# Patient Record
Sex: MALE | Race: WHITE | NOT HISPANIC OR LATINO | Employment: OTHER | URBAN - METROPOLITAN AREA
[De-identification: names, ages, dates, MRNs, and addresses within clinical notes are randomized per-mention and may not be internally consistent; named-entity substitution may affect disease eponyms.]

---

## 2017-08-15 ENCOUNTER — ALLSCRIPTS OFFICE VISIT (OUTPATIENT)
Dept: OTHER | Facility: OTHER | Age: 72
End: 2017-08-15

## 2017-08-15 ENCOUNTER — TRANSCRIBE ORDERS (OUTPATIENT)
Dept: ADMINISTRATIVE | Facility: HOSPITAL | Age: 72
End: 2017-08-15

## 2017-08-15 ENCOUNTER — APPOINTMENT (OUTPATIENT)
Dept: LAB | Facility: HOSPITAL | Age: 72
End: 2017-08-15
Attending: UROLOGY
Payer: COMMERCIAL

## 2017-08-15 DIAGNOSIS — R35.0 FREQUENCY OF MICTURITION: ICD-10-CM

## 2017-08-15 DIAGNOSIS — Z85.46 PERSONAL HISTORY OF MALIGNANT NEOPLASM OF PROSTATE: ICD-10-CM

## 2017-08-15 DIAGNOSIS — N28.1 RENAL CYST: Primary | ICD-10-CM

## 2017-08-15 DIAGNOSIS — N28.1 ACQUIRED CYST OF KIDNEY: ICD-10-CM

## 2017-08-15 LAB
BILIRUB UR QL STRIP: ABNORMAL
CLARITY UR: ABNORMAL
COLOR UR: ABNORMAL
GLUCOSE (HISTORICAL): ABNORMAL
HGB UR QL STRIP.AUTO: ABNORMAL
KETONES UR STRIP-MCNC: ABNORMAL MG/DL
LEUKOCYTE ESTERASE UR QL STRIP: ABNORMAL
NITRITE UR QL STRIP: POSITIVE
PH UR STRIP.AUTO: 5 [PH]
PROT UR STRIP-MCNC: ABNORMAL MG/DL
SP GR UR STRIP.AUTO: 1.02
UROBILINOGEN UR QL STRIP.AUTO: 0.2

## 2017-08-15 PROCEDURE — 87086 URINE CULTURE/COLONY COUNT: CPT

## 2017-08-16 LAB — BACTERIA UR CULT: NORMAL

## 2017-08-17 ENCOUNTER — GENERIC CONVERSION - ENCOUNTER (OUTPATIENT)
Dept: OTHER | Facility: OTHER | Age: 72
End: 2017-08-17

## 2017-09-13 ENCOUNTER — GENERIC CONVERSION - ENCOUNTER (OUTPATIENT)
Dept: OTHER | Facility: OTHER | Age: 72
End: 2017-09-13

## 2017-11-01 ENCOUNTER — TRANSCRIBE ORDERS (OUTPATIENT)
Dept: LAB | Facility: CLINIC | Age: 72
End: 2017-11-01

## 2017-11-01 ENCOUNTER — APPOINTMENT (OUTPATIENT)
Dept: LAB | Facility: CLINIC | Age: 72
End: 2017-11-01
Payer: COMMERCIAL

## 2017-11-01 DIAGNOSIS — Z85.46 PERSONAL HISTORY OF MALIGNANT NEOPLASM OF PROSTATE: ICD-10-CM

## 2017-11-01 LAB
ALBUMIN SERPL BCP-MCNC: 3.7 G/DL (ref 3.5–5)
ALP SERPL-CCNC: 72 U/L (ref 46–116)
ALT SERPL W P-5'-P-CCNC: 31 U/L (ref 12–78)
ANION GAP SERPL CALCULATED.3IONS-SCNC: 6 MMOL/L (ref 4–13)
AST SERPL W P-5'-P-CCNC: 20 U/L (ref 5–45)
BILIRUB SERPL-MCNC: 0.41 MG/DL (ref 0.2–1)
BUN SERPL-MCNC: 13 MG/DL (ref 5–25)
CALCIUM SERPL-MCNC: 9.7 MG/DL (ref 8.3–10.1)
CHLORIDE SERPL-SCNC: 106 MMOL/L (ref 100–108)
CO2 SERPL-SCNC: 29 MMOL/L (ref 21–32)
CREAT SERPL-MCNC: 0.96 MG/DL (ref 0.6–1.3)
GFR SERPL CREATININE-BSD FRML MDRD: 79 ML/MIN/1.73SQ M
GLUCOSE SERPL-MCNC: 82 MG/DL (ref 65–140)
POTASSIUM SERPL-SCNC: 4 MMOL/L (ref 3.5–5.3)
PROT SERPL-MCNC: 7 G/DL (ref 6.4–8.2)
PSA SERPL-MCNC: <0.1 NG/ML (ref 0–4)
SODIUM SERPL-SCNC: 141 MMOL/L (ref 136–145)

## 2017-11-01 PROCEDURE — 36415 COLL VENOUS BLD VENIPUNCTURE: CPT

## 2017-11-01 PROCEDURE — 80053 COMPREHEN METABOLIC PANEL: CPT

## 2017-11-01 PROCEDURE — 84153 ASSAY OF PSA TOTAL: CPT

## 2017-11-16 DIAGNOSIS — Z85.46 PERSONAL HISTORY OF MALIGNANT NEOPLASM OF PROSTATE: ICD-10-CM

## 2017-11-16 DIAGNOSIS — N28.1 ACQUIRED CYST OF KIDNEY: ICD-10-CM

## 2017-12-13 ENCOUNTER — ALLSCRIPTS OFFICE VISIT (OUTPATIENT)
Dept: OTHER | Facility: OTHER | Age: 72
End: 2017-12-13

## 2017-12-13 LAB
BILIRUB UR QL STRIP: ABNORMAL
CLARITY UR: ABNORMAL
COLOR UR: YELLOW
GLUCOSE (HISTORICAL): ABNORMAL
HGB UR QL STRIP.AUTO: ABNORMAL
KETONES UR STRIP-MCNC: ABNORMAL MG/DL
LEUKOCYTE ESTERASE UR QL STRIP: ABNORMAL
NITRITE UR QL STRIP: ABNORMAL
PH UR STRIP.AUTO: 7 [PH]
PROT UR STRIP-MCNC: ABNORMAL MG/DL
SP GR UR STRIP.AUTO: 1.01
UROBILINOGEN UR QL STRIP.AUTO: 0.2

## 2018-01-04 ENCOUNTER — GENERIC CONVERSION - ENCOUNTER (OUTPATIENT)
Dept: OTHER | Facility: OTHER | Age: 73
End: 2018-01-04

## 2018-01-13 NOTE — MISCELLANEOUS
Message   Recorded as Task   Date: 08/15/2017 03:43 PM, Created By: Jonah Rivero   Task Name: Miscellaneous   Assigned To: Breann Garcia   Regarding Patient: Miladis Lubin, Status: In Progress   Comment:    ErasmolazaraRupali pham - 15 Aug 2017 3:43 PM     TASK CREATED  ANGIE IS COMING IN FOR A LUPRON INJECTION AND OV WITH DR Sima Ramírez ON 9/13/17 AT 11:00  FIRST TIME LUPRON   Breann Garcia - 16 Aug 2017 11:47 AM     TASK EDITED  Will submit Benefit Verification  Breann Garcia - 16 Aug 2017 11:47 AM     TASK IN PROGRESS        Active Problems    1  Complex renal cyst (753 10) (N28 1)   2  Malignant neoplasm of prostate (185) (C61)   3  Urinary frequency (788 41) (R35 0)    Allergies    1   No Known Drug Allergies    Signatures   Electronically signed by : Billie Vieira RN; Aug 17 2017  3:58PM EST                       (Author)

## 2018-01-22 VITALS
WEIGHT: 254 LBS | BODY MASS INDEX: 38.49 KG/M2 | HEIGHT: 68 IN | SYSTOLIC BLOOD PRESSURE: 118 MMHG | DIASTOLIC BLOOD PRESSURE: 78 MMHG

## 2018-01-22 VITALS
WEIGHT: 248 LBS | DIASTOLIC BLOOD PRESSURE: 82 MMHG | HEIGHT: 68 IN | BODY MASS INDEX: 37.59 KG/M2 | SYSTOLIC BLOOD PRESSURE: 132 MMHG

## 2018-01-23 VITALS
DIASTOLIC BLOOD PRESSURE: 84 MMHG | SYSTOLIC BLOOD PRESSURE: 130 MMHG | HEIGHT: 68 IN | BODY MASS INDEX: 37.28 KG/M2 | WEIGHT: 246 LBS

## 2018-01-24 VITALS — BODY MASS INDEX: 39.68 KG/M2 | WEIGHT: 261 LBS

## 2018-03-01 ENCOUNTER — TELEPHONE (OUTPATIENT)
Dept: UROLOGY | Facility: MEDICAL CENTER | Age: 73
End: 2018-03-01

## 2019-06-17 ENCOUNTER — TRANSCRIBE ORDERS (OUTPATIENT)
Dept: LAB | Facility: MEDICAL CENTER | Age: 74
End: 2019-06-17

## 2019-06-17 ENCOUNTER — APPOINTMENT (OUTPATIENT)
Dept: LAB | Facility: MEDICAL CENTER | Age: 74
End: 2019-06-17
Payer: COMMERCIAL

## 2019-06-17 DIAGNOSIS — N18.9 CHRONIC RENAL IMPAIRMENT, UNSPECIFIED CKD STAGE: ICD-10-CM

## 2019-06-17 DIAGNOSIS — C61 PROSTATE CA (HCC): ICD-10-CM

## 2019-06-17 DIAGNOSIS — M19.90 ARTHRITIS: ICD-10-CM

## 2019-06-17 DIAGNOSIS — R73.9 ELEVATED BLOOD SUGAR: Primary | ICD-10-CM

## 2019-06-17 DIAGNOSIS — R53.83 OTHER FATIGUE: ICD-10-CM

## 2019-06-17 DIAGNOSIS — R73.9 ELEVATED BLOOD SUGAR: ICD-10-CM

## 2019-06-17 DIAGNOSIS — G20 PARKINSON'S DISEASE (HCC): ICD-10-CM

## 2019-06-17 DIAGNOSIS — E78.5 HYPERLIPIDEMIA, UNSPECIFIED HYPERLIPIDEMIA TYPE: ICD-10-CM

## 2019-06-17 LAB
ALBUMIN SERPL BCP-MCNC: 3.8 G/DL (ref 3.5–5)
ALP SERPL-CCNC: 75 U/L (ref 46–116)
ALT SERPL W P-5'-P-CCNC: 34 U/L (ref 12–78)
ANION GAP SERPL CALCULATED.3IONS-SCNC: 3 MMOL/L (ref 4–13)
AST SERPL W P-5'-P-CCNC: 25 U/L (ref 5–45)
BILIRUB SERPL-MCNC: 0.35 MG/DL (ref 0.2–1)
BUN SERPL-MCNC: 25 MG/DL (ref 5–25)
CALCIUM SERPL-MCNC: 9.3 MG/DL (ref 8.3–10.1)
CHLORIDE SERPL-SCNC: 109 MMOL/L (ref 100–108)
CHOLEST SERPL-MCNC: 177 MG/DL (ref 50–200)
CO2 SERPL-SCNC: 27 MMOL/L (ref 21–32)
CREAT SERPL-MCNC: 1.56 MG/DL (ref 0.6–1.3)
ERYTHROCYTE [DISTWIDTH] IN BLOOD BY AUTOMATED COUNT: 13.5 % (ref 11.6–15.1)
EST. AVERAGE GLUCOSE BLD GHB EST-MCNC: 114 MG/DL
GFR SERPL CREATININE-BSD FRML MDRD: 43 ML/MIN/1.73SQ M
GLUCOSE P FAST SERPL-MCNC: 89 MG/DL (ref 65–99)
HBA1C MFR BLD: 5.6 % (ref 4.2–6.3)
HCT VFR BLD AUTO: 46.4 % (ref 36.5–49.3)
HDLC SERPL-MCNC: 27 MG/DL (ref 40–60)
HGB BLD-MCNC: 14.4 G/DL (ref 12–17)
LDLC SERPL CALC-MCNC: 109 MG/DL (ref 0–100)
MCH RBC QN AUTO: 27.5 PG (ref 26.8–34.3)
MCHC RBC AUTO-ENTMCNC: 31 G/DL (ref 31.4–37.4)
MCV RBC AUTO: 89 FL (ref 82–98)
NONHDLC SERPL-MCNC: 150 MG/DL
PLATELET # BLD AUTO: 209 THOUSANDS/UL (ref 149–390)
PMV BLD AUTO: 10.6 FL (ref 8.9–12.7)
POTASSIUM SERPL-SCNC: 4.7 MMOL/L (ref 3.5–5.3)
PROT SERPL-MCNC: 7.3 G/DL (ref 6.4–8.2)
PSA SERPL-MCNC: <0.1 NG/ML (ref 0–4)
RBC # BLD AUTO: 5.23 MILLION/UL (ref 3.88–5.62)
SODIUM SERPL-SCNC: 139 MMOL/L (ref 136–145)
TRIGL SERPL-MCNC: 205 MG/DL
WBC # BLD AUTO: 6.11 THOUSAND/UL (ref 4.31–10.16)

## 2019-06-17 PROCEDURE — 85027 COMPLETE CBC AUTOMATED: CPT

## 2019-06-17 PROCEDURE — 83036 HEMOGLOBIN GLYCOSYLATED A1C: CPT

## 2019-06-17 PROCEDURE — 80053 COMPREHEN METABOLIC PANEL: CPT

## 2019-06-17 PROCEDURE — G0103 PSA SCREENING: HCPCS

## 2019-06-17 PROCEDURE — 36415 COLL VENOUS BLD VENIPUNCTURE: CPT

## 2019-06-17 PROCEDURE — 80061 LIPID PANEL: CPT

## 2019-12-18 ENCOUNTER — TRANSCRIBE ORDERS (OUTPATIENT)
Dept: LAB | Facility: MEDICAL CENTER | Age: 74
End: 2019-12-18

## 2019-12-18 ENCOUNTER — APPOINTMENT (OUTPATIENT)
Dept: LAB | Facility: MEDICAL CENTER | Age: 74
End: 2019-12-18
Payer: COMMERCIAL

## 2019-12-18 DIAGNOSIS — G20 PARKINSON'S DISEASE (HCC): ICD-10-CM

## 2019-12-18 DIAGNOSIS — M10.9 GOUT, UNSPECIFIED CAUSE, UNSPECIFIED CHRONICITY, UNSPECIFIED SITE: ICD-10-CM

## 2019-12-18 DIAGNOSIS — M25.519 SHOULDER PAIN, UNSPECIFIED CHRONICITY, UNSPECIFIED LATERALITY: ICD-10-CM

## 2019-12-18 DIAGNOSIS — M25.50 ARTHRALGIA, UNSPECIFIED JOINT: ICD-10-CM

## 2019-12-18 DIAGNOSIS — E53.8 LOW VITAMIN B12 LEVEL: ICD-10-CM

## 2019-12-18 DIAGNOSIS — M25.519 SHOULDER PAIN, UNSPECIFIED CHRONICITY, UNSPECIFIED LATERALITY: Primary | ICD-10-CM

## 2019-12-18 LAB
ALBUMIN SERPL BCP-MCNC: 3.8 G/DL (ref 3.5–5)
ALP SERPL-CCNC: 78 U/L (ref 46–116)
ALT SERPL W P-5'-P-CCNC: 35 U/L (ref 12–78)
ANION GAP SERPL CALCULATED.3IONS-SCNC: 5 MMOL/L (ref 4–13)
AST SERPL W P-5'-P-CCNC: 17 U/L (ref 5–45)
BILIRUB SERPL-MCNC: 0.54 MG/DL (ref 0.2–1)
BUN SERPL-MCNC: 19 MG/DL (ref 5–25)
CALCIUM SERPL-MCNC: 9.7 MG/DL (ref 8.3–10.1)
CHLORIDE SERPL-SCNC: 110 MMOL/L (ref 100–108)
CHOLEST SERPL-MCNC: 200 MG/DL (ref 50–200)
CO2 SERPL-SCNC: 26 MMOL/L (ref 21–32)
CREAT SERPL-MCNC: 1.58 MG/DL (ref 0.6–1.3)
ERYTHROCYTE [DISTWIDTH] IN BLOOD BY AUTOMATED COUNT: 13.2 % (ref 11.6–15.1)
GFR SERPL CREATININE-BSD FRML MDRD: 42 ML/MIN/1.73SQ M
GLUCOSE SERPL-MCNC: 89 MG/DL (ref 65–140)
HCT VFR BLD AUTO: 49.2 % (ref 36.5–49.3)
HDLC SERPL-MCNC: 33 MG/DL
HGB BLD-MCNC: 15.2 G/DL (ref 12–17)
LDLC SERPL CALC-MCNC: 122 MG/DL (ref 0–100)
MCH RBC QN AUTO: 27.6 PG (ref 26.8–34.3)
MCHC RBC AUTO-ENTMCNC: 30.9 G/DL (ref 31.4–37.4)
MCV RBC AUTO: 89 FL (ref 82–98)
NONHDLC SERPL-MCNC: 167 MG/DL
PLATELET # BLD AUTO: 230 THOUSANDS/UL (ref 149–390)
PMV BLD AUTO: 10.8 FL (ref 8.9–12.7)
POTASSIUM SERPL-SCNC: 4.3 MMOL/L (ref 3.5–5.3)
PROT SERPL-MCNC: 7.4 G/DL (ref 6.4–8.2)
RBC # BLD AUTO: 5.51 MILLION/UL (ref 3.88–5.62)
SODIUM SERPL-SCNC: 141 MMOL/L (ref 136–145)
TRIGL SERPL-MCNC: 224 MG/DL
URATE SERPL-MCNC: 8.4 MG/DL (ref 4.2–8)
VIT B12 SERPL-MCNC: 645 PG/ML (ref 100–900)
WBC # BLD AUTO: 6.81 THOUSAND/UL (ref 4.31–10.16)

## 2019-12-18 PROCEDURE — 84550 ASSAY OF BLOOD/URIC ACID: CPT

## 2019-12-18 PROCEDURE — 80061 LIPID PANEL: CPT

## 2019-12-18 PROCEDURE — 82607 VITAMIN B-12: CPT

## 2019-12-18 PROCEDURE — 36415 COLL VENOUS BLD VENIPUNCTURE: CPT

## 2019-12-18 PROCEDURE — 80053 COMPREHEN METABOLIC PANEL: CPT

## 2019-12-18 PROCEDURE — 85027 COMPLETE CBC AUTOMATED: CPT

## 2020-06-22 ENCOUNTER — TRANSCRIBE ORDERS (OUTPATIENT)
Dept: LAB | Facility: MEDICAL CENTER | Age: 75
End: 2020-06-22

## 2020-06-22 ENCOUNTER — APPOINTMENT (OUTPATIENT)
Dept: LAB | Facility: MEDICAL CENTER | Age: 75
End: 2020-06-22
Payer: COMMERCIAL

## 2020-06-22 DIAGNOSIS — M19.90 ARTHRITIS: ICD-10-CM

## 2020-06-22 DIAGNOSIS — G20 PARKINSON'S DISEASE (HCC): ICD-10-CM

## 2020-06-22 DIAGNOSIS — R53.83 FATIGUE, UNSPECIFIED TYPE: ICD-10-CM

## 2020-06-22 DIAGNOSIS — R53.83 FATIGUE, UNSPECIFIED TYPE: Primary | ICD-10-CM

## 2020-06-22 LAB
ANION GAP SERPL CALCULATED.3IONS-SCNC: 5 MMOL/L (ref 4–13)
BUN SERPL-MCNC: 18 MG/DL (ref 5–25)
CALCIUM SERPL-MCNC: 9.4 MG/DL (ref 8.3–10.1)
CHLORIDE SERPL-SCNC: 110 MMOL/L (ref 100–108)
CO2 SERPL-SCNC: 25 MMOL/L (ref 21–32)
CREAT SERPL-MCNC: 1.45 MG/DL (ref 0.6–1.3)
ERYTHROCYTE [DISTWIDTH] IN BLOOD BY AUTOMATED COUNT: 13.3 % (ref 11.6–15.1)
GFR SERPL CREATININE-BSD FRML MDRD: 47 ML/MIN/1.73SQ M
GLUCOSE P FAST SERPL-MCNC: 96 MG/DL (ref 65–99)
HCT VFR BLD AUTO: 47.4 % (ref 36.5–49.3)
HGB BLD-MCNC: 15.1 G/DL (ref 12–17)
MCH RBC QN AUTO: 27.7 PG (ref 26.8–34.3)
MCHC RBC AUTO-ENTMCNC: 31.9 G/DL (ref 31.4–37.4)
MCV RBC AUTO: 87 FL (ref 82–98)
PLATELET # BLD AUTO: 205 THOUSANDS/UL (ref 149–390)
PMV BLD AUTO: 10.5 FL (ref 8.9–12.7)
POTASSIUM SERPL-SCNC: 4.1 MMOL/L (ref 3.5–5.3)
RBC # BLD AUTO: 5.45 MILLION/UL (ref 3.88–5.62)
SODIUM SERPL-SCNC: 140 MMOL/L (ref 136–145)
T4 FREE SERPL-MCNC: 1 NG/DL (ref 0.76–1.46)
TSH SERPL DL<=0.05 MIU/L-ACNC: 0.93 UIU/ML (ref 0.36–3.74)
WBC # BLD AUTO: 5.56 THOUSAND/UL (ref 4.31–10.16)

## 2020-06-22 PROCEDURE — 84443 ASSAY THYROID STIM HORMONE: CPT

## 2020-06-22 PROCEDURE — 85027 COMPLETE CBC AUTOMATED: CPT

## 2020-06-22 PROCEDURE — 36415 COLL VENOUS BLD VENIPUNCTURE: CPT

## 2020-06-22 PROCEDURE — 84439 ASSAY OF FREE THYROXINE: CPT

## 2020-06-22 PROCEDURE — 80048 BASIC METABOLIC PNL TOTAL CA: CPT

## 2020-06-22 PROCEDURE — 86618 LYME DISEASE ANTIBODY: CPT

## 2020-06-23 LAB — B BURGDOR IGG+IGM SER-ACNC: <0.91 ISR (ref 0–0.9)

## 2020-06-24 ENCOUNTER — TRANSCRIBE ORDERS (OUTPATIENT)
Dept: NON INVASIVE DIAGNOSTICS | Facility: CLINIC | Age: 75
End: 2020-06-24

## 2020-06-24 DIAGNOSIS — R06.02 SOB (SHORTNESS OF BREATH): Primary | ICD-10-CM

## 2020-06-24 DIAGNOSIS — R94.31 ABNORMAL EKG: ICD-10-CM

## 2020-07-08 ENCOUNTER — HOSPITAL ENCOUNTER (OUTPATIENT)
Dept: NON INVASIVE DIAGNOSTICS | Facility: CLINIC | Age: 75
Discharge: HOME | End: 2020-07-08
Payer: COMMERCIAL

## 2020-07-08 DIAGNOSIS — R94.31 ABNORMAL EKG: ICD-10-CM

## 2020-07-08 DIAGNOSIS — R06.02 SOB (SHORTNESS OF BREATH): ICD-10-CM

## 2020-07-08 PROCEDURE — 93350 STRESS TTE ONLY: CPT

## 2020-07-08 PROCEDURE — 93351 STRESS TTE COMPLETE: CPT | Performed by: INTERNAL MEDICINE

## 2020-07-09 ENCOUNTER — OFFICE VISIT (OUTPATIENT)
Dept: CARDIOLOGY CLINIC | Facility: CLINIC | Age: 75
End: 2020-07-09
Payer: COMMERCIAL

## 2020-07-09 VITALS
BODY MASS INDEX: 36.41 KG/M2 | DIASTOLIC BLOOD PRESSURE: 82 MMHG | SYSTOLIC BLOOD PRESSURE: 114 MMHG | WEIGHT: 232 LBS | HEART RATE: 68 BPM | HEIGHT: 67 IN

## 2020-07-09 DIAGNOSIS — N28.9 RENAL INSUFFICIENCY: ICD-10-CM

## 2020-07-09 DIAGNOSIS — E78.5 DYSLIPIDEMIA: ICD-10-CM

## 2020-07-09 DIAGNOSIS — I25.10 CORONARY ARTERY DISEASE INVOLVING NATIVE CORONARY ARTERY OF NATIVE HEART, ANGINA PRESENCE UNSPECIFIED: Primary | ICD-10-CM

## 2020-07-09 LAB
CHEST PAIN STATEMENT: NORMAL
MAX DIASTOLIC BP: 90 MMHG
MAX HEART RATE: 113 BPM
MAX PREDICTED HEART RATE: 145 BPM
MAX. SYSTOLIC BP: 180 MMHG
PROTOCOL NAME: NORMAL
TARGET HR FORMULA: NORMAL
TEST INDICATION: NORMAL
TIME IN EXERCISE PHASE: NORMAL

## 2020-07-09 PROCEDURE — 99204 OFFICE O/P NEW MOD 45 MIN: CPT | Performed by: INTERNAL MEDICINE

## 2020-07-09 RX ORDER — PREDNISONE 10 MG/1
TABLET ORAL
COMMUNITY
Start: 2017-11-10

## 2020-07-09 RX ORDER — OXYCODONE HYDROCHLORIDE AND ACETAMINOPHEN 5; 325 MG/1; MG/1
TABLET ORAL
COMMUNITY
Start: 2020-04-10 | End: 2020-09-07 | Stop reason: HOSPADM

## 2020-07-09 RX ORDER — ASPIRIN 81 MG/1
81 TABLET, CHEWABLE ORAL DAILY
Start: 2020-07-09 | End: 2020-09-07 | Stop reason: HOSPADM

## 2020-07-09 NOTE — PROGRESS NOTES
Patient ID: Shira Peñaloza is a 76 y o  male  Plan:      Renal insufficiency  Mild but avoid ventriculography if feasible at the time of cardiac cath  Prior Left nephrectomy for CA  Coronary artery disease involving native coronary artery of native heart  6 months symptoms consistent with CAD as is the stress echo  Will add aspirin and arrange for cath  Dyslipidemia  Will certainly treat this if coronary angiography is abnormal        Follow up Plan:  Depending upon coronary angiography  Patient is anxious about this test   We discussed the purpose at some length  HPI:  Patient is seen today in consultation Dr Erin Brewster regarding exertional weakness and abnormal stress test   For the past 6 months the patient has felt weak with very minor effort  There has been intermittent left chest pain  Mainly this seems exertional but not entirely clear  A stress echo was performed yesterday  Resting echo was normal but there was significant inferior wall hypokinesia immediately post exercise described  I am asked to comment further  The patient has not had any prior heart attack  or stroke  He is plagued by recent onset of parkinsonism which prevents fast walking            Most recent or relevant cardiac/vascular testing:    Stress echo 07/08/2020:  Normal LV systolic function at rest   Inferior wall ischemia by echo criteria  Past Surgical History:   Procedure Laterality Date    NEPHRECTOMY Left 2018     CMP:   Lab Results   Component Value Date    K 4 1 06/22/2020     (H) 06/22/2020    CO2 25 06/22/2020    BUN 18 06/22/2020    CREATININE 1 45 (H) 06/22/2020    EGFR 47 06/22/2020       Lipid Profile:   Lab Results   Component Value Date    TRIG 224 (H) 12/18/2019    HDL 33 (L) 12/18/2019         Review of Systems   10  point ROS  was otherwise non pertinent or negative except as per HPI or as below  Gait:  Slow          Objective:     /82   Pulse 68   Ht 5' 7" (7 702 m)   Wt 105 kg (232 lb)   BMI 36 34 kg/m²     PHYSICAL EXAM:    General:  Normal appearance in no distress  Eyes:  Anicteric  Oral mucosa:  Moist   Neck:  No JVD  Carotid upstrokes are brisk without bruits  No masses  Chest:  Clear to auscultation and percussion  Cardiac:  Normal PMI  Normal S1 and S2  No murmur gallop or rub  Abdomen:  Soft and nontender  No palpable organomegaly or aortic enlargement  Extremities:  No peripheral edema  Musculoskeletal:  Symmetric  Vascular:  Femoral pulses are brisk without bruits  Popliteal pulses are intact bilaterally  Pedal pulses are intact  Neuro:  Grossly symmetric  Resting tremor particularly in the right hand  Psych:  Alert and oriented x3          Current Outpatient Medications:     oxyCODONE-acetaminophen (PERCOCET) 5-325 mg per tablet, TAKE 1 TO 2 TABLETS BY MOUTH EVERY 4 TO 6 HOURS AS NEEDED FOR PAIN NO MORE THAN 6 TABLETS PER DAY, Disp: , Rfl:     predniSONE 10 mg tablet, Take 10 mg by oral route as needed for gout , Disp: , Rfl:     aspirin 81 mg chewable tablet, Chew 1 tablet (81 mg total) daily, Disp: , Rfl:   Allergies   Allergen Reactions    Indomethacin Shortness Of Breath     Past Medical History:   Diagnosis Date    Anxiety     Chronic kidney disease (CKD), stage III (moderate)     GERD (gastroesophageal reflux disease)     Parkinson disease     Prostate carcinoma     Renal cell carcinoma, left            Social History     Tobacco Use   Smoking Status Former Smoker   Smokeless Tobacco Never Used

## 2020-07-09 NOTE — H&P (VIEW-ONLY)
Patient ID: Zully Perez is a 76 y o  male  Plan:      Renal insufficiency  Mild but avoid ventriculography if feasible at the time of cardiac cath  Prior Left nephrectomy for CA  Coronary artery disease involving native coronary artery of native heart  6 months symptoms consistent with CAD as is the stress echo  Will add aspirin and arrange for cath  Dyslipidemia  Will certainly treat this if coronary angiography is abnormal        Follow up Plan:  Depending upon coronary angiography  Patient is anxious about this test   We discussed the purpose at some length  HPI:  Patient is seen today in consultation Dr Dirk Chacko regarding exertional weakness and abnormal stress test   For the past 6 months the patient has felt weak with very minor effort  There has been intermittent left chest pain  Mainly this seems exertional but not entirely clear  A stress echo was performed yesterday  Resting echo was normal but there was significant inferior wall hypokinesia immediately post exercise described  I am asked to comment further  The patient has not had any prior heart attack  or stroke  He is plagued by recent onset of parkinsonism which prevents fast walking            Most recent or relevant cardiac/vascular testing:    Stress echo 07/08/2020:  Normal LV systolic function at rest   Inferior wall ischemia by echo criteria  Past Surgical History:   Procedure Laterality Date    NEPHRECTOMY Left 2018     CMP:   Lab Results   Component Value Date    K 4 1 06/22/2020     (H) 06/22/2020    CO2 25 06/22/2020    BUN 18 06/22/2020    CREATININE 1 45 (H) 06/22/2020    EGFR 47 06/22/2020       Lipid Profile:   Lab Results   Component Value Date    TRIG 224 (H) 12/18/2019    HDL 33 (L) 12/18/2019         Review of Systems   10  point ROS  was otherwise non pertinent or negative except as per HPI or as below  Gait:  Slow          Objective:     /82   Pulse 68   Ht 5' 7" (0 702 m)   Wt 105 kg (232 lb)   BMI 36 34 kg/m²     PHYSICAL EXAM:    General:  Normal appearance in no distress  Eyes:  Anicteric  Oral mucosa:  Moist   Neck:  No JVD  Carotid upstrokes are brisk without bruits  No masses  Chest:  Clear to auscultation and percussion  Cardiac:  Normal PMI  Normal S1 and S2  No murmur gallop or rub  Abdomen:  Soft and nontender  No palpable organomegaly or aortic enlargement  Extremities:  No peripheral edema  Musculoskeletal:  Symmetric  Vascular:  Femoral pulses are brisk without bruits  Popliteal pulses are intact bilaterally  Pedal pulses are intact  Neuro:  Grossly symmetric  Resting tremor particularly in the right hand  Psych:  Alert and oriented x3          Current Outpatient Medications:     oxyCODONE-acetaminophen (PERCOCET) 5-325 mg per tablet, TAKE 1 TO 2 TABLETS BY MOUTH EVERY 4 TO 6 HOURS AS NEEDED FOR PAIN NO MORE THAN 6 TABLETS PER DAY, Disp: , Rfl:     predniSONE 10 mg tablet, Take 10 mg by oral route as needed for gout , Disp: , Rfl:     aspirin 81 mg chewable tablet, Chew 1 tablet (81 mg total) daily, Disp: , Rfl:   Allergies   Allergen Reactions    Indomethacin Shortness Of Breath     Past Medical History:   Diagnosis Date    Anxiety     Chronic kidney disease (CKD), stage III (moderate)     GERD (gastroesophageal reflux disease)     Parkinson disease     Prostate carcinoma     Renal cell carcinoma, left            Social History     Tobacco Use   Smoking Status Former Smoker   Smokeless Tobacco Never Used

## 2020-07-09 NOTE — ASSESSMENT & PLAN NOTE
Mild but avoid ventriculography if feasible at the time of cardiac cath  Prior Left nephrectomy for CA

## 2020-07-10 ENCOUNTER — TELEPHONE (OUTPATIENT)
Dept: CARDIOLOGY CLINIC | Facility: CLINIC | Age: 75
End: 2020-07-10

## 2020-07-10 DIAGNOSIS — I25.10 CORONARY ARTERY DISEASE INVOLVING NATIVE CORONARY ARTERY OF NATIVE HEART, ANGINA PRESENCE UNSPECIFIED: Primary | ICD-10-CM

## 2020-07-10 NOTE — TELEPHONE ENCOUNTER
Patient scheduled for [facility] on 7/23/20 in [facility] location with Dr Mary Kay Henson  Patient aware of all general instructions  Blood work orders placed  [facility], can you check for prior auth

## 2020-07-14 NOTE — TELEPHONE ENCOUNTER
His cath [id number] on 7/23/20 at [facility] was approved    Auth# [id number]  7/14/20-1/20/21

## 2020-07-17 RX ORDER — SODIUM CHLORIDE 9 MG/ML
100 INJECTION, SOLUTION INTRAVENOUS CONTINUOUS
Status: CANCELLED | OUTPATIENT
Start: 2020-07-17

## 2020-07-17 RX ORDER — ASPIRIN 81 MG/1
243 TABLET, CHEWABLE ORAL DAILY
Status: CANCELLED | OUTPATIENT
Start: 2020-07-17

## 2020-07-20 ENCOUNTER — APPOINTMENT (OUTPATIENT)
Dept: LAB | Facility: CLINIC | Age: 75
End: 2020-07-20
Payer: COMMERCIAL

## 2020-07-20 DIAGNOSIS — I25.10 CORONARY ARTERY DISEASE INVOLVING NATIVE CORONARY ARTERY OF NATIVE HEART, ANGINA PRESENCE UNSPECIFIED: ICD-10-CM

## 2020-07-20 LAB
ALBUMIN SERPL BCP-MCNC: 3.9 G/DL (ref 3.5–5)
ALP SERPL-CCNC: 73 U/L (ref 46–116)
ALT SERPL W P-5'-P-CCNC: 41 U/L (ref 12–78)
ANION GAP SERPL CALCULATED.3IONS-SCNC: 7 MMOL/L (ref 4–13)
AST SERPL W P-5'-P-CCNC: 21 U/L (ref 5–45)
BASOPHILS # BLD AUTO: 0.07 THOUSANDS/ΜL (ref 0–0.1)
BASOPHILS NFR BLD AUTO: 1 % (ref 0–1)
BILIRUB SERPL-MCNC: 0.76 MG/DL (ref 0.2–1)
BUN SERPL-MCNC: 21 MG/DL (ref 5–25)
CALCIUM ALBUM COR SERPL-MCNC: 10.4 MG/DL (ref 8.3–10.1)
CALCIUM SERPL-MCNC: 10.3 MG/DL (ref 8.3–10.1)
CHLORIDE SERPL-SCNC: 111 MMOL/L (ref 100–108)
CO2 SERPL-SCNC: 23 MMOL/L (ref 21–32)
CREAT SERPL-MCNC: 1.35 MG/DL (ref 0.6–1.3)
EOSINOPHIL # BLD AUTO: 0.09 THOUSAND/ΜL (ref 0–0.61)
EOSINOPHIL NFR BLD AUTO: 1 % (ref 0–6)
ERYTHROCYTE [DISTWIDTH] IN BLOOD BY AUTOMATED COUNT: 13.4 % (ref 11.6–15.1)
GFR SERPL CREATININE-BSD FRML MDRD: 51 ML/MIN/1.73SQ M
GLUCOSE SERPL-MCNC: 82 MG/DL (ref 65–140)
HCT VFR BLD AUTO: 51.3 % (ref 36.5–49.3)
HGB BLD-MCNC: 16.1 G/DL (ref 12–17)
IMM GRANULOCYTES # BLD AUTO: 0.03 THOUSAND/UL (ref 0–0.2)
IMM GRANULOCYTES NFR BLD AUTO: 1 % (ref 0–2)
LYMPHOCYTES # BLD AUTO: 1.18 THOUSANDS/ΜL (ref 0.6–4.47)
LYMPHOCYTES NFR BLD AUTO: 18 % (ref 14–44)
MCH RBC QN AUTO: 27.6 PG (ref 26.8–34.3)
MCHC RBC AUTO-ENTMCNC: 31.4 G/DL (ref 31.4–37.4)
MCV RBC AUTO: 88 FL (ref 82–98)
MONOCYTES # BLD AUTO: 0.74 THOUSAND/ΜL (ref 0.17–1.22)
MONOCYTES NFR BLD AUTO: 12 % (ref 4–12)
NEUTROPHILS # BLD AUTO: 4.32 THOUSANDS/ΜL (ref 1.85–7.62)
NEUTS SEG NFR BLD AUTO: 67 % (ref 43–75)
NRBC BLD AUTO-RTO: 0 /100 WBCS
PLATELET # BLD AUTO: 253 THOUSANDS/UL (ref 149–390)
PMV BLD AUTO: 11 FL (ref 8.9–12.7)
POTASSIUM SERPL-SCNC: 4.2 MMOL/L (ref 3.5–5.3)
PROT SERPL-MCNC: 7 G/DL (ref 6.4–8.2)
RBC # BLD AUTO: 5.84 MILLION/UL (ref 3.88–5.62)
SODIUM SERPL-SCNC: 141 MMOL/L (ref 136–145)
WBC # BLD AUTO: 6.43 THOUSAND/UL (ref 4.31–10.16)

## 2020-07-20 PROCEDURE — 36415 COLL VENOUS BLD VENIPUNCTURE: CPT

## 2020-07-20 PROCEDURE — 85025 COMPLETE CBC W/AUTO DIFF WBC: CPT

## 2020-07-20 PROCEDURE — 80053 COMPREHEN METABOLIC PANEL: CPT

## 2020-07-22 ENCOUNTER — TELEPHONE (OUTPATIENT)
Dept: SURGERY | Facility: HOSPITAL | Age: 75
End: 2020-07-22

## 2020-07-23 ENCOUNTER — HOSPITAL ENCOUNTER (OUTPATIENT)
Dept: NON INVASIVE DIAGNOSTICS | Facility: HOSPITAL | Age: 75
Discharge: HOME | End: 2020-07-23
Attending: INTERNAL MEDICINE | Admitting: INTERNAL MEDICINE
Payer: COMMERCIAL

## 2020-07-23 VITALS
TEMPERATURE: 97.7 F | DIASTOLIC BLOOD PRESSURE: 78 MMHG | BODY MASS INDEX: 35.31 KG/M2 | RESPIRATION RATE: 20 BRPM | HEIGHT: 67 IN | OXYGEN SATURATION: 98 % | HEART RATE: 68 BPM | SYSTOLIC BLOOD PRESSURE: 133 MMHG | WEIGHT: 225 LBS

## 2020-07-23 DIAGNOSIS — I25.10 CORONARY ARTERY DISEASE INVOLVING NATIVE CORONARY ARTERY OF NATIVE HEART, ANGINA PRESENCE UNSPECIFIED: ICD-10-CM

## 2020-07-23 LAB
ATRIAL RATE: 63 BPM
P AXIS: 40 DEGREES
PR INTERVAL: 278 MS
QRS AXIS: -2 DEGREES
QRSD INTERVAL: 88 MS
QT INTERVAL: 372 MS
QTC INTERVAL: 380 MS
T WAVE AXIS: 155 DEGREES
VENTRICULAR RATE: 63 BPM

## 2020-07-23 PROCEDURE — 99152 MOD SED SAME PHYS/QHP 5/>YRS: CPT | Performed by: INTERNAL MEDICINE

## 2020-07-23 PROCEDURE — C1760 CLOSURE DEV, VASC: HCPCS | Performed by: INTERNAL MEDICINE

## 2020-07-23 PROCEDURE — C1894 INTRO/SHEATH, NON-LASER: HCPCS | Performed by: INTERNAL MEDICINE

## 2020-07-23 PROCEDURE — 93010 ELECTROCARDIOGRAM REPORT: CPT | Performed by: INTERNAL MEDICINE

## 2020-07-23 PROCEDURE — C1769 GUIDE WIRE: HCPCS | Performed by: INTERNAL MEDICINE

## 2020-07-23 PROCEDURE — 99153 MOD SED SAME PHYS/QHP EA: CPT | Performed by: INTERNAL MEDICINE

## 2020-07-23 PROCEDURE — 93458 L HRT ARTERY/VENTRICLE ANGIO: CPT | Performed by: INTERNAL MEDICINE

## 2020-07-23 PROCEDURE — 93005 ELECTROCARDIOGRAM TRACING: CPT

## 2020-07-23 RX ORDER — MIDAZOLAM HYDROCHLORIDE 2 MG/2ML
INJECTION, SOLUTION INTRAMUSCULAR; INTRAVENOUS CODE/TRAUMA/SEDATION MEDICATION
Status: COMPLETED | OUTPATIENT
Start: 2020-07-23 | End: 2020-07-23

## 2020-07-23 RX ORDER — SODIUM CHLORIDE 9 MG/ML
200 INJECTION, SOLUTION INTRAVENOUS CONTINUOUS
Status: DISPENSED | OUTPATIENT
Start: 2020-07-23 | End: 2020-07-23

## 2020-07-23 RX ORDER — ISOSORBIDE MONONITRATE 30 MG/1
30 TABLET, EXTENDED RELEASE ORAL DAILY
Qty: 30 TABLET | Refills: 0 | Status: CANCELLED | OUTPATIENT
Start: 2020-07-23

## 2020-07-23 RX ORDER — ISOSORBIDE MONONITRATE 30 MG/1
30 TABLET, EXTENDED RELEASE ORAL DAILY
Qty: 30 TABLET | Refills: 0 | Status: SHIPPED | OUTPATIENT
Start: 2020-07-23 | End: 2020-08-26

## 2020-07-23 RX ORDER — FENTANYL CITRATE 50 UG/ML
INJECTION, SOLUTION INTRAMUSCULAR; INTRAVENOUS CODE/TRAUMA/SEDATION MEDICATION
Status: COMPLETED | OUTPATIENT
Start: 2020-07-23 | End: 2020-07-23

## 2020-07-23 RX ORDER — OXYCODONE HYDROCHLORIDE AND ACETAMINOPHEN 5; 325 MG/1; MG/1
1 TABLET ORAL EVERY 4 HOURS PRN
Status: DISCONTINUED | OUTPATIENT
Start: 2020-07-23 | End: 2020-07-24 | Stop reason: HOSPADM

## 2020-07-23 RX ORDER — SODIUM CHLORIDE 9 MG/ML
100 INJECTION, SOLUTION INTRAVENOUS CONTINUOUS
Status: DISCONTINUED | OUTPATIENT
Start: 2020-07-23 | End: 2020-07-23

## 2020-07-23 RX ORDER — ASPIRIN 81 MG/1
243 TABLET, CHEWABLE ORAL DAILY
Status: DISCONTINUED | OUTPATIENT
Start: 2020-07-23 | End: 2020-07-24 | Stop reason: HOSPADM

## 2020-07-23 RX ORDER — ACETAMINOPHEN 325 MG/1
650 TABLET ORAL EVERY 4 HOURS PRN
Status: DISCONTINUED | OUTPATIENT
Start: 2020-07-23 | End: 2020-07-24 | Stop reason: HOSPADM

## 2020-07-23 RX ORDER — LIDOCAINE HYDROCHLORIDE 10 MG/ML
INJECTION, SOLUTION EPIDURAL; INFILTRATION; INTRACAUDAL; PERINEURAL CODE/TRAUMA/SEDATION MEDICATION
Status: COMPLETED | OUTPATIENT
Start: 2020-07-23 | End: 2020-07-23

## 2020-07-23 RX ADMIN — IOHEXOL 55 ML: 350 INJECTION, SOLUTION INTRAVENOUS at 08:11

## 2020-07-23 RX ADMIN — MIDAZOLAM 1 MG: 1 INJECTION INTRAMUSCULAR; INTRAVENOUS at 07:57

## 2020-07-23 RX ADMIN — MIDAZOLAM 2 MG: 1 INJECTION INTRAMUSCULAR; INTRAVENOUS at 07:50

## 2020-07-23 RX ADMIN — FENTANYL CITRATE 50 MCG: 50 INJECTION, SOLUTION INTRAMUSCULAR; INTRAVENOUS at 07:57

## 2020-07-23 RX ADMIN — SODIUM CHLORIDE 100 ML/HR: 0.9 INJECTION, SOLUTION INTRAVENOUS at 07:34

## 2020-07-23 RX ADMIN — LIDOCAINE HYDROCHLORIDE 10 ML: 10 INJECTION, SOLUTION EPIDURAL; INFILTRATION; INTRACAUDAL; PERINEURAL at 07:54

## 2020-07-23 RX ADMIN — FENTANYL CITRATE 50 MCG: 50 INJECTION, SOLUTION INTRAMUSCULAR; INTRAVENOUS at 08:11

## 2020-07-23 RX ADMIN — FENTANYL CITRATE 50 MCG: 50 INJECTION, SOLUTION INTRAMUSCULAR; INTRAVENOUS at 07:50

## 2020-07-23 NOTE — DISCHARGE INSTRUCTIONS
We have arranged an appointment for you to see Cardiac surgery on July 27th 2020  The details are included in these discharge instructions  Until then please begin taking metoprolol 12 5 mg twice daily and Imdur 30 mg daily  These scripts have been sent to your pharmacy  Continue taking aspirin  If you have any chest pain, chest pressure, dizziness, passing out, or difficulty breathing please do not hesitate to call 911 immediately and come to the hospital   No strenuous activity  After Heart Catheterization   AMBULATORY CARE:   Call 911 for any of the following:   · You have any of the following signs of a heart attack:      ¨ Squeezing, pressure, or pain in your chest that lasts longer than 5 minutes or returns    ¨ Discomfort or pain in your back, neck, jaw, stomach, or arm     ¨ Trouble breathing    ¨ Nausea or vomiting    ¨ Lightheadedness or a sudden cold sweat, especially with chest pain or trouble breathing    · You have any of the following signs of a stroke:      ¨ Numbness or drooping on one side of your face     ¨ Weakness in an arm or leg    ¨ Confusion or difficulty speaking    ¨ Dizziness, a severe headache, or vision loss    · You feel lightheaded, short of breath, and have chest pain  · You cough up blood  · You have trouble breathing  · You cannot stop the bleeding from your wound even after you hold firm pressure for 10 minutes  Seek care immediately if:   · Blood soaks through your bandage  · Your stitches come apart  · Your arm or leg feels numb, cool, or looks pale  · Your wound gets swollen quickly  Contact your healthcare provider if:   · You have a fever or chills  · Your wound is red, swollen, or draining pus  · Your wound looks more bruised or you have new bruising on the side of your leg or arm  · You have nausea or are vomiting  · Your skin is itchy, swollen, or you have a rash      · You have questions or concerns about your condition or care   Medicines: You may need any of the following:  · Blood thinners    help prevent blood clots  Examples of blood thinners include heparin and warfarin  Clots can cause strokes, heart attacks, and death  The following are general safety guidelines to follow while you are taking a blood thinner:    ¨ Watch for bleeding and bruising while you take blood thinners  Watch for bleeding from your gums or nose  Watch for blood in your urine and bowel movements  Use a soft washcloth on your skin, and a soft toothbrush to brush your teeth  This can keep your skin and gums from bleeding  If you shave, use an electric shaver  Do not play contact sports  ¨ Tell your dentist and other healthcare providers that you take anticoagulants  Wear a bracelet or necklace that says you take this medicine  ¨ Do not start or stop any medicines unless your healthcare provider tells you to  Many medicines cannot be used with blood thinners  ¨ Tell your healthcare provider right away if you forget to take the medicine, or if you take too much  ¨ Warfarin  is a blood thinner that you may need to take  The following are things you should be aware of if you take warfarin  § Foods and medicines can affect the amount of warfarin in your blood  Do not make major changes to your diet while you take warfarin  Warfarin works best when you eat about the same amount of vitamin K every day  Vitamin K is found in green leafy vegetables and certain other foods  Ask for more information about what to eat when you are taking warfarin  § You will need to see your healthcare provider for follow-up visits when you are on warfarin  You will need regular blood tests  These tests are used to decide how much medicine you need  · Acetaminophen  helps decrease pain and fever  This medicine is available without a doctor's order  Ask how much medicine is safe to take, and how often to take it   Acetaminophen can cause liver damage if not taken correctly  · Take your medicine as directed  Contact your healthcare provider if you think your medicine is not helping or if you have side effects  Tell him or her if you are allergic to any medicine  Keep a list of the medicines, vitamins, and herbs you take  Include the amounts, and when and why you take them  Bring the list or the pill bottles to follow-up visits  Carry your medicine list with you in case of an emergency  Bathing: You may be able to shower the day after your procedure  Remove your pressure bandage before you shower  Do not take baths or go in hot tubs or pools  Carefully wash the wound with soap and water  Pat the area dry  Care for your wound as directed:  Change your bandage when it gets wet or dirty  A small bandage can be placed on your wound after you remove the pressure bandage  Do not put powders, lotions, or creams on your wound  They may cause your wound to get infected  Monitor your wound every day for signs of infection, such as redness, swelling, or pus  Mild bruising is normal and expected  If bleeding from your wound occurs:  Apply firm, steady pressure to stop the bleeding  Apply pressure with a clean gauze or towel for 5 to 10 minutes  Call 911 if bleeding becomes heavy or does not stop  Activity:  Do not lift anything heavier than 5 pounds until directed by your healthcare provider  Heavy lifting can put stress on your wound and cause bleeding  Do not push or pull with the arm that was used for the procedure  Do not do vigorous activity for at least 48 hours  Vigorous activity may cause bleeding from your wound  Rest and do quiet activities  Short walks to the bathroom and around the house are okay  Limit your stair climbing to prevent bleeding  Ask your healthcare provider when you can return to your normal activities  Do not strain when you have a bowel movement:  Your wound may bleed if you strain to have a bowel movement   Keep your legs flat on the floor and your hips at a 90° angle  Talk to your healthcare provider if you are constipated  You may need medicine to make it easier for you to have a bowel movement and to prevent straining  Drink liquids as directed:  Liquids will help flush the contrast liquid from your body  Ask how much liquid to drink each day and which liquids are best for you  Driving:  Ask your healthcare provider when it is okay for you to drive  He may tell you to wait 48 hours before you drive to decrease your risk for bleeding  Returning to work: You may not be able to return to work for at least 2 days after your procedure if your job involves heavy lifting  Ask your healthcare provider when it is okay for you to return to work  © 2017 [facility] Information is for End User's use only and may not be sold, redistributed or otherwise used for commercial purposes  All illustrations and images included in CareNotes® are the copyrighted property of A D A M , Inc  or Landon Solis  The above information is an  only  It is not intended as medical advice for individual conditions or treatments  Talk to your doctor, nurse or pharmacist before following any medical regimen to see if it is safe and effective for you

## 2020-07-23 NOTE — PROGRESS NOTES
3 ASA 81mg each given to Krysta Barker RN to give to pt in Cath Lab d/t being able to sign off in Epic because listed to be given in Cath Lab

## 2020-07-23 NOTE — INTERVAL H&P NOTE
/69   Pulse 78   Temp 97 7 °F (36 5 °C) (Temporal)   Resp 18   Ht 5' 7" (1 702 m)   Wt 102 kg (225 lb)   SpO2 94%   BMI 35 24 kg/m²     H&P reviewed  After examining the patient, I find no changed to the H&P since it had been written  Patient re-evaluated   Accept as history and physical     Jodie Campos MD/July 23, 2020/7:18 AM

## 2020-07-27 ENCOUNTER — OFFICE VISIT (OUTPATIENT)
Dept: CARDIAC SURGERY | Facility: CLINIC | Age: 75
End: 2020-07-27
Payer: COMMERCIAL

## 2020-07-27 VITALS
SYSTOLIC BLOOD PRESSURE: 122 MMHG | BODY MASS INDEX: 35.16 KG/M2 | HEART RATE: 61 BPM | WEIGHT: 224 LBS | TEMPERATURE: 97.5 F | RESPIRATION RATE: 19 BRPM | DIASTOLIC BLOOD PRESSURE: 72 MMHG | HEIGHT: 67 IN

## 2020-07-27 DIAGNOSIS — I25.10 CORONARY ARTERY DISEASE INVOLVING NATIVE CORONARY ARTERY OF NATIVE HEART WITHOUT ANGINA PECTORIS: Primary | ICD-10-CM

## 2020-07-27 DIAGNOSIS — Z01.810 PREOPERATIVE CARDIOVASCULAR EXAMINATION: ICD-10-CM

## 2020-07-27 PROCEDURE — 99205 OFFICE O/P NEW HI 60 MIN: CPT | Performed by: PHYSICIAN ASSISTANT

## 2020-07-27 NOTE — PROGRESS NOTES
Consultation - Cardiothoracic Surgery   Mira Monsivais 76 y o  male MRN: [id number]    Physician Requesting Consult: Sincere Gonzalez    Reason for Consult / Principal Problem: Coronary artery disease    History of Present Illness: Mira Monsivais is a 76y o  year old male who presents for an outpatient consultation for the evaluation of coronary artery disease  The patient follows with Dr Marina Houser as an outpatient  He has been experiencing exertional weakness and chest pain/pressure for the past 6-12 months  He underwent a stress echo and his EKG was consistent with ischemia  He then underwent cardiac catheterization, which revealed MV CAD  The patient admits to feeling as though he is going to pass out at times due to his weakness  He denies SOB, PND, orthopnea, palpitations, LE edema, diaphoresis, nausea or vomiting  His level of activity has significantly declined over the past 5 years  He lives alone and does his outside yard work and indoor chores, although he has to stop and rest frequently  He is a lifelong non-smoker  He drinks approximately 9 mixed drinks per week  He reports his sister had an MI at age 72, his brother  at age 71 from a possible MI and his mother had an MI at age 80  The patient has a history of renal cell CA, s/p left nephrectomy in 2018  He also has a history of prostate CA, treated with radiation/Lupron in 2017  He has a history of CKD 3, baseline creatinine is approximately 1 5      Past Medical History:  Past Medical History:   Diagnosis Date    Anxiety     Cancer ([address] )     Renal Cell - Left;  Prostate    Chronic kidney disease (CKD), stage III (moderate)     GERD (gastroesophageal reflux disease)     Parkinson disease     Prostate carcinoma     Renal cell carcinoma, left          Past Surgical History:   Past Surgical History:   Procedure Laterality Date    NEPHRECTOMY Left 2018         Family History:  Family History   Problem Relation Age of Onset    Heart disease Mother          Social History:      Social History     Substance and Sexual Activity   Alcohol Use Yes    Frequency: 2-3 times a week    Comment: liquor     Social History     Substance and Sexual Activity   Drug Use Never     Social History     Tobacco Use   Smoking Status Never Smoker   Smokeless Tobacco Never Used       Home Medications:   Prior to Admission medications    Medication Sig Start Date End Date Taking? Authorizing Provider   aspirin 81 mg chewable tablet Chew 1 tablet (81 mg total) daily 7/9/20  Yes Juaquin Campos MD   isosorbide mononitrate (IMDUR) 30 mg 24 hr tablet Take 1 tablet (30 mg total) by mouth daily 7/23/20  Yes Layne Hager PA-C   metoprolol tartrate (LOPRESSOR) 25 mg tablet Take 0 5 tablets (12 5 mg total) by mouth every 12 (twelve) hours 7/23/20  Yes Marko Hager PA-C   oxyCODONE-acetaminophen (PERCOCET) 5-325 mg per tablet TAKE 1 TO 2 TABLETS BY MOUTH EVERY 4 TO 6 HOURS AS NEEDED FOR PAIN NO MORE THAN 6 TABLETS PER DAY 4/10/20   Historical Provider, MD   predniSONE 10 mg tablet Take 10 mg by oral route as needed for gout  11/10/17   Historical Provider, MD       Allergies: Allergies   Allergen Reactions    Indomethacin Shortness Of Breath       Review of Systems:  Review of Systems   Constitutional: Positive for activity change and fatigue  Negative for chills, diaphoresis and fever  HENT: Negative  Eyes: Negative  Respiratory: Positive for chest tightness  Negative for shortness of breath  Cardiovascular: Positive for chest pain  Negative for palpitations and leg swelling  Gastrointestinal: Negative  Endocrine: Negative  Genitourinary: Negative  Musculoskeletal: Negative  Skin: Negative  Allergic/Immunologic: Negative  Neurological: Positive for tremors and weakness  Negative for dizziness, syncope, light-headedness, numbness and headaches  Hematological: Negative for adenopathy  Does not bruise/bleed easily  Psychiatric/Behavioral: Negative  All other systems reviewed and are negative  Vital Signs:     Vitals:    07/27/20 1331 07/27/20 1335   BP: 120/72 122/72   BP Location: Left arm Right arm   Patient Position: Sitting Sitting   Cuff Size: Standard Standard   Pulse: 61    Resp: 19    Temp: 97 5 °F (36 4 °C)    TempSrc: Tympanic    Weight: 102 kg (224 lb)    Height: 5' 7" (1 702 m)        Physical Exam:  Physical Exam   Constitutional: He is oriented to person, place, and time  Vital signs are normal  He appears well-developed and well-nourished  No distress  HENT:   Head: Normocephalic and atraumatic  Right Ear: External ear normal    Left Ear: External ear normal    Nose: Nose normal    Mouth/Throat: Oropharynx is clear and moist  No oropharyngeal exudate  Eyes: Pupils are equal, round, and reactive to light  Conjunctivae and EOM are normal  Right eye exhibits no discharge  Left eye exhibits no discharge  No scleral icterus  Neck: Normal range of motion  Neck supple  No JVD present  No tracheal deviation present  Cardiovascular: Normal rate, regular rhythm and normal heart sounds  Exam reveals no friction rub  No murmur heard  Pulmonary/Chest: Effort normal and breath sounds normal  No stridor  No respiratory distress  He has no wheezes  He has no rales  Abdominal: Soft  Bowel sounds are normal  He exhibits no distension and no mass  There is no tenderness  There is no guarding  Musculoskeletal: Normal range of motion  He exhibits no edema, tenderness or deformity  Neurological: He is alert and oriented to person, place, and time  He displays normal reflexes  No cranial nerve deficit or sensory deficit  He exhibits normal muscle tone  Coordination normal    Resting tremor of right hand/arm   Skin: Skin is warm and dry  No rash noted  He is not diaphoretic  No erythema  No pallor  Psychiatric: He has a normal mood and affect   His behavior is normal  Judgment and thought content normal  Flat affect   Nursing note and vitals reviewed  Lab Results:               Invalid input(s): LABGLOM      Lab Results   Component Value Date    HGBA1C 5 6 06/17/2019     No results found for: CKTOTAL, CKMB, CKMBINDEX, TROPONINI    Imaging Studies:     Cardiac Catheterization: CORONARY CIRCULATION:  Proximal LAD: There was a tubular 90 % stenosis  The lesion was irregularly contoured, ulcerated, eccentric, and heavily calcified  It appears amenable to percutaneous intervention  1st obtuse marginal: There was a tubular 50 % stenosis  Proximal RCA: There was a 100 % stenosis  This lesion is a chronic total occlusion  It does not appear amenable to intervention  Stress Echocardiogram 7/8/20: The stress ECG was consistent with myocardial ischemia  There were no stress arrhythmias or conduction abnormalities  PEAK STRESS: There was severe hypokinesis of the basal-mid inferior wall(s)  Remaining segments vigorously contractile  There was an appropriate reduction in left ventricular size  There was an appropriate augmentation in LV function      ECHO CONCLUSIONS: Echocardiographic findings were positive for stress-induced ischemia  I have personally reviewed pertinent reports  and I have personally reviewed pertinent films in PACS    Assessment:  Patient Active Problem List    Diagnosis Date Noted    Coronary artery disease involving native coronary artery of native heart 07/09/2020    Renal insufficiency 07/09/2020    Dyslipidemia 07/09/2020     Severe coronary artery disease; Ongoing CABG workup    Plan:  Risks and benefits of coronary artery bypass grafting were discussed in detail today with the patient and his son  They understand and wish to proceed with further workup and ultimately surgical intervention  We have ordered routine preoperative imaging, laboratory and vascular studies  He will return to our office to discuss his test results and further discuss/schedule surgery at that time  Rajat May and his son were comfortable with our recommendations, and their questions were answered to their satisfaction  Thank you for allowing us to participate in the care of this patient         SIGNATURE: Naya Momin PA-C  DATE: July 27, 2020  TIME: 2:22 PM

## 2020-07-28 ENCOUNTER — APPOINTMENT (OUTPATIENT)
Dept: LAB | Facility: CLINIC | Age: 75
End: 2020-07-28
Payer: COMMERCIAL

## 2020-07-28 DIAGNOSIS — Z01.810 PREOPERATIVE CARDIOVASCULAR EXAMINATION: ICD-10-CM

## 2020-07-28 DIAGNOSIS — I25.10 CORONARY ARTERY DISEASE INVOLVING NATIVE CORONARY ARTERY OF NATIVE HEART WITHOUT ANGINA PECTORIS: ICD-10-CM

## 2020-07-28 LAB
ABO GROUP BLD: NORMAL
ALBUMIN SERPL BCP-MCNC: 3.4 G/DL (ref 3.5–5)
ALP SERPL-CCNC: 74 U/L (ref 46–116)
ALT SERPL W P-5'-P-CCNC: 29 U/L (ref 12–78)
ANION GAP SERPL CALCULATED.3IONS-SCNC: 4 MMOL/L (ref 4–13)
AST SERPL W P-5'-P-CCNC: 17 U/L (ref 5–45)
BACTERIA UR QL AUTO: NORMAL /HPF
BILIRUB SERPL-MCNC: 0.52 MG/DL (ref 0.2–1)
BILIRUB UR QL STRIP: NEGATIVE
BLD GP AB SCN SERPL QL: NEGATIVE
BUN SERPL-MCNC: 17 MG/DL (ref 5–25)
CALCIUM SERPL-MCNC: 9.4 MG/DL (ref 8.3–10.1)
CHLORIDE SERPL-SCNC: 111 MMOL/L (ref 100–108)
CHOLEST SERPL-MCNC: 161 MG/DL (ref 50–200)
CLARITY UR: CLEAR
CO2 SERPL-SCNC: 26 MMOL/L (ref 21–32)
COLOR UR: YELLOW
CREAT SERPL-MCNC: 1.27 MG/DL (ref 0.6–1.3)
ERYTHROCYTE [DISTWIDTH] IN BLOOD BY AUTOMATED COUNT: 13.5 % (ref 11.6–15.1)
EST. AVERAGE GLUCOSE BLD GHB EST-MCNC: 117 MG/DL
GFR SERPL CREATININE-BSD FRML MDRD: 55 ML/MIN/1.73SQ M
GLUCOSE P FAST SERPL-MCNC: 92 MG/DL (ref 65–99)
GLUCOSE UR STRIP-MCNC: NEGATIVE MG/DL
HBA1C MFR BLD: 5.7 %
HCT VFR BLD AUTO: 46.3 % (ref 36.5–49.3)
HDLC SERPL-MCNC: 26 MG/DL
HGB BLD-MCNC: 14.6 G/DL (ref 12–17)
HGB UR QL STRIP.AUTO: NEGATIVE
HYALINE CASTS #/AREA URNS LPF: NORMAL /LPF
INR PPP: 1.06 (ref 0.84–1.19)
KETONES UR STRIP-MCNC: NEGATIVE MG/DL
LDLC SERPL CALC-MCNC: 108 MG/DL (ref 0–100)
LEUKOCYTE ESTERASE UR QL STRIP: ABNORMAL
MCH RBC QN AUTO: 27.8 PG (ref 26.8–34.3)
MCHC RBC AUTO-ENTMCNC: 31.5 G/DL (ref 31.4–37.4)
MCV RBC AUTO: 88 FL (ref 82–98)
NITRITE UR QL STRIP: NEGATIVE
NON-SQ EPI CELLS URNS QL MICRO: NORMAL /HPF
NONHDLC SERPL-MCNC: 135 MG/DL
PH UR STRIP.AUTO: 6 [PH]
PLATELET # BLD AUTO: 223 THOUSANDS/UL (ref 149–390)
PMV BLD AUTO: 10.8 FL (ref 8.9–12.7)
POTASSIUM SERPL-SCNC: 4.4 MMOL/L (ref 3.5–5.3)
PROT SERPL-MCNC: 6.8 G/DL (ref 6.4–8.2)
PROT UR STRIP-MCNC: NEGATIVE MG/DL
PROTHROMBIN TIME: 13.8 SECONDS (ref 11.6–14.5)
RBC # BLD AUTO: 5.26 MILLION/UL (ref 3.88–5.62)
RBC #/AREA URNS AUTO: NORMAL /HPF
RH BLD: POSITIVE
SODIUM SERPL-SCNC: 141 MMOL/L (ref 136–145)
SP GR UR STRIP.AUTO: 1.02 (ref 1–1.03)
SPECIMEN EXPIRATION DATE: NORMAL
T3 SERPL-MCNC: 0.9 NG/ML (ref 0.6–1.8)
T4 FREE SERPL-MCNC: 0.87 NG/DL (ref 0.76–1.46)
TRIGL SERPL-MCNC: 133 MG/DL
TSH SERPL DL<=0.05 MIU/L-ACNC: 1.84 UIU/ML (ref 0.36–3.74)
UROBILINOGEN UR QL STRIP.AUTO: 1 E.U./DL
WBC # BLD AUTO: 7.07 THOUSAND/UL (ref 4.31–10.16)
WBC #/AREA URNS AUTO: NORMAL /HPF

## 2020-07-28 PROCEDURE — 84443 ASSAY THYROID STIM HORMONE: CPT

## 2020-07-28 PROCEDURE — 80061 LIPID PANEL: CPT

## 2020-07-28 PROCEDURE — 80053 COMPREHEN METABOLIC PANEL: CPT

## 2020-07-28 PROCEDURE — 83036 HEMOGLOBIN GLYCOSYLATED A1C: CPT

## 2020-07-28 PROCEDURE — 84480 ASSAY TRIIODOTHYRONINE (T3): CPT

## 2020-07-28 PROCEDURE — 86850 RBC ANTIBODY SCREEN: CPT

## 2020-07-28 PROCEDURE — 86901 BLOOD TYPING SEROLOGIC RH(D): CPT

## 2020-07-28 PROCEDURE — 85027 COMPLETE CBC AUTOMATED: CPT

## 2020-07-28 PROCEDURE — 86900 BLOOD TYPING SEROLOGIC ABO: CPT

## 2020-07-28 PROCEDURE — 85610 PROTHROMBIN TIME: CPT

## 2020-07-28 PROCEDURE — 36415 COLL VENOUS BLD VENIPUNCTURE: CPT

## 2020-07-28 PROCEDURE — 84439 ASSAY OF FREE THYROXINE: CPT

## 2020-07-28 PROCEDURE — 81001 URINALYSIS AUTO W/SCOPE: CPT | Performed by: THORACIC SURGERY (CARDIOTHORACIC VASCULAR SURGERY)

## 2020-07-30 ENCOUNTER — HOSPITAL ENCOUNTER (OUTPATIENT)
Dept: NON INVASIVE DIAGNOSTICS | Facility: HOSPITAL | Age: 75
Discharge: HOME | End: 2020-07-30
Payer: COMMERCIAL

## 2020-07-30 ENCOUNTER — TRANSCRIBE ORDERS (OUTPATIENT)
Dept: LAB | Facility: HOSPITAL | Age: 75
End: 2020-07-30

## 2020-07-30 ENCOUNTER — HOSPITAL ENCOUNTER (OUTPATIENT)
Dept: CT IMAGING | Facility: HOSPITAL | Age: 75
Discharge: HOME | End: 2020-07-30
Payer: COMMERCIAL

## 2020-07-30 DIAGNOSIS — I25.10 CORONARY ARTERY DISEASE INVOLVING NATIVE CORONARY ARTERY OF NATIVE HEART WITHOUT ANGINA PECTORIS: ICD-10-CM

## 2020-07-30 DIAGNOSIS — Z01.810 PREOPERATIVE CARDIOVASCULAR EXAMINATION: ICD-10-CM

## 2020-07-30 DIAGNOSIS — I25.10 CORONARY ARTERY DISEASE INVOLVING NATIVE CORONARY ARTERY OF NATIVE HEART WITHOUT ANGINA PECTORIS: Primary | ICD-10-CM

## 2020-07-30 PROCEDURE — 93971 EXTREMITY STUDY: CPT

## 2020-07-30 PROCEDURE — 71250 CT THORAX DX C-: CPT

## 2020-07-30 PROCEDURE — 93880 EXTRACRANIAL BILAT STUDY: CPT

## 2020-07-30 PROCEDURE — 87081 CULTURE SCREEN ONLY: CPT | Performed by: PHYSICIAN ASSISTANT

## 2020-07-30 PROCEDURE — 74176 CT ABD & PELVIS W/O CONTRAST: CPT

## 2020-07-30 PROCEDURE — 93880 EXTRACRANIAL BILAT STUDY: CPT | Performed by: SURGERY

## 2020-07-30 PROCEDURE — 93971 EXTREMITY STUDY: CPT | Performed by: SURGERY

## 2020-07-31 LAB — MRSA NOSE QL CULT: NORMAL

## 2020-08-13 ENCOUNTER — HOSPITAL ENCOUNTER (OUTPATIENT)
Dept: NON INVASIVE DIAGNOSTICS | Facility: HOSPITAL | Age: 75
Discharge: HOME | End: 2020-08-13
Payer: COMMERCIAL

## 2020-08-13 DIAGNOSIS — Z01.810 PREOPERATIVE CARDIOVASCULAR EXAMINATION: ICD-10-CM

## 2020-08-13 DIAGNOSIS — I25.10 CORONARY ARTERY DISEASE INVOLVING NATIVE CORONARY ARTERY OF NATIVE HEART WITHOUT ANGINA PECTORIS: ICD-10-CM

## 2020-08-13 PROCEDURE — 93306 TTE W/DOPPLER COMPLETE: CPT

## 2020-08-13 PROCEDURE — 93306 TTE W/DOPPLER COMPLETE: CPT | Performed by: INTERNAL MEDICINE

## 2020-08-17 ENCOUNTER — OFFICE VISIT (OUTPATIENT)
Dept: CARDIAC SURGERY | Facility: CLINIC | Age: 75
End: 2020-08-17
Payer: COMMERCIAL

## 2020-08-17 VITALS
RESPIRATION RATE: 18 BRPM | HEART RATE: 64 BPM | BODY MASS INDEX: 33.9 KG/M2 | DIASTOLIC BLOOD PRESSURE: 80 MMHG | SYSTOLIC BLOOD PRESSURE: 130 MMHG | TEMPERATURE: 97.7 F | WEIGHT: 216 LBS | HEIGHT: 67 IN

## 2020-08-17 DIAGNOSIS — Z12.11 COLON CANCER SCREENING: ICD-10-CM

## 2020-08-17 DIAGNOSIS — Z11.59 SPECIAL SCREENING EXAMINATION FOR UNSPECIFIED VIRAL DISEASE: ICD-10-CM

## 2020-08-17 DIAGNOSIS — I25.10 CORONARY ARTERY DISEASE INVOLVING NATIVE CORONARY ARTERY OF NATIVE HEART, ANGINA PRESENCE UNSPECIFIED: Primary | ICD-10-CM

## 2020-08-17 PROCEDURE — 99214 OFFICE O/P EST MOD 30 MIN: CPT | Performed by: PHYSICIAN ASSISTANT

## 2020-08-17 RX ORDER — CEFAZOLIN SODIUM 2 G/50ML
2000 SOLUTION INTRAVENOUS ONCE
Status: CANCELLED | OUTPATIENT
Start: 2020-08-17 | End: 2020-08-17

## 2020-08-17 RX ORDER — CHLORHEXIDINE GLUCONATE 0.12 MG/ML
15 RINSE ORAL EVERY 12 HOURS SCHEDULED
Status: CANCELLED | OUTPATIENT
Start: 2020-08-17

## 2020-08-17 NOTE — H&P
H&P - Cardiothoracic Surgery   Adele Montesinos 76 y o  male MRN: [id number]    History of Present Illness: Adele Montesinos is a 76y o  year old male who originally was seen in our office on 7/27/2020 for CAD who presents today after completion of pre-operative testing for review  Since his last visit with our office he has completed all his testing  Denies any acute illnesses, ED visits, PCP visits or hospitalizations  States his symptoms have been stable including weakness/faituge worsening w/ movements & improved w/ rest  States he has been active at home including using his riding mower to care for his lawn as well as picking up sticks in his yard & using his weed shasta  States he has to stop multiple times during these activities but is able to complete them  Is still agreeable to surgical work-up & intervention      Past Medical History:  Past Medical History:   Diagnosis Date    Anxiety     CAD (coronary artery disease)     Cancer ([facility] [address] )     Renal Cell - Left;  Prostate    Chronic kidney disease (CKD), stage III (moderate)     baseline Cr 1 50    Diverticulosis     GERD (gastroesophageal reflux disease)     Gout     History of nephrolithiasis     History of prostate cancer     s/p radiation/Lupron    History of renal cell cancer     s/p left nephrectomy    Parkinson disease      Past Surgical History:   Past Surgical History:   Procedure Laterality Date    ARTHROSCOPY KNEE Right     NEPHRECTOMY Left 2018    REPLACEMENT TOTAL KNEE Left     ROTATOR CUFF REPAIR Right     TONSILLECTOMY       Family History:  Family History   Problem Relation Age of Onset    Heart disease Mother     Heart attack Mother     Heart attack Sister     Heart attack Brother      Social History:  Social History     Substance and Sexual Activity   Alcohol Use Yes    Alcohol/week: 9 0 standard drinks    Types: 9 Standard drinks or equivalent per week    Frequency: 2-3 times a week    Binge frequency: Daily or almost daily    Comment: 9 mixed drinks per week     Social History     Substance and Sexual Activity   Drug Use Never     Social History     Tobacco Use   Smoking Status Never Smoker   Smokeless Tobacco Never Used       Home Medications:   Prior to Admission medications    Medication Sig Start Date End Date Taking? Authorizing Provider   aspirin 81 mg chewable tablet Chew 1 tablet (81 mg total) daily 7/9/20  Yes Chau Casper MD   metoprolol tartrate (LOPRESSOR) 25 mg tablet Take 0 5 tablets (12 5 mg total) by mouth every 12 (twelve) hours 7/23/20  Yes Odalis Hager PA-C   isosorbide mononitrate (IMDUR) 30 mg 24 hr tablet Take 1 tablet (30 mg total) by mouth daily  Patient not taking: Reported on 8/17/2020 7/23/20   Odalis Hager PA-C   oxyCODONE-acetaminophen (PERCOCET) 5-325 mg per tablet TAKE 1 TO 2 TABLETS BY MOUTH EVERY 4 TO 6 HOURS AS NEEDED FOR PAIN NO MORE THAN 6 TABLETS PER DAY 4/10/20   Historical Provider, MD   predniSONE 10 mg tablet Take 10 mg by oral route as needed for gout  11/10/17   Historical Provider, MD       Allergies: Allergies   Allergen Reactions    Indomethacin Shortness Of Breath       Review of Systems:  Review of Systems - History obtained from the patient  General ROS: positive for  - fatigue and weakness  Respiratory ROS: no cough, shortness of breath, or wheezing  Cardiovascular ROS: no chest pain or dyspnea on exertion  Gastrointestinal ROS: no abdominal pain, change in bowel habits, or black or bloody stools  Musculoskeletal ROS: negative  Neurological ROS: no TIA or stroke symptoms  Dermatological ROS: negative  All other ROS (-)    Vital Signs:     Vitals:    08/17/20 1328   BP: 130/80   BP Location: Left arm   Patient Position: Sitting   Cuff Size: Large   Pulse: 64   Resp: 18   Temp: 97 7 °F (36 5 °C)   TempSrc: Tympanic   Weight: 98 kg (216 lb)   Height: 5' 7" (1 702 m)       Physical Exam:    General:  Sitting in chair in NAD  HEENT/NECK:  PERRLA    No jugular venous distention  Cardiac:RRR  No murmurs/rubs/gallops  No heaves/lifts on palpation     Carotid arteries: No bruit on auscultation  Pulmonary:  Breath sounds clear bilaterally  Abdomen:  Non-tender, Non-distended  Positive bowel sounds  Upper extremities: 2+ radial pulses; brisk capillary refill  Lower extremities: Extremities warm/dry  PT/DP pulses 2+ bilaterally  Trace edema B/L  Neuro: Alert and oriented X 3  Sensation is grossly intact  B/l UE intermittent tremors  Skin: Warm/Dry, without rashes or lesions  Lab Results:               Invalid input(s): LABGLOM      Lab Results   Component Value Date    HGBA1C 5 7 (H) 07/28/2020     No results found for: CKTOTAL, CKMB, CKMBINDEX, TROPONINI    Imaging Studies:     Cardiac Catheterization 7/23: 90% prox LAD, 50% OM1, 100% prox RCA     Transthoracic Echocardiogram 8/13: EF 60%, no RWMA, mild AI     CT Chest/Abdomen/Pelvis 7/27: ascending aortic aneurysm 4 2cm, 7mm PARKER groundglass opacity    Carotid Duplex 1/51: <29% LICA stenosis, 69-63% LYN stenosis, antegrade flow b/l, no subclavian disease    Vein Mapping 7/27: whole right leg & left thigh adequate    I have personally reviewed pertinent reports  Assessment:  Patient Active Problem List    Diagnosis Date Noted    Coronary artery disease involving native coronary artery of native heart 07/09/2020    Renal insufficiency 07/09/2020    Dyslipidemia 07/09/2020     Severe coronary artery disease; Ongoing CABG workup    Plan:  Risks and benefits of coronary artery bypass grafting were discussed in detail today with the patient  We have reviewed results all preoperative radiographic,  laboratory and vascular studies  They understand and wish to proceed with surgical intervention  Surgery will be scheduled with ELIAZAR Shrestha was comfortable with our recommendations, and their questions were answered to their satisfaction    Thank you for allowing us to participate in the care of this patient  The patient was referred to gastroenterology for consideration of routine colonoscopy screening of all patients aged 54-65  Gastroenterology evaluation will not be necessary prior to any open heart procedures, and can be completed following surgical recovery      SIGNATURE: Terence Olmos PA-C  DATE: August 17, 2020  TIME: 1:55 PM

## 2020-08-17 NOTE — H&P (VIEW-ONLY)
H&P - Cardiothoracic Surgery   Eulalio Chua 76 y o  male MRN: [id number]    History of Present Illness: Eulalio Chua is a 76y o  year old male who originally was seen in our office on 7/27/2020 for CAD who presents today after completion of pre-operative testing for review  Since his last visit with our office he has completed all his testing  Denies any acute illnesses, ED visits, PCP visits or hospitalizations  States his symptoms have been stable including weakness/faituge worsening w/ movements & improved w/ rest  States he has been active at home including using his riding mower to care for his lawn as well as picking up sticks in his yard & using his weed shasta  States he has to stop multiple times during these activities but is able to complete them  Is still agreeable to surgical work-up & intervention      Past Medical History:  Past Medical History:   Diagnosis Date    Anxiety     CAD (coronary artery disease)     Cancer ([facility] [place] [address] )     Renal Cell - Left;  Prostate    Chronic kidney disease (CKD), stage III (moderate)     baseline Cr 1 50    Diverticulosis     GERD (gastroesophageal reflux disease)     Gout     History of nephrolithiasis     History of prostate cancer     s/p radiation/Lupron    History of renal cell cancer     s/p left nephrectomy    Parkinson disease      Past Surgical History:   Past Surgical History:   Procedure Laterality Date    ARTHROSCOPY KNEE Right     NEPHRECTOMY Left 2018    REPLACEMENT TOTAL KNEE Left     ROTATOR CUFF REPAIR Right     TONSILLECTOMY       Family History:  Family History   Problem Relation Age of Onset    Heart disease Mother     Heart attack Mother     Heart attack Sister     Heart attack Brother      Social History:  Social History     Substance and Sexual Activity   Alcohol Use Yes    Alcohol/week: 9 0 standard drinks    Types: 9 Standard drinks or equivalent per week    Frequency: 2-3 times a week    Binge frequency: Daily or almost daily    Comment: 9 mixed drinks per week     Social History     Substance and Sexual Activity   Drug Use Never     Social History     Tobacco Use   Smoking Status Never Smoker   Smokeless Tobacco Never Used       Home Medications:   Prior to Admission medications    Medication Sig Start Date End Date Taking? Authorizing Provider   aspirin 81 mg chewable tablet Chew 1 tablet (81 mg total) daily 7/9/20  Yes Babs Davalos MD   metoprolol tartrate (LOPRESSOR) 25 mg tablet Take 0 5 tablets (12 5 mg total) by mouth every 12 (twelve) hours 7/23/20  Yes Grady Hager PA-C   isosorbide mononitrate (IMDUR) 30 mg 24 hr tablet Take 1 tablet (30 mg total) by mouth daily  Patient not taking: Reported on 8/17/2020 7/23/20   Grady Hager PA-C   oxyCODONE-acetaminophen (PERCOCET) 5-325 mg per tablet TAKE 1 TO 2 TABLETS BY MOUTH EVERY 4 TO 6 HOURS AS NEEDED FOR PAIN NO MORE THAN 6 TABLETS PER DAY 4/10/20   Historical Provider, MD   predniSONE 10 mg tablet Take 10 mg by oral route as needed for gout  11/10/17   Historical Provider, MD       Allergies: Allergies   Allergen Reactions    Indomethacin Shortness Of Breath       Review of Systems:  Review of Systems - History obtained from the patient  General ROS: positive for  - fatigue and weakness  Respiratory ROS: no cough, shortness of breath, or wheezing  Cardiovascular ROS: no chest pain or dyspnea on exertion  Gastrointestinal ROS: no abdominal pain, change in bowel habits, or black or bloody stools  Musculoskeletal ROS: negative  Neurological ROS: no TIA or stroke symptoms  Dermatological ROS: negative  All other ROS (-)    Vital Signs:     Vitals:    08/17/20 1328   BP: 130/80   BP Location: Left arm   Patient Position: Sitting   Cuff Size: Large   Pulse: 64   Resp: 18   Temp: 97 7 °F (36 5 °C)   TempSrc: Tympanic   Weight: 98 kg (216 lb)   Height: 5' 7" (1 702 m)       Physical Exam:    General:  Sitting in chair in NAD  HEENT/NECK:  PERRLA    No jugular venous distention  Cardiac:RRR  No murmurs/rubs/gallops  No heaves/lifts on palpation     Carotid arteries: No bruit on auscultation  Pulmonary:  Breath sounds clear bilaterally  Abdomen:  Non-tender, Non-distended  Positive bowel sounds  Upper extremities: 2+ radial pulses; brisk capillary refill  Lower extremities: Extremities warm/dry  PT/DP pulses 2+ bilaterally  Trace edema B/L  Neuro: Alert and oriented X 3  Sensation is grossly intact  B/l UE intermittent tremors  Skin: Warm/Dry, without rashes or lesions  Lab Results:               Invalid input(s): LABGLOM      Lab Results   Component Value Date    HGBA1C 5 7 (H) 07/28/2020     No results found for: CKTOTAL, CKMB, CKMBINDEX, TROPONINI    Imaging Studies:     Cardiac Catheterization 7/23: 90% prox LAD, 50% OM1, 100% prox RCA     Transthoracic Echocardiogram 8/13: EF 60%, no RWMA, mild AI     CT Chest/Abdomen/Pelvis 7/27: ascending aortic aneurysm 4 2cm, 7mm PARKER groundglass opacity    Carotid Duplex 2/77: <55% LICA stenosis, 15-00% LYN stenosis, antegrade flow b/l, no subclavian disease    Vein Mapping 7/27: whole right leg & left thigh adequate    I have personally reviewed pertinent reports  Assessment:  Patient Active Problem List    Diagnosis Date Noted    Coronary artery disease involving native coronary artery of native heart 07/09/2020    Renal insufficiency 07/09/2020    Dyslipidemia 07/09/2020     Severe coronary artery disease; Ongoing CABG workup    Plan:  Risks and benefits of coronary artery bypass grafting were discussed in detail today with the patient  We have reviewed results all preoperative radiographic,  laboratory and vascular studies  They understand and wish to proceed with surgical intervention  Surgery will be scheduled with ELIAZAR Orozco Oas was comfortable with our recommendations, and their questions were answered to their satisfaction    Thank you for allowing us to participate in the care of this patient  The patient was referred to gastroenterology for consideration of routine colonoscopy screening of all patients aged 54-65  Gastroenterology evaluation will not be necessary prior to any open heart procedures, and can be completed following surgical recovery      SIGNATURE: Julieta Balderrama PA-C  DATE: August 17, 2020  TIME: 1:55 PM

## 2020-08-17 NOTE — PROGRESS NOTES
Follow up preop visit - Cardiothoracic Surgery   Jacqueline Acuna 76 y o  male MRN: [id number]    History of Present Illness: Jacqueline Acuna is a 76y o  year old male who originally was seen in our office on 7/27/2020 for CAD who presents today after completion of pre-operative testing for review  Since his last visit with our office he has completed all his testing  Denies any acute illnesses, ED visits, PCP visits or hospitalizations  States his symptoms have been stable including weakness/faituge worsening w/ movements & improved w/ rest  States he has been active at home including using his riding mower to care for his lawn as well as picking up sticks in his yard & using his weed shasta  States he has to stop multiple times during these activities but is able to complete them  Is still agreeable to surgical work-up & intervention      Past Medical History:  Past Medical History:   Diagnosis Date    Anxiety     CAD (coronary artery disease)     Cancer ([facility] [address] )     Renal Cell - Left;  Prostate    Chronic kidney disease (CKD), stage III (moderate)     baseline Cr 1 50    Diverticulosis     GERD (gastroesophageal reflux disease)     Gout     History of nephrolithiasis     History of prostate cancer     s/p radiation/Lupron    History of renal cell cancer     s/p left nephrectomy    Parkinson disease      Past Surgical History:   Past Surgical History:   Procedure Laterality Date    ARTHROSCOPY KNEE Right     NEPHRECTOMY Left 2018    REPLACEMENT TOTAL KNEE Left     ROTATOR CUFF REPAIR Right     TONSILLECTOMY       Family History:  Family History   Problem Relation Age of Onset    Heart disease Mother     Heart attack Mother     Heart attack Sister     Heart attack Brother      Social History:  Social History     Substance and Sexual Activity   Alcohol Use Yes    Alcohol/week: 9 0 standard drinks    Types: 9 Standard drinks or equivalent per week    Frequency: 2-3 times a week    Binge frequency: Daily or almost daily    Comment: 9 mixed drinks per week     Social History     Substance and Sexual Activity   Drug Use Never     Social History     Tobacco Use   Smoking Status Never Smoker   Smokeless Tobacco Never Used       Home Medications:   Prior to Admission medications    Medication Sig Start Date End Date Taking? Authorizing Provider   aspirin 81 mg chewable tablet Chew 1 tablet (81 mg total) daily 7/9/20  Yes Jean Neville MD   metoprolol tartrate (LOPRESSOR) 25 mg tablet Take 0 5 tablets (12 5 mg total) by mouth every 12 (twelve) hours 7/23/20  Yes Gali Hager PA-C   isosorbide mononitrate (IMDUR) 30 mg 24 hr tablet Take 1 tablet (30 mg total) by mouth daily  Patient not taking: Reported on 8/17/2020 7/23/20   Gali Hager PA-C   oxyCODONE-acetaminophen (PERCOCET) 5-325 mg per tablet TAKE 1 TO 2 TABLETS BY MOUTH EVERY 4 TO 6 HOURS AS NEEDED FOR PAIN NO MORE THAN 6 TABLETS PER DAY 4/10/20   Historical Provider, MD   predniSONE 10 mg tablet Take 10 mg by oral route as needed for gout  11/10/17   Historical Provider, MD       Allergies:   Allergies   Allergen Reactions    Indomethacin Shortness Of Breath       Review of Systems:  Review of Systems - History obtained from the patient  General ROS: positive for  - fatigue and weakness  Respiratory ROS: no cough, shortness of breath, or wheezing  Cardiovascular ROS: no chest pain or dyspnea on exertion  Gastrointestinal ROS: no abdominal pain, change in bowel habits, or black or bloody stools  Musculoskeletal ROS: negative  Neurological ROS: no TIA or stroke symptoms  Dermatological ROS: negative  All other ROS (-)    Vital Signs:     Vitals:    08/17/20 1328   BP: 130/80   BP Location: Left arm   Patient Position: Sitting   Cuff Size: Large   Pulse: 64   Resp: 18   Temp: 97 7 °F (36 5 °C)   TempSrc: Tympanic   Weight: 98 kg (216 lb)   Height: 5' 7" (1 702 m)       Physical Exam:    General:  Sitting in chair in NAD  HEENT/NECK: PERRLA  No jugular venous distention  Cardiac:RRR  No murmurs/rubs/gallops  No heaves/lifts on palpation     Carotid arteries: No bruit on auscultation  Pulmonary:  Breath sounds clear bilaterally  Abdomen:  Non-tender, Non-distended  Positive bowel sounds  Upper extremities: 2+ radial pulses; brisk capillary refill  Lower extremities: Extremities warm/dry  PT/DP pulses 2+ bilaterally  Trace edema B/L  Neuro: Alert and oriented X 3  Sensation is grossly intact  B/l UE intermittent tremors  Skin: Warm/Dry, without rashes or lesions  Lab Results:               Invalid input(s): LABGLOM      Lab Results   Component Value Date    HGBA1C 5 7 (H) 07/28/2020     No results found for: CKTOTAL, CKMB, CKMBINDEX, TROPONINI    Imaging Studies:     Cardiac Catheterization 7/23: 90% prox LAD, 50% OM1, 100% prox RCA     Transthoracic Echocardiogram 8/13: EF 60%, no RWMA, mild AI     CT Chest/Abdomen/Pelvis 7/27: ascending aortic aneurysm 4 2cm, 7mm PARKER groundglass opacity    Carotid Duplex 3/84: <67% LICA stenosis, 49-07% LYN stenosis, antegrade flow b/l, no subclavian disease    Vein Mapping 7/27: whole right leg & left thigh adequate    I have personally reviewed pertinent reports  Assessment:  Patient Active Problem List    Diagnosis Date Noted    Coronary artery disease involving native coronary artery of native heart 07/09/2020    Renal insufficiency 07/09/2020    Dyslipidemia 07/09/2020     Severe coronary artery disease; Ongoing CABG workup    Plan:  Risks and benefits of coronary artery bypass grafting were discussed in detail today with the patient  We have reviewed results all preoperative radiographic,  laboratory and vascular studies  They understand and wish to proceed with surgical intervention  Surgery will be scheduled with ELIAZAR Back was comfortable with our recommendations, and their questions were answered to their satisfaction    Thank you for allowing us to participate in the care of this patient  The patient was referred to gastroenterology for consideration of routine colonoscopy screening of all patients aged 54-65  Gastroenterology evaluation will not be necessary prior to any open heart procedures, and can be completed following surgical recovery      SIGNATURE: Reena Quarles PA-C  DATE: August 17, 2020  TIME: 1:55 PM

## 2020-08-19 ENCOUNTER — APPOINTMENT (OUTPATIENT)
Dept: LAB | Facility: HOSPITAL | Age: 75
End: 2020-08-19
Payer: COMMERCIAL

## 2020-08-19 ENCOUNTER — TRANSCRIBE ORDERS (OUTPATIENT)
Dept: LAB | Facility: HOSPITAL | Age: 75
End: 2020-08-19

## 2020-08-19 ENCOUNTER — LAB REQUISITION (OUTPATIENT)
Dept: LAB | Facility: HOSPITAL | Age: 75
End: 2020-08-19
Payer: COMMERCIAL

## 2020-08-19 DIAGNOSIS — I25.10 CORONARY ARTERY DISEASE INVOLVING NATIVE CORONARY ARTERY OF NATIVE HEART, ANGINA PRESENCE UNSPECIFIED: ICD-10-CM

## 2020-08-19 DIAGNOSIS — I25.10 ATHEROSCLEROTIC HEART DISEASE OF NATIVE CORONARY ARTERY WITHOUT ANGINA PECTORIS: ICD-10-CM

## 2020-08-19 DIAGNOSIS — Z11.59 SPECIAL SCREENING EXAMINATION FOR UNSPECIFIED VIRAL DISEASE: ICD-10-CM

## 2020-08-19 LAB
ABO GROUP BLD: NORMAL
BLD GP AB SCN SERPL QL: NEGATIVE
RH BLD: POSITIVE
SPECIMEN EXPIRATION DATE: NORMAL

## 2020-08-19 PROCEDURE — U0003 INFECTIOUS AGENT DETECTION BY NUCLEIC ACID (DNA OR RNA); SEVERE ACUTE RESPIRATORY SYNDROME CORONAVIRUS 2 (SARS-COV-2) (CORONAVIRUS DISEASE [COVID-19]), AMPLIFIED PROBE TECHNIQUE, MAKING USE OF HIGH THROUGHPUT TECHNOLOGIES AS DESCRIBED BY CMS-2020-01-R: HCPCS | Performed by: PHYSICIAN ASSISTANT

## 2020-08-19 PROCEDURE — 36415 COLL VENOUS BLD VENIPUNCTURE: CPT

## 2020-08-19 PROCEDURE — 86900 BLOOD TYPING SEROLOGIC ABO: CPT | Performed by: PHYSICIAN ASSISTANT

## 2020-08-19 PROCEDURE — 86901 BLOOD TYPING SEROLOGIC RH(D): CPT | Performed by: PHYSICIAN ASSISTANT

## 2020-08-19 PROCEDURE — 86850 RBC ANTIBODY SCREEN: CPT | Performed by: PHYSICIAN ASSISTANT

## 2020-08-21 LAB — SARS-COV-2 RNA SPEC QL NAA+PROBE: NOT DETECTED

## 2020-08-26 NOTE — PRE-PROCEDURE INSTRUCTIONS
Pre-Surgery Instructions:   Medication Instructions    aspirin 81 mg chewable tablet Patient was instructed by Physician and understands   metoprolol tartrate (LOPRESSOR) 25 mg tablet Patient was instructed by Physician and understands   oxyCODONE-acetaminophen (PERCOCET) 5-325 mg per tablet Patient was instructed by Physician and understands   predniSONE 10 mg tablet Patient was instructed by Physician and understands  Spoke to pt   Medication list reviewed   Confirmed with pt, he received all medication and showering instructions from the surgeon office   Negative COVID screening  Reviewed 1 visitor / patient policy  Advised to bring living will for DOS

## 2020-08-27 RX ORDER — HEPARIN SODIUM 1000 [USP'U]/ML
10000 INJECTION, SOLUTION INTRAVENOUS; SUBCUTANEOUS ONCE
Status: CANCELLED | OUTPATIENT
Start: 2020-08-28

## 2020-08-27 RX ORDER — HEPARIN SODIUM 1000 [USP'U]/ML
400 INJECTION, SOLUTION INTRAVENOUS; SUBCUTANEOUS ONCE
Status: CANCELLED | OUTPATIENT
Start: 2020-08-28

## 2020-08-28 ENCOUNTER — APPOINTMENT (OUTPATIENT)
Dept: NON INVASIVE DIAGNOSTICS | Facility: HOSPITAL | Age: 75
End: 2020-08-28
Payer: COMMERCIAL

## 2020-08-28 ENCOUNTER — HOSPITAL ENCOUNTER (OUTPATIENT)
Facility: HOSPITAL | Age: 75
Setting detail: OUTPATIENT SURGERY
Discharge: HOME | End: 2020-08-28
Attending: THORACIC SURGERY (CARDIOTHORACIC VASCULAR SURGERY) | Admitting: THORACIC SURGERY (CARDIOTHORACIC VASCULAR SURGERY)
Payer: COMMERCIAL

## 2020-08-28 VITALS
OXYGEN SATURATION: 95 % | HEIGHT: 67 IN | HEART RATE: 92 BPM | SYSTOLIC BLOOD PRESSURE: 119 MMHG | DIASTOLIC BLOOD PRESSURE: 85 MMHG | BODY MASS INDEX: 32.65 KG/M2 | WEIGHT: 208 LBS | RESPIRATION RATE: 18 BRPM | TEMPERATURE: 98.5 F

## 2020-08-28 DIAGNOSIS — I25.10 CAD IN NATIVE ARTERY: ICD-10-CM

## 2020-08-28 PROCEDURE — 86923 COMPATIBILITY TEST ELECTRIC: CPT

## 2020-08-28 RX ORDER — CHLORHEXIDINE GLUCONATE 0.12 MG/ML
15 RINSE ORAL EVERY 12 HOURS SCHEDULED
Status: DISCONTINUED | OUTPATIENT
Start: 2020-08-28 | End: 2020-08-28 | Stop reason: HOSPADM

## 2020-08-28 RX ORDER — SODIUM CHLORIDE, SODIUM LACTATE, POTASSIUM CHLORIDE, CALCIUM CHLORIDE 600; 310; 30; 20 MG/100ML; MG/100ML; MG/100ML; MG/100ML
125 INJECTION, SOLUTION INTRAVENOUS CONTINUOUS
Status: DISCONTINUED | OUTPATIENT
Start: 2020-08-28 | End: 2020-08-28 | Stop reason: HOSPADM

## 2020-08-28 RX ORDER — CEFAZOLIN SODIUM 2 G/50ML
2000 SOLUTION INTRAVENOUS ONCE
Status: DISCONTINUED | OUTPATIENT
Start: 2020-08-28 | End: 2020-08-28 | Stop reason: HOSPADM

## 2020-08-28 NOTE — RESPIRATORY THERAPY NOTE
RT Protocol Note  Thom Landau 76 y o  male MRN: [id number]  Unit/Bed#: OR POOL Encounter: [id number]    Assessment    Active Problems:    * No active hospital problems   *      Home Pulmonary Medications:  None       Past Medical History:   Diagnosis Date    Anxiety     CAD (coronary artery disease)     Cancer ([facility] [address] )     Renal Cell - Left;  Prostate    Carotid stenosis, right     50-69% internal    Chronic kidney disease (CKD), stage III (moderate)     baseline Cr 1 50    Diverticulosis     Diverticulosis     GERD (gastroesophageal reflux disease)     Gout     History of nephrolithiasis     History of prostate cancer     s/p radiation/Lupron    History of renal cell cancer     s/p left nephrectomy    Osteoarthritis     knees & shoulders    Parkinson disease     Pulmonary nodule     7mm PARKER     Social History     Socioeconomic History    Marital status:      Spouse name: None    Number of children: None    Years of education: None    Highest education level: None   Occupational History    None   Social Needs    Financial resource strain: None    Food insecurity     Worry: None     Inability: None    Transportation needs     Medical: None     Non-medical: None   Tobacco Use    Smoking status: Never Smoker    Smokeless tobacco: Never Used   Substance and Sexual Activity    Alcohol use: Not Currently     Alcohol/week: 9 0 standard drinks     Types: 9 Standard drinks or equivalent per week     Frequency: 2-3 times a week     Binge frequency: Daily or almost daily     Comment: 9 mixed drinks per week    Drug use: Never    Sexual activity: None   Lifestyle    Physical activity     Days per week: None     Minutes per session: None    Stress: None   Relationships    Social connections     Talks on phone: None     Gets together: None     Attends Denominational service: None     Active member of club or organization: None     Attends meetings of clubs or organizations: None Relationship status: None    Intimate partner violence     Fear of current or ex partner: None     Emotionally abused: None     Physically abused: None     Forced sexual activity: None   Other Topics Concern    None   Social History Narrative    None       Subjective         Objective    Physical Exam:   Assessment Type: Assess only  General Appearance: Awake  Respiratory Pattern: Spontaneous, Normal  Chest Assessment: Chest expansion symmetrical, Trachea midline  Bilateral Breath Sounds: Clear  Cough: None  O2 Device: RA    Vitals:  Blood pressure 119/85, pulse 92, temperature 98 5 °F (36 9 °C), temperature source Tympanic, resp  rate 18, height 5' 7" (1 702 m), weight 94 3 kg (208 lb), SpO2 95 %  Imaging and other studies: I have personally reviewed pertinent reports  O2 Device: RA     Plan             Resp Comments: Pt assessed; observed resting comfortably with NAD at this time  He does not have a pulmonary hx; current admission for CABG  BBS clear at this time  Respiratory intervention not required at this time, will review if clinical symptoms present

## 2020-08-28 NOTE — INTERVAL H&P NOTE
H&P reviewed  After examining the patient I find no changes in the patients condition since the H&P had been written  The patient was originally scheduled for CABG today, but this was rescheduled for Tuesday 9/2 due to an emergency       Vitals:    08/28/20 0605   BP:    Pulse: 92   Resp: 18   Temp:    SpO2: 95%

## 2020-08-30 LAB
ABO GROUP BLD BPU: NORMAL
BPU ID: NORMAL
CROSSMATCH: NORMAL
UNIT DISPENSE STATUS: NORMAL
UNIT PRODUCT CODE: NORMAL
UNIT RH: NORMAL

## 2020-09-01 ENCOUNTER — ANESTHESIA EVENT (INPATIENT)
Dept: PERIOP | Facility: HOSPITAL | Age: 75
DRG: 236 | End: 2020-09-01
Payer: COMMERCIAL

## 2020-09-01 PROBLEM — N18.30 STAGE 3 CHRONIC KIDNEY DISEASE (HCC): Chronic | Status: ACTIVE | Noted: 2020-09-01

## 2020-09-01 RX ORDER — HEPARIN SODIUM 1000 [USP'U]/ML
400 INJECTION, SOLUTION INTRAVENOUS; SUBCUTANEOUS ONCE
Status: CANCELLED | OUTPATIENT
Start: 2020-09-02

## 2020-09-01 RX ORDER — HEPARIN SODIUM 1000 [USP'U]/ML
10000 INJECTION, SOLUTION INTRAVENOUS; SUBCUTANEOUS ONCE
Status: CANCELLED | OUTPATIENT
Start: 2020-09-02

## 2020-09-01 NOTE — ANESTHESIA PREPROCEDURE EVALUATION
Procedure:  CORONARY ARTERY BYPASS GRAFT (CABG) 2 VESSELS (N/A Chest)  HARVEST VEIN ENDOSCOPIC (EVH) (N/A Abdomen)  TRANSESOPHAGEAL ECHOCARDIOGRAM (MEGAN) (N/A Esophagus)    Relevant Problems   ANESTHESIA (within normal limits)      CARDIO   (+) CAD (coronary artery disease)   (+) Carotid stenosis, right   (+) Coronary artery disease involving native coronary artery of native heart   (+) BYNUM (dyspnea on exertion)      /RENAL   (+) Chronic kidney disease (CKD), stage III (moderate)   (+) Stage 3 chronic kidney disease (HCC)      PULMONARY   (+) BYNUM (dyspnea on exertion)      Other   (+) Dyslipidemia   (+) Obesity   (+) Tremor of right hand        Physical Exam    Airway    Mallampati score: III  TM Distance: >3 FB  Neck ROM: full     Dental       Cardiovascular  Rhythm: regular, Rate: normal, Cardiovascular exam normal    Pulmonary  Pulmonary exam normal Breath sounds clear to auscultation,     Other Findings        Anesthesia Plan  ASA Score- 4     Anesthesia Type- general with ASA Monitors  Additional Monitors: arterial line, central venous line and pulmonary artery catheter  Airway Plan: ETT  Plan Factors-Exercise tolerance (METS): <4 METS  Chart reviewed  EKG reviewed  Imaging results reviewed  Existing labs reviewed  Patient summary reviewed  Patient is not a current smoker  Induction- intravenous  Postoperative Plan- Plan for postoperative opioid use  Informed Consent- Anesthetic plan and risks discussed with patient  I personally reviewed this patient with the CRNA  Discussed and agreed on the Anesthesia Plan with the CRNA  Lorin Schroeder

## 2020-09-02 ENCOUNTER — APPOINTMENT (OUTPATIENT)
Dept: NON INVASIVE DIAGNOSTICS | Facility: HOSPITAL | Age: 75
DRG: 236 | End: 2020-09-02
Payer: COMMERCIAL

## 2020-09-02 ENCOUNTER — APPOINTMENT (INPATIENT)
Dept: RADIOLOGY | Facility: HOSPITAL | Age: 75
DRG: 236 | End: 2020-09-02
Payer: COMMERCIAL

## 2020-09-02 ENCOUNTER — ANESTHESIA (INPATIENT)
Dept: PERIOP | Facility: HOSPITAL | Age: 75
DRG: 236 | End: 2020-09-02
Payer: COMMERCIAL

## 2020-09-02 ENCOUNTER — HOSPITAL ENCOUNTER (INPATIENT)
Facility: HOSPITAL | Age: 75
LOS: 5 days | Discharge: HOME HEALTH | DRG: 236 | End: 2020-09-07
Attending: THORACIC SURGERY (CARDIOTHORACIC VASCULAR SURGERY) | Admitting: THORACIC SURGERY (CARDIOTHORACIC VASCULAR SURGERY)
Payer: COMMERCIAL

## 2020-09-02 DIAGNOSIS — Z95.1 S/P CABG X 2: Primary | ICD-10-CM

## 2020-09-02 DIAGNOSIS — I25.10 CAD IN NATIVE ARTERY: ICD-10-CM

## 2020-09-02 PROBLEM — R06.09 DOE (DYSPNEA ON EXERTION): Status: ACTIVE | Noted: 2020-09-02

## 2020-09-02 PROBLEM — R06.00 DOE (DYSPNEA ON EXERTION): Status: ACTIVE | Noted: 2020-09-02

## 2020-09-02 PROBLEM — R25.1 TREMOR OF RIGHT HAND: Status: ACTIVE | Noted: 2020-09-02

## 2020-09-02 PROBLEM — E66.9 OBESITY: Status: ACTIVE | Noted: 2020-09-02

## 2020-09-02 LAB
ANION GAP SERPL CALCULATED.3IONS-SCNC: 8 MMOL/L (ref 4–13)
APTT PPP: 36 SECONDS (ref 23–37)
ATRIAL RATE: 47 BPM
BASE EXCESS BLDA CALC-SCNC: -1 MMOL/L (ref -2–3)
BASE EXCESS BLDA CALC-SCNC: -2 MMOL/L (ref -2–3)
BASE EXCESS BLDA CALC-SCNC: -3 MMOL/L (ref -2–3)
BASE EXCESS BLDA CALC-SCNC: -3 MMOL/L (ref -2–3)
BASE EXCESS BLDA CALC-SCNC: 6 MMOL/L (ref -2–3)
BUN SERPL-MCNC: 19 MG/DL (ref 5–25)
CA-I BLD-SCNC: 1.04 MMOL/L (ref 1.12–1.32)
CA-I BLD-SCNC: 1.05 MMOL/L (ref 1.12–1.32)
CA-I BLD-SCNC: 1.15 MMOL/L (ref 1.12–1.32)
CA-I BLD-SCNC: 1.15 MMOL/L (ref 1.12–1.32)
CA-I BLD-SCNC: 1.23 MMOL/L (ref 1.12–1.32)
CA-I BLD-SCNC: 1.29 MMOL/L (ref 1.12–1.32)
CA-I BLD-SCNC: 1.32 MMOL/L (ref 1.12–1.32)
CALCIUM SERPL-MCNC: 7.6 MG/DL (ref 8.3–10.1)
CHLORIDE SERPL-SCNC: 115 MMOL/L (ref 100–108)
CO2 SERPL-SCNC: 22 MMOL/L (ref 21–32)
CREAT SERPL-MCNC: 1.09 MG/DL (ref 0.6–1.3)
FIBRINOGEN PPP-MCNC: 254 MG/DL (ref 227–495)
GFR SERPL CREATININE-BSD FRML MDRD: 66 ML/MIN/1.73SQ M
GLUCOSE SERPL-MCNC: 104 MG/DL (ref 65–140)
GLUCOSE SERPL-MCNC: 112 MG/DL (ref 65–140)
GLUCOSE SERPL-MCNC: 133 MG/DL (ref 65–140)
GLUCOSE SERPL-MCNC: 133 MG/DL (ref 65–140)
GLUCOSE SERPL-MCNC: 139 MG/DL (ref 65–140)
GLUCOSE SERPL-MCNC: 140 MG/DL (ref 65–140)
GLUCOSE SERPL-MCNC: 152 MG/DL (ref 65–140)
GLUCOSE SERPL-MCNC: 152 MG/DL (ref 65–140)
GLUCOSE SERPL-MCNC: 155 MG/DL (ref 65–140)
GLUCOSE SERPL-MCNC: 161 MG/DL (ref 65–140)
GLUCOSE SERPL-MCNC: 167 MG/DL (ref 65–140)
GLUCOSE SERPL-MCNC: 171 MG/DL (ref 65–140)
GLUCOSE SERPL-MCNC: 86 MG/DL (ref 65–140)
HCO3 BLDA-SCNC: 19.7 MMOL/L (ref 24–30)
HCO3 BLDA-SCNC: 21.9 MMOL/L (ref 22–28)
HCO3 BLDA-SCNC: 23.5 MMOL/L (ref 22–28)
HCO3 BLDA-SCNC: 23.6 MMOL/L (ref 24–30)
HCO3 BLDA-SCNC: 23.7 MMOL/L (ref 22–28)
HCO3 BLDA-SCNC: 24.6 MMOL/L (ref 24–30)
HCO3 BLDA-SCNC: 31.2 MMOL/L (ref 22–28)
HCT VFR BLD AUTO: 40.5 % (ref 36.5–49.3)
HCT VFR BLD AUTO: 41.8 % (ref 36.5–49.3)
HCT VFR BLD CALC: 32 % (ref 36.5–49.3)
HCT VFR BLD CALC: 32 % (ref 36.5–49.3)
HCT VFR BLD CALC: 35 % (ref 36.5–49.3)
HCT VFR BLD CALC: 37 % (ref 36.5–49.3)
HCT VFR BLD CALC: 38 % (ref 36.5–49.3)
HCT VFR BLD CALC: 40 % (ref 36.5–49.3)
HCT VFR BLD CALC: 43 % (ref 36.5–49.3)
HGB BLD-MCNC: 12.7 G/DL (ref 12–17)
HGB BLD-MCNC: 13.6 G/DL (ref 12–17)
HGB BLDA-MCNC: 10.9 G/DL (ref 12–17)
HGB BLDA-MCNC: 10.9 G/DL (ref 12–17)
HGB BLDA-MCNC: 11.9 G/DL (ref 12–17)
HGB BLDA-MCNC: 12.6 G/DL (ref 12–17)
HGB BLDA-MCNC: 12.9 G/DL (ref 12–17)
HGB BLDA-MCNC: 13.6 G/DL (ref 12–17)
HGB BLDA-MCNC: 14.6 G/DL (ref 12–17)
INR PPP: 1.35 (ref 0.84–1.19)
KCT BLD-ACNC: 121 SEC (ref 89–137)
KCT BLD-ACNC: 132 SEC (ref 89–137)
KCT BLD-ACNC: 484 SEC (ref 89–137)
KCT BLD-ACNC: 508 SEC (ref 89–137)
KCT BLD-ACNC: 594 SEC (ref 89–137)
KCT BLD-ACNC: 616 SEC (ref 89–137)
P AXIS: 54 DEGREES
PCO2 BLD: 21 MMOL/L (ref 21–32)
PCO2 BLD: 23 MMOL/L (ref 21–32)
PCO2 BLD: 25 MMOL/L (ref 21–32)
PCO2 BLD: 26 MMOL/L (ref 21–32)
PCO2 BLD: 29.3 MM HG (ref 42–50)
PCO2 BLD: 33 MMOL/L (ref 21–32)
PCO2 BLD: 36.5 MM HG (ref 36–44)
PCO2 BLD: 39.6 MM HG (ref 36–44)
PCO2 BLD: 42.5 MM HG (ref 42–50)
PCO2 BLD: 43.3 MM HG (ref 36–44)
PCO2 BLD: 47.6 MM HG (ref 36–44)
PCO2 BLD: 49 MM HG (ref 42–50)
PH BLD: 7.31 [PH] (ref 7.3–7.4)
PH BLD: 7.35 [PH] (ref 7.35–7.45)
PH BLD: 7.35 [PH] (ref 7.35–7.45)
PH BLD: 7.35 [PH] (ref 7.3–7.4)
PH BLD: 7.42 [PH] (ref 7.35–7.45)
PH BLD: 7.42 [PH] (ref 7.35–7.45)
PH BLD: 7.43 [PH] (ref 7.3–7.4)
PLATELET # BLD AUTO: 135 THOUSANDS/UL (ref 149–390)
PLATELET # BLD AUTO: 147 THOUSANDS/UL (ref 149–390)
PMV BLD AUTO: 10.7 FL (ref 8.9–12.7)
PMV BLD AUTO: 10.8 FL (ref 8.9–12.7)
PO2 BLD: 105 MM HG (ref 75–129)
PO2 BLD: 109 MM HG (ref 35–45)
PO2 BLD: 144 MM HG (ref 75–129)
PO2 BLD: 35 MM HG (ref 35–45)
PO2 BLD: 361 MM HG (ref 75–129)
PO2 BLD: 49 MM HG (ref 35–45)
PO2 BLD: >400 MM HG (ref 75–129)
POTASSIUM BLD-SCNC: 3.8 MMOL/L (ref 3.5–5.3)
POTASSIUM BLD-SCNC: 4 MMOL/L (ref 3.5–5.3)
POTASSIUM BLD-SCNC: 4.1 MMOL/L (ref 3.5–5.3)
POTASSIUM BLD-SCNC: 4.3 MMOL/L (ref 3.5–5.3)
POTASSIUM BLD-SCNC: 4.3 MMOL/L (ref 3.5–5.3)
POTASSIUM BLD-SCNC: 4.6 MMOL/L (ref 3.5–5.3)
POTASSIUM BLD-SCNC: 4.7 MMOL/L (ref 3.5–5.3)
POTASSIUM SERPL-SCNC: 3.9 MMOL/L (ref 3.5–5.3)
POTASSIUM SERPL-SCNC: 4.3 MMOL/L (ref 3.5–5.3)
POTASSIUM SERPL-SCNC: 4.3 MMOL/L (ref 3.5–5.3)
PR INTERVAL: 271 MS
PROTHROMBIN TIME: 16.7 SECONDS (ref 11.6–14.5)
QRS AXIS: -4 DEGREES
QRSD INTERVAL: 96 MS
QT INTERVAL: 483 MS
QTC INTERVAL: 427 MS
SAO2 % BLD FROM PO2: 100 % (ref 60–85)
SAO2 % BLD FROM PO2: 60 % (ref 60–85)
SAO2 % BLD FROM PO2: 82 % (ref 60–85)
SAO2 % BLD FROM PO2: 98 % (ref 60–85)
SAO2 % BLD FROM PO2: 99 % (ref 60–85)
SAO2 % BLD FROM PO2: 99 % (ref 60–85)
SARS-COV-2 RNA RESP QL NAA+PROBE: NEGATIVE
SODIUM BLD-SCNC: 138 MMOL/L (ref 136–145)
SODIUM BLD-SCNC: 138 MMOL/L (ref 136–145)
SODIUM BLD-SCNC: 139 MMOL/L (ref 136–145)
SODIUM BLD-SCNC: 140 MMOL/L (ref 136–145)
SODIUM BLD-SCNC: 141 MMOL/L (ref 136–145)
SODIUM BLD-SCNC: 142 MMOL/L (ref 136–145)
SODIUM BLD-SCNC: 143 MMOL/L (ref 136–145)
SODIUM SERPL-SCNC: 145 MMOL/L (ref 136–145)
SPECIMEN SOURCE: ABNORMAL
SPECIMEN SOURCE: NORMAL
T WAVE AXIS: 73 DEGREES
VENTRICULAR RATE: 47 BPM

## 2020-09-02 PROCEDURE — 85347 COAGULATION TIME ACTIVATED: CPT

## 2020-09-02 PROCEDURE — 85049 AUTOMATED PLATELET COUNT: CPT | Performed by: PHYSICIAN ASSISTANT

## 2020-09-02 PROCEDURE — 06BP4ZZ EXCISION OF RIGHT SAPHENOUS VEIN, PERCUTANEOUS ENDOSCOPIC APPROACH: ICD-10-PCS | Performed by: THORACIC SURGERY (CARDIOTHORACIC VASCULAR SURGERY)

## 2020-09-02 PROCEDURE — 85049 AUTOMATED PLATELET COUNT: CPT | Performed by: THORACIC SURGERY (CARDIOTHORACIC VASCULAR SURGERY)

## 2020-09-02 PROCEDURE — 94760 N-INVAS EAR/PLS OXIMETRY 1: CPT

## 2020-09-02 PROCEDURE — 86923 COMPATIBILITY TEST ELECTRIC: CPT

## 2020-09-02 PROCEDURE — U0003 INFECTIOUS AGENT DETECTION BY NUCLEIC ACID (DNA OR RNA); SEVERE ACUTE RESPIRATORY SYNDROME CORONAVIRUS 2 (SARS-COV-2) (CORONAVIRUS DISEASE [COVID-19]), AMPLIFIED PROBE TECHNIQUE, MAKING USE OF HIGH THROUGHPUT TECHNOLOGIES AS DESCRIBED BY CMS-2020-01-R: HCPCS | Performed by: PHYSICIAN ASSISTANT

## 2020-09-02 PROCEDURE — 93005 ELECTROCARDIOGRAM TRACING: CPT

## 2020-09-02 PROCEDURE — 33517 CABG ARTERY-VEIN SINGLE: CPT | Performed by: THORACIC SURGERY (CARDIOTHORACIC VASCULAR SURGERY)

## 2020-09-02 PROCEDURE — 33508 ENDOSCOPIC VEIN HARVEST: CPT | Performed by: PHYSICIAN ASSISTANT

## 2020-09-02 PROCEDURE — 84295 ASSAY OF SERUM SODIUM: CPT

## 2020-09-02 PROCEDURE — 82947 ASSAY GLUCOSE BLOOD QUANT: CPT

## 2020-09-02 PROCEDURE — 84132 ASSAY OF SERUM POTASSIUM: CPT | Performed by: PHYSICIAN ASSISTANT

## 2020-09-02 PROCEDURE — 85610 PROTHROMBIN TIME: CPT | Performed by: PHYSICIAN ASSISTANT

## 2020-09-02 PROCEDURE — 5A1221Z PERFORMANCE OF CARDIAC OUTPUT, CONTINUOUS: ICD-10-PCS | Performed by: THORACIC SURGERY (CARDIOTHORACIC VASCULAR SURGERY)

## 2020-09-02 PROCEDURE — 02HV33Z INSERTION OF INFUSION DEVICE INTO SUPERIOR VENA CAVA, PERCUTANEOUS APPROACH: ICD-10-PCS | Performed by: THORACIC SURGERY (CARDIOTHORACIC VASCULAR SURGERY)

## 2020-09-02 PROCEDURE — 33508 ENDOSCOPIC VEIN HARVEST: CPT | Performed by: THORACIC SURGERY (CARDIOTHORACIC VASCULAR SURGERY)

## 2020-09-02 PROCEDURE — 33517 CABG ARTERY-VEIN SINGLE: CPT | Performed by: PHYSICIAN ASSISTANT

## 2020-09-02 PROCEDURE — 80048 BASIC METABOLIC PNL TOTAL CA: CPT | Performed by: PHYSICIAN ASSISTANT

## 2020-09-02 PROCEDURE — 93312 ECHO TRANSESOPHAGEAL: CPT

## 2020-09-02 PROCEDURE — 30233N1 TRANSFUSION OF NONAUTOLOGOUS RED BLOOD CELLS INTO PERIPHERAL VEIN, PERCUTANEOUS APPROACH: ICD-10-PCS | Performed by: THORACIC SURGERY (CARDIOTHORACIC VASCULAR SURGERY)

## 2020-09-02 PROCEDURE — 85730 THROMBOPLASTIN TIME PARTIAL: CPT | Performed by: PHYSICIAN ASSISTANT

## 2020-09-02 PROCEDURE — 84132 ASSAY OF SERUM POTASSIUM: CPT

## 2020-09-02 PROCEDURE — 33533 CABG ARTERIAL SINGLE: CPT | Performed by: PHYSICIAN ASSISTANT

## 2020-09-02 PROCEDURE — 021009W BYPASS CORONARY ARTERY, ONE ARTERY FROM AORTA WITH AUTOLOGOUS VENOUS TISSUE, OPEN APPROACH: ICD-10-PCS | Performed by: THORACIC SURGERY (CARDIOTHORACIC VASCULAR SURGERY)

## 2020-09-02 PROCEDURE — 94002 VENT MGMT INPAT INIT DAY: CPT

## 2020-09-02 PROCEDURE — C1894 INTRO/SHEATH, NON-LASER: HCPCS | Performed by: THORACIC SURGERY (CARDIOTHORACIC VASCULAR SURGERY)

## 2020-09-02 PROCEDURE — 82803 BLOOD GASES ANY COMBINATION: CPT

## 2020-09-02 PROCEDURE — 85018 HEMOGLOBIN: CPT | Performed by: PHYSICIAN ASSISTANT

## 2020-09-02 PROCEDURE — 33533 CABG ARTERIAL SINGLE: CPT | Performed by: THORACIC SURGERY (CARDIOTHORACIC VASCULAR SURGERY)

## 2020-09-02 PROCEDURE — 82948 REAGENT STRIP/BLOOD GLUCOSE: CPT

## 2020-09-02 PROCEDURE — 99024 POSTOP FOLLOW-UP VISIT: CPT | Performed by: ANESTHESIOLOGY

## 2020-09-02 PROCEDURE — 82330 ASSAY OF CALCIUM: CPT

## 2020-09-02 PROCEDURE — 5A1223Z PERFORMANCE OF CARDIAC PACING, CONTINUOUS: ICD-10-PCS | Performed by: THORACIC SURGERY (CARDIOTHORACIC VASCULAR SURGERY)

## 2020-09-02 PROCEDURE — 71045 X-RAY EXAM CHEST 1 VIEW: CPT

## 2020-09-02 PROCEDURE — 93010 ELECTROCARDIOGRAM REPORT: CPT | Performed by: INTERNAL MEDICINE

## 2020-09-02 PROCEDURE — 85014 HEMATOCRIT: CPT

## 2020-09-02 PROCEDURE — 85384 FIBRINOGEN ACTIVITY: CPT | Performed by: PHYSICIAN ASSISTANT

## 2020-09-02 PROCEDURE — 02100Z9 BYPASS CORONARY ARTERY, ONE ARTERY FROM LEFT INTERNAL MAMMARY, OPEN APPROACH: ICD-10-PCS | Performed by: THORACIC SURGERY (CARDIOTHORACIC VASCULAR SURGERY)

## 2020-09-02 PROCEDURE — 85014 HEMATOCRIT: CPT | Performed by: PHYSICIAN ASSISTANT

## 2020-09-02 PROCEDURE — 30233N0 TRANSFUSION OF AUTOLOGOUS RED BLOOD CELLS INTO PERIPHERAL VEIN, PERCUTANEOUS APPROACH: ICD-10-PCS | Performed by: THORACIC SURGERY (CARDIOTHORACIC VASCULAR SURGERY)

## 2020-09-02 DEVICE — MARKER CORONARY BYPASS VOSS GRAFT: Type: IMPLANTABLE DEVICE | Site: AORTA | Status: FUNCTIONAL

## 2020-09-02 RX ORDER — SODIUM CHLORIDE 450 MG/100ML
20 INJECTION, SOLUTION INTRAVENOUS CONTINUOUS
Status: DISCONTINUED | OUTPATIENT
Start: 2020-09-02 | End: 2020-09-03

## 2020-09-02 RX ORDER — GABAPENTIN 300 MG/1
300 CAPSULE ORAL ONCE
Status: COMPLETED | OUTPATIENT
Start: 2020-09-02 | End: 2020-09-02

## 2020-09-02 RX ORDER — PANTOPRAZOLE SODIUM 40 MG/1
40 TABLET, DELAYED RELEASE ORAL
Status: DISCONTINUED | OUTPATIENT
Start: 2020-09-03 | End: 2020-09-07 | Stop reason: HOSPADM

## 2020-09-02 RX ORDER — SODIUM CHLORIDE 9 MG/ML
INJECTION, SOLUTION INTRAVENOUS CONTINUOUS PRN
Status: DISCONTINUED | OUTPATIENT
Start: 2020-09-02 | End: 2020-09-02

## 2020-09-02 RX ORDER — ALBUMIN, HUMAN INJ 5% 5 %
12.5 SOLUTION INTRAVENOUS ONCE
Status: COMPLETED | OUTPATIENT
Start: 2020-09-02 | End: 2020-09-02

## 2020-09-02 RX ORDER — POLYETHYLENE GLYCOL 3350 17 G/17G
17 POWDER, FOR SOLUTION ORAL DAILY
Status: DISCONTINUED | OUTPATIENT
Start: 2020-09-02 | End: 2020-09-07 | Stop reason: HOSPADM

## 2020-09-02 RX ORDER — AMIODARONE HYDROCHLORIDE 200 MG/1
200 TABLET ORAL EVERY 8 HOURS SCHEDULED
Status: DISCONTINUED | OUTPATIENT
Start: 2020-09-02 | End: 2020-09-07 | Stop reason: HOSPADM

## 2020-09-02 RX ORDER — ONDANSETRON 2 MG/ML
4 INJECTION INTRAMUSCULAR; INTRAVENOUS EVERY 6 HOURS PRN
Status: DISCONTINUED | OUTPATIENT
Start: 2020-09-02 | End: 2020-09-07 | Stop reason: HOSPADM

## 2020-09-02 RX ORDER — POTASSIUM CHLORIDE 14.9 MG/ML
20 INJECTION INTRAVENOUS ONCE AS NEEDED
Status: COMPLETED | OUTPATIENT
Start: 2020-09-02 | End: 2020-09-02

## 2020-09-02 RX ORDER — PROTAMINE SULFATE 10 MG/ML
INJECTION, SOLUTION INTRAVENOUS AS NEEDED
Status: DISCONTINUED | OUTPATIENT
Start: 2020-09-02 | End: 2020-09-02

## 2020-09-02 RX ORDER — POTASSIUM CHLORIDE 14.9 MG/ML
20 INJECTION INTRAVENOUS
Status: DISCONTINUED | OUTPATIENT
Start: 2020-09-02 | End: 2020-09-03

## 2020-09-02 RX ORDER — BISACODYL 10 MG
10 SUPPOSITORY, RECTAL RECTAL DAILY PRN
Status: DISCONTINUED | OUTPATIENT
Start: 2020-09-02 | End: 2020-09-07 | Stop reason: HOSPADM

## 2020-09-02 RX ORDER — CEFAZOLIN SODIUM 2 G/50ML
2000 SOLUTION INTRAVENOUS ONCE
Status: COMPLETED | OUTPATIENT
Start: 2020-09-02 | End: 2020-09-02

## 2020-09-02 RX ORDER — KETAMINE HCL IN NACL, ISO-OSM 100MG/10ML
SYRINGE (ML) INJECTION AS NEEDED
Status: DISCONTINUED | OUTPATIENT
Start: 2020-09-02 | End: 2020-09-02

## 2020-09-02 RX ORDER — ALBUMIN, HUMAN INJ 5% 5 %
SOLUTION INTRAVENOUS
Status: COMPLETED
Start: 2020-09-02 | End: 2020-09-02

## 2020-09-02 RX ORDER — LIDOCAINE HYDROCHLORIDE 10 MG/ML
INJECTION, SOLUTION EPIDURAL; INFILTRATION; INTRACAUDAL; PERINEURAL
Status: COMPLETED
Start: 2020-09-02 | End: 2020-09-02

## 2020-09-02 RX ORDER — ACETAMINOPHEN 650 MG/1
650 SUPPOSITORY RECTAL EVERY 4 HOURS PRN
Status: DISCONTINUED | OUTPATIENT
Start: 2020-09-02 | End: 2020-09-07 | Stop reason: HOSPADM

## 2020-09-02 RX ORDER — ONDANSETRON 2 MG/ML
INJECTION INTRAMUSCULAR; INTRAVENOUS AS NEEDED
Status: DISCONTINUED | OUTPATIENT
Start: 2020-09-02 | End: 2020-09-02

## 2020-09-02 RX ORDER — DEXAMETHASONE SODIUM PHOSPHATE 10 MG/ML
INJECTION, SOLUTION INTRAMUSCULAR; INTRAVENOUS AS NEEDED
Status: DISCONTINUED | OUTPATIENT
Start: 2020-09-02 | End: 2020-09-02

## 2020-09-02 RX ORDER — EPHEDRINE SULFATE 50 MG/ML
INJECTION INTRAVENOUS AS NEEDED
Status: DISCONTINUED | OUTPATIENT
Start: 2020-09-02 | End: 2020-09-02

## 2020-09-02 RX ORDER — FENTANYL CITRATE 50 UG/ML
INJECTION, SOLUTION INTRAMUSCULAR; INTRAVENOUS AS NEEDED
Status: DISCONTINUED | OUTPATIENT
Start: 2020-09-02 | End: 2020-09-02

## 2020-09-02 RX ORDER — GLYCOPYRROLATE 0.2 MG/ML
INJECTION INTRAMUSCULAR; INTRAVENOUS AS NEEDED
Status: DISCONTINUED | OUTPATIENT
Start: 2020-09-02 | End: 2020-09-02

## 2020-09-02 RX ORDER — FENTANYL CITRATE 50 UG/ML
50 INJECTION, SOLUTION INTRAMUSCULAR; INTRAVENOUS ONCE
Status: DISCONTINUED | OUTPATIENT
Start: 2020-09-02 | End: 2020-09-03

## 2020-09-02 RX ORDER — ROCURONIUM BROMIDE 10 MG/ML
INJECTION, SOLUTION INTRAVENOUS AS NEEDED
Status: DISCONTINUED | OUTPATIENT
Start: 2020-09-02 | End: 2020-09-02

## 2020-09-02 RX ORDER — OXYCODONE HYDROCHLORIDE 5 MG/1
5 TABLET ORAL EVERY 4 HOURS PRN
Status: DISCONTINUED | OUTPATIENT
Start: 2020-09-02 | End: 2020-09-07 | Stop reason: HOSPADM

## 2020-09-02 RX ORDER — MAGNESIUM HYDROXIDE 1200 MG/15ML
LIQUID ORAL AS NEEDED
Status: DISCONTINUED | OUTPATIENT
Start: 2020-09-02 | End: 2020-09-02 | Stop reason: HOSPADM

## 2020-09-02 RX ORDER — OXYCODONE HYDROCHLORIDE 5 MG/1
2.5 TABLET ORAL EVERY 4 HOURS PRN
Status: DISCONTINUED | OUTPATIENT
Start: 2020-09-02 | End: 2020-09-07 | Stop reason: HOSPADM

## 2020-09-02 RX ORDER — AMINOCAPROIC ACID 250 MG/ML
INJECTION, SOLUTION INTRAVENOUS AS NEEDED
Status: DISCONTINUED | OUTPATIENT
Start: 2020-09-02 | End: 2020-09-02

## 2020-09-02 RX ORDER — ACETAMINOPHEN 325 MG/1
975 TABLET ORAL ONCE
Status: COMPLETED | OUTPATIENT
Start: 2020-09-02 | End: 2020-09-02

## 2020-09-02 RX ORDER — HEPARIN SODIUM 1000 [USP'U]/ML
INJECTION, SOLUTION INTRAVENOUS; SUBCUTANEOUS AS NEEDED
Status: DISCONTINUED | OUTPATIENT
Start: 2020-09-02 | End: 2020-09-02

## 2020-09-02 RX ORDER — NEOSTIGMINE METHYLSULFATE 1 MG/ML
INJECTION INTRAVENOUS AS NEEDED
Status: DISCONTINUED | OUTPATIENT
Start: 2020-09-02 | End: 2020-09-02

## 2020-09-02 RX ORDER — LIDOCAINE 50 MG/G
3 PATCH TOPICAL DAILY
Status: DISCONTINUED | OUTPATIENT
Start: 2020-09-02 | End: 2020-09-07 | Stop reason: HOSPADM

## 2020-09-02 RX ORDER — ACETAMINOPHEN 325 MG/1
975 TABLET ORAL EVERY 8 HOURS
Status: DISCONTINUED | OUTPATIENT
Start: 2020-09-02 | End: 2020-09-07 | Stop reason: HOSPADM

## 2020-09-02 RX ORDER — VANCOMYCIN HYDROCHLORIDE 1 G/20ML
INJECTION, POWDER, LYOPHILIZED, FOR SOLUTION INTRAVENOUS AS NEEDED
Status: DISCONTINUED | OUTPATIENT
Start: 2020-09-02 | End: 2020-09-02 | Stop reason: HOSPADM

## 2020-09-02 RX ORDER — MIDAZOLAM HYDROCHLORIDE 2 MG/2ML
INJECTION, SOLUTION INTRAMUSCULAR; INTRAVENOUS AS NEEDED
Status: DISCONTINUED | OUTPATIENT
Start: 2020-09-02 | End: 2020-09-02

## 2020-09-02 RX ORDER — CHLORHEXIDINE GLUCONATE 0.12 MG/ML
15 RINSE ORAL 2 TIMES DAILY
Status: DISCONTINUED | OUTPATIENT
Start: 2020-09-02 | End: 2020-09-03

## 2020-09-02 RX ORDER — CEFAZOLIN SODIUM 2 G/50ML
2000 SOLUTION INTRAVENOUS ONCE
Status: DISCONTINUED | OUTPATIENT
Start: 2020-09-02 | End: 2020-09-02

## 2020-09-02 RX ORDER — CHLORHEXIDINE GLUCONATE 0.12 MG/ML
15 RINSE ORAL ONCE
Status: COMPLETED | OUTPATIENT
Start: 2020-09-02 | End: 2020-09-02

## 2020-09-02 RX ORDER — HEPARIN SODIUM 5000 [USP'U]/ML
5000 INJECTION, SOLUTION INTRAVENOUS; SUBCUTANEOUS EVERY 8 HOURS SCHEDULED
Status: DISCONTINUED | OUTPATIENT
Start: 2020-09-03 | End: 2020-09-07 | Stop reason: HOSPADM

## 2020-09-02 RX ORDER — LIDOCAINE HYDROCHLORIDE 10 MG/ML
INJECTION, SOLUTION EPIDURAL; INFILTRATION; INTRACAUDAL; PERINEURAL AS NEEDED
Status: DISCONTINUED | OUTPATIENT
Start: 2020-09-02 | End: 2020-09-02

## 2020-09-02 RX ORDER — CALCIUM CHLORIDE 100 MG/ML
1 INJECTION INTRAVENOUS; INTRAVENTRICULAR ONCE
Status: DISCONTINUED | OUTPATIENT
Start: 2020-09-02 | End: 2020-09-03

## 2020-09-02 RX ORDER — CEFAZOLIN SODIUM 2 G/50ML
2000 SOLUTION INTRAVENOUS EVERY 8 HOURS
Status: COMPLETED | OUTPATIENT
Start: 2020-09-02 | End: 2020-09-03

## 2020-09-02 RX ORDER — CHLORHEXIDINE GLUCONATE 0.12 MG/ML
15 RINSE ORAL EVERY 12 HOURS SCHEDULED
Status: DISCONTINUED | OUTPATIENT
Start: 2020-09-02 | End: 2020-09-02

## 2020-09-02 RX ORDER — ASPIRIN 325 MG
325 TABLET ORAL DAILY
Status: DISCONTINUED | OUTPATIENT
Start: 2020-09-02 | End: 2020-09-07 | Stop reason: HOSPADM

## 2020-09-02 RX ORDER — FENTANYL CITRATE 50 UG/ML
50 INJECTION, SOLUTION INTRAMUSCULAR; INTRAVENOUS
Status: DISCONTINUED | OUTPATIENT
Start: 2020-09-02 | End: 2020-09-03

## 2020-09-02 RX ORDER — ATORVASTATIN CALCIUM 80 MG/1
80 TABLET, FILM COATED ORAL
Status: DISCONTINUED | OUTPATIENT
Start: 2020-09-02 | End: 2020-09-07 | Stop reason: HOSPADM

## 2020-09-02 RX ORDER — MAGNESIUM SULFATE HEPTAHYDRATE 40 MG/ML
2 INJECTION, SOLUTION INTRAVENOUS ONCE
Status: COMPLETED | OUTPATIENT
Start: 2020-09-02 | End: 2020-09-02

## 2020-09-02 RX ADMIN — EPHEDRINE SULFATE 10 MG: 50 INJECTION, SOLUTION INTRAVENOUS at 08:33

## 2020-09-02 RX ADMIN — DEXAMETHASONE SODIUM PHOSPHATE 5 MG: 10 INJECTION, SOLUTION INTRAMUSCULAR; INTRAVENOUS at 11:00

## 2020-09-02 RX ADMIN — MIDAZOLAM 2 MG: 1 INJECTION INTRAMUSCULAR; INTRAVENOUS at 11:00

## 2020-09-02 RX ADMIN — PHENYLEPHRINE HYDROCHLORIDE 200 MCG: 10 INJECTION INTRAVENOUS at 08:33

## 2020-09-02 RX ADMIN — CEFAZOLIN SODIUM 2000 MG: 2 SOLUTION INTRAVENOUS at 08:55

## 2020-09-02 RX ADMIN — PHENYLEPHRINE HYDROCHLORIDE 200 MCG: 10 INJECTION INTRAVENOUS at 08:30

## 2020-09-02 RX ADMIN — ALBUMIN (HUMAN) 12.5 G: 12.5 INJECTION, SOLUTION INTRAVENOUS at 15:01

## 2020-09-02 RX ADMIN — NEOSTIGMINE METHYLSULFATE 4 MG: 1 INJECTION, SOLUTION INTRAVENOUS at 13:17

## 2020-09-02 RX ADMIN — CHLORHEXIDINE GLUCONATE 0.12% ORAL RINSE 15 ML: 1.2 LIQUID ORAL at 06:33

## 2020-09-02 RX ADMIN — AMINOCAPROIC ACID 1000 MG/HR: 250 INJECTION, SOLUTION INTRAVENOUS at 08:55

## 2020-09-02 RX ADMIN — MAGNESIUM SULFATE HEPTAHYDRATE 2 G: 40 INJECTION, SOLUTION INTRAVENOUS at 14:42

## 2020-09-02 RX ADMIN — MIDAZOLAM 4 MG: 1 INJECTION INTRAMUSCULAR; INTRAVENOUS at 08:05

## 2020-09-02 RX ADMIN — ACETAMINOPHEN 975 MG: 325 TABLET, FILM COATED ORAL at 06:33

## 2020-09-02 RX ADMIN — POTASSIUM CHLORIDE 20 MEQ: 14.9 INJECTION, SOLUTION INTRAVENOUS at 14:40

## 2020-09-02 RX ADMIN — SODIUM CHLORIDE: 0.9 INJECTION, SOLUTION INTRAVENOUS at 08:01

## 2020-09-02 RX ADMIN — ROCURONIUM BROMIDE 100 MG: 10 INJECTION, SOLUTION INTRAVENOUS at 08:25

## 2020-09-02 RX ADMIN — AMIODARONE HYDROCHLORIDE 200 MG: 200 TABLET ORAL at 21:32

## 2020-09-02 RX ADMIN — CEFAZOLIN SODIUM 2000 MG: 2 SOLUTION INTRAVENOUS at 20:09

## 2020-09-02 RX ADMIN — HEPARIN SODIUM 32700 UNITS: 1000 INJECTION INTRAVENOUS; SUBCUTANEOUS at 10:26

## 2020-09-02 RX ADMIN — GABAPENTIN 300 MG: 300 CAPSULE ORAL at 06:33

## 2020-09-02 RX ADMIN — SODIUM CHLORIDE 20 ML/HR: 0.45 INJECTION, SOLUTION INTRAVENOUS at 14:24

## 2020-09-02 RX ADMIN — ACETAMINOPHEN 975 MG: 325 TABLET, FILM COATED ORAL at 21:32

## 2020-09-02 RX ADMIN — METOPROLOL TARTRATE 12.5 MG: 25 TABLET ORAL at 06:33

## 2020-09-02 RX ADMIN — HEPARIN SODIUM 5000 UNITS: 1000 INJECTION INTRAVENOUS; SUBCUTANEOUS at 10:05

## 2020-09-02 RX ADMIN — MUPIROCIN 1 APPLICATION: 20 OINTMENT TOPICAL at 06:33

## 2020-09-02 RX ADMIN — PROTAMINE SULFATE 300 MG: 10 INJECTION, SOLUTION INTRAVENOUS at 12:06

## 2020-09-02 RX ADMIN — ONDANSETRON 4 MG: 2 INJECTION INTRAMUSCULAR; INTRAVENOUS at 13:00

## 2020-09-02 RX ADMIN — PHENYLEPHRINE HYDROCHLORIDE 200 MCG: 10 INJECTION INTRAVENOUS at 08:50

## 2020-09-02 RX ADMIN — LIDOCAINE HYDROCHLORIDE 10 ML: 10 INJECTION, SOLUTION EPIDURAL; INFILTRATION; INTRACAUDAL; PERINEURAL at 08:25

## 2020-09-02 RX ADMIN — CEFAZOLIN SODIUM 2000 MG: 2 SOLUTION INTRAVENOUS at 11:59

## 2020-09-02 RX ADMIN — OXYCODONE HYDROCHLORIDE 5 MG: 5 TABLET ORAL at 19:15

## 2020-09-02 RX ADMIN — ALBUMIN (HUMAN) 12.5 G: 12.5 INJECTION, SOLUTION INTRAVENOUS at 19:15

## 2020-09-02 RX ADMIN — OXYCODONE HYDROCHLORIDE 5 MG: 5 TABLET ORAL at 23:17

## 2020-09-02 RX ADMIN — Medication 50 MG: at 08:25

## 2020-09-02 RX ADMIN — GLYCOPYRROLATE 0.4 MG: 0.2 INJECTION, SOLUTION INTRAMUSCULAR; INTRAVENOUS at 13:17

## 2020-09-02 RX ADMIN — FENTANYL CITRATE 1000 MCG: 50 INJECTION, SOLUTION INTRAMUSCULAR; INTRAVENOUS at 08:25

## 2020-09-02 RX ADMIN — EPHEDRINE SULFATE 10 MG: 50 INJECTION, SOLUTION INTRAVENOUS at 08:30

## 2020-09-02 RX ADMIN — SODIUM CHLORIDE 2 MCG/MIN: 0.9 INJECTION, SOLUTION INTRAVENOUS at 11:59

## 2020-09-02 RX ADMIN — Medication 50 MG: at 11:59

## 2020-09-02 RX ADMIN — SODIUM CHLORIDE: 0.9 INJECTION, SOLUTION INTRAVENOUS at 08:55

## 2020-09-02 RX ADMIN — DEXMEDETOMIDINE 0.3 MCG/KG/HR: 100 INJECTION, SOLUTION, CONCENTRATE INTRAVENOUS at 09:15

## 2020-09-02 RX ADMIN — MUPIROCIN 1 APPLICATION: 20 OINTMENT TOPICAL at 21:32

## 2020-09-02 RX ADMIN — AMINOCAPROIC ACID 5 G: 250 INJECTION, SOLUTION INTRAVENOUS at 08:55

## 2020-09-02 RX ADMIN — EPHEDRINE SULFATE 10 MG: 50 INJECTION, SOLUTION INTRAVENOUS at 08:50

## 2020-09-02 RX ADMIN — LIDOCAINE 5% 3 PATCH: 700 PATCH TOPICAL at 20:09

## 2020-09-02 RX ADMIN — MIDAZOLAM 4 MG: 1 INJECTION INTRAMUSCULAR; INTRAVENOUS at 11:59

## 2020-09-02 NOTE — ANESTHESIA PROCEDURE NOTES
Arterial Line Insertion    Date/Time: 9/2/2020 8:20 AM  Performed by: Tonya Lyn MD  Authorized by: Tonya Lyn MD   Consent: Verbal consent obtained  Written consent obtained  Risks and benefits: risks, benefits and alternatives were discussed  Consent given by: patient  Patient understanding: patient states understanding of the procedure being performed  Patient consent: the patient's understanding of the procedure matches consent given  Required items: required blood products, implants, devices, and special equipment available  Patient identity confirmed: verbally with patient  Time out: Immediately prior to procedure a "time out" was called to verify the correct patient, procedure, equipment, support staff and site/side marked as required  Preparation: Patient was prepped and draped in the usual sterile fashion  Indications: hemodynamic monitoring  Orientation:  Right  Location: brachial artery  Sedation:  Patient sedated: yes  Sedation type: anxiolysis  Sedatives: midazolam and see MAR for details  Vitals: Vital signs were monitored during sedation      Procedure Details:  Needle gauge: 22 (5 Fr catheter)  Seldinger technique: Seldinger technique used  Number of attempts: 1    Post-procedure:  Post-procedure: dressing applied and chlorhexidine patch applied  Waveform: good waveform  Post-procedure CNS: unchanged  Patient tolerance: Patient tolerated the procedure well with no immediate complications  Comments: Failed attempts at left radial artery cannulation x 2

## 2020-09-02 NOTE — ANESTHESIA PROCEDURE NOTES
Procedure Performed: MEGAN Anesthesia  Start Time:  9/2/2020 8:54 AM        Preanesthesia Checklist    Patient identified, IV assessed, risks and benefits discussed, monitors and equipment assessed, procedure being performed at surgeon's request and anesthesia consent obtained  Procedure    Type of MEGAN: complete MEGAN with interpretation  Images Saved: ultrasound permanent image saved  Physician Requesting Echo: Sukumar Lux DO  Location performed: OR  Intubated  Heart visualized  Insertion of MEGAN Probe:  Easy  Probe Type:  Epiaortic and multiplane  Modalities:  Color flow mapping, 3D, pulse wave Doppler and continuous wave Doppler  Echocardiographic and Doppler Measurements    PREPROCEDURE    LEFT VENTRICLE:  Systolic Function: normal  Ejection Fraction: 50-55%  Cavity size: normal  Regional Wall Motion Abnormalities: None  RIGHT VENTRICLE:  Systolic Function: normal   Cavity size normal  Hypertrophy (LVH) present  AORTIC VALVE:  Leaflets: normal and trileaflet  Leaflet motions normal and normal  Stenosis: none  Regurgitation: Trace to mild and mild  MITRAL VALVE:  Leaflets: normal  Leaflet Motions: normal  Regurgitation: mild  Stenosis: none  TRICUSPID VALVE:  Leaflets: normal  Leaflet Motions: normal  Stenosis: none  Regurgitation: mild  PULMONIC VALVE:  Leaflets: normal  Regurgitation: trace and Trace to mild  Stenosis: none  ASCENDING AORTA:  Size:  normal  Dissection not present  AORTIC ARCH:  Size:  normal  dissection not present  Grade 2: severe intimal thickening without protruding atheroma  DESCENDING AORTA:  Size: normal  Dissection not present  Grade 3: atheroma protruding < 0 5 cm into lumen          RIGHT ATRIUM:  Size:  normal  No spontaneous echo contrast     LEFT ATRIUM:  Size: normal  No spontaneous echo contrast     LEFT ATRIAL APPENDAGE:  Size: normal  No spontaneous echo contrast         ATRIAL SEPTUM:  Intra-atrial septal morphology: lipomatous hypertrophy  VENTRICULAR SEPTUM:  Intra-ventricular septum morphology: normal          EPIAORTIC:  Plaque Thickness: 0-5 mm  OTHER FINDINGS:  Pericardium:  normal  Pleural Effusion:  none  POSTPROCEDURE    LEFT VENTRICLE: Unchanged   RIGHT VENTRICLE: Unchanged   AORTIC VALVE: Unchanged   MITRAL VALVE: Unchanged   TRICUSPID VALVE: Unchanged   PULMONIC VALVE: Unchanged           ATRIA: Unchanged   AORTA: Unchanged   REMOVAL:  Probe Removal: atraumatic

## 2020-09-02 NOTE — ANESTHESIA PROCEDURE NOTES
Ad/Rosanne  Performed by: Julita Spears MD  Authorized by: Julita Spears MD     Date/Time: 9/2/2020 8:47 AM  Consent: Verbal consent obtained  Written consent obtained  Risks and benefits: risks, benefits and alternatives were discussed  Consent given by: patient  Patient understanding: patient states understanding of the procedure being performed  Patient consent: the patient's understanding of the procedure matches consent given  Required items: required blood products, implants, devices, and special equipment available  Patient identity confirmed: verbally with patient and arm band  Time out: Immediately prior to procedure a "time out" was called to verify the correct patient, procedure, equipment, support staff and site/side marked as required    Indications: vascular access and central pressure monitoring  Location details: right internal jugular  Catheter size: 9 Fr  Patient position: Trendelenburg  Assessment: blood return through all ports and free fluid flow  Preparation: skin prepped with 2% chlorhexidine and Chloraprep  Skin prep agent dried: skin prep agent completely dried prior to procedure  Sterile barriers: all five maximum sterile barriers used - cap, mask, sterile gown, sterile gloves, and large sterile sheet  Hand hygiene: hand hygiene performed prior to central venous catheter insertion  Ultrasound guidance: yes  ultrasound permanent image saved  Number of attempts: 1  Successful placement: yes  Post-procedure: line sutured and dressing applied  Patient tolerance: Patient tolerated the procedure well with no immediate complications

## 2020-09-02 NOTE — ANESTHESIA PROCEDURE NOTES
Central Line Insertion  Performed by: Sha Machado MD  Authorized by: Sha Machado MD     Date/Time: 9/2/2020 8:47 AM  Catheter Type:  triple lumen  Consent: Verbal consent obtained  Written consent obtained  Risks and benefits: risks, benefits and alternatives were discussed  Consent given by: patient  Patient understanding: patient states understanding of the procedure being performed  Patient consent: the patient's understanding of the procedure matches consent given  Required items: required blood products, implants, devices, and special equipment available  Patient identity confirmed: verbally with patient and arm band  Time out: Immediately prior to procedure a "time out" was called to verify the correct patient, procedure, equipment, support staff and site/side marked as required    Indications: vascular access and central pressure monitoring  Location details: right internal jugular  Catheter size: 7 Fr  Patient position: Trendelenburg  Assessment: blood return through all ports and free fluid flow  Preparation: skin prepped with 2% chlorhexidine and Chloraprep  Skin prep agent dried: skin prep agent completely dried prior to procedure  Sterile barriers: all five maximum sterile barriers used - cap, mask, sterile gown, sterile gloves, and large sterile sheet  Hand hygiene: hand hygiene performed prior to central venous catheter insertion  Ultrasound guidance: yes  sterile gel and probe cover used in ultrasound-guided central venous catheter insertionultrasound permanent image saved  Vessel of Catheter Tip End: svc  Number of attempts: 1  Successful placement: yes  Post-procedure: line sutured,  dressing applied and chlorhexidine patch applied  Patient tolerance: Patient tolerated the procedure well with no immediate complications

## 2020-09-02 NOTE — ANESTHESIA POSTPROCEDURE EVALUATION
Post-Op Assessment Note    CV Status:  Stable    Pain management: adequate     Mental Status:  Sleepy and somnolent   Hydration Status:  Stable   PONV Controlled:  None   Airway Patency:  Patent  Airway: intubated      Post Op Vitals Reviewed: Yes      Staff: Anesthesiologist         No complications documented      BP   108/61   Temp  96 8F   Pulse  80 AV paced   Resp   14   SpO2   98%

## 2020-09-03 ENCOUNTER — APPOINTMENT (INPATIENT)
Dept: RADIOLOGY | Facility: HOSPITAL | Age: 75
DRG: 236 | End: 2020-09-03
Payer: COMMERCIAL

## 2020-09-03 LAB
ABO GROUP BLD BPU: NORMAL
ANION GAP SERPL CALCULATED.3IONS-SCNC: 5 MMOL/L (ref 4–13)
ATRIAL RATE: 87 BPM
BPU ID: NORMAL
BUN SERPL-MCNC: 18 MG/DL (ref 5–25)
CALCIUM SERPL-MCNC: 7.8 MG/DL (ref 8.3–10.1)
CHLORIDE SERPL-SCNC: 113 MMOL/L (ref 100–108)
CO2 SERPL-SCNC: 25 MMOL/L (ref 21–32)
CREAT SERPL-MCNC: 1.04 MG/DL (ref 0.6–1.3)
CROSSMATCH: NORMAL
ERYTHROCYTE [DISTWIDTH] IN BLOOD BY AUTOMATED COUNT: 13.7 % (ref 11.6–15.1)
GFR SERPL CREATININE-BSD FRML MDRD: 70 ML/MIN/1.73SQ M
GLUCOSE SERPL-MCNC: 101 MG/DL (ref 65–140)
GLUCOSE SERPL-MCNC: 103 MG/DL (ref 65–140)
GLUCOSE SERPL-MCNC: 111 MG/DL (ref 65–140)
GLUCOSE SERPL-MCNC: 124 MG/DL (ref 65–140)
GLUCOSE SERPL-MCNC: 129 MG/DL (ref 65–140)
GLUCOSE SERPL-MCNC: 132 MG/DL (ref 65–140)
GLUCOSE SERPL-MCNC: 142 MG/DL (ref 65–140)
GLUCOSE SERPL-MCNC: 150 MG/DL (ref 65–140)
GLUCOSE SERPL-MCNC: 157 MG/DL (ref 65–140)
HCT VFR BLD AUTO: 39.6 % (ref 36.5–49.3)
HGB BLD-MCNC: 12.5 G/DL (ref 12–17)
MAGNESIUM SERPL-MCNC: 2.7 MG/DL (ref 1.6–2.6)
MCH RBC QN AUTO: 27.6 PG (ref 26.8–34.3)
MCHC RBC AUTO-ENTMCNC: 31.6 G/DL (ref 31.4–37.4)
MCV RBC AUTO: 87 FL (ref 82–98)
P AXIS: 41 DEGREES
PLATELET # BLD AUTO: 152 THOUSANDS/UL (ref 149–390)
PMV BLD AUTO: 11 FL (ref 8.9–12.7)
POTASSIUM SERPL-SCNC: 4.4 MMOL/L (ref 3.5–5.3)
PR INTERVAL: 263 MS
QRS AXIS: -10 DEGREES
QRSD INTERVAL: 92 MS
QT INTERVAL: 388 MS
QTC INTERVAL: 467 MS
RBC # BLD AUTO: 4.53 MILLION/UL (ref 3.88–5.62)
SODIUM SERPL-SCNC: 143 MMOL/L (ref 136–145)
T WAVE AXIS: 13 DEGREES
UNIT DISPENSE STATUS: NORMAL
UNIT PRODUCT CODE: NORMAL
UNIT RH: NORMAL
VENTRICULAR RATE: 87 BPM
WBC # BLD AUTO: 13.67 THOUSAND/UL (ref 4.31–10.16)

## 2020-09-03 PROCEDURE — 97167 OT EVAL HIGH COMPLEX 60 MIN: CPT

## 2020-09-03 PROCEDURE — 71045 X-RAY EXAM CHEST 1 VIEW: CPT

## 2020-09-03 PROCEDURE — 83735 ASSAY OF MAGNESIUM: CPT | Performed by: PHYSICIAN ASSISTANT

## 2020-09-03 PROCEDURE — 82948 REAGENT STRIP/BLOOD GLUCOSE: CPT

## 2020-09-03 PROCEDURE — 97163 PT EVAL HIGH COMPLEX 45 MIN: CPT

## 2020-09-03 PROCEDURE — 97530 THERAPEUTIC ACTIVITIES: CPT

## 2020-09-03 PROCEDURE — 93005 ELECTROCARDIOGRAM TRACING: CPT

## 2020-09-03 PROCEDURE — 94760 N-INVAS EAR/PLS OXIMETRY 1: CPT

## 2020-09-03 PROCEDURE — 99024 POSTOP FOLLOW-UP VISIT: CPT | Performed by: THORACIC SURGERY (CARDIOTHORACIC VASCULAR SURGERY)

## 2020-09-03 PROCEDURE — 99233 SBSQ HOSP IP/OBS HIGH 50: CPT | Performed by: EMERGENCY MEDICINE

## 2020-09-03 PROCEDURE — 99223 1ST HOSP IP/OBS HIGH 75: CPT | Performed by: INTERNAL MEDICINE

## 2020-09-03 PROCEDURE — 85027 COMPLETE CBC AUTOMATED: CPT | Performed by: PHYSICIAN ASSISTANT

## 2020-09-03 PROCEDURE — 93010 ELECTROCARDIOGRAM REPORT: CPT | Performed by: INTERNAL MEDICINE

## 2020-09-03 PROCEDURE — 80048 BASIC METABOLIC PNL TOTAL CA: CPT | Performed by: PHYSICIAN ASSISTANT

## 2020-09-03 RX ORDER — FUROSEMIDE 10 MG/ML
40 INJECTION INTRAMUSCULAR; INTRAVENOUS
Status: DISCONTINUED | OUTPATIENT
Start: 2020-09-03 | End: 2020-09-04

## 2020-09-03 RX ORDER — LANOLIN ALCOHOL/MO/W.PET/CERES
6 CREAM (GRAM) TOPICAL
Status: DISCONTINUED | OUTPATIENT
Start: 2020-09-03 | End: 2020-09-07 | Stop reason: HOSPADM

## 2020-09-03 RX ORDER — DOCUSATE SODIUM 100 MG/1
100 CAPSULE, LIQUID FILLED ORAL 2 TIMES DAILY
Status: DISCONTINUED | OUTPATIENT
Start: 2020-09-03 | End: 2020-09-07 | Stop reason: HOSPADM

## 2020-09-03 RX ORDER — TEMAZEPAM 15 MG/1
15 CAPSULE ORAL
Status: DISCONTINUED | OUTPATIENT
Start: 2020-09-03 | End: 2020-09-07 | Stop reason: HOSPADM

## 2020-09-03 RX ORDER — POTASSIUM CHLORIDE 20 MEQ/1
20 TABLET, EXTENDED RELEASE ORAL 2 TIMES DAILY
Status: DISCONTINUED | OUTPATIENT
Start: 2020-09-03 | End: 2020-09-04

## 2020-09-03 RX ADMIN — HEPARIN SODIUM 5000 UNITS: 5000 INJECTION INTRAVENOUS; SUBCUTANEOUS at 06:08

## 2020-09-03 RX ADMIN — ASPIRIN 325 MG ORAL TABLET 325 MG: 325 PILL ORAL at 09:04

## 2020-09-03 RX ADMIN — SODIUM CHLORIDE 2 MG/HR: 0.9 INJECTION, SOLUTION INTRAVENOUS at 01:57

## 2020-09-03 RX ADMIN — HEPARIN SODIUM 5000 UNITS: 5000 INJECTION INTRAVENOUS; SUBCUTANEOUS at 21:32

## 2020-09-03 RX ADMIN — MUPIROCIN 1 APPLICATION: 20 OINTMENT TOPICAL at 21:32

## 2020-09-03 RX ADMIN — POTASSIUM CHLORIDE 20 MEQ: 1500 TABLET, EXTENDED RELEASE ORAL at 09:04

## 2020-09-03 RX ADMIN — OXYCODONE HYDROCHLORIDE 5 MG: 5 TABLET ORAL at 21:32

## 2020-09-03 RX ADMIN — MUPIROCIN 1 APPLICATION: 20 OINTMENT TOPICAL at 09:04

## 2020-09-03 RX ADMIN — CEFAZOLIN SODIUM 2000 MG: 2 SOLUTION INTRAVENOUS at 12:13

## 2020-09-03 RX ADMIN — POTASSIUM CHLORIDE 20 MEQ: 1500 TABLET, EXTENDED RELEASE ORAL at 17:54

## 2020-09-03 RX ADMIN — DOCUSATE SODIUM 100 MG: 100 CAPSULE, LIQUID FILLED ORAL at 17:54

## 2020-09-03 RX ADMIN — AMIODARONE HYDROCHLORIDE 200 MG: 200 TABLET ORAL at 14:18

## 2020-09-03 RX ADMIN — METOPROLOL TARTRATE 25 MG: 25 TABLET, FILM COATED ORAL at 21:32

## 2020-09-03 RX ADMIN — DOCUSATE SODIUM 100 MG: 100 CAPSULE, LIQUID FILLED ORAL at 09:04

## 2020-09-03 RX ADMIN — FUROSEMIDE 40 MG: 10 INJECTION, SOLUTION INTRAMUSCULAR; INTRAVENOUS at 09:05

## 2020-09-03 RX ADMIN — ACETAMINOPHEN 975 MG: 325 TABLET, FILM COATED ORAL at 21:31

## 2020-09-03 RX ADMIN — TEMAZEPAM 15 MG: 15 CAPSULE ORAL at 21:32

## 2020-09-03 RX ADMIN — FUROSEMIDE 40 MG: 10 INJECTION, SOLUTION INTRAMUSCULAR; INTRAVENOUS at 17:54

## 2020-09-03 RX ADMIN — LIDOCAINE 5% 3 PATCH: 700 PATCH TOPICAL at 09:28

## 2020-09-03 RX ADMIN — POLYETHYLENE GLYCOL 3350 17 G: 17 POWDER, FOR SOLUTION ORAL at 09:04

## 2020-09-03 RX ADMIN — ATORVASTATIN CALCIUM 80 MG: 80 TABLET, FILM COATED ORAL at 17:54

## 2020-09-03 RX ADMIN — HEPARIN SODIUM 5000 UNITS: 5000 INJECTION INTRAVENOUS; SUBCUTANEOUS at 14:18

## 2020-09-03 RX ADMIN — ACETAMINOPHEN 975 MG: 325 TABLET, FILM COATED ORAL at 14:18

## 2020-09-03 RX ADMIN — MELATONIN 6 MG: at 21:31

## 2020-09-03 RX ADMIN — METOPROLOL TARTRATE 25 MG: 25 TABLET, FILM COATED ORAL at 09:04

## 2020-09-03 RX ADMIN — AMIODARONE HYDROCHLORIDE 200 MG: 200 TABLET ORAL at 21:32

## 2020-09-03 RX ADMIN — MELATONIN 6 MG: at 00:10

## 2020-09-03 RX ADMIN — AMIODARONE HYDROCHLORIDE 200 MG: 200 TABLET ORAL at 06:08

## 2020-09-03 RX ADMIN — ACETAMINOPHEN 975 MG: 325 TABLET, FILM COATED ORAL at 06:08

## 2020-09-03 RX ADMIN — CEFAZOLIN SODIUM 2000 MG: 2 SOLUTION INTRAVENOUS at 04:35

## 2020-09-03 RX ADMIN — PANTOPRAZOLE SODIUM 40 MG: 40 TABLET, DELAYED RELEASE ORAL at 06:08

## 2020-09-03 RX ADMIN — OXYCODONE HYDROCHLORIDE 5 MG: 5 TABLET ORAL at 04:32

## 2020-09-04 LAB
ANION GAP SERPL CALCULATED.3IONS-SCNC: 3 MMOL/L (ref 4–13)
BUN SERPL-MCNC: 33 MG/DL (ref 5–25)
CALCIUM SERPL-MCNC: 8 MG/DL (ref 8.3–10.1)
CHLORIDE SERPL-SCNC: 110 MMOL/L (ref 100–108)
CO2 SERPL-SCNC: 27 MMOL/L (ref 21–32)
CREAT SERPL-MCNC: 1.67 MG/DL (ref 0.6–1.3)
ERYTHROCYTE [DISTWIDTH] IN BLOOD BY AUTOMATED COUNT: 14.2 % (ref 11.6–15.1)
GFR SERPL CREATININE-BSD FRML MDRD: 39 ML/MIN/1.73SQ M
GLUCOSE SERPL-MCNC: 121 MG/DL (ref 65–140)
GLUCOSE SERPL-MCNC: 125 MG/DL (ref 65–140)
GLUCOSE SERPL-MCNC: 128 MG/DL (ref 65–140)
GLUCOSE SERPL-MCNC: 92 MG/DL (ref 65–140)
GLUCOSE SERPL-MCNC: 94 MG/DL (ref 65–140)
HCT VFR BLD AUTO: 38.3 % (ref 36.5–49.3)
HGB BLD-MCNC: 11.6 G/DL (ref 12–17)
MAGNESIUM SERPL-MCNC: 2.5 MG/DL (ref 1.6–2.6)
MCH RBC QN AUTO: 27.4 PG (ref 26.8–34.3)
MCHC RBC AUTO-ENTMCNC: 30.3 G/DL (ref 31.4–37.4)
MCV RBC AUTO: 91 FL (ref 82–98)
PLATELET # BLD AUTO: 142 THOUSANDS/UL (ref 149–390)
PMV BLD AUTO: 11.2 FL (ref 8.9–12.7)
POTASSIUM SERPL-SCNC: 4.7 MMOL/L (ref 3.5–5.3)
RBC # BLD AUTO: 4.23 MILLION/UL (ref 3.88–5.62)
SODIUM SERPL-SCNC: 140 MMOL/L (ref 136–145)
WBC # BLD AUTO: 13.72 THOUSAND/UL (ref 4.31–10.16)

## 2020-09-04 PROCEDURE — 97530 THERAPEUTIC ACTIVITIES: CPT

## 2020-09-04 PROCEDURE — 97535 SELF CARE MNGMENT TRAINING: CPT

## 2020-09-04 PROCEDURE — 82948 REAGENT STRIP/BLOOD GLUCOSE: CPT

## 2020-09-04 PROCEDURE — 85027 COMPLETE CBC AUTOMATED: CPT | Performed by: PHYSICIAN ASSISTANT

## 2020-09-04 PROCEDURE — 97116 GAIT TRAINING THERAPY: CPT

## 2020-09-04 PROCEDURE — 99232 SBSQ HOSP IP/OBS MODERATE 35: CPT | Performed by: INTERNAL MEDICINE

## 2020-09-04 PROCEDURE — 99024 POSTOP FOLLOW-UP VISIT: CPT | Performed by: THORACIC SURGERY (CARDIOTHORACIC VASCULAR SURGERY)

## 2020-09-04 PROCEDURE — 83735 ASSAY OF MAGNESIUM: CPT | Performed by: PHYSICIAN ASSISTANT

## 2020-09-04 PROCEDURE — 99223 1ST HOSP IP/OBS HIGH 75: CPT | Performed by: NURSE PRACTITIONER

## 2020-09-04 PROCEDURE — 80048 BASIC METABOLIC PNL TOTAL CA: CPT | Performed by: PHYSICIAN ASSISTANT

## 2020-09-04 RX ORDER — BISACODYL 10 MG
10 SUPPOSITORY, RECTAL RECTAL ONCE
Status: COMPLETED | OUTPATIENT
Start: 2020-09-04 | End: 2020-09-04

## 2020-09-04 RX ORDER — FUROSEMIDE 10 MG/ML
40 INJECTION INTRAMUSCULAR; INTRAVENOUS DAILY
Status: DISCONTINUED | OUTPATIENT
Start: 2020-09-04 | End: 2020-09-07 | Stop reason: HOSPADM

## 2020-09-04 RX ADMIN — OXYCODONE HYDROCHLORIDE 5 MG: 5 TABLET ORAL at 02:38

## 2020-09-04 RX ADMIN — DOCUSATE SODIUM 100 MG: 100 CAPSULE, LIQUID FILLED ORAL at 10:01

## 2020-09-04 RX ADMIN — AMIODARONE HYDROCHLORIDE 200 MG: 200 TABLET ORAL at 14:22

## 2020-09-04 RX ADMIN — POLYETHYLENE GLYCOL 3350 17 G: 17 POWDER, FOR SOLUTION ORAL at 10:01

## 2020-09-04 RX ADMIN — ATORVASTATIN CALCIUM 80 MG: 80 TABLET, FILM COATED ORAL at 17:14

## 2020-09-04 RX ADMIN — HEPARIN SODIUM 5000 UNITS: 5000 INJECTION INTRAVENOUS; SUBCUTANEOUS at 14:22

## 2020-09-04 RX ADMIN — AMIODARONE HYDROCHLORIDE 200 MG: 200 TABLET ORAL at 06:31

## 2020-09-04 RX ADMIN — ACETAMINOPHEN 975 MG: 325 TABLET, FILM COATED ORAL at 14:22

## 2020-09-04 RX ADMIN — HEPARIN SODIUM 5000 UNITS: 5000 INJECTION INTRAVENOUS; SUBCUTANEOUS at 06:30

## 2020-09-04 RX ADMIN — HEPARIN SODIUM 5000 UNITS: 5000 INJECTION INTRAVENOUS; SUBCUTANEOUS at 21:16

## 2020-09-04 RX ADMIN — METOPROLOL TARTRATE 25 MG: 25 TABLET, FILM COATED ORAL at 10:01

## 2020-09-04 RX ADMIN — MUPIROCIN 1 APPLICATION: 20 OINTMENT TOPICAL at 20:23

## 2020-09-04 RX ADMIN — DOCUSATE SODIUM 100 MG: 100 CAPSULE, LIQUID FILLED ORAL at 17:14

## 2020-09-04 RX ADMIN — MELATONIN 6 MG: at 21:15

## 2020-09-04 RX ADMIN — ACETAMINOPHEN 975 MG: 325 TABLET, FILM COATED ORAL at 06:30

## 2020-09-04 RX ADMIN — PANTOPRAZOLE SODIUM 40 MG: 40 TABLET, DELAYED RELEASE ORAL at 06:31

## 2020-09-04 RX ADMIN — ACETAMINOPHEN 975 MG: 325 TABLET, FILM COATED ORAL at 21:15

## 2020-09-04 RX ADMIN — OXYCODONE HYDROCHLORIDE 2.5 MG: 5 TABLET ORAL at 14:21

## 2020-09-04 RX ADMIN — ASPIRIN 325 MG ORAL TABLET 325 MG: 325 PILL ORAL at 10:01

## 2020-09-04 RX ADMIN — METOPROLOL TARTRATE 25 MG: 25 TABLET, FILM COATED ORAL at 20:23

## 2020-09-04 RX ADMIN — BISACODYL 10 MG: 10 SUPPOSITORY RECTAL at 10:03

## 2020-09-04 RX ADMIN — LIDOCAINE 5% 3 PATCH: 700 PATCH TOPICAL at 10:01

## 2020-09-04 RX ADMIN — FUROSEMIDE 40 MG: 10 INJECTION, SOLUTION INTRAMUSCULAR; INTRAVENOUS at 10:01

## 2020-09-04 RX ADMIN — AMIODARONE HYDROCHLORIDE 200 MG: 200 TABLET ORAL at 21:15

## 2020-09-04 RX ADMIN — MUPIROCIN 1 APPLICATION: 20 OINTMENT TOPICAL at 10:01

## 2020-09-05 LAB
ANION GAP SERPL CALCULATED.3IONS-SCNC: 4 MMOL/L (ref 4–13)
BUN SERPL-MCNC: 34 MG/DL (ref 5–25)
CALCIUM SERPL-MCNC: 8.3 MG/DL (ref 8.3–10.1)
CHLORIDE SERPL-SCNC: 109 MMOL/L (ref 100–108)
CO2 SERPL-SCNC: 27 MMOL/L (ref 21–32)
CREAT SERPL-MCNC: 1.25 MG/DL (ref 0.6–1.3)
ERYTHROCYTE [DISTWIDTH] IN BLOOD BY AUTOMATED COUNT: 13.9 % (ref 11.6–15.1)
GFR SERPL CREATININE-BSD FRML MDRD: 56 ML/MIN/1.73SQ M
GLUCOSE SERPL-MCNC: 114 MG/DL (ref 65–140)
GLUCOSE SERPL-MCNC: 124 MG/DL (ref 65–140)
GLUCOSE SERPL-MCNC: 138 MG/DL (ref 65–140)
GLUCOSE SERPL-MCNC: 85 MG/DL (ref 65–140)
GLUCOSE SERPL-MCNC: 91 MG/DL (ref 65–140)
HCT VFR BLD AUTO: 33.7 % (ref 36.5–49.3)
HGB BLD-MCNC: 10.6 G/DL (ref 12–17)
MCH RBC QN AUTO: 27.8 PG (ref 26.8–34.3)
MCHC RBC AUTO-ENTMCNC: 31.5 G/DL (ref 31.4–37.4)
MCV RBC AUTO: 89 FL (ref 82–98)
PLATELET # BLD AUTO: 124 THOUSANDS/UL (ref 149–390)
PMV BLD AUTO: 11.4 FL (ref 8.9–12.7)
POTASSIUM SERPL-SCNC: 4.3 MMOL/L (ref 3.5–5.3)
RBC # BLD AUTO: 3.81 MILLION/UL (ref 3.88–5.62)
SODIUM SERPL-SCNC: 140 MMOL/L (ref 136–145)
WBC # BLD AUTO: 9.72 THOUSAND/UL (ref 4.31–10.16)

## 2020-09-05 PROCEDURE — 85027 COMPLETE CBC AUTOMATED: CPT | Performed by: PHYSICIAN ASSISTANT

## 2020-09-05 PROCEDURE — 80048 BASIC METABOLIC PNL TOTAL CA: CPT | Performed by: PHYSICIAN ASSISTANT

## 2020-09-05 PROCEDURE — 99024 POSTOP FOLLOW-UP VISIT: CPT | Performed by: THORACIC SURGERY (CARDIOTHORACIC VASCULAR SURGERY)

## 2020-09-05 PROCEDURE — 82948 REAGENT STRIP/BLOOD GLUCOSE: CPT

## 2020-09-05 PROCEDURE — 99232 SBSQ HOSP IP/OBS MODERATE 35: CPT | Performed by: INTERNAL MEDICINE

## 2020-09-05 PROCEDURE — 97116 GAIT TRAINING THERAPY: CPT

## 2020-09-05 RX ADMIN — METOPROLOL TARTRATE 25 MG: 25 TABLET, FILM COATED ORAL at 09:31

## 2020-09-05 RX ADMIN — FUROSEMIDE 40 MG: 10 INJECTION, SOLUTION INTRAMUSCULAR; INTRAVENOUS at 09:31

## 2020-09-05 RX ADMIN — AMIODARONE HYDROCHLORIDE 200 MG: 200 TABLET ORAL at 06:18

## 2020-09-05 RX ADMIN — AMIODARONE HYDROCHLORIDE 200 MG: 200 TABLET ORAL at 21:23

## 2020-09-05 RX ADMIN — MELATONIN 6 MG: at 21:23

## 2020-09-05 RX ADMIN — METOPROLOL TARTRATE 25 MG: 25 TABLET, FILM COATED ORAL at 21:24

## 2020-09-05 RX ADMIN — ASPIRIN 325 MG ORAL TABLET 325 MG: 325 PILL ORAL at 09:31

## 2020-09-05 RX ADMIN — OXYCODONE HYDROCHLORIDE 5 MG: 5 TABLET ORAL at 02:57

## 2020-09-05 RX ADMIN — DOCUSATE SODIUM 100 MG: 100 CAPSULE, LIQUID FILLED ORAL at 09:31

## 2020-09-05 RX ADMIN — LIDOCAINE 5% 3 PATCH: 700 PATCH TOPICAL at 09:31

## 2020-09-05 RX ADMIN — ACETAMINOPHEN 975 MG: 325 TABLET, FILM COATED ORAL at 06:17

## 2020-09-05 RX ADMIN — ACETAMINOPHEN 975 MG: 325 TABLET, FILM COATED ORAL at 21:23

## 2020-09-05 RX ADMIN — MUPIROCIN 1 APPLICATION: 20 OINTMENT TOPICAL at 09:31

## 2020-09-05 RX ADMIN — ATORVASTATIN CALCIUM 80 MG: 80 TABLET, FILM COATED ORAL at 18:12

## 2020-09-05 RX ADMIN — DOCUSATE SODIUM 100 MG: 100 CAPSULE, LIQUID FILLED ORAL at 18:12

## 2020-09-05 RX ADMIN — HEPARIN SODIUM 5000 UNITS: 5000 INJECTION INTRAVENOUS; SUBCUTANEOUS at 21:39

## 2020-09-05 RX ADMIN — POLYETHYLENE GLYCOL 3350 17 G: 17 POWDER, FOR SOLUTION ORAL at 09:31

## 2020-09-05 RX ADMIN — PANTOPRAZOLE SODIUM 40 MG: 40 TABLET, DELAYED RELEASE ORAL at 06:18

## 2020-09-05 RX ADMIN — TEMAZEPAM 15 MG: 15 CAPSULE ORAL at 23:07

## 2020-09-05 RX ADMIN — HEPARIN SODIUM 5000 UNITS: 5000 INJECTION INTRAVENOUS; SUBCUTANEOUS at 06:18

## 2020-09-05 RX ADMIN — OXYCODONE HYDROCHLORIDE 5 MG: 5 TABLET ORAL at 21:23

## 2020-09-05 RX ADMIN — AMIODARONE HYDROCHLORIDE 200 MG: 200 TABLET ORAL at 16:14

## 2020-09-05 RX ADMIN — MUPIROCIN 1 APPLICATION: 20 OINTMENT TOPICAL at 21:23

## 2020-09-05 RX ADMIN — OXYCODONE HYDROCHLORIDE 5 MG: 5 TABLET ORAL at 09:36

## 2020-09-06 LAB
ANION GAP SERPL CALCULATED.3IONS-SCNC: 3 MMOL/L (ref 4–13)
BUN SERPL-MCNC: 28 MG/DL (ref 5–25)
CALCIUM SERPL-MCNC: 8.9 MG/DL (ref 8.3–10.1)
CHLORIDE SERPL-SCNC: 106 MMOL/L (ref 100–108)
CO2 SERPL-SCNC: 30 MMOL/L (ref 21–32)
CREAT SERPL-MCNC: 1.17 MG/DL (ref 0.6–1.3)
GFR SERPL CREATININE-BSD FRML MDRD: 61 ML/MIN/1.73SQ M
GLUCOSE SERPL-MCNC: 102 MG/DL (ref 65–140)
GLUCOSE SERPL-MCNC: 104 MG/DL (ref 65–140)
GLUCOSE SERPL-MCNC: 104 MG/DL (ref 65–140)
GLUCOSE SERPL-MCNC: 135 MG/DL (ref 65–140)
GLUCOSE SERPL-MCNC: 88 MG/DL (ref 65–140)
POTASSIUM SERPL-SCNC: 4.2 MMOL/L (ref 3.5–5.3)
SODIUM SERPL-SCNC: 139 MMOL/L (ref 136–145)

## 2020-09-06 PROCEDURE — 80048 BASIC METABOLIC PNL TOTAL CA: CPT | Performed by: PHYSICIAN ASSISTANT

## 2020-09-06 PROCEDURE — 99024 POSTOP FOLLOW-UP VISIT: CPT | Performed by: THORACIC SURGERY (CARDIOTHORACIC VASCULAR SURGERY)

## 2020-09-06 PROCEDURE — 99232 SBSQ HOSP IP/OBS MODERATE 35: CPT | Performed by: INTERNAL MEDICINE

## 2020-09-06 PROCEDURE — 82948 REAGENT STRIP/BLOOD GLUCOSE: CPT

## 2020-09-06 RX ADMIN — ASPIRIN 325 MG ORAL TABLET 325 MG: 325 PILL ORAL at 09:36

## 2020-09-06 RX ADMIN — DOCUSATE SODIUM 100 MG: 100 CAPSULE, LIQUID FILLED ORAL at 17:38

## 2020-09-06 RX ADMIN — MUPIROCIN 1 APPLICATION: 20 OINTMENT TOPICAL at 09:36

## 2020-09-06 RX ADMIN — PANTOPRAZOLE SODIUM 40 MG: 40 TABLET, DELAYED RELEASE ORAL at 05:13

## 2020-09-06 RX ADMIN — OXYCODONE HYDROCHLORIDE 5 MG: 5 TABLET ORAL at 04:10

## 2020-09-06 RX ADMIN — AMIODARONE HYDROCHLORIDE 200 MG: 200 TABLET ORAL at 21:28

## 2020-09-06 RX ADMIN — MUPIROCIN 1 APPLICATION: 20 OINTMENT TOPICAL at 21:27

## 2020-09-06 RX ADMIN — MELATONIN 6 MG: at 21:28

## 2020-09-06 RX ADMIN — ACETAMINOPHEN 975 MG: 325 TABLET, FILM COATED ORAL at 21:28

## 2020-09-06 RX ADMIN — FUROSEMIDE 40 MG: 10 INJECTION, SOLUTION INTRAMUSCULAR; INTRAVENOUS at 09:36

## 2020-09-06 RX ADMIN — DOCUSATE SODIUM 100 MG: 100 CAPSULE, LIQUID FILLED ORAL at 09:36

## 2020-09-06 RX ADMIN — METOPROLOL TARTRATE 25 MG: 25 TABLET, FILM COATED ORAL at 21:27

## 2020-09-06 RX ADMIN — HEPARIN SODIUM 5000 UNITS: 5000 INJECTION INTRAVENOUS; SUBCUTANEOUS at 06:14

## 2020-09-06 RX ADMIN — ACETAMINOPHEN 975 MG: 325 TABLET, FILM COATED ORAL at 13:51

## 2020-09-06 RX ADMIN — ACETAMINOPHEN 975 MG: 325 TABLET, FILM COATED ORAL at 05:13

## 2020-09-06 RX ADMIN — METOPROLOL TARTRATE 25 MG: 25 TABLET, FILM COATED ORAL at 09:35

## 2020-09-06 RX ADMIN — HEPARIN SODIUM 5000 UNITS: 5000 INJECTION INTRAVENOUS; SUBCUTANEOUS at 21:29

## 2020-09-06 RX ADMIN — BISACODYL 10 MG: 10 SUPPOSITORY RECTAL at 09:39

## 2020-09-06 RX ADMIN — POLYETHYLENE GLYCOL 3350 17 G: 17 POWDER, FOR SOLUTION ORAL at 09:36

## 2020-09-06 RX ADMIN — AMIODARONE HYDROCHLORIDE 200 MG: 200 TABLET ORAL at 06:14

## 2020-09-06 RX ADMIN — HEPARIN SODIUM 5000 UNITS: 5000 INJECTION INTRAVENOUS; SUBCUTANEOUS at 13:52

## 2020-09-06 RX ADMIN — LIDOCAINE 5% 3 PATCH: 700 PATCH TOPICAL at 09:36

## 2020-09-06 RX ADMIN — AMIODARONE HYDROCHLORIDE 200 MG: 200 TABLET ORAL at 13:52

## 2020-09-06 RX ADMIN — ATORVASTATIN CALCIUM 80 MG: 80 TABLET, FILM COATED ORAL at 17:38

## 2020-09-07 VITALS
DIASTOLIC BLOOD PRESSURE: 73 MMHG | RESPIRATION RATE: 18 BRPM | TEMPERATURE: 98.1 F | OXYGEN SATURATION: 96 % | BODY MASS INDEX: 33.01 KG/M2 | SYSTOLIC BLOOD PRESSURE: 126 MMHG | WEIGHT: 210.32 LBS | HEART RATE: 59 BPM | HEIGHT: 67 IN

## 2020-09-07 LAB
ANION GAP SERPL CALCULATED.3IONS-SCNC: 5 MMOL/L (ref 4–13)
BUN SERPL-MCNC: 24 MG/DL (ref 5–25)
CALCIUM SERPL-MCNC: 8.9 MG/DL (ref 8.3–10.1)
CHLORIDE SERPL-SCNC: 106 MMOL/L (ref 100–108)
CO2 SERPL-SCNC: 29 MMOL/L (ref 21–32)
CREAT SERPL-MCNC: 1.24 MG/DL (ref 0.6–1.3)
GFR SERPL CREATININE-BSD FRML MDRD: 57 ML/MIN/1.73SQ M
GLUCOSE SERPL-MCNC: 108 MG/DL (ref 65–140)
GLUCOSE SERPL-MCNC: 90 MG/DL (ref 65–140)
POTASSIUM SERPL-SCNC: 3.9 MMOL/L (ref 3.5–5.3)
SODIUM SERPL-SCNC: 140 MMOL/L (ref 136–145)

## 2020-09-07 PROCEDURE — NC001 PR NO CHARGE: Performed by: THORACIC SURGERY (CARDIOTHORACIC VASCULAR SURGERY)

## 2020-09-07 PROCEDURE — 99232 SBSQ HOSP IP/OBS MODERATE 35: CPT | Performed by: INTERNAL MEDICINE

## 2020-09-07 PROCEDURE — 99024 POSTOP FOLLOW-UP VISIT: CPT | Performed by: THORACIC SURGERY (CARDIOTHORACIC VASCULAR SURGERY)

## 2020-09-07 PROCEDURE — 80048 BASIC METABOLIC PNL TOTAL CA: CPT | Performed by: PHYSICIAN ASSISTANT

## 2020-09-07 PROCEDURE — 82948 REAGENT STRIP/BLOOD GLUCOSE: CPT

## 2020-09-07 RX ORDER — ATORVASTATIN CALCIUM 80 MG/1
80 TABLET, FILM COATED ORAL
Qty: 30 TABLET | Refills: 2 | Status: SHIPPED | OUTPATIENT
Start: 2020-09-07

## 2020-09-07 RX ORDER — TORSEMIDE 20 MG/1
20 TABLET ORAL DAILY
Qty: 5 TABLET | Refills: 2 | Status: SHIPPED | OUTPATIENT
Start: 2020-09-07 | End: 2020-09-22 | Stop reason: ALTCHOICE

## 2020-09-07 RX ORDER — SENNOSIDES 8.6 MG
650 CAPSULE ORAL EVERY 8 HOURS PRN
Qty: 30 TABLET | Refills: 0 | Status: SHIPPED | OUTPATIENT
Start: 2020-09-07 | End: 2020-10-07

## 2020-09-07 RX ORDER — OMEPRAZOLE 20 MG/1
20 CAPSULE, DELAYED RELEASE ORAL DAILY
Qty: 30 CAPSULE | Refills: 0 | Status: SHIPPED | OUTPATIENT
Start: 2020-09-07 | End: 2020-10-07

## 2020-09-07 RX ORDER — DOCUSATE SODIUM 100 MG/1
100 CAPSULE, LIQUID FILLED ORAL 2 TIMES DAILY
Qty: 60 CAPSULE | Refills: 0 | Status: SHIPPED | OUTPATIENT
Start: 2020-09-07 | End: 2020-10-07

## 2020-09-07 RX ORDER — OXYCODONE HYDROCHLORIDE 5 MG/1
5 TABLET ORAL EVERY 6 HOURS PRN
Qty: 28 TABLET | Refills: 0 | Status: SHIPPED | OUTPATIENT
Start: 2020-09-07 | End: 2020-09-14

## 2020-09-07 RX ORDER — POTASSIUM CHLORIDE 20 MEQ/1
20 TABLET, EXTENDED RELEASE ORAL DAILY
Qty: 5 TABLET | Refills: 2 | Status: SHIPPED | OUTPATIENT
Start: 2020-09-07 | End: 2020-09-22 | Stop reason: ALTCHOICE

## 2020-09-07 RX ORDER — ASPIRIN 325 MG
325 TABLET ORAL DAILY
Qty: 30 TABLET | Refills: 2 | Status: SHIPPED | OUTPATIENT
Start: 2020-09-08

## 2020-09-07 RX ADMIN — ACETAMINOPHEN 975 MG: 325 TABLET, FILM COATED ORAL at 05:22

## 2020-09-07 RX ADMIN — POLYETHYLENE GLYCOL 3350 17 G: 17 POWDER, FOR SOLUTION ORAL at 08:15

## 2020-09-07 RX ADMIN — DOCUSATE SODIUM 100 MG: 100 CAPSULE, LIQUID FILLED ORAL at 08:15

## 2020-09-07 RX ADMIN — METOPROLOL TARTRATE 25 MG: 25 TABLET, FILM COATED ORAL at 08:15

## 2020-09-07 RX ADMIN — OXYCODONE HYDROCHLORIDE 5 MG: 5 TABLET ORAL at 00:03

## 2020-09-07 RX ADMIN — HEPARIN SODIUM 5000 UNITS: 5000 INJECTION INTRAVENOUS; SUBCUTANEOUS at 05:23

## 2020-09-07 RX ADMIN — AMIODARONE HYDROCHLORIDE 200 MG: 200 TABLET ORAL at 05:22

## 2020-09-07 RX ADMIN — PANTOPRAZOLE SODIUM 40 MG: 40 TABLET, DELAYED RELEASE ORAL at 05:22

## 2020-09-07 RX ADMIN — ASPIRIN 325 MG ORAL TABLET 325 MG: 325 PILL ORAL at 08:15

## 2020-09-07 RX ADMIN — FUROSEMIDE 40 MG: 10 INJECTION, SOLUTION INTRAMUSCULAR; INTRAVENOUS at 08:15

## 2020-09-07 RX ADMIN — LIDOCAINE 5% 3 PATCH: 700 PATCH TOPICAL at 08:15

## 2020-09-08 ENCOUNTER — TELEPHONE (OUTPATIENT)
Dept: CARDIOLOGY CLINIC | Facility: CLINIC | Age: 75
End: 2020-09-08

## 2020-09-08 NOTE — TELEPHONE ENCOUNTER
Has had anxiety since surgery  Wants to know if he can take something for anxiety    Feels he is so week he is unable to start rehab   [phone]

## 2020-09-14 ENCOUNTER — TELEPHONE (OUTPATIENT)
Dept: CARDIAC SURGERY | Facility: CLINIC | Age: 75
End: 2020-09-14

## 2020-09-14 NOTE — TELEPHONE ENCOUNTER
Spoke with patient for routine post op call  He said he is feeling good, no shortness of breath, fevers or chills  No swelling or dizziness and his incision looks good  He has been keeping a daily weight log and this morning his weight was 207lbs  He is unsure if he wants to complete cardiac rehab he does not want to wear the mask, said he will decide by tomorrow whether to cancel appt or not  Pt  Is aware of all upcoming appts  Advised to call the office with any questions or concerns

## 2020-09-22 ENCOUNTER — OFFICE VISIT (OUTPATIENT)
Dept: CARDIOLOGY CLINIC | Facility: CLINIC | Age: 75
End: 2020-09-22
Payer: COMMERCIAL

## 2020-09-22 VITALS
WEIGHT: 208 LBS | HEIGHT: 67 IN | SYSTOLIC BLOOD PRESSURE: 120 MMHG | BODY MASS INDEX: 32.65 KG/M2 | DIASTOLIC BLOOD PRESSURE: 80 MMHG | HEART RATE: 58 BPM

## 2020-09-22 DIAGNOSIS — E78.5 DYSLIPIDEMIA: ICD-10-CM

## 2020-09-22 DIAGNOSIS — R53.1 GENERALIZED WEAKNESS: ICD-10-CM

## 2020-09-22 DIAGNOSIS — I25.10 CORONARY ARTERY DISEASE INVOLVING NATIVE CORONARY ARTERY OF NATIVE HEART WITHOUT ANGINA PECTORIS: Primary | ICD-10-CM

## 2020-09-22 PROCEDURE — 99214 OFFICE O/P EST MOD 30 MIN: CPT | Performed by: PHYSICIAN ASSISTANT

## 2020-09-22 NOTE — PROGRESS NOTES
[facility]     Stephani Castellano / 76 y o  male / : 1945 / MRN: [id number]  Date of Visit: 20    ASSESSMENT/PLAN  Coronary artery disease involving native coronary artery of native heart  · S/p 2V CABG 20 (LIMA to LAD, SVG to PDA)  · No chest pain since surgery  · On ASA and high intensity statin therapy    Generalized weakness  · Very weak/fatigued since CABG, has positional lightheadedness  · BP on the low side and he is mildly bradycardic today - will decrease lopressor to 12 5mg BID  · Strongly encouraged cardiac rehab - he cancelled appt last week due to weakness and unwillingness to wear mask while exercising  He is now agreeable to trying it    Dyslipidemia  · Tolerating high intensity statin      We will reach out to cardiac rehab to have his appointments arranged  Decrease lopressor, f/u in 1 month     SUBJECTIVE:  HPI: Stephani Castellano is a 76 y o  male who presents for follow-up regarding CAD, carotid stenosis, HLD  He also has Parkinson's and RCC s/p nephrectomy  He recently underwent 2V CABG  He is feeling very weak and lightheaded  No recurrence of chest pain  He cancelled cardiac rehab appts last week as he did not feel it was necessary and does not feel that he can wear a mask while exercising  Cardiac testing:    Echo 20 - EF 60%, mild LVH     Cardiac cath 20 - 90% pLAD, 100% pRCA, 50% OM1       Allergies   Allergen Reactions    Indomethacin Shortness Of Breath         Current Outpatient Medications:     acetaminophen (TYLENOL) 650 mg CR tablet, Take 1 tablet (650 mg total) by mouth every 8 (eight) hours as needed for mild pain, Disp: 30 tablet, Rfl: 0    aspirin 325 mg tablet, Take 1 tablet (325 mg total) by mouth daily, Disp: 30 tablet, Rfl: 2    atorvastatin (LIPITOR) 80 mg tablet, Take 1 tablet (80 mg total) by mouth daily with dinner, Disp: 30 tablet, Rfl: 2    docusate sodium (COLACE) 100 mg capsule, Take 1 capsule (100 mg total) by mouth 2 (two) times a day, Disp: 60 capsule, Rfl: 0    metoprolol tartrate (LOPRESSOR) 25 mg tablet, Take 1 tablet (25 mg total) by mouth every 12 (twelve) hours, Disp: 60 tablet, Rfl: 2    omeprazole (PriLOSEC) 20 mg delayed release capsule, Take 1 capsule (20 mg total) by mouth daily, Disp: 30 capsule, Rfl: 0    predniSONE 10 mg tablet, Take 10 mg by oral route as needed for gout , Disp: , Rfl:     Past Medical History:   Diagnosis Date    Anxiety     CAD (coronary artery disease)     Carotid stenosis, right     50-69% internal    Chronic kidney disease (CKD), stage III (moderate)     baseline Cr 1 50    Diverticulosis     GERD (gastroesophageal reflux disease)     Gout     History of nephrolithiasis     History of prostate cancer     s/p radiation/Lupron    History of renal cell cancer     s/p left nephrectomy    Osteoarthritis     knees & shoulders    Parkinson disease     Pulmonary nodule     7mm PARKER       Family History   Problem Relation Age of Onset    Heart disease Mother     Heart attack Mother     Heart attack Sister     Heart attack Brother        Past Surgical History:   Procedure Laterality Date    ARTHROSCOPY KNEE Right     CARDIAC CATHETERIZATION      JOINT REPLACEMENT Left     knee    NEPHRECTOMY Left 2018    MA CABG, ARTERY-VEIN, TWO N/A 9/2/2020    Procedure: CORONARY ARTERY BYPASS GRAFT (CABG) 2 VESSELS: LIMA to LAD, RLE EVH/SVG to PDA; Surgeon: Reena Isabel DO;  [facility];  Service: Cardiac Surgery    MA ECHO TRANSESOPHAG R-T 2D W/PRB IMG ACQUISJ I&R N/A 9/2/2020    Procedure: TRANSESOPHAGEAL ECHOCARDIOGRAM (MEGAN); Surgeon: Reena Isabel DO;  [facility];  Service: Cardiac Surgery    MA ENDOSCOPY W/VIDEO-ASST VEIN HARVEST,CABG Right 9/2/2020    Procedure: HARVEST VEIN ENDOSCOPIC ([address]);   Surgeon: Reena Isabel DO;  [facility];  Service: Cardiac Surgery    REPLACEMENT TOTAL KNEE Left     ROTATOR CUFF REPAIR Right     TONSILLECTOMY Social History     Socioeconomic History    Marital status:      Spouse name: Not on file    Number of children: Not on file    Years of education: Not on file    Highest education level: Not on file   Occupational History    Not on file   Social Needs    Financial resource strain: Not on file    Food insecurity     Worry: Not on file     Inability: Not on file    Transportation needs     Medical: Not on file     Non-medical: Not on file   Tobacco Use    Smoking status: Never Smoker    Smokeless tobacco: Never Used   Substance and Sexual Activity    Alcohol use: Not Currently     Alcohol/week: 9 0 standard drinks     Types: 9 Standard drinks or equivalent per week     Frequency: 2-3 times a week     Binge frequency: Daily or almost daily     Comment: 9 mixed drinks per week    Drug use: Never    Sexual activity: Not on file   Lifestyle    Physical activity     Days per week: Not on file     Minutes per session: Not on file    Stress: Not on file   Relationships    Social connections     Talks on phone: Not on file     Gets together: Not on file     Attends [employer] service: Not on file     Active member of club or organization: Not on file     Attends meetings of clubs or organizations: Not on file     Relationship status: Not on file    Intimate partner violence     Fear of current or ex partner: Not on file     Emotionally abused: Not on file     Physically abused: Not on file     Forced sexual activity: Not on file   Other Topics Concern    Not on file   Social History Narrative    Not on file       Review of Systems   Constitution: Positive for malaise/fatigue  HENT: Negative  Eyes: Negative  Cardiovascular: Negative for chest pain, claudication, dyspnea on exertion, irregular heartbeat, leg swelling, near-syncope, orthopnea, palpitations, paroxysmal nocturnal dyspnea and syncope  Respiratory: Negative for cough, shortness of breath and wheezing      Endocrine: Negative  Skin: Negative  Musculoskeletal: Negative  Gastrointestinal: Negative  Genitourinary: Negative  Neurological: Positive for light-headedness and weakness  Psychiatric/Behavioral: Negative  OBJECTIVE:  Vitals: /80   Pulse 58   Ht 5' 7" (1 702 m)   Wt 94 3 kg (208 lb)   BMI 32 58 kg/m²     Physical exam:   Physical Exam   Constitutional: He is oriented to person, place, and time  He appears well-developed and well-nourished  No distress  HENT:   Head: Normocephalic and atraumatic  Eyes: EOM are normal  No scleral icterus  Neck: Normal range of motion  Neck supple  No JVD present  Cardiovascular: Regular rhythm, S1 normal and S2 normal  Bradycardia present  No murmur heard  Midline sternotomy scar   Pulmonary/Chest: Effort normal and breath sounds normal  He has no wheezes  He has no rales  Abdominal: Soft  Bowel sounds are normal    Musculoskeletal:         General: No edema  Neurological: He is alert and oriented to person, place, and time  He displays tremor (right hand)  Skin: Skin is warm and dry  Psychiatric: He has a normal mood and affect   His behavior is normal        Lab Results:   Lab Results   Component Value Date    HGBA1C 5 7 (H) 07/28/2020    HGBA1C 5 6 06/17/2019    HGBA1C 5 8 (H) 03/12/2019     No results found for: CHOL  Lab Results   Component Value Date    HDL 26 (L) 07/28/2020    HDL 33 (L) 12/18/2019    HDL 27 (L) 06/17/2019     Lab Results   Component Value Date    LDLCALC 108 (H) 07/28/2020    LDLCALC 122 (H) 12/18/2019    LDLCALC 109 (H) 06/17/2019     Lab Results   Component Value Date    TRIG 133 07/28/2020    TRIG 224 (H) 12/18/2019    TRIG 205 (H) 06/17/2019     No results found for: CHOLHDL

## 2020-09-22 NOTE — ASSESSMENT & PLAN NOTE
· S/p 2V CABG 9/2/20 (LIMA to LAD, SVG to PDA)  · No chest pain since surgery  · On ASA and high intensity statin therapy

## 2020-09-22 NOTE — ASSESSMENT & PLAN NOTE
· Very weak/fatigued since CABG, has positional lightheadedness  · BP on the low side and he is mildly bradycardic today - will decrease lopressor to 12 5mg BID  · Strongly encouraged cardiac rehab - he cancelled appt last week due to weakness and unwillingness to wear mask while exercising   He is now agreeable to trying it

## 2020-09-23 ENCOUNTER — TELEPHONE (OUTPATIENT)
Dept: CARDIAC REHAB | Facility: HOSPITAL | Age: 75
End: 2020-09-23

## 2020-10-05 ENCOUNTER — OFFICE VISIT (OUTPATIENT)
Dept: CARDIAC SURGERY | Facility: CLINIC | Age: 75
End: 2020-10-05

## 2020-10-05 VITALS
BODY MASS INDEX: 32.74 KG/M2 | TEMPERATURE: 97 F | HEART RATE: 62 BPM | DIASTOLIC BLOOD PRESSURE: 70 MMHG | HEIGHT: 67 IN | RESPIRATION RATE: 18 BRPM | SYSTOLIC BLOOD PRESSURE: 116 MMHG | WEIGHT: 208.6 LBS

## 2020-10-05 DIAGNOSIS — Z48.89 ENCOUNTER FOR POST SURGICAL WOUND CHECK: Primary | ICD-10-CM

## 2020-10-05 PROCEDURE — 99024 POSTOP FOLLOW-UP VISIT: CPT | Performed by: PHYSICIAN ASSISTANT

## 2021-03-16 ENCOUNTER — IMMUNIZATIONS (OUTPATIENT)
Dept: FAMILY MEDICINE CLINIC | Facility: HOSPITAL | Age: 76
End: 2021-03-16

## 2021-03-16 DIAGNOSIS — Z23 ENCOUNTER FOR IMMUNIZATION: Primary | ICD-10-CM

## 2021-03-16 PROCEDURE — 0011A SARS-COV-2 / COVID-19 MRNA VACCINE (MODERNA) 100 MCG: CPT

## 2021-03-16 PROCEDURE — 91301 SARS-COV-2 / COVID-19 MRNA VACCINE (MODERNA) 100 MCG: CPT

## 2021-04-15 ENCOUNTER — IMMUNIZATIONS (OUTPATIENT)
Dept: FAMILY MEDICINE CLINIC | Facility: HOSPITAL | Age: 76
End: 2021-04-15

## 2021-04-15 DIAGNOSIS — Z23 ENCOUNTER FOR IMMUNIZATION: Primary | ICD-10-CM

## 2021-04-15 PROCEDURE — 91301 SARS-COV-2 / COVID-19 MRNA VACCINE (MODERNA) 100 MCG: CPT

## 2021-04-15 PROCEDURE — 0012A SARS-COV-2 / COVID-19 MRNA VACCINE (MODERNA) 100 MCG: CPT

## 2021-12-07 ENCOUNTER — APPOINTMENT (OUTPATIENT)
Dept: LAB | Facility: CLINIC | Age: 76
End: 2021-12-07
Payer: COMMERCIAL

## 2021-12-07 DIAGNOSIS — M25.50 ARTHRALGIA, UNSPECIFIED JOINT: ICD-10-CM

## 2021-12-07 DIAGNOSIS — R97.20 ELEVATED PSA: ICD-10-CM

## 2021-12-07 DIAGNOSIS — R63.5 WEIGHT GAIN: ICD-10-CM

## 2021-12-07 DIAGNOSIS — I25.10 ASCVD (ARTERIOSCLEROTIC CARDIOVASCULAR DISEASE): ICD-10-CM

## 2021-12-07 DIAGNOSIS — M19.90 ARTHRITIS: ICD-10-CM

## 2021-12-07 DIAGNOSIS — I10 HYPERTENSION, UNSPECIFIED TYPE: ICD-10-CM

## 2021-12-07 DIAGNOSIS — R73.01 ELEVATED FASTING BLOOD SUGAR: ICD-10-CM

## 2021-12-07 DIAGNOSIS — E78.5 HYPERLIPIDEMIA, UNSPECIFIED HYPERLIPIDEMIA TYPE: ICD-10-CM

## 2021-12-07 DIAGNOSIS — D51.9 ANEMIA DUE TO VITAMIN B12 DEFICIENCY, UNSPECIFIED B12 DEFICIENCY TYPE: ICD-10-CM

## 2021-12-07 LAB
ALBUMIN SERPL BCP-MCNC: 3.6 G/DL (ref 3.5–5)
ALP SERPL-CCNC: 88 U/L (ref 46–116)
ALT SERPL W P-5'-P-CCNC: 24 U/L (ref 12–78)
ANION GAP SERPL CALCULATED.3IONS-SCNC: 5 MMOL/L (ref 4–13)
AST SERPL W P-5'-P-CCNC: 19 U/L (ref 5–45)
BASOPHILS # BLD AUTO: 0.07 THOUSANDS/ΜL (ref 0–0.1)
BASOPHILS NFR BLD AUTO: 1 % (ref 0–1)
BILIRUB SERPL-MCNC: 0.85 MG/DL (ref 0.2–1)
BUN SERPL-MCNC: 19 MG/DL (ref 5–25)
CALCIUM SERPL-MCNC: 8.7 MG/DL (ref 8.3–10.1)
CHLORIDE SERPL-SCNC: 110 MMOL/L (ref 100–108)
CHOLEST SERPL-MCNC: 86 MG/DL
CO2 SERPL-SCNC: 27 MMOL/L (ref 21–32)
CREAT SERPL-MCNC: 1.35 MG/DL (ref 0.6–1.3)
CRP SERPL QL: <3 MG/L
EOSINOPHIL # BLD AUTO: 0.12 THOUSAND/ΜL (ref 0–0.61)
EOSINOPHIL NFR BLD AUTO: 2 % (ref 0–6)
ERYTHROCYTE [DISTWIDTH] IN BLOOD BY AUTOMATED COUNT: 13.9 % (ref 11.6–15.1)
GFR SERPL CREATININE-BSD FRML MDRD: 51 ML/MIN/1.73SQ M
GLUCOSE P FAST SERPL-MCNC: 87 MG/DL (ref 65–99)
HCT VFR BLD AUTO: 48.5 % (ref 36.5–49.3)
HDLC SERPL-MCNC: 21 MG/DL
HGB BLD-MCNC: 15.1 G/DL (ref 12–17)
IMM GRANULOCYTES # BLD AUTO: 0.03 THOUSAND/UL (ref 0–0.2)
IMM GRANULOCYTES NFR BLD AUTO: 0 % (ref 0–2)
LDLC SERPL CALC-MCNC: 41 MG/DL (ref 0–100)
LYMPHOCYTES # BLD AUTO: 1.3 THOUSANDS/ΜL (ref 0.6–4.47)
LYMPHOCYTES NFR BLD AUTO: 18 % (ref 14–44)
MCH RBC QN AUTO: 27.8 PG (ref 26.8–34.3)
MCHC RBC AUTO-ENTMCNC: 31.1 G/DL (ref 31.4–37.4)
MCV RBC AUTO: 89 FL (ref 82–98)
MONOCYTES # BLD AUTO: 0.78 THOUSAND/ΜL (ref 0.17–1.22)
MONOCYTES NFR BLD AUTO: 11 % (ref 4–12)
NEUTROPHILS # BLD AUTO: 4.91 THOUSANDS/ΜL (ref 1.85–7.62)
NEUTS SEG NFR BLD AUTO: 68 % (ref 43–75)
NONHDLC SERPL-MCNC: 65 MG/DL
NRBC BLD AUTO-RTO: 0 /100 WBCS
PLATELET # BLD AUTO: 253 THOUSANDS/UL (ref 149–390)
PMV BLD AUTO: 11.3 FL (ref 8.9–12.7)
POTASSIUM SERPL-SCNC: 4.2 MMOL/L (ref 3.5–5.3)
PROT SERPL-MCNC: 7.1 G/DL (ref 6.4–8.2)
PSA SERPL-MCNC: <0.1 NG/ML (ref 0–4)
RBC # BLD AUTO: 5.43 MILLION/UL (ref 3.88–5.62)
SODIUM SERPL-SCNC: 142 MMOL/L (ref 136–145)
T4 FREE SERPL-MCNC: 0.91 NG/DL (ref 0.76–1.46)
TRIGL SERPL-MCNC: 119 MG/DL
TSH SERPL DL<=0.05 MIU/L-ACNC: 1.02 UIU/ML (ref 0.36–3.74)
VIT B12 SERPL-MCNC: 454 PG/ML (ref 100–900)
WBC # BLD AUTO: 7.21 THOUSAND/UL (ref 4.31–10.16)

## 2021-12-07 PROCEDURE — 36415 COLL VENOUS BLD VENIPUNCTURE: CPT

## 2021-12-07 PROCEDURE — 84439 ASSAY OF FREE THYROXINE: CPT

## 2021-12-07 PROCEDURE — 82607 VITAMIN B-12: CPT

## 2021-12-07 PROCEDURE — 80061 LIPID PANEL: CPT

## 2021-12-07 PROCEDURE — 85025 COMPLETE CBC W/AUTO DIFF WBC: CPT

## 2021-12-07 PROCEDURE — 80053 COMPREHEN METABOLIC PANEL: CPT

## 2021-12-07 PROCEDURE — 84443 ASSAY THYROID STIM HORMONE: CPT

## 2021-12-07 PROCEDURE — 83036 HEMOGLOBIN GLYCOSYLATED A1C: CPT

## 2021-12-07 PROCEDURE — 84153 ASSAY OF PSA TOTAL: CPT

## 2021-12-07 PROCEDURE — 86140 C-REACTIVE PROTEIN: CPT

## 2021-12-08 LAB
EST. AVERAGE GLUCOSE BLD GHB EST-MCNC: 114 MG/DL
HBA1C MFR BLD: 5.6 %

## 2022-12-01 ENCOUNTER — APPOINTMENT (OUTPATIENT)
Dept: LAB | Facility: MEDICAL CENTER | Age: 77
End: 2022-12-01

## 2022-12-01 DIAGNOSIS — E78.5 HYPERLIPIDEMIA, UNSPECIFIED HYPERLIPIDEMIA TYPE: ICD-10-CM

## 2022-12-01 DIAGNOSIS — C61 PROSTATE CANCER (HCC): ICD-10-CM

## 2022-12-01 DIAGNOSIS — G20 PARKINSON'S DISEASE (HCC): ICD-10-CM

## 2022-12-01 DIAGNOSIS — I25.10 ASCVD (ARTERIOSCLEROTIC CARDIOVASCULAR DISEASE): ICD-10-CM

## 2022-12-02 LAB
ALBUMIN SERPL BCP-MCNC: 3.7 G/DL (ref 3.5–5)
ALP SERPL-CCNC: 106 U/L (ref 46–116)
ALT SERPL W P-5'-P-CCNC: 28 U/L (ref 12–78)
ANION GAP SERPL CALCULATED.3IONS-SCNC: 4 MMOL/L (ref 4–13)
AST SERPL W P-5'-P-CCNC: 26 U/L (ref 5–45)
BASOPHILS # BLD AUTO: 0.05 THOUSANDS/ÂΜL (ref 0–0.1)
BASOPHILS NFR BLD AUTO: 1 % (ref 0–1)
BILIRUB SERPL-MCNC: 0.84 MG/DL (ref 0.2–1)
BUN SERPL-MCNC: 21 MG/DL (ref 5–25)
CALCIUM SERPL-MCNC: 9.5 MG/DL (ref 8.3–10.1)
CHLORIDE SERPL-SCNC: 111 MMOL/L (ref 96–108)
CHOLEST SERPL-MCNC: 70 MG/DL
CO2 SERPL-SCNC: 26 MMOL/L (ref 21–32)
CREAT SERPL-MCNC: 1.34 MG/DL (ref 0.6–1.3)
EOSINOPHIL # BLD AUTO: 0.15 THOUSAND/ÂΜL (ref 0–0.61)
EOSINOPHIL NFR BLD AUTO: 2 % (ref 0–6)
ERYTHROCYTE [DISTWIDTH] IN BLOOD BY AUTOMATED COUNT: 14.8 % (ref 11.6–15.1)
GFR SERPL CREATININE-BSD FRML MDRD: 50 ML/MIN/1.73SQ M
GLUCOSE SERPL-MCNC: 110 MG/DL (ref 65–140)
HCT VFR BLD AUTO: 46.5 % (ref 36.5–49.3)
HDLC SERPL-MCNC: 14 MG/DL
HGB BLD-MCNC: 14.5 G/DL (ref 12–17)
IMM GRANULOCYTES # BLD AUTO: 0.04 THOUSAND/UL (ref 0–0.2)
IMM GRANULOCYTES NFR BLD AUTO: 1 % (ref 0–2)
LDLC SERPL CALC-MCNC: 39 MG/DL (ref 0–100)
LYMPHOCYTES # BLD AUTO: 1.03 THOUSANDS/ÂΜL (ref 0.6–4.47)
LYMPHOCYTES NFR BLD AUTO: 17 % (ref 14–44)
MCH RBC QN AUTO: 27.9 PG (ref 26.8–34.3)
MCHC RBC AUTO-ENTMCNC: 31.2 G/DL (ref 31.4–37.4)
MCV RBC AUTO: 90 FL (ref 82–98)
MONOCYTES # BLD AUTO: 0.72 THOUSAND/ÂΜL (ref 0.17–1.22)
MONOCYTES NFR BLD AUTO: 12 % (ref 4–12)
NEUTROPHILS # BLD AUTO: 4.15 THOUSANDS/ÂΜL (ref 1.85–7.62)
NEUTS SEG NFR BLD AUTO: 67 % (ref 43–75)
NONHDLC SERPL-MCNC: 56 MG/DL
NRBC BLD AUTO-RTO: 0 /100 WBCS
PLATELET # BLD AUTO: 221 THOUSANDS/UL (ref 149–390)
PMV BLD AUTO: 11.8 FL (ref 8.9–12.7)
POTASSIUM SERPL-SCNC: 4.4 MMOL/L (ref 3.5–5.3)
PROT SERPL-MCNC: 6.7 G/DL (ref 6.4–8.4)
PSA SERPL-MCNC: <0.1 NG/ML (ref 0–4)
RBC # BLD AUTO: 5.19 MILLION/UL (ref 3.88–5.62)
SODIUM SERPL-SCNC: 141 MMOL/L (ref 135–147)
TRIGL SERPL-MCNC: 85 MG/DL
WBC # BLD AUTO: 6.14 THOUSAND/UL (ref 4.31–10.16)

## 2023-01-01 ENCOUNTER — HOME CARE VISIT (OUTPATIENT)
Dept: HOME HEALTH SERVICES | Facility: HOME HEALTHCARE | Age: 78
End: 2023-01-01
Payer: MEDICARE

## 2023-01-01 ENCOUNTER — HOME CARE VISIT (OUTPATIENT)
Dept: HOME HOSPICE | Facility: HOSPICE | Age: 78
End: 2023-01-01
Payer: MEDICARE

## 2023-01-01 VITALS — HEART RATE: 76 BPM | TEMPERATURE: 98.2 F | RESPIRATION RATE: 14 BRPM

## 2023-01-01 VITALS
SYSTOLIC BLOOD PRESSURE: 150 MMHG | RESPIRATION RATE: 16 BRPM | DIASTOLIC BLOOD PRESSURE: 100 MMHG | HEART RATE: 80 BPM | TEMPERATURE: 98.6 F

## 2023-01-01 VITALS — TEMPERATURE: 97.8 F | RESPIRATION RATE: 16 BRPM

## 2023-01-01 VITALS — HEART RATE: 60 BPM | TEMPERATURE: 97.5 F | RESPIRATION RATE: 19 BRPM

## 2023-01-01 DIAGNOSIS — Z95.1 S/P CABG X 2: ICD-10-CM

## 2023-01-01 PROCEDURE — G0156 HHCP-SVS OF AIDE,EA 15 MIN: HCPCS

## 2023-01-01 PROCEDURE — G0299 HHS/HOSPICE OF RN EA 15 MIN: HCPCS

## 2023-01-01 PROCEDURE — G0155 HHCP-SVS OF CSW,EA 15 MIN: HCPCS

## 2023-01-01 RX ORDER — OMEPRAZOLE 20 MG/1
20 CAPSULE, DELAYED RELEASE ORAL DAILY
Qty: 30 CAPSULE | Refills: 5 | Status: SHIPPED | OUTPATIENT
Start: 2023-01-01 | End: 2023-01-01

## 2023-06-07 ENCOUNTER — TELEPHONE (OUTPATIENT)
Dept: FAMILY MEDICINE CLINIC | Facility: CLINIC | Age: 78
End: 2023-06-07

## 2023-06-07 DIAGNOSIS — Z95.1 S/P CABG X 2: Primary | ICD-10-CM

## 2023-06-07 NOTE — TELEPHONE ENCOUNTER
Spoke with Dean Carrizales today  He is having increasing dyspnea on exertion  Feels like he is essentially going to pass out at times  This is similar to the situation he had right before he had open heart bypass surgery 3 years ago  I need him to get to a cardiology appointment with Dr Alyssa chapa as soon as possible  I can [place] text Dr andria Weaver or his colleagues if you having trouble getting him set up  He is aware he is to go to the emergency room if his symptoms seem to be worsening  Right now at rest he has no symptoms, only when he walks 30 feet or more  There is no chest pain associated with the symptoms    Please let me know when his appointment will be with the cardiologist

## 2023-06-08 ENCOUNTER — APPOINTMENT (EMERGENCY)
Dept: RADIOLOGY | Facility: HOSPITAL | Age: 78
DRG: 392 | End: 2023-06-08
Payer: COMMERCIAL

## 2023-06-08 ENCOUNTER — APPOINTMENT (EMERGENCY)
Dept: CT IMAGING | Facility: HOSPITAL | Age: 78
DRG: 392 | End: 2023-06-08
Payer: COMMERCIAL

## 2023-06-08 ENCOUNTER — HOSPITAL ENCOUNTER (INPATIENT)
Facility: HOSPITAL | Age: 78
LOS: 4 days | Discharge: NURSING, REHAB OR LONG-TERM CARE | DRG: 392 | End: 2023-06-13
Attending: EMERGENCY MEDICINE | Admitting: INTERNAL MEDICINE
Payer: COMMERCIAL

## 2023-06-08 DIAGNOSIS — K57.92 ACUTE DIVERTICULITIS: ICD-10-CM

## 2023-06-08 DIAGNOSIS — I95.9 HYPOTENSION: ICD-10-CM

## 2023-06-08 DIAGNOSIS — N48.89 PENILE EDEMA: ICD-10-CM

## 2023-06-08 DIAGNOSIS — I48.92 ATRIAL FLUTTER (HCC): ICD-10-CM

## 2023-06-08 DIAGNOSIS — D64.9 ANEMIA: Primary | ICD-10-CM

## 2023-06-08 DIAGNOSIS — G20 PARKINSON DISEASE (HCC): ICD-10-CM

## 2023-06-08 DIAGNOSIS — E83.42 HYPOMAGNESEMIA: ICD-10-CM

## 2023-06-08 DIAGNOSIS — R53.1 WEAKNESS: ICD-10-CM

## 2023-06-08 PROBLEM — R42 DIZZINESS: Status: ACTIVE | Noted: 2023-06-08

## 2023-06-08 PROBLEM — R55 NEAR SYNCOPE: Status: ACTIVE | Noted: 2023-06-08

## 2023-06-08 PROBLEM — R10.9 FLANK PAIN: Status: ACTIVE | Noted: 2023-06-08

## 2023-06-08 LAB
2HR DELTA HS TROPONIN: 0 NG/L
4HR DELTA HS TROPONIN: 2 NG/L
ALBUMIN SERPL BCP-MCNC: 2.9 G/DL (ref 3.5–5)
ALP SERPL-CCNC: 61 U/L (ref 34–104)
ALT SERPL W P-5'-P-CCNC: 3 U/L (ref 7–52)
ANION GAP SERPL CALCULATED.3IONS-SCNC: 6 MMOL/L (ref 4–13)
APTT PPP: 32 SECONDS (ref 23–37)
AST SERPL W P-5'-P-CCNC: 30 U/L (ref 13–39)
BASOPHILS # BLD AUTO: 0.03 THOUSANDS/ÂΜL (ref 0–0.1)
BASOPHILS NFR BLD AUTO: 0 % (ref 0–1)
BILIRUB SERPL-MCNC: 1.07 MG/DL (ref 0.2–1)
BILIRUB UR QL STRIP: NEGATIVE
BNP SERPL-MCNC: 133 PG/ML (ref 0–100)
BUN SERPL-MCNC: 23 MG/DL (ref 5–25)
CALCIUM ALBUM COR SERPL-MCNC: 9 MG/DL (ref 8.3–10.1)
CALCIUM SERPL-MCNC: 8.1 MG/DL (ref 8.4–10.2)
CARDIAC TROPONIN I PNL SERPL HS: 24 NG/L
CARDIAC TROPONIN I PNL SERPL HS: 24 NG/L
CARDIAC TROPONIN I PNL SERPL HS: 26 NG/L
CHLORIDE SERPL-SCNC: 104 MMOL/L (ref 96–108)
CLARITY UR: CLEAR
CO2 SERPL-SCNC: 27 MMOL/L (ref 21–32)
COLOR UR: YELLOW
CREAT SERPL-MCNC: 1.43 MG/DL (ref 0.6–1.3)
EOSINOPHIL # BLD AUTO: 0.03 THOUSAND/ÂΜL (ref 0–0.61)
EOSINOPHIL NFR BLD AUTO: 0 % (ref 0–6)
ERYTHROCYTE [DISTWIDTH] IN BLOOD BY AUTOMATED COUNT: 17 % (ref 11.6–15.1)
GFR SERPL CREATININE-BSD FRML MDRD: 46 ML/MIN/1.73SQ M
GLUCOSE SERPL-MCNC: 87 MG/DL (ref 65–140)
GLUCOSE UR STRIP-MCNC: NEGATIVE MG/DL
HCT VFR BLD AUTO: 33.4 % (ref 36.5–49.3)
HGB BLD-MCNC: 10.6 G/DL (ref 12–17)
HGB UR QL STRIP.AUTO: NEGATIVE
IMM GRANULOCYTES # BLD AUTO: 0.04 THOUSAND/UL (ref 0–0.2)
IMM GRANULOCYTES NFR BLD AUTO: 1 % (ref 0–2)
INR PPP: 1.51 (ref 0.84–1.19)
KETONES UR STRIP-MCNC: NEGATIVE MG/DL
LEUKOCYTE ESTERASE UR QL STRIP: NEGATIVE
LYMPHOCYTES # BLD AUTO: 0.63 THOUSANDS/ÂΜL (ref 0.6–4.47)
LYMPHOCYTES NFR BLD AUTO: 9 % (ref 14–44)
MAGNESIUM SERPL-MCNC: 1.8 MG/DL (ref 1.9–2.7)
MCH RBC QN AUTO: 27.5 PG (ref 26.8–34.3)
MCHC RBC AUTO-ENTMCNC: 31.7 G/DL (ref 31.4–37.4)
MCV RBC AUTO: 87 FL (ref 82–98)
MONOCYTES # BLD AUTO: 0.85 THOUSAND/ÂΜL (ref 0.17–1.22)
MONOCYTES NFR BLD AUTO: 12 % (ref 4–12)
NEUTROPHILS # BLD AUTO: 5.61 THOUSANDS/ÂΜL (ref 1.85–7.62)
NEUTS SEG NFR BLD AUTO: 78 % (ref 43–75)
NITRITE UR QL STRIP: NEGATIVE
NRBC BLD AUTO-RTO: 0 /100 WBCS
PH UR STRIP.AUTO: 6 [PH]
PLATELET # BLD AUTO: 171 THOUSANDS/UL (ref 149–390)
PMV BLD AUTO: 12.7 FL (ref 8.9–12.7)
POTASSIUM SERPL-SCNC: 4.6 MMOL/L (ref 3.5–5.3)
PROT SERPL-MCNC: 4.9 G/DL (ref 6.4–8.4)
PROT UR STRIP-MCNC: NEGATIVE MG/DL
PROTHROMBIN TIME: 18.2 SECONDS (ref 11.6–14.5)
RBC # BLD AUTO: 3.85 MILLION/UL (ref 3.88–5.62)
SODIUM SERPL-SCNC: 137 MMOL/L (ref 135–147)
SP GR UR STRIP.AUTO: 1.01
TSH SERPL DL<=0.05 MIU/L-ACNC: 1.44 UIU/ML (ref 0.45–4.5)
UROBILINOGEN UR QL STRIP.AUTO: 4 E.U./DL
WBC # BLD AUTO: 7.19 THOUSAND/UL (ref 4.31–10.16)

## 2023-06-08 PROCEDURE — 96361 HYDRATE IV INFUSION ADD-ON: CPT

## 2023-06-08 PROCEDURE — 96360 HYDRATION IV INFUSION INIT: CPT

## 2023-06-08 PROCEDURE — 74177 CT ABD & PELVIS W/CONTRAST: CPT

## 2023-06-08 PROCEDURE — 99223 1ST HOSP IP/OBS HIGH 75: CPT | Performed by: NURSE PRACTITIONER

## 2023-06-08 PROCEDURE — 93005 ELECTROCARDIOGRAM TRACING: CPT

## 2023-06-08 PROCEDURE — 84484 ASSAY OF TROPONIN QUANT: CPT | Performed by: EMERGENCY MEDICINE

## 2023-06-08 PROCEDURE — 84443 ASSAY THYROID STIM HORMONE: CPT | Performed by: EMERGENCY MEDICINE

## 2023-06-08 PROCEDURE — 36415 COLL VENOUS BLD VENIPUNCTURE: CPT | Performed by: EMERGENCY MEDICINE

## 2023-06-08 PROCEDURE — 85610 PROTHROMBIN TIME: CPT | Performed by: EMERGENCY MEDICINE

## 2023-06-08 PROCEDURE — 83880 ASSAY OF NATRIURETIC PEPTIDE: CPT | Performed by: EMERGENCY MEDICINE

## 2023-06-08 PROCEDURE — 99285 EMERGENCY DEPT VISIT HI MDM: CPT

## 2023-06-08 PROCEDURE — 83735 ASSAY OF MAGNESIUM: CPT | Performed by: EMERGENCY MEDICINE

## 2023-06-08 PROCEDURE — 71045 X-RAY EXAM CHEST 1 VIEW: CPT

## 2023-06-08 PROCEDURE — 81003 URINALYSIS AUTO W/O SCOPE: CPT | Performed by: EMERGENCY MEDICINE

## 2023-06-08 PROCEDURE — 85025 COMPLETE CBC W/AUTO DIFF WBC: CPT | Performed by: EMERGENCY MEDICINE

## 2023-06-08 PROCEDURE — 85730 THROMBOPLASTIN TIME PARTIAL: CPT | Performed by: EMERGENCY MEDICINE

## 2023-06-08 PROCEDURE — 80053 COMPREHEN METABOLIC PANEL: CPT | Performed by: EMERGENCY MEDICINE

## 2023-06-08 RX ORDER — ASPIRIN 81 MG/1
81 TABLET, CHEWABLE ORAL DAILY
COMMUNITY

## 2023-06-08 RX ORDER — GABAPENTIN 100 MG/1
200 CAPSULE ORAL
Status: ON HOLD | COMMUNITY
End: 2023-06-23 | Stop reason: SDUPTHER

## 2023-06-08 RX ORDER — CEFTRIAXONE 1 G/50ML
1000 INJECTION, SOLUTION INTRAVENOUS EVERY 24 HOURS
Status: DISCONTINUED | OUTPATIENT
Start: 2023-06-08 | End: 2023-06-12

## 2023-06-08 RX ORDER — ENOXAPARIN SODIUM 100 MG/ML
40 INJECTION SUBCUTANEOUS DAILY
Status: DISCONTINUED | OUTPATIENT
Start: 2023-06-09 | End: 2023-06-13 | Stop reason: HOSPADM

## 2023-06-08 RX ORDER — METRONIDAZOLE 500 MG/100ML
500 INJECTION, SOLUTION INTRAVENOUS EVERY 8 HOURS
Status: DISCONTINUED | OUTPATIENT
Start: 2023-06-08 | End: 2023-06-12

## 2023-06-08 RX ORDER — ATORVASTATIN CALCIUM 40 MG/1
80 TABLET, FILM COATED ORAL
Status: DISCONTINUED | OUTPATIENT
Start: 2023-06-08 | End: 2023-06-09

## 2023-06-08 RX ORDER — ONDANSETRON 2 MG/ML
4 INJECTION INTRAMUSCULAR; INTRAVENOUS EVERY 6 HOURS PRN
Status: DISCONTINUED | OUTPATIENT
Start: 2023-06-08 | End: 2023-06-13 | Stop reason: HOSPADM

## 2023-06-08 RX ORDER — LANOLIN ALCOHOL/MO/W.PET/CERES
400 CREAM (GRAM) TOPICAL ONCE
Status: COMPLETED | OUTPATIENT
Start: 2023-06-08 | End: 2023-06-08

## 2023-06-08 RX ORDER — ACETAMINOPHEN 325 MG/1
650 TABLET ORAL EVERY 6 HOURS PRN
Status: DISCONTINUED | OUTPATIENT
Start: 2023-06-08 | End: 2023-06-13 | Stop reason: HOSPADM

## 2023-06-08 RX ORDER — ASPIRIN 81 MG/1
81 TABLET, CHEWABLE ORAL DAILY
Status: DISCONTINUED | OUTPATIENT
Start: 2023-06-09 | End: 2023-06-13 | Stop reason: HOSPADM

## 2023-06-08 RX ORDER — GABAPENTIN 100 MG/1
200 CAPSULE ORAL
Status: DISCONTINUED | OUTPATIENT
Start: 2023-06-08 | End: 2023-06-13 | Stop reason: HOSPADM

## 2023-06-08 RX ADMIN — METOPROLOL TARTRATE 25 MG: 25 TABLET, FILM COATED ORAL at 21:09

## 2023-06-08 RX ADMIN — CARBIDOPA AND LEVODOPA 1 TABLET: 25; 100 TABLET ORAL at 21:08

## 2023-06-08 RX ADMIN — CEFTRIAXONE 1000 MG: 1 INJECTION, SOLUTION INTRAVENOUS at 18:28

## 2023-06-08 RX ADMIN — MAGNESIUM OXIDE TAB 400 MG (241.3 MG ELEMENTAL MG) 400 MG: 400 (241.3 MG) TAB at 16:06

## 2023-06-08 RX ADMIN — SODIUM CHLORIDE 1000 ML: 0.9 INJECTION, SOLUTION INTRAVENOUS at 16:08

## 2023-06-08 RX ADMIN — ATORVASTATIN CALCIUM 80 MG: 40 TABLET, FILM COATED ORAL at 18:28

## 2023-06-08 RX ADMIN — IOHEXOL 100 ML: 350 INJECTION, SOLUTION INTRAVENOUS at 15:54

## 2023-06-08 RX ADMIN — GABAPENTIN 200 MG: 100 CAPSULE ORAL at 21:09

## 2023-06-08 RX ADMIN — METRONIDAZOLE 500 MG: 500 INJECTION, SOLUTION INTRAVENOUS at 19:08

## 2023-06-08 NOTE — ASSESSMENT & PLAN NOTE
· Reports left flank pain  · History of renal cell cancer, left nephrectomy, and nephrolithiasis  · CT abdomen pelvis: Colonic diverticulosis with mild wall thickening pericolonic inflammation involving the distal descending colon consistent with acute diverticulitis  No perforation or abscess   Trace perihepatic ascites  · Start ceftriaxone 1 g IV Q24H and Flagyl 500 mg IV Q8H

## 2023-06-08 NOTE — ASSESSMENT & PLAN NOTE
· Reports dizziness along with generalized weakness in that after ambulating greater than 10 feet he becomes weak, dizzy, feels like he is going to collapse    Sent by his primary physician, Dr Mary Hamilton  · Check echocardiogram  · Cardiology consultation

## 2023-06-08 NOTE — ASSESSMENT & PLAN NOTE
· Reports several years of generalized weakness, decreasing activity tolerance  · History of Parkinson's disease  · PT OT consult

## 2023-06-08 NOTE — H&P
[address]  H&P  Name: Sandy Heath 66 y o  male I MRN: [id number]  Unit/Bed#: ED 25 I Date of Admission: 6/8/2023   Date of Service: 6/8/2023 I Hospital Day: 0      Assessment/Plan      * Acute diverticulitis  Assessment & Plan  · Reports left flank pain  · History of renal cell cancer, left nephrectomy, and nephrolithiasis  · CT abdomen pelvis: Colonic diverticulosis with mild wall thickening pericolonic inflammation involving the distal descending colon consistent with acute diverticulitis  No perforation or abscess  Trace perihepatic ascites  · Start ceftriaxone 1 g IV Q24H and Flagyl 500 mg IV Q8H    Dizziness  Assessment & Plan  · Reports dizziness along with generalized weakness in that after ambulating greater than 10 feet he becomes weak, dizzy, feels like he is going to collapse  Sent by his primary physician, Dr Christiano Doan  · Check echocardiogram  · Cardiology consultation    Parkinson's disease [facility])  Assessment & Plan  · Continue Sinemet  · Fall precautions and supportive care    Generalized weakness  Assessment & Plan  · Reports several years of generalized weakness, decreasing activity tolerance  · History of Parkinson's disease  · PT OT consult    Coronary artery disease involving native coronary artery of native heart without angina pectoris  Assessment & Plan  · Denies chest pain  · History of CABG  · Continue aspirin, metoprolol, and atorvastatin       VTE Pharmacologic Prophylaxis: VTE Score: 5 High Risk (Score >/= 5) - Pharmacological DVT Prophylaxis Ordered: enoxaparin (Lovenox)  Sequential Compression Devices Ordered  Code Status: Level 1 - Full Code   Discussion with family: Patient declined call to   Anticipated Length of Stay: Patient will be admitted on an observation basis with an anticipated length of stay of less than 2 midnights secondary to acute diverticultis and weakness        Total Time Spent on Date of Encounter in care of patient: "55 minutes This time was spent on one or more of the following: performing physical exam; counseling and coordination of care; obtaining or reviewing history; documenting in the medical record; reviewing/ordering tests, medications or procedures; communicating with other healthcare professionals and discussing with patient's family/caregivers  Chief Complaint: Generalized weakness    History of Present Illness:  Thompson Evans is a 66 y o  male with a PMH of CAD, CABG, CKD, GERD, gout, nephrolithiasis, prostate cancer, renal cell cancer, Parkinson's disease, alcohol use who presents with generalized weakness  He also reports that he has had decreasing activity tolerance  He says this has been going on for several years  He says that he used to be able to walk about 100 feet  Now he says he can walk only about 10 feet and then he becomes dizzy, short of breath, gets weak and feels like he is going to [facility]"  He also reports left flank pain but it is more so to the front  He says that this is also been going on a while, and he says that the pain increases he feels like he has to push out gas really hard, and when he does his pain improves  He denies fever, congestion, visual disturbance, chest pain, myalgia, rash, focal weakness  He is admitted to observation medicine  Review of Systems:  Review of Systems   Constitutional: Positive for activity change  Negative for chills and fever  HENT: Negative for congestion and sinus pressure  Eyes: Negative for visual disturbance  Respiratory: Positive for shortness of breath  Negative for chest tightness  Cardiovascular: Negative for chest pain, palpitations and leg swelling  Gastrointestinal: Positive for abdominal pain and constipation  Genitourinary: Negative for difficulty urinating  Musculoskeletal: Positive for gait problem  Negative for arthralgias  Skin: Negative for color change  Neurological: Positive for dizziness   " Psychiatric/Behavioral: Negative for confusion and dysphoric mood  Past Medical and Surgical History:   Past Medical History:   Diagnosis Date   • Anxiety    • CAD (coronary artery disease)    • Carotid stenosis, right     50-69% internal   • Chronic kidney disease (CKD), stage III (moderate)     baseline Cr 1 50   • Diverticulosis    • GERD (gastroesophageal reflux disease)    • Gout    • History of nephrolithiasis    • History of prostate cancer     s/p radiation/Lupron   • History of renal cell cancer     s/p left nephrectomy   • Osteoarthritis     knees & shoulders   • Parkinson disease    • Pulmonary nodule     7mm PARKER       Past Surgical History:   Procedure Laterality Date   • ARTHROSCOPY KNEE Right    • CARDIAC CATHETERIZATION     • JOINT REPLACEMENT Left     knee   • NEPHRECTOMY Left 2018   • MS CORONARY ARTERY BYP W/VEIN & ARTERY GRAFT 2 VEIN N/A 9/2/2020    Procedure: CORONARY ARTERY BYPASS GRAFT (CABG) 2 VESSELS: LIMA to LAD, RLE EVH/SVG to PDA; Surgeon: Arsalan Smith DO;  Location: [facility];  Service: Cardiac Surgery   • MS ECHO TRANSESOPHAG R-T 2D W/PRB IMG ACQUISJ I&R N/A 9/2/2020    Procedure: TRANSESOPHAGEAL ECHOCARDIOGRAM (MEGAN); Surgeon: Arsalan Smith DO;  Location: [facility];  Service: Cardiac Surgery   • MS NDSC SURG W/VIDEO-ASSISTED HARVEST VEIN CABG Right 9/2/2020    Procedure: HARVEST VEIN ENDOSCOPIC ([address]); Surgeon: Arsalan Smith DO;  Location: [facility];  Service: Cardiac Surgery   • REPLACEMENT TOTAL KNEE Left    • ROTATOR CUFF REPAIR Right    • TONSILLECTOMY         Meds/Allergies:  Prior to Admission medications    Medication Sig Start Date End Date Taking?  Authorizing Provider   aspirin 325 mg tablet Take 1 tablet (325 mg total) by mouth daily 9/8/20   Corona Griffin PA-C   atorvastatin (LIPITOR) 80 mg tablet Take 1 tablet (80 mg total) by mouth daily with dinner 9/7/20   Corona Griffin PA-C   docusate sodium (COLACE) 100 mg capsule Take 1 capsule (100 mg total) by "mouth 2 (two) times a day 9/7/20 10/7/20  Rios Ee, PA-C   metoprolol tartrate (LOPRESSOR) 25 mg tablet Take 1 tablet (25 mg total) by mouth every 12 (twelve) hours 9/7/20   Rios Ee, PA-C   omeprazole (PriLOSEC) 20 mg delayed release capsule Take 1 capsule (20 mg total) by mouth daily 9/7/20 10/7/20  Rios Ee, PA-C   predniSONE 10 mg tablet Take 10 mg by oral route as needed for gout  11/10/17   Historical Provider, MD LEIJA have reviewed home medications with patient personally  Allergies: Allergies   Allergen Reactions   • Indomethacin Shortness Of Breath       Social History:  Marital Status:    Occupation: Not employed  Patient Pre-hospital Living Situation: Home  Patient Pre-hospital Level of Mobility: Sometimes uses cane  Still able to drive  Patient Pre-hospital Diet Restrictions: None    Substance Use History:   Social History     Substance and Sexual Activity   Alcohol Use Yes   • Alcohol/week: 9 0 standard drinks of alcohol   • Types: 9 Standard drinks or equivalent per week    Comment: 3x a week ( 6-7 mixed drinks each time- henok)     Social History     Tobacco Use   Smoking Status Never   Smokeless Tobacco Never     Social History     Substance and Sexual Activity   Drug Use Never       Family History:  Family History   Problem Relation Age of Onset   • Heart disease Mother    • Heart attack Mother    • Heart attack Sister    • Heart attack Brother        Physical Exam:     Vitals:   Blood Pressure: 96/51 (06/08/23 1430)  Pulse: 70 (06/08/23 1430)  Temperature: (!) 97 2 °F (36 2 °C) (06/08/23 1430)  Temp Source: Temporal (06/08/23 1430)  Respirations: 18 (06/08/23 1430)  Height: 5' 7\" (170 2 cm) (06/08/23 1430)  Weight - Scale: 90 7 kg (200 lb) (06/08/23 1430)  SpO2: 98 % (06/08/23 1430)    Physical Exam  Constitutional:       General: He is not in acute distress  Appearance: He is obese     HENT:      Nose: Nose normal       Mouth/Throat:      Mouth: Mucous " membranes are moist       Pharynx: Oropharynx is clear  Eyes:      Pupils: Pupils are equal, round, and reactive to light  Cardiovascular:      Rate and Rhythm: Normal rate and regular rhythm  Pulses: Normal pulses  Heart sounds: Normal heart sounds  Pulmonary:      Effort: Pulmonary effort is normal       Breath sounds: No rhonchi  Abdominal:      General: There is no distension  Palpations: Abdomen is soft  Tenderness: There is no abdominal tenderness  There is no guarding  Musculoskeletal:      Right lower leg: No edema  Left lower leg: No edema  Skin:     General: Skin is warm and dry  Capillary Refill: Capillary refill takes less than 2 seconds  Neurological:      General: No focal deficit present  Mental Status: He is alert and oriented to person, place, and time        Comments: Right hand tremor        Additional Data:     Lab Results:  Results from last 7 days   Lab Units 06/08/23  1516   EOS PCT % 0   HEMATOCRIT % 33 4*   HEMOGLOBIN g/dL 10 6*   LYMPHS PCT % 9*   MONOS PCT % 12   NEUTROS PCT % 78*   PLATELETS Thousands/uL 171   WBC Thousand/uL 7 19     Results from last 7 days   Lab Units 06/08/23  1516   ANION GAP mmol/L 6   ALBUMIN g/dL 2 9*   ALK PHOS U/L 61   ALT U/L 3*   AST U/L 30   BUN mg/dL 23   CALCIUM mg/dL 8 1*   CHLORIDE mmol/L 104   CO2 mmol/L 27   CREATININE mg/dL 1 43*   GLUCOSE RANDOM mg/dL 87   POTASSIUM mmol/L 4 6   SODIUM mmol/L 137   TOTAL BILIRUBIN mg/dL 1 07*     Results from last 7 days   Lab Units 06/08/23  1516   INR  1 51*                   Lines/Drains:  Invasive Devices     Peripheral Intravenous Line  Duration           Peripheral IV 06/08/23 Right Antecubital <1 day                    Imaging: Reviewed radiology reports from this admission including: abdominal/pelvic CT     CT abdomen pelvis with contrast   Final Result by Srikanth Sommers MD (06/08 0029)      Colonic diverticulosis with mild wall thickening pericolonic inflammation involving the distal descending colon consistent with acute diverticulitis  No perforation or abscess  Trace perihepatic ascites  Small right pleural effusion of uncertain etiology  Nonobstructing 3 mm right lower pole intrarenal calculus without hydronephrosis  Workstation performed: [id number]         XR chest 1 view portable   ED Interpretation by Sarah Wilson III, DO (06/08 1538)   Portable chest x-ray shows no acute osseous abnormalities, no acute fractures, no infiltrates noted, no pneumothoraces, steronotomy wires present  Final Result by Ángel Tovar MD (06/08 4149)      No acute cardiopulmonary disease  Workstation performed: [id number]             EKG and Other Studies Reviewed on Admission:   · EKG: NSR  HR 70-80       ** Please Note: This note has been constructed using a voice recognition system   **

## 2023-06-08 NOTE — ED PROVIDER NOTES
History  Chief Complaint   Patient presents with   • Weakness - Generalized     Patient reports when ambulatory he is weak and feels like his legs are going to give out  Patient reports he then is dizzy from the weakness  Patient reports he has been having this issue for approx 4 years  • Flank Pain     Started approx 1 month ago  Patient had left kidney removed approx 3-4 years ago and is experiencing pain there  HPI    This is a 51-year-old gentleman with a past medical history of LYN stensosis contact chronic kidney disease stage III, history of nephrectomy, history of prostate cancer status post Lupron and radiation, diverticulosis, GERD, gout, history of nephro lithiasis, osteoarthritis and Parkinson's disease with a history of CAD with subsequent CABG x2 2 vessels: LIMA to LAD, RLE EVH / SVG to PDA presents the emergency department via private vehicle with a myriad of multiple chronic complaints  Review of the electronic medical record noted that he had a conversation with his PCP about inability to walk distances specifically increased dyspnea upon exertion: reported by Dr Shamir Pimentel communication with patient (pt denies: describes it as exertional dizziness w/ associated near syncopal symptoms at times  Last 2D echo in the computer August 2020 shows an EF of 60% mild aortic regurg  Also reports some increased frequency of urination along with some left-sided abdominal pain that is intermittent in nature  Prior to Admission Medications   Prescriptions Last Dose Informant Patient Reported?  Taking?   aspirin 81 mg chewable tablet   Yes Yes   Sig: Chew 81 mg daily   atorvastatin (LIPITOR) 80 mg tablet   No No   Sig: Take 1 tablet (80 mg total) by mouth daily with dinner   carbidopa-levodopa (SINEMET)  mg per tablet   Yes Yes   Sig: Take 1 tablet by mouth 3 (three) times a day   docusate sodium (COLACE) 100 mg capsule   No No   Sig: Take 1 capsule (100 mg total) by mouth 2 (two) times a day gabapentin (NEURONTIN) 100 mg capsule   Yes Yes   Sig: Take 200 mg by mouth daily at bedtime   metoprolol tartrate (LOPRESSOR) 25 mg tablet   No No   Sig: Take 1 tablet (25 mg total) by mouth every 12 (twelve) hours   omeprazole (PriLOSEC) 20 mg delayed release capsule   No No   Sig: Take 1 capsule (20 mg total) by mouth daily      Facility-Administered Medications: None       Past Medical History:   Diagnosis Date   • Anxiety    • CAD (coronary artery disease)    • Carotid stenosis, right     50-69% internal   • Chronic kidney disease (CKD), stage III (moderate)     baseline Cr 1 50   • Diverticulosis    • GERD (gastroesophageal reflux disease)    • Gout    • History of nephrolithiasis    • History of prostate cancer     s/p radiation/Lupron   • History of renal cell cancer     s/p left nephrectomy   • Osteoarthritis     knees & shoulders   • Parkinson disease    • Pulmonary nodule     7mm PARKER       Past Surgical History:   Procedure Laterality Date   • ARTHROSCOPY KNEE Right    • CARDIAC CATHETERIZATION     • JOINT REPLACEMENT Left     knee   • NEPHRECTOMY Left 2018   • CA CORONARY ARTERY BYP W/VEIN & ARTERY GRAFT 2 VEIN N/A 9/2/2020    Procedure: CORONARY ARTERY BYPASS GRAFT (CABG) 2 VESSELS: LIMA to LAD, RLE EVH/SVG to PDA; Surgeon: Sabina Mcginnis DO;  [facility]: [facility];  Service: Cardiac Surgery   • CA ECHO TRANSESOPHAG R-T 2D W/PRB IMG ACQUISJ I&R N/A 9/2/2020    Procedure: TRANSESOPHAGEAL ECHOCARDIOGRAM (MEGAN); Surgeon: Sabina Mcginnis DO;  Location[facility];  Service: Cardiac Surgery   • CA NDSC SURG W/VIDEO-ASSISTED HARVEST VEIN CABG Right 9/2/2020    Procedure: HARVEST VEIN ENDOSCOPIC ([address]);   Surgeon: Sabina Mcginnis DO;  Location: [facility];  Service: Cardiac Surgery   • REPLACEMENT TOTAL KNEE Left    • ROTATOR CUFF REPAIR Right    • TONSILLECTOMY         Family History   Problem Relation Age of Onset   • Heart disease Mother    • Heart attack Mother    • Heart attack Sister    • Heart attack Brother      I have reviewed and agree with the history as documented  E-Cigarette/Vaping   • E-Cigarette Use Never User      E-Cigarette/Vaping Substances     Social History     Tobacco Use   • Smoking status: Never   • Smokeless tobacco: Never   Vaping Use   • Vaping Use: Never used   Substance Use Topics   • Alcohol use: Yes     Alcohol/week: 9 0 standard drinks of alcohol     Types: 9 Standard drinks or equivalent per week     Comment: 3x a week ( 6-7 mixed drinks each time- henok)   • Drug use: Never       Review of Systems   Constitutional: Negative  HENT: Negative  Eyes: Negative  Respiratory: Negative  Negative for shortness of breath  Cardiovascular: Negative  Negative for chest pain and leg swelling  Gastrointestinal: Positive for abdominal pain  Endocrine: Negative  Genitourinary: Negative  Musculoskeletal: Negative  Skin: Negative  Allergic/Immunologic: Negative  Neurological: Positive for dizziness  Hematological: Negative  Psychiatric/Behavioral: Negative  Physical Exam  Physical Exam  Vitals and nursing note reviewed  Constitutional:       Appearance: Normal appearance  He is normal weight  HENT:      Head: Normocephalic and atraumatic  Right Ear: External ear normal       Left Ear: External ear normal       Nose: Nose normal       Mouth/Throat:      Mouth: Mucous membranes are moist       Pharynx: Oropharynx is clear  Eyes:      Extraocular Movements: Extraocular movements intact  Conjunctiva/sclera: Conjunctivae normal       Pupils: Pupils are equal, round, and reactive to light  Cardiovascular:      Rate and Rhythm: Normal rate and regular rhythm  Pulses: Normal pulses  Heart sounds: Normal heart sounds  Pulmonary:      Effort: Pulmonary effort is normal       Breath sounds: Normal breath sounds  Abdominal:      General: Abdomen is flat  Bowel sounds are normal    Genitourinary:     Rectum: Guaiac result negative  Musculoskeletal:         General: Normal range of motion  Cervical back: Normal range of motion  Skin:     General: Skin is warm  Capillary Refill: Capillary refill takes less than 2 seconds  Neurological:      General: No focal deficit present  Mental Status: He is alert and oriented to person, place, and time  Mental status is at baseline  Psychiatric:         Mood and Affect: Mood normal          Behavior: Behavior normal          Thought Content:  Thought content normal          Judgment: Judgment normal          Vital Signs  ED Triage Vitals [06/08/23 1430]   Temperature Pulse Respirations Blood Pressure SpO2   (!) 97 2 °F (36 2 °C) 70 18 96/51 98 %      Temp Source Heart Rate Source Patient Position - Orthostatic VS BP Location FiO2 (%)   Temporal Monitor Lying Right arm --      Pain Score       2           Vitals:    06/08/23 1430   BP: 96/51   Pulse: 70   Patient Position - Orthostatic VS: Lying         Visual Acuity      ED Medications  Medications   aspirin chewable tablet 81 mg (has no administration in time range)   atorvastatin (LIPITOR) tablet 80 mg (has no administration in time range)   carbidopa-levodopa (SINEMET)  mg per tablet 1 tablet (has no administration in time range)   gabapentin (NEURONTIN) capsule 200 mg (has no administration in time range)   metoprolol tartrate (LOPRESSOR) tablet 25 mg (has no administration in time range)   acetaminophen (TYLENOL) tablet 650 mg (has no administration in time range)   ondansetron (ZOFRAN) injection 4 mg (has no administration in time range)   enoxaparin (LOVENOX) subcutaneous injection 40 mg (has no administration in time range)   cefTRIAXone (ROCEPHIN) IVPB (premix in dextrose) 1,000 mg 50 mL (has no administration in time range)   metroNIDAZOLE (FLAGYL) IVPB (premix) 500 mg 100 mL (has no administration in time range)   sodium chloride 0 9 % bolus 1,000 mL (1,000 mL Intravenous New Bag 6/8/23 0906)   magnesium Oxide (MAG-OX) tablet 400 mg (400 mg Oral Given 6/8/23 1606)   iohexol (OMNIPAQUE) 350 MG/ML injection (SINGLE-DOSE) 100 mL (100 mL Intravenous Given 6/8/23 1554)       Diagnostic Studies  Results Reviewed     Procedure Component Value Units Date/Time    B-Type Natriuretic Peptide(BNP) [[id number]]  (Abnormal) Collected: 06/08/23 1516    Lab Status: Final result Specimen: Blood from Arm, Right Updated: 06/08/23 1808      pg/mL     HS Troponin I 2hr [[id number]] Collected: 06/08/23 1806    Lab Status:  In process Specimen: Blood Updated: 06/08/23 1806    UA w Reflex to Microscopic w Reflex to Culture [[id number]]  (Abnormal) Collected: 06/08/23 1640    Lab Status: Final result Specimen: Urine, Other Updated: 06/08/23 1648     Color, UA Yellow     Clarity, UA Clear     Specific Gravity, UA 1 010     pH, UA 6 0     Leukocytes, UA Negative     Nitrite, UA Negative     Protein, UA Negative mg/dl      Glucose, UA Negative mg/dl      Ketones, UA Negative mg/dl      Urobilinogen, UA 4 0 E U /dl      Bilirubin, UA Negative     Occult Blood, UA Negative    HS Troponin I 4hr [[id number]]     Lab Status: No result Specimen: Blood     HS Troponin 0hr (reflex protocol) [[id number]]  (Normal) Collected: 06/08/23 1516    Lab Status: Final result Specimen: Blood Updated: 06/08/23 1628     hs TnI 0hr 24 ng/L     TSH [[id number]]  (Normal) Collected: 06/08/23 1516    Lab Status: Final result Specimen: Blood from Arm, Right Updated: 06/08/23 1554     TSH 3RD GENERATON 1 438 uIU/mL     Comprehensive metabolic panel [[id number]]  (Abnormal) Collected: 06/08/23 1516    Lab Status: Final result Specimen: Blood from Arm, Right Updated: 06/08/23 1543     Sodium 137 mmol/L      Potassium 4 6 mmol/L      Chloride 104 mmol/L      CO2 27 mmol/L      ANION GAP 6 mmol/L      BUN 23 mg/dL      Creatinine 1 43 mg/dL      Glucose 87 mg/dL      Calcium 8 1 mg/dL      Corrected Calcium 9 0 mg/dL      AST 30 U/L      ALT 3 U/L      Alkaline Phosphatase 61 U/L Total Protein 4 9 g/dL      Albumin 2 9 g/dL      Total Bilirubin 1 07 mg/dL      eGFR 46 ml/min/1 73sq m     Narrative:      National Kid[employer] Disease Foundation guidelines for Chronic Kidney Disease (CKD):   •  Stage 1 with normal or high GFR (GFR > 90 mL/min/1 73 square meters)  •  Stage 2 Mild CKD (GFR = 60-89 mL/min/1 73 square meters)  •  Stage 3A Moderate CKD (GFR = 45-59 mL/min/1 73 square meters)  •  Stage 3B Moderate CKD (GFR = 30-44 mL/min/1 73 square meters)  •  Stage 4 Severe CKD (GFR = 15-29 mL/min/1 73 square meters)  •  Stage 5 End Stage CKD (GFR <15 mL/min/1 73 square meters)  Note: GFR calculation is accurate only with a steady state creatinine    Magnesium [[id number]]  (Abnormal) Collected: 06/08/23 1516    Lab Status: Final result Specimen: Blood from Arm, Right Updated: 06/08/23 1543     Magnesium 1 8 mg/dL     Protime-INR [[id number]]  (Abnormal) Collected: 06/08/23 1516    Lab Status: Final result Specimen: Blood from Arm, Right Updated: 06/08/23 1538     Protime 18 2 seconds      INR 1 51    APTT [[id number]]  (Normal) Collected: 06/08/23 1516    Lab Status: Final result Specimen: Blood from Arm, Right Updated: 06/08/23 1538     PTT 32 seconds     CBC and differential [[id number]]  (Abnormal) Collected: 06/08/23 1516    Lab Status: Final result Specimen: Blood from Arm, Right Updated: 06/08/23 1521     WBC 7 19 Thousand/uL      RBC 3 85 Million/uL      Hemoglobin 10 6 g/dL      Hematocrit 33 4 %      MCV 87 fL      MCH 27 5 pg      MCHC 31 7 g/dL      RDW 17 0 %      MPV 12 7 fL      Platelets 551 Thousands/uL      nRBC 0 /100 WBCs      Neutrophils Relative 78 %      Immat GRANS % 1 %      Lymphocytes Relative 9 %      Monocytes Relative 12 %      Eosinophils Relative 0 %      Basophils Relative 0 %      Neutrophils Absolute 5 61 Thousands/µL      Immature Grans Absolute 0 04 Thousand/uL      Lymphocytes Absolute 0 63 Thousands/µL      Monocytes Absolute 0 85 Thousand/µL      Eosinophils Absolute 0 03 Thousand/µL      Basophils Absolute 0 03 Thousands/µL                  CT abdomen pelvis with contrast   Final Result by Lilian Frank MD (06/08 1639)      Colonic diverticulosis with mild wall thickening pericolonic inflammation involving the distal descending colon consistent with acute diverticulitis  No perforation or abscess  Trace perihepatic ascites  Small right pleural effusion of uncertain etiology  Nonobstructing 3 mm right lower pole intrarenal calculus without hydronephrosis  Workstation performed: [id number]         XR chest 1 view portable   ED Interpretation by Pankaj Cardozo III, DO (06/08 7728)   Portable chest x-ray shows no acute osseous abnormalities, no acute fractures, no infiltrates noted, no pneumothoraces, steronotomy wires present  Final Result by Jaimee Zeng MD (06/08 0719)      No acute cardiopulmonary disease  Workstation performed: [id number]                    Procedures  ECG 12 Lead Documentation Only    Date/Time: 6/8/2023 2:40 PM    Performed by: Eun Dextre DO  Authorized by: Pankaj Cardozo III, DO    Indications / Diagnosis:  Weakness  ECG reviewed by me, the ED Provider: yes    Patient location:  ED  Comments:      Personally reviewed this EKG that was performed the patient June 8, 2023, EKG was completed at 2:40 PM interpreted by me at 2:40 PM, artifact versus a flutter with a rate of 149 bpm, appears that there is some artifact in leads I, lead II, lead III  This change from prior tracings    No diffuse elevations to indicate pericarditis  No coved ST elevations greater than 2mm with negative T waves in V1-3 to indicate concern for brugada  No biphasic T waves in V2, V3 to indicate Wellens (critical stenosis of LAD)  No elevation in aVR or deviation when compared to V1 (can be associated with ST depression in I,II, V4-6 when left main occlusion is present)    ECG 12 Lead Documentation Only    Date/Time: 6/8/2023 5:20 PM    Performed by: Almedia Mortimer, DO  Authorized by: Cam Wilcox III, DO    Indications / Diagnosis:  Weakness  ECG reviewed by me, the ED Provider: yes    Patient location:  ED  Comments:      I personally reviewed this EKG that was performed the patient is June 8, 2023, EKG was completed at 5:18 PM interpreted by me at 5:20 PM, rate of 137 bpm, peers that this could be artifact secondary to Parkinson's history versus of normal sinus, there appears to be minimal artifact noted in V4, V5, V6 and leads III  No diffuse elevations to indicate pericarditis  No coved ST elevations greater than 2mm with negative T waves in V1-3 to indicate concern for brugada  No biphasic T waves in V2, V3 to indicate Wellens (critical stenosis of LAD)  No elevation in aVR or deviation when compared to V1 (can be associated with ST depression in I,II, V4-6 when left main occlusion is present)  ED Course  ED Course as of 06/08/23 1823   Thu Jun 08, 2023   1510 Patient seen and examined  Chronic exertional dyspnea is syncopal symptoms worse over the last 6 months, baseline he can walk normally 100 feet now is only less than 20 feet, appointment with cardiology next week, EKG noted appears to be a flutter difficult to tell due to his history of having significant Parkinson's with right-sided tremor, EKG sent to cardiology  Initial Diff Dx: Anemia vs Pneumonia (parkinson's pt high risk) versus cardiac versus infectious specifically UTI versus dehydration versus electrolyte abnormalities   1529 Patient's hemoglobin is 10 6, Villegas crit is 33 4 with normal platelets, baseline 4 months ago was 14 5 terms of his hemoglobin and hematocrit of 46 5    1535 Images of the EKG was sent to on-call cardiology Dr Seema Quarles wondering if the EKG is consistent with a flutter versus Parkinson's induced, different from prior tracings     1548 Noted magnesium level of 1 8   1645 CT of the A/P showed: Colonic diverticulosis with mild wall thickening pericolonic inflammation involving the distal descending colon consistent with acute diverticulitis  No perforation or abscess      Trace perihepatic ascites      Small right pleural effusion of uncertain etiology      Nonobstructing 3 mm right lower pole intrarenal calculus without hydronephrosis      1722 Tempted to ambulate the patient noted that he was to attempt to ambulate, only ambulate approximately 50 feet, patient's EKG was repeated  Appears the patient significant artifact related to his Parkinson's disease,  because the QRS complexes are regular, but not in even intervals of the flutter waves, appears to be significantly from the prior EKG tracings  [facility] text communication sent to Dr Magaly Way from [facility]  1740 Case reviewed with Dr Magaly Way, will admit for observation  SBIRT 22yo+    Flowsheet Row Most Recent Value   Initial Alcohol Screen: US AUDIT-C     1  How often do you have a drink containing alcohol? 4 Filed at: 06/08/2023 1429   2  How many drinks containing alcohol do you have on a typical day you are drinking? 4 Filed at: 06/08/2023 1429   3a  Male UNDER 65: How often do you have five or more drinks on one occasion? 3 Filed at: 06/08/2023 1429   3b  FEMALE Any Age, or MALE 65+: How often do you have 4 or more drinks on one occassion?  0 Filed at: 06/08/2023 1429   Audit-C Score 11 Filed at: 06/08/2023 1429                    Medical Decision Making  This is a 22-year-old gentleman presents with multiple chronic complaints worsening over the last 6 months, reports having exertional dizziness with questionable dyspnea going on for approximately 3 to 4 years, found to have a abnormal EKG it appears that it could be a rapid a flutter versus Parkinson's artifact, case was discussed with Dr Bethel Hunter felt this could be pointing to a Parkinson's artifact, it appears that this EKG finding "is new for the patient (EKG was in 2020, son reports that he did have some mild parkinsonian's-like symptoms then with a tremor of the right upper extremity) , noted that the patient did have episode of hypotension down to 92 systolic, give patient IV fluids, found to have a marginally decreased magnesium level which was replaced via p o  no UTI, patient's hemoglobin is dropped 4 g in approximately 4 months, heme-negative rectal exam, she will be admitted to the hospital for evaluation  Portions of the record may have been created with voice recognition software  Occasional wrong word or \"sound a like\" substitutions may have occurred due to the inherent limitations of voice recognition software  Read the chart carefully and recognize, using context, where substitutions have occurred  Amount and/or Complexity of Data Reviewed  Labs: ordered  Radiology: ordered and independent interpretation performed  Risk  OTC drugs  Prescription drug management  Decision regarding hospitalization            Disposition  Final diagnoses:   Anemia   Hypomagnesemia   Weakness   Hypotension   [facility]     Time reflects when diagnosis was documented in both MDM as applicable and the Disposition within this note     Time User Action Codes Description Comment    6/8/2023  3:55 PM Mehran Kil [D64 9] Anemia     6/8/2023  3:55 PM Mehran Kil [E83 42] Hypomagnesemia     6/8/2023  5:41 PM Phyllis Eaves Add [R53 1] Weakness     6/8/2023  6:19 PM Henao, [address] [I48 92] Atrial flutter ([facility] )     6/8/2023  6:22 PM Phyllis Eaves Add [I95 9] Hypotension     6/8/2023  6:22 PM Phyllis Eaves Add [G20] Parkinson disease [facility])       ED Disposition     ED Disposition   Admit    Condition   Stable    Date/Time   u Jun 8, 2023  5:42 PM    Comment   Case was discussed with Dr Veronica Tanner and the patient's admission status was agreed to be Admission Status: observation status to the service of Dr Veronica Tanner "             Follow-up Information    None         Patient's Medications   Discharge Prescriptions    No medications on file       No discharge procedures on file      PDMP Review       Value Time User    PDMP Reviewed  Yes 9/7/2020  9:30 AM Travis Ferguson PA-C          ED Provider  Electronically Signed by           Christoph Pickard III, DO  06/08/23 6499

## 2023-06-09 ENCOUNTER — HOME HEALTH ADMISSION (OUTPATIENT)
Dept: HOME HEALTH SERVICES | Facility: HOME HEALTHCARE | Age: 78
End: 2023-06-09

## 2023-06-09 ENCOUNTER — APPOINTMENT (OUTPATIENT)
Dept: NON INVASIVE DIAGNOSTICS | Facility: HOSPITAL | Age: 78
DRG: 392 | End: 2023-06-09
Payer: COMMERCIAL

## 2023-06-09 PROBLEM — D64.9 ANEMIA: Status: ACTIVE | Noted: 2023-06-09

## 2023-06-09 PROBLEM — R94.31 ABNORMAL ECG: Status: ACTIVE | Noted: 2023-06-09

## 2023-06-09 LAB
ANION GAP SERPL CALCULATED.3IONS-SCNC: 5 MMOL/L (ref 4–13)
AORTIC ROOT: 4 CM
AORTIC VALVE MEAN VELOCITY: 9.7 M/S
ASCENDING AORTA: 3.1 CM
AV AREA BY CONTINUOUS VTI: 2.5 CM2
AV AREA PEAK VELOCITY: 2.4 CM2
AV LVOT MEAN GRADIENT: 3 MMHG
AV LVOT PEAK GRADIENT: 4 MMHG
AV MEAN GRADIENT: 4 MMHG
AV PEAK GRADIENT: 7 MMHG
AV VALVE AREA: 2.46 CM2
AV VELOCITY RATIO: 0.77
BUN SERPL-MCNC: 21 MG/DL (ref 5–25)
CALCIUM SERPL-MCNC: 7.8 MG/DL (ref 8.4–10.2)
CHLORIDE SERPL-SCNC: 106 MMOL/L (ref 96–108)
CO2 SERPL-SCNC: 25 MMOL/L (ref 21–32)
CREAT SERPL-MCNC: 1.33 MG/DL (ref 0.6–1.3)
DOP CALC AO PEAK VEL: 1.37 M/S
DOP CALC AO VTI: 30.05 CM
DOP CALC LVOT AREA: 3.14 CM2
DOP CALC LVOT DIAMETER: 2 CM
DOP CALC LVOT PEAK VEL VTI: 23.59 CM
DOP CALC LVOT PEAK VEL: 1.06 M/S
DOP CALC LVOT STROKE INDEX: 36.6 ML/M2
DOP CALC LVOT STROKE VOLUME: 74.07
E WAVE DECELERATION TIME: 251 MS
ERYTHROCYTE [DISTWIDTH] IN BLOOD BY AUTOMATED COUNT: 17.1 % (ref 11.6–15.1)
FRACTIONAL SHORTENING: 37 (ref 28–44)
GFR SERPL CREATININE-BSD FRML MDRD: 50 ML/MIN/1.73SQ M
GLUCOSE SERPL-MCNC: 98 MG/DL (ref 65–140)
HCT VFR BLD AUTO: 30.1 % (ref 36.5–49.3)
HGB BLD-MCNC: 9.3 G/DL (ref 12–17)
INTERVENTRICULAR SEPTUM IN DIASTOLE (PARASTERNAL SHORT AXIS VIEW): 1.3 CM
INTERVENTRICULAR SEPTUM: 1.3 CM (ref 0.6–1.1)
IVC: 13 MM
LAAS-AP2: 20.3 CM2
LAAS-AP4: 7.9 CM2
LEFT ATRIUM SIZE: 4.5 CM
LEFT INTERNAL DIMENSION IN SYSTOLE: 3.3 CM (ref 2.1–4)
LEFT VENTRICULAR INTERNAL DIMENSION IN DIASTOLE: 5.2 CM (ref 3.5–6)
LEFT VENTRICULAR POSTERIOR WALL IN END DIASTOLE: 1.3 CM
LEFT VENTRICULAR STROKE VOLUME: 84 ML
LVSV (TEICH): 84 ML
MAGNESIUM SERPL-MCNC: 1.9 MG/DL (ref 1.9–2.7)
MCH RBC QN AUTO: 26.7 PG (ref 26.8–34.3)
MCHC RBC AUTO-ENTMCNC: 30.9 G/DL (ref 31.4–37.4)
MCV RBC AUTO: 87 FL (ref 82–98)
MV E'TISSUE VEL-SEP: 7 CM/S
MV PEAK A VEL: 0.81 M/S
MV PEAK E VEL: 57 CM/S
MV STENOSIS PRESSURE HALF TIME: 73 MS
MV VALVE AREA P 1/2 METHOD: 3.01
PLATELET # BLD AUTO: 146 THOUSANDS/UL (ref 149–390)
PMV BLD AUTO: 11.9 FL (ref 8.9–12.7)
POTASSIUM SERPL-SCNC: 4.4 MMOL/L (ref 3.5–5.3)
RBC # BLD AUTO: 3.48 MILLION/UL (ref 3.88–5.62)
RIGHT ATRIUM AREA SYSTOLE A4C: 13.9 CM2
RIGHT VENTRICLE ID DIMENSION: 3.6 CM
SL CV LEFT ATRIUM LENGTH A2C: 5.9 CM
SL CV LV EF: 65
SL CV PED ECHO LEFT VENTRICLE DIASTOLIC VOLUME (MOD BIPLANE) 2D: 128 ML
SL CV PED ECHO LEFT VENTRICLE SYSTOLIC VOLUME (MOD BIPLANE) 2D: 44 ML
SODIUM SERPL-SCNC: 136 MMOL/L (ref 135–147)
TR MAX PG: 14 MMHG
TR PEAK VELOCITY: 1.9 M/S
TRICUSPID ANNULAR PLANE SYSTOLIC EXCURSION: 1.7 CM
TRICUSPID VALVE PEAK REGURGITATION VELOCITY: 1.87 M/S
WBC # BLD AUTO: 6.1 THOUSAND/UL (ref 4.31–10.16)

## 2023-06-09 PROCEDURE — 99222 1ST HOSP IP/OBS MODERATE 55: CPT | Performed by: INTERNAL MEDICINE

## 2023-06-09 PROCEDURE — 97167 OT EVAL HIGH COMPLEX 60 MIN: CPT

## 2023-06-09 PROCEDURE — C8929 TTE W OR WO FOL WCON,DOPPLER: HCPCS

## 2023-06-09 PROCEDURE — 80048 BASIC METABOLIC PNL TOTAL CA: CPT | Performed by: INTERNAL MEDICINE

## 2023-06-09 PROCEDURE — 36415 COLL VENOUS BLD VENIPUNCTURE: CPT | Performed by: INTERNAL MEDICINE

## 2023-06-09 PROCEDURE — 99232 SBSQ HOSP IP/OBS MODERATE 35: CPT | Performed by: INTERNAL MEDICINE

## 2023-06-09 PROCEDURE — 83735 ASSAY OF MAGNESIUM: CPT | Performed by: INTERNAL MEDICINE

## 2023-06-09 PROCEDURE — 93306 TTE W/DOPPLER COMPLETE: CPT | Performed by: INTERNAL MEDICINE

## 2023-06-09 PROCEDURE — 85027 COMPLETE CBC AUTOMATED: CPT | Performed by: INTERNAL MEDICINE

## 2023-06-09 PROCEDURE — 97163 PT EVAL HIGH COMPLEX 45 MIN: CPT

## 2023-06-09 RX ORDER — SODIUM CHLORIDE, SODIUM GLUCONATE, SODIUM ACETATE, POTASSIUM CHLORIDE, MAGNESIUM CHLORIDE, SODIUM PHOSPHATE, DIBASIC, AND POTASSIUM PHOSPHATE .53; .5; .37; .037; .03; .012; .00082 G/100ML; G/100ML; G/100ML; G/100ML; G/100ML; G/100ML; G/100ML
100 INJECTION, SOLUTION INTRAVENOUS CONTINUOUS
Status: DISPENSED | OUTPATIENT
Start: 2023-06-09 | End: 2023-06-09

## 2023-06-09 RX ORDER — LORAZEPAM 1 MG/1
1 TABLET ORAL
Status: DISCONTINUED | OUTPATIENT
Start: 2023-06-09 | End: 2023-06-13 | Stop reason: HOSPADM

## 2023-06-09 RX ORDER — ATORVASTATIN CALCIUM 40 MG/1
40 TABLET, FILM COATED ORAL
Status: DISCONTINUED | OUTPATIENT
Start: 2023-06-09 | End: 2023-06-13 | Stop reason: HOSPADM

## 2023-06-09 RX ADMIN — ASPIRIN 81 MG 81 MG: 81 TABLET ORAL at 08:46

## 2023-06-09 RX ADMIN — METRONIDAZOLE 500 MG: 500 INJECTION, SOLUTION INTRAVENOUS at 02:33

## 2023-06-09 RX ADMIN — CEFTRIAXONE 1000 MG: 1 INJECTION, SOLUTION INTRAVENOUS at 17:37

## 2023-06-09 RX ADMIN — CARBIDOPA AND LEVODOPA 1 TABLET: 25; 100 TABLET ORAL at 21:30

## 2023-06-09 RX ADMIN — ENOXAPARIN SODIUM 40 MG: 100 INJECTION SUBCUTANEOUS at 08:47

## 2023-06-09 RX ADMIN — GABAPENTIN 200 MG: 100 CAPSULE ORAL at 21:30

## 2023-06-09 RX ADMIN — METRONIDAZOLE 500 MG: 500 INJECTION, SOLUTION INTRAVENOUS at 10:50

## 2023-06-09 RX ADMIN — METRONIDAZOLE 500 MG: 500 INJECTION, SOLUTION INTRAVENOUS at 18:08

## 2023-06-09 RX ADMIN — CARBIDOPA AND LEVODOPA 1 TABLET: 25; 100 TABLET ORAL at 16:52

## 2023-06-09 RX ADMIN — ATORVASTATIN CALCIUM 40 MG: 40 TABLET, FILM COATED ORAL at 16:52

## 2023-06-09 RX ADMIN — PERFLUTREN 1 ML/MIN: 6.52 INJECTION, SUSPENSION INTRAVENOUS at 08:05

## 2023-06-09 RX ADMIN — ACETAMINOPHEN 650 MG: 325 TABLET ORAL at 23:10

## 2023-06-09 RX ADMIN — CARBIDOPA AND LEVODOPA 1 TABLET: 25; 100 TABLET ORAL at 08:46

## 2023-06-09 RX ADMIN — LORAZEPAM 1 MG: 1 TABLET ORAL at 22:43

## 2023-06-09 RX ADMIN — SODIUM CHLORIDE, SODIUM GLUCONATE, SODIUM ACETATE, POTASSIUM CHLORIDE, MAGNESIUM CHLORIDE, SODIUM PHOSPHATE, DIBASIC, AND POTASSIUM PHOSPHATE 100 ML/HR: .53; .5; .37; .037; .03; .012; .00082 INJECTION, SOLUTION INTRAVENOUS at 11:43

## 2023-06-09 NOTE — ASSESSMENT & PLAN NOTE
· Reports left flank pain  · History of renal cell cancer, left nephrectomy, and nephrolithiasis  · CT abdomen pelvis: Colonic diverticulosis with mild wall thickening pericolonic inflammation involving the distal descending colon consistent with acute diverticulitis  No perforation or abscess  Trace perihepatic ascites  · Will continue ceftriaxone and Flagyl  · IV fluids as needed  · Pain control as needed  · Diet as tolerated  · No signs of abscess or perforation for now    If change in clinical exam can consider surgical consultation

## 2023-06-09 NOTE — PLAN OF CARE
Problem: MOBILITY - ADULT  Goal: Maintain or return to baseline ADL function  Description: INTERVENTIONS:  -  Assess patient's ability to carry out ADLs; assess patient's baseline for ADL function and identify physical deficits which impact ability to perform ADLs (bathing, care of mouth/teeth, toileting, grooming, dressing, etc )  - Assess/evaluate cause of self-care deficits   - Assess range of motion  - Assess patient's mobility; develop plan if impaired  - Assess patient's need for assistive devices and provide as appropriate  - Encourage maximum independence but intervene and supervise when necessary  - Involve family in performance of ADLs  - Assess for home care needs following discharge   - Consider OT consult to assist with ADL evaluation and planning for discharge  - Provide patient education as appropriate  Outcome: Not Progressing     Problem: MOBILITY - ADULT  Goal: Maintains/Returns to pre admission functional level  Description: INTERVENTIONS:  - Perform BMAT or MOVE assessment daily    - Set and communicate daily mobility goal to care team and patient/family/caregiver  - Collaborate with rehabilitation services on mobility goals if consulted  - Perform Range of Motion   times a day  - Reposition patient every 2 hours    - Dangle patient 3 times a day  - Stand patient 3 times a day  - Ambulate patient 3 times a day  - Out of bed to chair 3 times a day   - Out of bed for meals 3 times a day  - Out of bed for toileting  - Record patient progress and toleration of activity level   Outcome: Not Progressing

## 2023-06-09 NOTE — CASE MANAGEMENT
Case Management Assessment & Discharge Planning Note    Patient name Akilah Kilgore  Location ED 05/ED 05 MRN [id number]  : 1945 Date 2023       Current Admission Date: 2023  Current Admission Diagnosis:Acute diverticulitis   Patient Active Problem List    Diagnosis Date Noted   • Abnormal ECG 2023   • Dizziness 2023   • Acute diverticulitis 2023   • Parkinson's disease ([facility] ) 2023   • Generalized weakness 2020   • S/P CABG x 2 2020   • Tremor of right hand 2020   • Obesity 2020   • BYNUM (dyspnea on exertion) 2020   • Chronic kidney disease (CKD), stage III (moderate)    • Coronary artery disease involving native coronary artery of native heart without angina pectoris    • Carotid stenosis, right    • Stage 3 chronic kidney disease ([facility] ) 2020   • Coronary artery disease involving native coronary artery of native heart 2020   • Renal insufficiency 2020   • Dyslipidemia 2020      LOS (days): 0  Geometric Mean LOS (GMLOS) (days): 2 60  Days to GMLOS:2 5     OBJECTIVE:    Risk of Unplanned Readmission Score: 14 24       Current admission status: Inpatient  Referral Reason: Rehab    Preferred Pharmacy:   [address] [place] [address], [address] - [place] [address] [place] [facility] [address] [place] [address]  Phone: [phone] Fax: [phone] [address] [facility] [address] [facility] 94 [facility] 38 210 [facility]  Phone: [phone] Fax: [phone]    Primary Care Provider: Kylah Ferguson DO    Primary Insurance: Sharon Claros [facility]  Secondary Insurance:     ASSESSMENT:  Ashok Sandoval Proxies    There are no active Health Care Proxies on file         Advance Directives  Does patient have a Health Care POA?: Yes  Does patient have Advance Directives?: Yes  Advance Directives: Living will, Power of  for health care  Primary Contact: Cheryl Coleman (Son)   [phone] (Mobile)    Readmission Root Cause  30 Day Readmission: No    Patient Information  Admitted from[de-identified] Home  Mental Status: Alert  During Assessment patient was accompanied by: Son  Assessment information provided by[de-identified] Patient, Son  Primary Caregiver: Self  Support Systems: Children, Friend, Family members  [place] of Residence: [address] do you live in?: Via [employer] entry access options   Select all that apply : Stairs  Number of steps to enter home : 5  Do the steps have railings?: Yes  Type of Current Residence: Other (Comment) (Split level)  In the last 12 months, was there a time when you were not able to pay the mortgage or rent on time?: No  In the last 12 months, how many places have you lived?: 1  In the last 12 months, was there a time when you did not have a steady place to sleep or slept in a shelter (including now)?: No  Homeless/housing insecurity resource given?: N/A  Living Arrangements: Lives Alone  Is patient a ?: No    Activities of Daily Living Prior to Admission  Functional Status: Independent  Completes ADLs independently?: Yes  Ambulates independently?: Yes  Does patient use assisted devices?: No  Does patient currently own DME?: No  Does patient have a history of Outpatient Therapy (PT/OT)?: No  Does the patient have a history of Short-Term Rehab?: No  Does patient have a history of HHC?: No  Does patient currently have [facility]?: No    Patient Information Continued  Income Source: Pension/detention  Does patient have prescription coverage?: Yes  Within the past 12 months, you worried that your food would run out before you got the money to buy more : Never true  Within the past 12 months, the food you bought just didn't last and you didn't have money to get more : Never true  Food insecurity resource given?: N/A  Does patient receive dialysis treatments?: No  Does patient have a history of substance abuse?: No  Does patient have a history of Mental Health Diagnosis?: No    Means of Transportation  Means of Transport to Appts[de-identified] Drives Self  In the past 12 months, has lack of transportation kept you from medical appointments or from getting medications?: No  In the past 12 months, has lack of transportation kept you from meetings, work, or from getting things needed for daily living?: No  Was application for public transport provided?: N/A        DISCHARGE DETAILS:    Discharge planning discussed with[de-identified] Patient and son Sonali Dates of Choice: Yes  Comments - Freedom of Choice: Discussed therapy evals and recs for rehab  CM contacted family/caregiver?: Yes  Were Treatment Team discharge recommendations reviewed with patient/caregiver?: Yes  Did patient/caregiver verbalize understanding of patient care needs?: Yes  Were patient/caregiver advised of the risks associated with not following Treatment Team discharge recommendations?: Yes    Contacts  Patient Contacts: Nadeem Greer (Son)   [phone] [facility])  Relationship to Patient[de-identified] Family  Contact Method: Phone  Phone Number: Nadeem Greer (Son)   [phone] (Mobile)  Reason/Outcome: Emergency Contact, Discharge Planning    Treatment Team Recommendation: Short Term Rehab      Additional Comments: Met with patient and son at bedside in ED  Discussed PT/OT evals and recs for rehab  Explained STR and answered all questions  Patient unsure if he wants to go to rehab vs Home with [facility]  Patient and son will discuss and [facility]  CM department following thru discharge

## 2023-06-09 NOTE — CONSULTS
[place] [address]  Consult  Name: Kevin Quezada 66 y o  male I MRN: [id number]  Unit/Bed#: ED 05 I Date of Admission: 6/8/2023   Date of Service: 6/9/2023 I Hospital Day: 0    Inpatient consult to Cardiology  Consult performed by: Naveed Morfin MD  Consult ordered by: Vinicius Alcantar, DO          Assessment/Plan   Abnormal ECG  Assessment & Plan  There was concern about atrial flutter  I have inspected the EKGs however and they are most consistent with sinus rhythm with artifact related to his Parkinson's disease mimicking atrial flutter  Coronary artery disease involving native coronary artery of native heart without angina pectoris  Assessment & Plan  Prior CABG  We have not seen him much since then  As he has generalized weakness I am going to stop the very small dose of metoprolol  Generalized weakness  Assessment & Plan  See above comments  Other summary comments:   I made minor changes to the regimen including stopping metoprolol and lowering atorvastatin  I am going to make sure we put him back in our yearly office schedule  I will remain available for any further problems in the hospital   Main issue seems to be deteriorating functionality  Outpatient Cardiologist: Kimberly Crowell    HPI: Kevin Quezada is a 66y o  year old male who presented with weakness  He was thought to have atrial flutter based on the EKG and I am asked to comment further  As well there may be diverticulitis which is being addressed  No recent chest pain  Lots of weakness and balance issues but no falls  He tries to be careful in this regard  Parkinsonism has been progressive over the last few years  Patient lives alone  Patient had two-vessel CABG including left internal mammary to the LAD and saphenous vein graft to the right posterior descending artery on 9/2/2020  No chest pain since then  EKG:   Baseline artifact consistent with parkinsonism    Regular rhythm most likely sinus  MOST  RECENT CARDIAC IMAGING:   TTE 6/9/2023: Normal LV and RV systolic function  Review of Systems: a 10 point review of systems was conducted and is negative except for as mentioned in the HPI or as below  Historical Information   Past Medical History:   Diagnosis Date   • Anxiety    • CAD (coronary artery disease)    • Carotid stenosis, right     50-69% internal   • Chronic kidney disease (CKD), stage III (moderate)     baseline Cr 1 50   • Diverticulosis    • GERD (gastroesophageal reflux disease)    • Gout    • History of nephrolithiasis    • History of prostate cancer     s/p radiation/Lupron   • History of renal cell cancer     s/p left nephrectomy   • Osteoarthritis     knees & shoulders   • Parkinson disease    • Pulmonary nodule     7mm PARKER     Past Surgical History:   Procedure Laterality Date   • ARTHROSCOPY KNEE Right    • CARDIAC CATHETERIZATION     • JOINT REPLACEMENT Left     knee   • NEPHRECTOMY Left 2018   • [place] CORONARY ARTERY BYP W/VEIN & ARTERY GRAFT 2 VEIN N/A 9/2/2020    Procedure: CORONARY ARTERY BYPASS GRAFT (CABG) 2 VESSELS: LIMA to LAD, RLE EVH/SVG to PDA; Surgeon: Jp Blanco DO;  [facility];  Service: Cardiac Surgery   • [place] ECHO TRANSESOPHAG R-T 2D W/PRB IMG ACQUISJ I&R N/A 9/2/2020    Procedure: TRANSESOPHAGEAL ECHOCARDIOGRAM (MEGAN); Surgeon: Jp Blanco DO;  [facility];  Service: Cardiac Surgery   • [place] NDSC SURG W/VIDEO-ASSISTED HARVEST VEIN CABG Right 9/2/2020    Procedure: HARVEST VEIN ENDOSCOPIC ([address]);   Surgeon: Jp Blanco DO;  [facility];  Service: Cardiac Surgery   • REPLACEMENT TOTAL KNEE Left    • ROTATOR CUFF REPAIR Right    • TONSILLECTOMY       Social History     Substance and Sexual Activity   Alcohol Use Yes   • Alcohol/week: 9 0 standard drinks of alcohol   • Types: 9 Standard drinks or equivalent per week    Comment: 3x a week ( 6-7 mixed drinks each time- henok)     Social History     Substance and "Sexual Activity   Drug Use Never     Social History     Tobacco Use   Smoking Status Never   Smokeless Tobacco Never       Family History:   No longer relevant  Meds/Allergies   all current active meds have been reviewed  (Not in a hospital admission)      Allergies   Allergen Reactions   • Indomethacin Shortness Of Breath       Objective   Vitals: Blood pressure 116/64, pulse 65, temperature (!) 97 2 °F (36 2 °C), temperature source Temporal, resp  rate 17, height 5' 7\" (1 702 m), weight 90 7 kg (200 lb), SpO2 95 %  , Body mass index is 31 32 kg/m² ,   Orthostatic Blood Pressures    Flowsheet Row Most Recent Value   Blood Pressure 116/64 filed at 06/09/2023 0805   Patient Position - Orthostatic VS Lying filed at 06/08/2023 2859          Systolic (77XUQ), FWQ:964 , Min:96 , MKC:646     Diastolic (91OJK), NLK:77, Min:51, Max:64              Physical Exam:    General:  Normal appearance in no distress  Eyes:  Anicteric  Oral mucosa:  Moist   Neck:  No JVD  Carotid upstrokes are brisk without bruits  No masses  Chest:  Clear to auscultation  Well-healed midline sternotomy scar  Cardiac:  No palpable PMI  Normal S1 and S2  No murmur gallop or rub  Abdomen:  Soft and nontender  No palpable organomegaly or aortic enlargement  Extremities:  No peripheral edema  Musculoskeletal:  Symmetric  Vascular:  Femoral pulses are brisk without bruits  Popliteal  pulses are intact bilaterally  Pedal pulses are intact  Neuro: Resting tremor right hand worse than left hand  Psych:  Alert and oriented x3          Lab Results:     Troponins:    Results from last 7 days   Lab Units 06/08/23  2107 06/08/23  1806 06/08/23  1516   HS TNI 0HR ng/L  --   --  24   HS TNI 2HR ng/L  --  24  --    HS TNI 4HR ng/L 26  --   --    HSTNI D2 ng/L  --  0  --    HSTNI D4 ng/L 2  --   --      BNP:   Results from last 6 Months   Lab Units 06/08/23  1516   BNP pg/mL 133*       CBC :   Results from last 7 days   Lab Units 06/09/23  0637 " 06/08/23  1516   HEMATOCRIT % 30 1* 33 4*   HEMOGLOBIN g/dL 9 3* 10 6*   MCV fL 87 87   PLATELETS Thousands/uL 146* 171   WBC Thousand/uL 6 10 7 19     TSH:     CMP:   Results from last 7 days   Lab Units 06/09/23  0637 06/08/23  1516   ALT U/L  --  3*   AST U/L  --  30   BUN mg/dL 21 23   CHLORIDE mmol/L 106 104   CO2 mmol/L 25 27   CREATININE mg/dL 1 33* 1 43*   EGFR ml/min/1 73sq m 50 46   POTASSIUM mmol/L 4 4 4 6     Lipid Profile:     Coags:   Results from last 7 days   Lab Units 06/08/23  1516   INR  1 51*       ]

## 2023-06-09 NOTE — ASSESSMENT & PLAN NOTE
Prior CABG  We have not seen him much since then  As he has generalized weakness I am going to stop the very small dose of metoprolol

## 2023-06-09 NOTE — PHYSICAL THERAPY NOTE
Physical Therapy Evaluation     Patient's Name: Harsha Vasquez    Admitting Diagnosis  Weakness generalized [R53 1]  Flank pain [R10 9]    Problem List  Patient Active Problem List   Diagnosis    Coronary artery disease involving native coronary artery of native heart    Renal insufficiency    Dyslipidemia    Stage 3 chronic kidney disease (HCC)    Chronic kidney disease (CKD), stage III (moderate)    Coronary artery disease involving native coronary artery of native heart without angina pectoris    Carotid stenosis, right    S/P CABG x 2    Tremor of right hand    Obesity    BYNUM (dyspnea on exertion)    Generalized weakness    Dizziness    Acute diverticulitis    Parkinson's disease ([address] )    Abnormal ECG       Past Medical History  Past Medical History:   Diagnosis Date    Anxiety     CAD (coronary artery disease)     Carotid stenosis, right     50-69% internal    Chronic kidney disease (CKD), stage III (moderate)     baseline Cr 1 50    Diverticulosis     GERD (gastroesophageal reflux disease)     Gout     History of nephrolithiasis     History of prostate cancer     s/p radiation/Lupron    History of renal cell cancer     s/p left nephrectomy    Osteoarthritis     knees & shoulders    Parkinson disease     Pulmonary nodule     7mm PARKER       Past Surgical History  Past Surgical History:   Procedure Laterality Date    ARTHROSCOPY KNEE Right     CARDIAC CATHETERIZATION      JOINT REPLACEMENT Left     knee    NEPHRECTOMY Left 2018    [place] CORONARY ARTERY BYP W/VEIN & ARTERY GRAFT 2 VEIN N/A 9/2/2020    Procedure: CORONARY ARTERY BYPASS GRAFT (CABG) 2 VESSELS: LIMA to LAD, RLE EVH/SVG to PDA; Surgeon: Presley Tavarez DO;  [facility];  Service: Cardiac Surgery    MN ECHO TRANSESOPHAG R-T 2D W/PRB IMG ACQUISJ I&R N/A 9/2/2020    Procedure: TRANSESOPHAGEAL ECHOCARDIOGRAM (MEGAN);   Surgeon: Presley Tavarez DO;  [facility];  Service: Cardiac Surgery    MN NDSC SURG W/VIDEO-ASSISTED HARVEST VEIN CABG "Right 9/2/2020    Procedure: HARVEST VEIN ENDOSCOPIC (EVH); Surgeon: Kuldeep Davidson DO;  [facility];  Service: Cardiac Surgery    REPLACEMENT TOTAL KNEE Left     ROTATOR CUFF REPAIR Right     TONSILLECTOMY          06/09/23 0926   PT Last Visit   PT Visit Date 06/09/23   Note Type   Note type Evaluation   Pain Assessment   Pain Assessment Tool 0-10  (\"numbness gets maddening\")   Pain Score 7   Pain Location/Orientation Orientation: Right;Location: Shoulder   Pain Onset/Description Onset: Ongoing;Frequency: Constant/Continuous; Descriptor: Aching;Descriptor: Sore   Effect of Pain on Daily Activities yes   Patient's Stated Pain Goal No pain   Hospital Pain Intervention(s) Repositioned; Emotional support; Environmental changes   Multiple Pain Sites Yes   Pain 2   Pain Score 2 7   Pain Location/Orientation 2 Orientation: Bilateral;Location: Generalized   Pain Onset/Description 2 Onset: Ongoing;Frequency: Constant/Continuous; Descriptor: Aching;Descriptor: Sore   Patient's Stated Pain Goal 2 No pain   Hospital Pain Intervention(s) 2 Repositioned; Ambulation/increased activity; Emotional support; Environmental changes   Restrictions/Precautions   Weight Bearing Precautions Per Order No   Other Precautions Chair Alarm; Bed Alarm; Fall Risk;Pain;Multiple lines   Home Living   Type of [address] One level;Performs ADLs on one level; Able to live on main level with bedroom/bathroom  (garage 8 + 6 GIDEON through garage, front 3 + 6 GIDEON)   Bathroom Shower/Tub Tub/shower unit  (typical sponge bathing \" I have trouble reaching\")   [address]   (no prior DME usage)   [address] Cane;Long-handled shoehorn  (denies prior usage)   Prior Function   Level of [place] Independent with ADLs; Independent with functional mobility; Independent with IADLS   Lives With Alone   Receives Help From Other (Comment)  (\"not much\" when asked about support) " "  IADLs Independent with driving; Independent with meal prep; Independent with medication management   Falls in the last 6 months 0  (denies, \"but I'm very careful\")   Vocational Retired   [employer]   Additional Pertinent History Lana CERDA present for co-assessment due to medical complexity, required skilled interventions of 2 clinicians for care delivery  Family/Caregiver Present No   Cognition   Overall Cognitive Status WFL   Arousal/Participation Alert   Orientation Level Oriented X4   Memory Within functional limits   Following Commands Follows one step commands with increased time or repetition   Comments Rafy Engel was agreeable to PT assessment, pleasant  He reports fatigue has notably worsened in the last month  RLE Assessment   RLE Assessment X  (3/5 gross musculature)   LLE Assessment   LLE Assessment X  (3/5 gross musculature)   Vision-Basic Assessment   Current Vision Wears glasses only for reading   Vestibular   Spontaneous Nystagmus (-) no evidence of nystagmus at rest in room light   Gaze Holding Nystagmus (-) no evidence of nystagmus   Coordination   Movements are Fluid and Coordinated 0   Coordination and Movement Description Incremental mobility requiring increased time and tactile facilitation  Parkinsonian tremors present throughout assessment  Sharp/Dull   RLE Sharp/Dull Impaired  (B hand constant numbness, intermittent pedal numbness)   LLE Sharp/Dull Impaired   Bed Mobility   Supine to Sit 4  Minimal assistance   Additional items Assist x 1;HOB elevated; Bedrails; Increased time required;Verbal cues;LE management   Sit to Supine   (DNT as Rafy Engel was sitting out of bed on the recliner upon conclusion )   Additional Comments Verbal cues for bedrail utilization and proper body mechanics  Transfers   Sit to Stand 4  Minimal assistance   Additional items Assist x 1;Bedrails; Impulsive;Verbal cues   Stand to Sit 4  Minimal assistance   Additional items Assist x 1;Bedrails; Impulsive;Verbal cues   Stand " pivot 4  Minimal assistance   Additional items Assist x 1; Increased time required;Verbal cues   Additional Comments Verbal cues for environmental awareness and safety awareness  Verbal cues for base of support widening for stabilization upon achieving weight bearing stance  Ambulation/Elevation   Gait pattern Improper Weight shift;Narrow TEO; Forward Flexion;Decreased foot clearance; Inconsistent alejandro; Short stride; Shuffling   Gait Assistance 4  Minimal assist   Additional items Assist x 1;Verbal cues; Tactile cues   Assistive Device None; Other (Comment)  (axillary/tactile support R side; initial pivot to recliner RW was present, however was not utilized for additional gait activities)   Distance 25 feet x 3   Stair Management Assistance Not tested   Ambulation/Elevation Additional Comments Verbal cues for safety with directional changes and base of support widening for stabilization  Fatigue reported as gait activities progressed with instruction for sitting rest    Balance   Static Sitting Fair +   Dynamic Sitting Fair   Static Standing Fair -   Dynamic Standing Fair -   Ambulatory Poor +   Endurance Deficit   Endurance Deficit Yes   Activity Tolerance   Activity Tolerance Patient limited by fatigue;Patient limited by pain   Medical Staff Made Aware Yes, CM was informed of d/c disposition recommendation  Nurse Made Aware Yes, nursing staff was informed of assessment outcome  Assessment   Prognosis Fair   Problem List Decreased strength;Decreased endurance; Impaired balance;Decreased mobility; Decreased coordination;Decreased safety awareness; Impaired judgement;Obesity;Pain   Assessment Pt is 66 y o  male seen for PT evaluation s/p admit to [facility] on 6/8/2023 w/ Acute diverticulitis  PT consulted to assess pt's functional mobility and d/c needs  Order placed for PT eval and tx, w/ up and OOB as tolerated order   Comorbidities affecting pt's physical performance at time of assessment include:generalized weakness, Parkinson's,acute diverticulitis,dizziness,CAD   PTA, pt was independent w/ all functional mobility w/ AD utilization  Personal factors affecting pt at time of IE include: inaccessible home environment, stairs to enter home, inability to navigate community distances, unable to perform dynamic tasks in community, impulsivity, limited insight into impairments, inability to perform IADLs, inability to perform ADLs and inability to live alone  Please find objective findings from PT assessment regarding body systems outlined above with impairments and limitations including weakness, impaired balance, decreased endurance, impaired coordination, gait deviations, pain, decreased activity tolerance, decreased functional mobility tolerance, altered sensation, decreased safety awareness, impaired judgement and fall risk  From PT/mobility standpoint, recommendation at time of d/c would be post acute rehabilitation services pending progress in order to facilitate return to PLOF  Barriers to Discharge Inaccessible home environment;Decreased caregiver support   Goals   Patient Goals to feel stronger   LTG Expiration Date 06/19/23   Long Term Goal #1 1 )Patient will complete bed mobility supervision of 1 for decrease need for caregiver assistance, decrease burden of care  2 ) Patient will complete transfers supervision of 1 to decrease risk of falls, facilitate upright standing posture  3 ) BLE strength to greater than/equal to 4/5 gross musculature to increase ability to safely transfer, control descent to chair  4 ) Patient will exhibit increase dynamic standing to Good 3-5 minutes without LOB supervision of 1 to improve activity tolerance  5 ) Patient will exhibit increase dynamic ambulatory balance to Fair > 50 feet w/AD prn supervision of 1 to improve ability to mobilize to toilet, chair and decrease risk for additional medical complications   6 ) Patient will exhibit good self monitoring and ability to follow 2 step commands to increase complexity of tasks and resume ADL's without LOB  PT Treatment Day 0   Plan   Treatment/Interventions Functional transfer training;LE strengthening/ROM; Elevations; Therapeutic exercise; Endurance training;Patient/family training;Equipment eval/education; Bed mobility;Gait training;Spoke to nursing;Spoke to case management   PT Frequency 3-5x/wk   Recommendation   PT Discharge Recommendation (S)  Post acute rehabilitation services   Additional Comments Upon conclusion, Sukhwinder Steen was resting out of bed on the recliner  All of his needs were within reach     AM-PAC Basic Mobility Inpatient   Turning in Flat Bed Without Bedrails 3   Lying on Back to Sitting on Edge of Flat Bed Without Bedrails 3   Moving Bed to Chair 3   Standing Up From Chair Using Arms 3   Walk in Room 2   Climb 3-5 Stairs With Railing 2   Basic Mobility Inpatient Raw Score 16   Basic Mobility Standardized Score 38 32   Highest Level Of Mobility   -HLM Goal 5: Stand one or more mins   JH-HLM Achieved 7: Walk 25 feet or more     History/Personal Factors/Comorbidities: generalized weakness, Parkinson's,acute diverticulitis,dizziness,CAD     # of body structures/limitations: muscle weakness, activity intolerance,decreased endurance, impaired balance, gait deviations,pain, impaired sensation, impaired coordination    Clinical presentation: unstable as seen in pain severity in more than one body region, fall risk, impulsivity, progressive symptoms prior to hospitalization,fatigue severity    Initial Assessment Time: 7320-2110    Rocky Rossi, PT

## 2023-06-09 NOTE — ASSESSMENT & PLAN NOTE
There was concern about atrial flutter  I have inspected the EKGs however and they are most consistent with sinus rhythm with artifact related to his Parkinson's disease mimicking atrial flutter

## 2023-06-09 NOTE — ASSESSMENT & PLAN NOTE
· Concern for possible aflutter  · Cardiology input appreciated  · Abnormality likely artifact from patient's Parkinson tremor  · Troponins have been stable  · Echo with no wall motion abnormalities

## 2023-06-09 NOTE — PLAN OF CARE
Problem: PHYSICAL THERAPY ADULT  Goal: Performs mobility at highest level of function for planned discharge setting  See evaluation for individualized goals  Description: Treatment/Interventions: Functional transfer training, LE strengthening/ROM, Elevations, Therapeutic exercise, Endurance training, Patient/family training, Equipment eval/education, Bed mobility, Gait training, Spoke to nursing, Spoke to case management          See flowsheet documentation for full assessment, interventions and recommendations  Note: Prognosis: Fair  Problem List: Decreased strength, Decreased endurance, Impaired balance, Decreased mobility, Decreased coordination, Decreased safety awareness, Impaired judgement, Obesity, Pain  Assessment: Pt is 66 y o  male seen for PT evaluation s/p admit to [facility] on 6/8/2023 w/ Acute diverticulitis  PT consulted to assess pt's functional mobility and d/c needs  Order placed for PT eval and tx, w/ up and OOB as tolerated order  Comorbidities affecting pt's physical performance at time of assessment include:generalized weakness, Parkinson's,acute diverticulitis,dizziness,CAD   PTA, pt was independent w/ all functional mobility w/ AD utilization  Personal factors affecting pt at time of IE include: inaccessible home environment, stairs to enter home, inability to navigate community distances, unable to perform dynamic tasks in community, impulsivity, limited insight into impairments, inability to perform IADLs, inability to perform ADLs and inability to live alone  Please find objective findings from PT assessment regarding body systems outlined above with impairments and limitations including weakness, impaired balance, decreased endurance, impaired coordination, gait deviations, pain, decreased activity tolerance, decreased functional mobility tolerance, altered sensation, decreased safety awareness, impaired judgement and fall risk   From PT/mobility standpoint, recommendation at time of d/c would be post acute rehabilitation services pending progress in order to facilitate return to PLOF  Barriers to Discharge: Inaccessible home environment, Decreased caregiver support     PT Discharge Recommendation: (S) Post acute rehabilitation services    See flowsheet documentation for full assessment

## 2023-06-09 NOTE — ASSESSMENT & PLAN NOTE
· Likely multifactorial, no signs of acute bleeding at this time  · Monitor H&H  · Transfuse as needed  · Will check iron panel  · Repeat CBC in the morning

## 2023-06-09 NOTE — PROGRESS NOTES
Alyse Cartagena  Progress Note  Name: Jean Paul Ponce  MRN: [id number]  Unit/Bed#: -01 I Date of Admission: 6/8/2023   Date of Service: 6/9/2023 I Hospital Day: 0    Assessment/Plan   * Acute diverticulitis  Assessment & Plan  · Reports left flank pain  · History of renal cell cancer, left nephrectomy, and nephrolithiasis  · CT abdomen pelvis: Colonic diverticulosis with mild wall thickening pericolonic inflammation involving the distal descending colon consistent with acute diverticulitis  No perforation or abscess  Trace perihepatic ascites  · Will continue ceftriaxone and Flagyl  · IV fluids as needed  · Pain control as needed  · Diet as tolerated  · No signs of abscess or perforation for now  If change in clinical exam can consider surgical consultation    Anemia  Assessment & Plan  · Likely multifactorial, no signs of acute bleeding at this time  · Monitor H&H  · Transfuse as needed  · Will check iron panel  · Repeat CBC in the morning    Abnormal ECG  Assessment & Plan  · Concern for possible aflutter  · Cardiology input appreciated  · Abnormality likely artifact from patient's Parkinson tremor  · Troponins have been stable  · Echo with no wall motion abnormalities    Parkinson's disease (HCC)  Assessment & Plan  · Continue Sinemet  · Fall precautions and supportive care  · PT/OT    Generalized weakness  Assessment & Plan  · Reports several years of generalized weakness, decreasing activity tolerance  · History of Parkinson's disease  · PT OT consult, recommending rehab    Coronary artery disease involving native coronary artery of native heart without angina pectoris  Assessment & Plan  · Denies chest pain  · History of CABG  · Continue aspirin, metoprolol, and atorvastatin         VTE Prophylaxis:  Enoxaparin (Lovenox)    Patient Centered Rounds: I have performed bedside rounds with nursing staff today      Discussions with Specialists or Other Care Team Provider: yes  Education and Discussions with Family / Patient: Spoke with patient's son over the phone regarding plan of care    Current Length of Stay: 0 day(s)    Current Patient Status: Inpatient   Certification Statement: The patient will continue to require additional inpatient hospital stay due to Diverticulitis    Discharge Plan: Hopeful discharge in the next 24 to 48 hours  PT recommending  rehab  Case management working on potential rehab placement    Code Status: Level 1 - Full Code    Subjective:   No overnight events noted  Patient still with generalized weakness and intermittent nausea  Tolerated diet    Objective:     Vitals:   Temp (24hrs), Av 3 °F (36 8 °C), Min:98 3 °F (36 8 °C), Max:98 3 °F (36 8 °C)    Temp:  [98 3 °F (36 8 °C)] 98 3 °F (36 8 °C)  HR:  [64-74] 74  Resp:  [14-21] 18  BP: ()/(52-64) 128/55  SpO2:  [95 %-98 %] 98 %  Body mass index is 33 08 kg/m²  Input and Output Summary (last 24 hours): Intake/Output Summary (Last 24 hours) at 2023 1528  Last data filed at 2023 0436  Gross per 24 hour   Intake 200 ml   Output --   Net 200 ml       Physical Exam:   Physical Exam  Constitutional:       General: He is not in acute distress  Appearance: He is obese  HENT:      Head: Normocephalic and atraumatic  Nose: Nose normal       Mouth/Throat:      Mouth: Mucous membranes are moist    Eyes:      Extraocular Movements: Extraocular movements intact  Conjunctiva/sclera: Conjunctivae normal    Cardiovascular:      Rate and Rhythm: Normal rate and regular rhythm  Pulmonary:      Effort: Pulmonary effort is normal  No respiratory distress  Abdominal:      Palpations: Abdomen is soft  Tenderness: There is no abdominal tenderness  Musculoskeletal:         General: Normal range of motion  Cervical back: Normal range of motion and neck supple  Comments: Generalized weakness   Skin:     General: Skin is warm and dry     Neurological:      Mental Status: He is alert  Comments: Tremor noted  Patient awake and alert  Answering questions appropriately  Moving all 4 extremities   Psychiatric:         Mood and Affect: Mood normal          Behavior: Behavior normal          Additional Data:     Labs:    Results from last 7 days   Lab Units 06/09/23  0637 06/08/23  1516   EOS PCT %  --  0   HEMATOCRIT % 30 1* 33 4*   HEMOGLOBIN g/dL 9 3* 10 6*   LYMPHS PCT %  --  9*   MONOS PCT %  --  12   NEUTROS PCT %  --  78*   PLATELETS Thousands/uL 146* 171   WBC Thousand/uL 6 10 7 19     Results from last 7 days   Lab Units 06/09/23  0637 06/08/23  1516   ALK PHOS U/L  --  61   ALT U/L  --  3*   AST U/L  --  30   BUN mg/dL 21 23   CALCIUM mg/dL 7 8* 8 1*   CHLORIDE mmol/L 106 104   CO2 mmol/L 25 27   CREATININE mg/dL 1 33* 1 43*   POTASSIUM mmol/L 4 4 4 6   SODIUM mmol/L 136 137     Results from last 7 days   Lab Units 06/08/23  1516   INR  1 51*               * I Have Reviewed All Lab Data Listed Above  * Additional Pertinent Lab Tests Reviewed:  [facility] 66 Admission  Reviewed    Imaging:  Imaging Reports Reviewed Today Include: No new imaging    Recent Cultures (last 7 days):           Last 24 Hours Medication List:   Current Facility-Administered Medications   Medication Dose Route Frequency Provider Last Rate   • acetaminophen  650 mg Oral Q6H PRN [facility], DO     • aspirin  81 mg Oral Daily [facility], DO     • atorvastatin  40 mg Oral Daily [facility], CRNP     • carbidopa-levodopa  1 tablet Oral TID [facility], DO     • cefTRIAXone  1,000 mg Intravenous Q24H [facility], DO Stopped (06/08/23 1909)   • enoxaparin  40 mg Subcutaneous Daily [facility], DO     • gabapentin  200 mg Oral HS [facility], DO     • metroNIDAZOLE  500 mg Intravenous Q8H [address] [facility], DO Stopped (06/09/23 1146)   • multi-electrolyte  100 mL/hr Intravenous Continuous Marci Ortiz MD 100 mL/hr (06/09/23 1143)   • ondansetron  4 mg Intravenous Q6H PRN [facility], [facility]          Today, Patient Was Seen By: Shweta Spears MD    ** Please Note: Dictation voice to text software may have been used in the creation of this document   **

## 2023-06-09 NOTE — ASSESSMENT & PLAN NOTE
· Reports several years of generalized weakness, decreasing activity tolerance  · History of Parkinson's disease  · PT OT consult, recommending rehab

## 2023-06-09 NOTE — UTILIZATION REVIEW
Initial Clinical Review    Admission: Date/Time/Statement:  6/8/23 1743 Observation AND CHANGED 6/9/23 1104 INPATIENT RE: PATIENT NEEDS > 2 MIDNIGHT STAY DUE TO CONTINUED ANTIBIOTICS FOR DIVERTICULITIS AND PT FOR AMBULATION DYSFUNCTION  Admission Orders (From admission, onward)     Ordered        06/09/23 1104  Inpatient Admission  Once                      Orders Placed This Encounter   Procedures   • Inpatient Admission     Standing Status:   Standing     Number of Occurrences:   1     Order Specific Question:   Level of Care     Answer:   Med Surg [16]     Order Specific Question:   Estimated length of stay     Answer:   More than 2 Midnights     Order Specific Question:   Certification     Answer:   I certify that inpatient services are medically necessary for this patient for a duration of greater than two midnights  See H&P and MD Progress Notes for additional information about the patient's course of treatment  ED Arrival Information     Expected   -    Arrival   6/8/2023 14:12    Acuity   Urgent            Means of arrival   Wheelchair    Escorted by   Family Member    Service   Hospitalist    Admission type   Emergency            Arrival complaint   feels faint           Chief Complaint   Patient presents with   • Weakness - Generalized     Patient reports when ambulatory he is weak and feels like his legs are going to give out  Patient reports he then is dizzy from the weakness  Patient reports he has been having this issue for approx 4 years  • Flank Pain     Started approx 1 month ago  Patient had left kidney removed approx 3-4 years ago and is experiencing pain there  Initial Presentation: 66 y o  male from home to ED, per PCP,  admitted to observation [address]  due to acute diverticulitis/Dizziness  PMH of parkinson, CAD, CKD & nephrectomy    Presented due to increased dyspnea on exertion,  intermittent increased urinary frequency and left abdominal pain starting about 1 month ago  Feels weak and if legs are going to give out, starting about 4 years ago  Dizziness and weakness after ambulating 10 feet with feeling going to collapse  On exam obese  Right hand tremor    Bun 23, creatinine 1 43 and was 1 34 12/1/22  INR 1 51   H&H 10 6/33 4  Ct abdomen showed diverticulitis  Trace perihepatic ascites  Right pleural effusion  In the ED given bolus of IVF  Plan is start ceftriaxone and Flagyl  Check echo and consult cardiology  Continue home Sinemet  Consult PT/OT  Continue home asa, metoprolol and statin  Date: 6/9/23    Day 2: CHANGED TO INPATIENT:  Has continued decreased strength and endurance  On exam:Resting tremor right hand worse than left hand  H&H 9 3/301  Bun 25, creatinine 1 33  Plan is continued antibiotics and IVF   PT recommends short term rehab  CM working with family  Per Cardiology 6/9/23:  Patient with abnormal ecg and concern of atrial flutter  Suspect this is artifact with sinus related to parkinson's disease  Stop metoprolol due to weakness  Decrease atorvastatin  Date: 6/10/23    Day 3: Has surpassed a 2nd midnight with active treatments and services  Feels achy  On exam:  Alert and oriented   + RUE tremors, numbness and tingling  Decreased sensation  Strength 4/5  LUE and bilateral LE 5/5  Cardiac irregular rhythm  Scrotal and penile swelling  Bun 22, creatinine 1 34   H&h 9 3/29  8  Continue antibiotics  Pain control  Diet as tolerated    PT      ED Triage Vitals [06/08/23 1430]   Temperature Pulse Respirations Blood Pressure SpO2   (!) 97 2 °F (36 2 °C) 70 18 96/51 98 %      Temp Source Heart Rate Source Patient Position - Orthostatic VS BP Location FiO2 (%)   Temporal Monitor Lying Right arm --      Pain Score       2          Wt Readings from Last 1 Encounters:   06/09/23 95 8 kg (211 lb 3 2 oz)     Additional Vital Signs:   06/10/23 06:46:23 98 8 °F (37 1 °C) 68 20 113/59 77 95 % -- -- 06/09/23 2310 -- -- -- -- -- -- None (Room air) --   06/09/23 22:56:01 100 4 °F (38 °C) 75 20 120/61 81 99 % -- --   06/09/23 14:56:23 98 3 °F (36 8 °C) 74 18 128/55 79 98 % --      06/09/23 0805 -- 65 -- 116/64 -- -- -- --   06/09/23 0500 -- 69 17 116/64 -- 95 % -- --   06/09/23 0300 -- 68 14 112/57 75 95 % -- --   06/09/23 0100 -- 64 15 105/52 73 95 % -- --   06/08/23 2315 -- 72 21 97/55 69 96 % -- --   06/08/23 2109 -- 70 -- 135/56 -- --       Pertinent Labs/Diagnostic Test Results:   CT abdomen pelvis with contrast   Final Result by Yossi Damon MD (06/08 1639)      Colonic diverticulosis with mild wall thickening pericolonic inflammation involving the distal descending colon consistent with acute diverticulitis  No perforation or abscess  Trace perihepatic ascites  Small right pleural effusion of uncertain etiology  Nonobstructing 3 mm right lower pole intrarenal calculus without hydronephrosis  Workstation performed: [id number]         XR chest 1 view portable   ED Interpretation by Jose Solo III, DO (06/08 1538)   Portable chest x-ray shows no acute osseous abnormalities, no acute fractures, no infiltrates noted, no pneumothoraces, steronotomy wires present  Final Result by Marcello Camacho MD (06/08 0689)      No acute cardiopulmonary disease  Workstation performed: [id number]           6/9/23 echo - Left Ventricle: Left ventricular cavity size is normal  There is mild concentric hypertrophy  The left ventricular ejection fraction is 65%  Systolic function is normal  Wall motion is normal   •  Right Ventricle: Right ventricular cavity size is mildly dilated  Systolic function is normal   •  The right ventricular systolic pressure is normal   •  The aortic root is mildly dilated to 4 00 cm    •  Left Atrium: The atrium is mildly dilated    6/8/23  1440 ecg artifact versus a flutter with a rate of 149 bpm, appears that there is some   artifact in leads I, lead II, lead III  This change from prior   tracings    No diffuse elevations to indicate pericarditis  No coved ST elevations greater than 2mm with negative T waves in V1-3 to   indicate concern for brugada  No biphasic T waves in V2, V3 to indicate Wellens (critical stenosis of   LAD)  No elevation in aVR or deviation when compared to V1 (can be associated   with ST depression in I,II, V4-6 when left main occlusion is present    6/8/23 ecg 1718 rate of 137 bpm, peers that this could be artifact secondary to Parkinson's   history versus of normal sinus, there appears to be minimal artifact noted   in V4, V5, V6 and leads III  No diffuse elevations to indicate pericarditis  No coved ST elevations greater than 2mm with negative T waves in V1-3 to   indicate concern for brugada  No biphasic T waves in V2, V3 to indicate Wellens (critical stenosis of   LAD)  No elevation in aVR or deviation when compared to V1 (can be associated   with ST depression in I,II, V4-6 when left main occlusion is present)        Results from last 7 days   Lab Units 06/10/23  0547 06/09/23  0637 06/08/23  1516   HEMATOCRIT % 29 8* 30 1* 33 4*   HEMOGLOBIN g/dL 9 3* 9 3* 10 6*   NEUTROS ABS Thousands/µL 3 75  --  5 61   PLATELETS Thousands/uL 130* 146* 171   WBC Thousand/uL 5 52 6 10 7 19     Results from last 7 days   Lab Units 06/10/23  0547 06/09/23  0637 06/08/23  1516   ANION GAP mmol/L 4 5 6   BUN mg/dL 22 21 23   CALCIUM mg/dL 7 7* 7 8* 8 1*   CHLORIDE mmol/L 106 106 104   CO2 mmol/L 26 25 27   CREATININE mg/dL 1 34* 1 33* 1 43*   EGFR ml/min/1 73sq m 50 50 46   POTASSIUM mmol/L 4 3 4 4 4 6   MAGNESIUM mg/dL 1 9 1 9 1 8*   SODIUM mmol/L 136 136 137     Results from last 7 days   Lab Units 06/10/23  0547 06/08/23  1516   ALBUMIN g/dL 2 3* 2 9*   ALK PHOS U/L 51 61   ALT U/L 4* 3*   AST U/L 25 30   TOTAL BILIRUBIN mg/dL 0 72 1 07*   TOTAL PROTEIN g/dL 4 4* 4 9*     Results from last 7 days   Lab Units 06/10/23  0547 06/09/23  0637 06/08/23  1516   GLUCOSE RANDOM mg/dL 102 98 87     Results from last 7 days   Lab Units 06/08/23  2107 06/08/23  1806 06/08/23  1516   HS TNI 0HR ng/L  --   --  24   HS TNI 2HR ng/L  --  24  --    HS TNI 4HR ng/L 26  --   --    HSTNI D2 ng/L  --  0  --    HSTNI D4 ng/L 2  --   --      Results from last 7 days   Lab Units 06/08/23  1516   INR  1 51*   PROTIME seconds 18 2*   PTT seconds 32     Results from last 7 days   Lab Units 06/08/23  1516   TSH 3RD GENERATON uIU/mL 1 438     Results from last 7 days   Lab Units 06/08/23  1516   BNP pg/mL 133*     Results from last 7 days   Lab Units 06/08/23  1640   BILIRUBIN UA  Negative   BLOOD UA  Negative   CLARITY UA  Clear   COLOR UA  Yellow   GLUCOSE UA mg/dl Negative   KETONES UA mg/dl Negative   LEUKOCYTES UA  Negative   NITRITE UA  Negative   PH UA  6 0   PROTEIN UA mg/dl Negative   SPEC GRAV UA  1 010   UROBILINOGEN UA E U /dl 4 0*       ED Treatment:   Medication Administration from 06/08/2023 1411 to 06/09/2023 0948       Date/Time Order Dose Route Action Comments     06/08/2023 1608 EDT sodium chloride 0 9 % bolus 1,000 mL 1,000 mL Intravenous New Bag --     06/08/2023 1606 EDT magnesium Oxide (MAG-OX) tablet 400 mg 400 mg Oral Given --     06/09/2023 0846 EDT aspirin chewable tablet 81 mg 81 mg Oral Given --     06/08/2023 1828 EDT atorvastatin (LIPITOR) tablet 80 mg 80 mg Oral Given --     06/09/2023 0846 EDT carbidopa-levodopa (SINEMET)  mg per tablet 1 tablet 1 tablet Oral Given --     06/08/2023 2108 EDT carbidopa-levodopa (SINEMET)  mg per tablet 1 tablet 1 tablet Oral Given --     06/08/2023 2109 EDT gabapentin (NEURONTIN) capsule 200 mg 200 mg Oral Given --     06/08/2023 2109 EDT metoprolol tartrate (LOPRESSOR) tablet 25 mg 25 mg Oral Given --     06/09/2023 0847 EDT enoxaparin (LOVENOX) subcutaneous injection 40 mg 40 mg Subcutaneous Given --     06/08/2023 1828 EDT cefTRIAXone (ROCEPHIN) IVPB (premix in dextrose) 1,000 mg 50 mL 1,000 mg Intravenous New Bag --     06/09/2023 0233 EDT metroNIDAZOLE (FLAGYL) IVPB (premix) 500 mg 100 mL 500 mg Intravenous New Bag --     06/08/2023 1908 EDT metroNIDAZOLE (FLAGYL) IVPB (premix) 500 mg 100 mL 500 mg Intravenous New Bag --        Past Medical History:   Diagnosis Date   • Anxiety    • CAD (coronary artery disease)    • Carotid stenosis, right     50-69% internal   • Chronic kidney disease (CKD), stage III (moderate)     baseline Cr 1 50   • Diverticulosis    • GERD (gastroesophageal reflux disease)    • Gout    • History of nephrolithiasis    • History of prostate cancer     s/p radiation/Lupron   • History of renal cell cancer     s/p left nephrectomy   • Osteoarthritis     knees & shoulders   • Parkinson disease    • Pulmonary nodule     7mm PARKER     Present on Admission:  • Generalized weakness      Admitting Diagnosis: Atrial flutter (HCC) [I48 92]  Hypomagnesemia [E83 42]  Parkinson disease ([facility] [address] ) [G20]  Weakness generalized [R53 1]  Weakness [R53 1]  Anemia [D64 9]  Hypotension [I95 9]  Flank pain [R10 9]  Age/Sex: 66 y o  male  Admission Orders:  Scheduled Medications:  aspirin, 81 mg, Oral, Daily  atorvastatin, 40 mg, Oral, Daily With Dinner  carbidopa-levodopa, 1 tablet, Oral, TID  cefTRIAXone, 1,000 mg, Intravenous, Q24H  enoxaparin, 40 mg, Subcutaneous, Daily  gabapentin, 200 mg, Oral, HS  metroNIDAZOLE, 500 mg, Intravenous, Q8H      Continuous IV Infusions:     PRN Meds: not used  acetaminophen, 650 mg, Oral, Q6H PRN  ondansetron, 4 mg, Intravenous, Q6H PRN    Telemetry     IP CONSULT TO CARDIOLOGY    Network Utilization Review Department  ATTENTION: Please call with any questions or concerns to [phone] and carefully listen to the prompts so that you are directed to the right person   All voicemails are confidential   Grant Garcia all requests for admission clinical reviews, approved or denied determinations and any other requests to dedicated fax number below belonging to the campus where the patient is receiving treatment   List of dedicated fax numbers for the Facilities:  [address] [place] [address] DENIALS (Administrative/Medical Necessity) [phone]   [address]  (Maternity/NICU/Pediatrics) [phone]   [address] [phone]   [address] [phone]   [address] [place] [address] [facility] [address] [place] [address] [phone]   [facility] [address] [facility] [phone]

## 2023-06-09 NOTE — PLAN OF CARE
Problem: OCCUPATIONAL THERAPY ADULT  Goal: Performs self-care activities at highest level of function for planned discharge setting  See evaluation for individualized goals  Description: Treatment Interventions: ADL retraining, Functional transfer training, UE strengthening/ROM, Endurance training, Patient/family training, Compensatory technique education, Energy conservation, Activityengagement       See flowsheet documentation for full assessment, interventions and recommendations  Note: Limitation: Decreased ADL status, Decreased UE strength, Decreased Safe judgement during ADL, Decreased endurance  Prognosis: Good  Assessment: Pt is a 66 y o  male seen for OT evaluation s/p admit to [place] [address] on 6/8/2023 w/ Acute diverticulitis  Comorbidities affecting pt's functional performance at time of assessment include: CAD, generalized weakness, dizziness, Parkinson's disease, Chronic kidney disease, tremor of R hand  Personal factors affecting pt at time of IE include:steps to enter environment, limited home support, difficulty performing ADLS, difficulty performing IADLS , decreased initiation and engagement  and health management   Prior to admission, pt was (I) with ADLs and required some assistance but overall modified independent with IADLs  Upon evaluation: the following deficits impact occupational performance: weakness, decreased ROM, decreased balance, decreased tolerance, impaired problem solving and decreased safety awareness  Pt to benefit from continued skilled OT tx while in the hospital to address deficits as defined above and maximize level of functional independence w ADL's and functional mobility  Occupational Performance areas to address include: grooming, bathing/shower, toilet hygiene, dressing, functional mobility, community mobility and clothing management  From OT standpoint, recommendation at time of d/c would be STR       OT Discharge Recommendation: Post acute rehabilitation services Nhi Tijerina MS, OTR/L

## 2023-06-09 NOTE — PROGRESS NOTES
-- Patient:  -- MRN: [id number]  -- Aidin Request ID: [id number]  -- Level of care reserved: translation missing: en provider  provider_type  -- Partner Reserved:  -- Clinical needs requested:  -- Geography searched: [id number]  -- Start of Service:  -- Request sent: 1:14pm EDT on 6/9/2023 by Obdulio Rasmussen  -- Partner reserved: by Obdulio Rasmussen  -- Choice list shared: 1:55pm EDT on 6/9/2023 by Obdulio Rasmussen

## 2023-06-09 NOTE — OCCUPATIONAL THERAPY NOTE
Occupational Therapy Evaluation     Patient Name: Thompson URBINA Date: 6/9/2023  Problem List  Principal Problem:    Acute diverticulitis  Active Problems:    Coronary artery disease involving native coronary artery of native heart without angina pectoris    Generalized weakness    Dizziness    Parkinson's disease ([address] )    Abnormal ECG    Past Medical History  Past Medical History:   Diagnosis Date    Anxiety     CAD (coronary artery disease)     Carotid stenosis, right     50-69% internal    Chronic kidney disease (CKD), stage III (moderate)     baseline Cr 1 50    Diverticulosis     GERD (gastroesophageal reflux disease)     Gout     History of nephrolithiasis     History of prostate cancer     s/p radiation/Lupron    History of renal cell cancer     s/p left nephrectomy    Osteoarthritis     knees & shoulders    Parkinson disease     Pulmonary nodule     7mm PARKER     Past Surgical History  Past Surgical History:   Procedure Laterality Date    ARTHROSCOPY KNEE Right     CARDIAC CATHETERIZATION      JOINT REPLACEMENT Left     knee    NEPHRECTOMY Left 2018    AL CORONARY ARTERY BYP W/VEIN & ARTERY GRAFT 2 VEIN N/A 9/2/2020    Procedure: CORONARY ARTERY BYPASS GRAFT (CABG) 2 VESSELS: LIMA to LAD, RLE EVH/SVG to PDA; Surgeon: Tru Sanchez DO;  [facility]: [facility];  Service: Cardiac Surgery    AL ECHO TRANSESOPHAG R-T 2D W/PRB IMG ACQUISJ I&R N/A 9/2/2020    Procedure: TRANSESOPHAGEAL ECHOCARDIOGRAM (MEGAN); Surgeon: Tru Sanchez DO;  [facility];  Service: Cardiac Surgery    AL NDSC SURG W/VIDEO-ASSISTED HARVEST VEIN CABG Right 9/2/2020    Procedure: HARVEST VEIN ENDOSCOPIC ([address]);   Surgeon: Tru Sanchez DO;  [facility];  Service: Cardiac Surgery    REPLACEMENT TOTAL KNEE Left     ROTATOR CUFF REPAIR Right     TONSILLECTOMY           06/09/23 0944   OT Last Visit   OT Visit Date 06/09/23   Note Type   Note type Evaluation   Additional Comments Pt seen as a co-eval with PT due "to the patient's co-morbidities, clinically unstable presentation, and present impairments which are a regression from the patient's baseline  Pain Assessment   Pain Assessment Tool 0-10  (numbness)   Pain Score 7   Pain Location/Orientation Orientation: Right;Location: Shoulder   Pain Onset/Description Onset: Ongoing;Frequency: Constant/Continuous; Descriptor: Aching;Descriptor: Sore   Effect of Pain on Daily Activities yes   Patient's Stated Pain Goal No pain   Hospital Pain Intervention(s) Repositioned; Emotional support; Environmental changes   Multiple Pain Sites Yes   Pain 2   Pain Score 2 7   Pain Location/Orientation 2 Orientation: Bilateral;Location: Generalized   Pain Onset/Description 2 Onset: Ongoing;Frequency: Constant/Continuous; Descriptor: Aching;Descriptor: Sore   Patient's Stated Pain Goal 2 No pain   Hospital Pain Intervention(s) 2 Repositioned; Ambulation/increased activity; Emotional support; Environmental changes   Restrictions/Precautions   Weight Bearing Precautions Per Order No   Braces or Orthoses   (none reported)   Other Precautions Bed Alarm; Chair Alarm; Fall Risk;Pain;Multiple lines   Home Living   Type of [address] One level;Performs ADLs on one level; Able to live on main level with bedroom/bathroom  (garage 8+6 GIDEON through garage, front 3+6 GIDEON)   Bathroom Shower/Tub Tub/shower unit  (typical sponge bathing, \"I have trouble reaching\")   Bathroom Toilet Standard   Bathroom Equipment   (no prior DME usage)   [address] Cane;Long-handled shoehorn  (denies prior usage)   Prior Function   Level of [place] Independent with ADLs; Independent with functional mobility; Independent with IADLS   Lives With Alone   Receives Help From Other (Comment)  (\"not much\" when asked about support)   IADLs Independent with driving; Independent with meal prep; Independent with medication management   Falls in the last 6 months 0  (dnies, but I'm very careful) " "  Vocational Retired   Subjective   Subjective \"I can reach my feet but can't hold it there for long\"   ADL   Where Assessed Edge of bed   Grooming Assistance 5  Supervision/Setup   UB Bathing Assistance 5  Supervision/Setup   LB Bathing Assistance 4  Minimal Assistance   UB Dressing Assistance 5  Supervision/Setup   LB Dressing Assistance 4  Minimal Assistance   LB Dressing Deficit Don/doff R sock; Don/doff L sock  (Pt demonstrated ability to doff sock by doffing L sock but unable to complete task thoroughly d/t fatigue  Pt required min - mod (A) to complete LB dressing task )   Bed Mobility   Supine to Sit 4  Minimal assistance   Additional items Assist x 1;HOB elevated; Bedrails; Increased time required;LE management   Sit to Supine   (DNT; pt seated in recliner upon conclusion of session)   Additional Comments VC for proper body mechanics   Transfers   Sit to Stand 4  Minimal assistance   Additional items Assist x 1;Bedrails; Increased time required; Impulsive;Verbal cues   Stand to Sit 4  Minimal assistance   Additional items Assist x 1;Bedrails; Impulsive;Verbal cues   Stand pivot 4  Minimal assistance   Additional items Assist x 1; Increased time required;Verbal cues   Additional Comments VC for safe hand placement and safety awareness   Functional Mobility   Functional Mobility 4  Minimal assistance   Additional Comments Pt completed short distance ADL mobility at min (A) x1 w/ RW  No significant LOB observed but mild-mod instability     Additional items Rolling walker   Balance   Static Sitting Fair +   Dynamic Sitting Fair   Static Standing Fair -   Dynamic Standing Fair -   Ambulatory Poor +   Activity Tolerance   Activity Tolerance Patient limited by fatigue;Patient limited by pain   Nurse Made Aware Yes, RN made aware of session outcomes   RUE Assessment   RUE Assessment WFL   RUE Strength   RUE Overall Strength   (3+/5)   LUE Assessment   LUE Assessment WFL   LUE Strength   LUE Overall Strength   (3+/5)   Hand " Function   Gross Motor Coordination Functional   Fine Motor Coordination Functional   Vision-Basic Assessment   Current Vision Wears glasses only for reading   Psychosocial   Psychosocial (WDL) WDL   Cognition   Overall Cognitive Status WFL   Arousal/Participation Alert; Responsive; Cooperative   Attention Within functional limits   Orientation Level Oriented X4   Memory Within functional limits   Following Commands Follows one step commands with increased time or repetition   Comments Pt agreeable to OT evaluation, pleasant  He reports fatigue has notable worsened in the last month   Assessment   Limitation Decreased ADL status; Decreased UE strength;Decreased Safe judgement during ADL;Decreased endurance   Prognosis Good   Assessment Pt is a 66 y o  male seen for OT evaluation s/p admit to [place] [id number] on 6/8/2023 w/ Acute diverticulitis  Comorbidities affecting pt's functional performance at time of assessment include: CAD, generalized weakness, dizziness, Parkinson's disease, Chronic kidney disease, tremor of R hand  Personal factors affecting pt at time of IE include:steps to enter environment, limited home support, difficulty performing ADLS, difficulty performing IADLS , decreased initiation and engagement  and health management   Prior to admission, pt was (I) with ADLs and required some assistance but overall modified independent with IADLs  Upon evaluation: the following deficits impact occupational performance: weakness, decreased ROM, decreased balance, decreased tolerance, impaired problem solving and decreased safety awareness  Pt to benefit from continued skilled OT tx while in the hospital to address deficits as defined above and maximize level of functional independence w ADL's and functional mobility  Occupational Performance areas to address include: grooming, bathing/shower, toilet hygiene, dressing, functional mobility, community mobility and clothing management   From OT standpoint, recommendation at time of d/c would be STR  Goals   Patient Goals to get stronger   Plan   Treatment Interventions ADL retraining;Functional transfer training;UE strengthening/ROM; Endurance training;Patient/family training; Compensatory technique education; Energy conservation; Activityengagement   Goal Expiration Date 06/19/23   OT Treatment Day 0   OT Frequency 3-5x/wk   Recommendation   OT Discharge Recommendation Post acute rehabilitation services   Additional Comments  The patient's raw score on the AM-PAC Daily Activity inpatient short form is 18, standardized score is 38 66, less than 39 4  Patients at this level are likely to benefit from discharge to post-acute rehabilitation services  Please refer to the recommendation of the Occupational Therapist for safe discharge planning     AM-PAC Daily Activity Inpatient   Lower Body Dressing 2   Bathing 2   Toileting 3   Upper Body Dressing 3   Grooming 4   Eating 4   Daily Activity Raw Score 18   Daily Activity Standardized Score (Calc for Raw Score >=11) 38 66   [facility] Applied Cognition Inpatient   Following a Speech/Presentation 3   Understanding Ordinary Conversation 4   Taking Medications 3   Remembering Where Things Are Placed or Put Away 3   Remembering List of 4-5 Errands 3   Taking Care of Complicated Tasks 3   Applied Cognition Raw Score 19   Applied Cognition Standardized Score 39 77     GOALS:    Pt will achieve the following within specified time frame: LTG  Pt will achieve the following goals within 10 days    *ADL transfers with (S) for inc'd independence with ADLs/purposeful tasks    *UB ADL with (I) for inc'd independence with self cares    *LB ADL with (I) using AE prn for inc'd independence with self cares    *Toileting with (S) for clothing management and hygiene for return to PLOF with personal care    *Increase static stand balance and dyn stand balance to G for inc'd safety with standing purposeful tasks    *Increase stand tolerance x7 m for inc'd tolerance with standing purposeful tasks    *Bed mobility- (I) for inc'd independence to manage own comfort and initiate EOB & OOB purposeful tasks    *Participate in 10m UE therex to increase overall stamina/activity tolerance for purposeful tasks      Cira Peña, MS, OTR/L

## 2023-06-10 PROBLEM — N48.89 PENILE EDEMA: Status: ACTIVE | Noted: 2023-06-10

## 2023-06-10 LAB
ALBUMIN SERPL BCP-MCNC: 2.3 G/DL (ref 3.5–5)
ALP SERPL-CCNC: 51 U/L (ref 34–104)
ALT SERPL W P-5'-P-CCNC: 4 U/L (ref 7–52)
ANION GAP SERPL CALCULATED.3IONS-SCNC: 4 MMOL/L (ref 4–13)
AST SERPL W P-5'-P-CCNC: 25 U/L (ref 13–39)
BASOPHILS # BLD AUTO: 0.03 THOUSANDS/ÂΜL (ref 0–0.1)
BASOPHILS NFR BLD AUTO: 1 % (ref 0–1)
BILIRUB SERPL-MCNC: 0.72 MG/DL (ref 0.2–1)
BUN SERPL-MCNC: 22 MG/DL (ref 5–25)
CALCIUM ALBUM COR SERPL-MCNC: 9.1 MG/DL (ref 8.3–10.1)
CALCIUM SERPL-MCNC: 7.7 MG/DL (ref 8.4–10.2)
CHLORIDE SERPL-SCNC: 106 MMOL/L (ref 96–108)
CO2 SERPL-SCNC: 26 MMOL/L (ref 21–32)
CREAT SERPL-MCNC: 1.34 MG/DL (ref 0.6–1.3)
EOSINOPHIL # BLD AUTO: 0.1 THOUSAND/ÂΜL (ref 0–0.61)
EOSINOPHIL NFR BLD AUTO: 2 % (ref 0–6)
ERYTHROCYTE [DISTWIDTH] IN BLOOD BY AUTOMATED COUNT: 17.1 % (ref 11.6–15.1)
FERRITIN SERPL-MCNC: 525 NG/ML (ref 24–336)
GFR SERPL CREATININE-BSD FRML MDRD: 50 ML/MIN/1.73SQ M
GLUCOSE SERPL-MCNC: 102 MG/DL (ref 65–140)
HCT VFR BLD AUTO: 29.8 % (ref 36.5–49.3)
HGB BLD-MCNC: 9.3 G/DL (ref 12–17)
IMM GRANULOCYTES # BLD AUTO: 0.02 THOUSAND/UL (ref 0–0.2)
IMM GRANULOCYTES NFR BLD AUTO: 0 % (ref 0–2)
IRON SATN MFR SERPL: 25 % (ref 20–50)
IRON SERPL-MCNC: 43 UG/DL (ref 65–175)
LYMPHOCYTES # BLD AUTO: 0.76 THOUSANDS/ÂΜL (ref 0.6–4.47)
LYMPHOCYTES NFR BLD AUTO: 14 % (ref 14–44)
MAGNESIUM SERPL-MCNC: 1.9 MG/DL (ref 1.9–2.7)
MCH RBC QN AUTO: 26.6 PG (ref 26.8–34.3)
MCHC RBC AUTO-ENTMCNC: 31.2 G/DL (ref 31.4–37.4)
MCV RBC AUTO: 85 FL (ref 82–98)
MONOCYTES # BLD AUTO: 0.86 THOUSAND/ÂΜL (ref 0.17–1.22)
MONOCYTES NFR BLD AUTO: 16 % (ref 4–12)
NEUTROPHILS # BLD AUTO: 3.75 THOUSANDS/ÂΜL (ref 1.85–7.62)
NEUTS SEG NFR BLD AUTO: 67 % (ref 43–75)
NRBC BLD AUTO-RTO: 0 /100 WBCS
PLATELET # BLD AUTO: 130 THOUSANDS/UL (ref 149–390)
PMV BLD AUTO: 10.9 FL (ref 8.9–12.7)
POTASSIUM SERPL-SCNC: 4.3 MMOL/L (ref 3.5–5.3)
PROT SERPL-MCNC: 4.4 G/DL (ref 6.4–8.4)
RBC # BLD AUTO: 3.5 MILLION/UL (ref 3.88–5.62)
SODIUM SERPL-SCNC: 136 MMOL/L (ref 135–147)
TIBC SERPL-MCNC: 169 UG/DL (ref 250–450)
WBC # BLD AUTO: 5.52 THOUSAND/UL (ref 4.31–10.16)

## 2023-06-10 PROCEDURE — 83540 ASSAY OF IRON: CPT | Performed by: INTERNAL MEDICINE

## 2023-06-10 PROCEDURE — 80053 COMPREHEN METABOLIC PANEL: CPT | Performed by: INTERNAL MEDICINE

## 2023-06-10 PROCEDURE — 82728 ASSAY OF FERRITIN: CPT | Performed by: INTERNAL MEDICINE

## 2023-06-10 PROCEDURE — 85025 COMPLETE CBC W/AUTO DIFF WBC: CPT | Performed by: INTERNAL MEDICINE

## 2023-06-10 PROCEDURE — 99232 SBSQ HOSP IP/OBS MODERATE 35: CPT | Performed by: INTERNAL MEDICINE

## 2023-06-10 PROCEDURE — 83735 ASSAY OF MAGNESIUM: CPT | Performed by: INTERNAL MEDICINE

## 2023-06-10 PROCEDURE — 83550 IRON BINDING TEST: CPT | Performed by: INTERNAL MEDICINE

## 2023-06-10 RX ORDER — HYDROCODONE BITARTRATE AND ACETAMINOPHEN 5; 325 MG/1; MG/1
1 TABLET ORAL EVERY 6 HOURS PRN
Status: DISCONTINUED | OUTPATIENT
Start: 2023-06-10 | End: 2023-06-13 | Stop reason: HOSPADM

## 2023-06-10 RX ADMIN — GABAPENTIN 200 MG: 100 CAPSULE ORAL at 21:00

## 2023-06-10 RX ADMIN — ENOXAPARIN SODIUM 40 MG: 100 INJECTION SUBCUTANEOUS at 08:08

## 2023-06-10 RX ADMIN — CARBIDOPA AND LEVODOPA 1 TABLET: 25; 100 TABLET ORAL at 08:08

## 2023-06-10 RX ADMIN — ATORVASTATIN CALCIUM 40 MG: 40 TABLET, FILM COATED ORAL at 16:55

## 2023-06-10 RX ADMIN — CARBIDOPA AND LEVODOPA 1 TABLET: 25; 100 TABLET ORAL at 20:56

## 2023-06-10 RX ADMIN — HYDROCODONE BITARTRATE AND ACETAMINOPHEN 1 TABLET: 5; 325 TABLET ORAL at 22:55

## 2023-06-10 RX ADMIN — METRONIDAZOLE 500 MG: 500 INJECTION, SOLUTION INTRAVENOUS at 02:47

## 2023-06-10 RX ADMIN — METRONIDAZOLE 500 MG: 500 INJECTION, SOLUTION INTRAVENOUS at 11:12

## 2023-06-10 RX ADMIN — METRONIDAZOLE 500 MG: 500 INJECTION, SOLUTION INTRAVENOUS at 18:20

## 2023-06-10 RX ADMIN — ASPIRIN 81 MG 81 MG: 81 TABLET ORAL at 08:08

## 2023-06-10 RX ADMIN — CARBIDOPA AND LEVODOPA 1 TABLET: 25; 100 TABLET ORAL at 16:55

## 2023-06-10 RX ADMIN — CEFTRIAXONE 1000 MG: 1 INJECTION, SOLUTION INTRAVENOUS at 17:45

## 2023-06-10 RX ADMIN — ACETAMINOPHEN 650 MG: 325 TABLET ORAL at 22:24

## 2023-06-10 NOTE — PLAN OF CARE
Problem: INFECTION - ADULT  Goal: Absence or prevention of progression during hospitalization  Description: INTERVENTIONS:  - Assess and monitor for signs and symptoms of infection  - Monitor lab/diagnostic results  - Monitor all insertion sites, i e  indwelling lines, tubes, and drains  - Monitor endotracheal if appropriate and nasal secretions for changes in amount and color  - [place] appropriate cooling/warming therapies per order  - Administer medications as ordered  - Instruct and encourage patient and family to use good hand hygiene technique  - Identify and instruct in appropriate isolation precautions for identified infection/condition  Outcome: Progressing  Goal: Absence of fever/infection during neutropenic period  Description: INTERVENTIONS:  - Monitor WBC    Outcome: Progressing

## 2023-06-10 NOTE — ASSESSMENT & PLAN NOTE
· Likely multifactorial, no signs of acute bleeding at this time  · Monitor H&H  · Transfuse as needed  · Iron panel results pending  · Repeat CBC in the morning

## 2023-06-10 NOTE — PLAN OF CARE
Problem: INFECTION - ADULT  Goal: Absence or prevention of progression during hospitalization  Description: INTERVENTIONS:  - Assess and monitor for signs and symptoms of infection  - Monitor lab/diagnostic results  - Monitor all insertion sites, i e  indwelling lines, tubes, and drains  - Monitor endotracheal if appropriate and nasal secretions for changes in amount and color  - [place] appropriate cooling/warming therapies per order  - Administer medications as ordered  - Instruct and encourage patient and family to use good hand hygiene technique  - Identify and instruct in appropriate isolation precautions for identified infection/condition  Outcome: Progressing  Goal: Absence of fever/infection during neutropenic period  Description: INTERVENTIONS:  - Monitor WBC    Outcome: Progressing

## 2023-06-10 NOTE — PROGRESS NOTES
[facility]  Progress Note  Name: Brian Young  MRN: [id number]  Unit/Bed#: -01 I Date of Admission: 6/8/2023   Date of Service: 6/10/2023 I Hospital Day: 1    Assessment/Plan   * Acute diverticulitis  Assessment & Plan  · Reports left flank pain  · History of renal cell cancer, left nephrectomy, and nephrolithiasis  · CT abdomen pelvis: Colonic diverticulosis with mild wall thickening pericolonic inflammation involving the distal descending colon consistent with acute diverticulitis  No perforation or abscess  Trace perihepatic ascites  · Will continue ceftriaxone and Flagyl  · IV fluids as needed  · Pain control as needed  · Diet as tolerated  · No signs of abscess or perforation for now  If change in clinical exam can consider surgical consultation    Anemia  Assessment & Plan  · Likely multifactorial, no signs of acute bleeding at this time  · Monitor H&H  · Transfuse as needed  · Iron panel results pending  · Repeat CBC in the morning    Abnormal ECG  Assessment & Plan  · Concern for possible aflutter  · Cardiology input appreciated  · Abnormality likely artifact from patient's Parkinson tremor  · Troponins have been stable  · Echo with no wall motion abnormalities    Parkinson's disease (HCC)  Assessment & Plan  · Continue Sinemet  · Fall precautions and supportive care  · PT/OT    Generalized weakness  Assessment & Plan  · Reports several years of generalized weakness, decreasing activity tolerance  · History of Parkinson's disease  · PT OT consult, recommending rehab    Coronary artery disease involving native coronary artery of native heart without angina pectoris  Assessment & Plan  · Denies chest pain  · History of CABG  · Continue aspirin, metoprolol, and atorvastatin           VTE Prophylaxis:  Enoxaparin (Lovenox)    Patient Centered Rounds: I have performed bedside rounds with nursing staff today      Discussions with Specialists or Other Care Team Provider: yes  Education and Discussions with Family / Patient: Spoke with patient's son Wiliam Sandhoff over the phone regarding plan of care    Current Length of Stay: 1 day(s)    Current Patient Status: Inpatient   Certification Statement: The patient will continue to require additional inpatient hospital stay due to Diverticulitis    Discharge Plan: PT recommending rehab  Case management for discharge planning    Code Status: Level 1 - Full Code    Subjective:   No overnight events noted  Patient complaining of scrotal/penile edema  No pain  No dysuria  Objective:     Vitals:   Temp (24hrs), Av 2 °F (37 3 °C), Min:98 3 °F (36 8 °C), Max:100 4 °F (38 °C)    Temp:  [98 3 °F (36 8 °C)-100 4 °F (38 °C)] 98 8 °F (37 1 °C)  HR:  [68-75] 68  Resp:  [18-20] 20  BP: (113-128)/(55-61) 113/59  SpO2:  [95 %-99 %] 95 %  Body mass index is 33 08 kg/m²  Input and Output Summary (last 24 hours): Intake/Output Summary (Last 24 hours) at 6/10/2023 1259  Last data filed at 6/10/2023 1100  Gross per 24 hour   Intake 600 ml   Output 500 ml   Net 100 ml       Physical Exam:   Physical Exam  Constitutional:       General: He is not in acute distress  HENT:      Head: Normocephalic and atraumatic  Nose: Nose normal       Mouth/Throat:      Mouth: Mucous membranes are moist    Eyes:      Extraocular Movements: Extraocular movements intact  Conjunctiva/sclera: Conjunctivae normal    Cardiovascular:      Rate and Rhythm: Normal rate and regular rhythm  Pulmonary:      Effort: Pulmonary effort is normal  No respiratory distress  Abdominal:      Palpations: Abdomen is soft  Tenderness: There is no abdominal tenderness  Genitourinary:     Penis: Swelling present  No tenderness or discharge  Testes:         Right: Tenderness not present  Left: Tenderness not present  Musculoskeletal:         General: No swelling  Normal range of motion  Cervical back: Normal range of motion and neck supple  Skin:     General: Skin is warm and dry  Neurological:      General: No focal deficit present  Mental Status: He is alert  Mental status is at baseline  Cranial Nerves: No cranial nerve deficit  Comments: Chronic tremor noted   Psychiatric:         Mood and Affect: Mood normal          Behavior: Behavior normal          Additional Data:     Labs:    Results from last 7 days   Lab Units 06/10/23  0547   EOS PCT % 2   HEMATOCRIT % 29 8*   HEMOGLOBIN g/dL 9 3*   LYMPHS PCT % 14   MONOS PCT % 16*   NEUTROS PCT % 67   PLATELETS Thousands/uL 130*   WBC Thousand/uL 5 52     Results from last 7 days   Lab Units 06/10/23  0547   ALK PHOS U/L 51   ALT U/L 4*   AST U/L 25   BUN mg/dL 22   CALCIUM mg/dL 7 7*   CHLORIDE mmol/L 106   CO2 mmol/L 26   CREATININE mg/dL 1 34*   POTASSIUM mmol/L 4 3   SODIUM mmol/L 136     Results from last 7 days   Lab Units 06/08/23  1516   INR  1 51*               * I Have Reviewed All Lab Data Listed Above  * Additional Pertinent Lab Tests Reviewed:  [address] Admission  Reviewed    Imaging:  Imaging Reports Reviewed Today Include: No new imaging    Recent Cultures (last 7 days):           Last 24 Hours Medication List:   Current Facility-Administered Medications   Medication Dose Route Frequency Provider Last Rate   • acetaminophen  650 mg Oral Q6H PRN [facility] Henao, DO     • aspirin  81 mg Oral Daily [facility], DO     • atorvastatin  40 mg Oral Daily With [employer] XVionicsRAEANN     • carbidopa-levodopa  1 tablet Oral TID [place] [facility] Henao, DO     • cefTRIAXone  1,000 mg Intravenous Q24H [facility], DO 1,000 mg (06/09/23 9127)   • enoxaparin  40 mg Subcutaneous Daily [place] Serfior Stanton, DO     • gabapentin  200 mg Oral HS [place] Serfior Stanton, DO     • LORazepam  1 mg Oral HS PRN Raeann Izaguirre PA-C     • metroNIDAZOLE  500 mg Intravenous Q8H [place] [facility] Henao,  mg (06/10/23 1112)   • ondansetron  4 mg Intravenous Q6H PRN [address] [facility], DO          Today, Patient Was Seen By: Doris Tay MD    ** Please Note: Dictation voice to text software may have been used in the creation of this document   **

## 2023-06-10 NOTE — ASSESSMENT & PLAN NOTE
· Appears to be dependent edema    No tenderness/ pain  · Advised nurse to provide scrotal elevation with Tylenol underneath  · If worsening can consider imaging versus urology consult

## 2023-06-11 PROBLEM — M79.89 SWELLING OF RIGHT UPPER EXTREMITY: Status: ACTIVE | Noted: 2023-06-11

## 2023-06-11 LAB
ANION GAP SERPL CALCULATED.3IONS-SCNC: 5 MMOL/L (ref 4–13)
BUN SERPL-MCNC: 23 MG/DL (ref 5–25)
CALCIUM SERPL-MCNC: 7.7 MG/DL (ref 8.4–10.2)
CHLORIDE SERPL-SCNC: 106 MMOL/L (ref 96–108)
CO2 SERPL-SCNC: 24 MMOL/L (ref 21–32)
CREAT SERPL-MCNC: 1.38 MG/DL (ref 0.6–1.3)
ERYTHROCYTE [DISTWIDTH] IN BLOOD BY AUTOMATED COUNT: 17.2 % (ref 11.6–15.1)
GFR SERPL CREATININE-BSD FRML MDRD: 48 ML/MIN/1.73SQ M
GLUCOSE SERPL-MCNC: 98 MG/DL (ref 65–140)
HCT VFR BLD AUTO: 31.1 % (ref 36.5–49.3)
HGB BLD-MCNC: 9.6 G/DL (ref 12–17)
MCH RBC QN AUTO: 26.3 PG (ref 26.8–34.3)
MCHC RBC AUTO-ENTMCNC: 30.9 G/DL (ref 31.4–37.4)
MCV RBC AUTO: 85 FL (ref 82–98)
PLATELET # BLD AUTO: 151 THOUSANDS/UL (ref 149–390)
PMV BLD AUTO: 12.2 FL (ref 8.9–12.7)
POTASSIUM SERPL-SCNC: 4.3 MMOL/L (ref 3.5–5.3)
RBC # BLD AUTO: 3.65 MILLION/UL (ref 3.88–5.62)
SODIUM SERPL-SCNC: 135 MMOL/L (ref 135–147)
WBC # BLD AUTO: 5.82 THOUSAND/UL (ref 4.31–10.16)

## 2023-06-11 PROCEDURE — 99222 1ST HOSP IP/OBS MODERATE 55: CPT | Performed by: SURGERY

## 2023-06-11 PROCEDURE — 87040 BLOOD CULTURE FOR BACTERIA: CPT | Performed by: INTERNAL MEDICINE

## 2023-06-11 PROCEDURE — 85027 COMPLETE CBC AUTOMATED: CPT | Performed by: INTERNAL MEDICINE

## 2023-06-11 PROCEDURE — 80048 BASIC METABOLIC PNL TOTAL CA: CPT | Performed by: INTERNAL MEDICINE

## 2023-06-11 PROCEDURE — 99232 SBSQ HOSP IP/OBS MODERATE 35: CPT | Performed by: INTERNAL MEDICINE

## 2023-06-11 PROCEDURE — 99497 ADVNCD CARE PLAN 30 MIN: CPT | Performed by: INTERNAL MEDICINE

## 2023-06-11 RX ORDER — FERROUS SULFATE 325(65) MG
325 TABLET ORAL
Status: DISCONTINUED | OUTPATIENT
Start: 2023-06-12 | End: 2023-06-13 | Stop reason: HOSPADM

## 2023-06-11 RX ORDER — FUROSEMIDE 10 MG/ML
40 INJECTION INTRAMUSCULAR; INTRAVENOUS ONCE
Status: COMPLETED | OUTPATIENT
Start: 2023-06-11 | End: 2023-06-11

## 2023-06-11 RX ADMIN — CARBIDOPA AND LEVODOPA 1 TABLET: 25; 100 TABLET ORAL at 20:55

## 2023-06-11 RX ADMIN — METRONIDAZOLE 500 MG: 500 INJECTION, SOLUTION INTRAVENOUS at 04:02

## 2023-06-11 RX ADMIN — METRONIDAZOLE 500 MG: 500 INJECTION, SOLUTION INTRAVENOUS at 17:30

## 2023-06-11 RX ADMIN — ATORVASTATIN CALCIUM 40 MG: 40 TABLET, FILM COATED ORAL at 16:28

## 2023-06-11 RX ADMIN — METRONIDAZOLE 500 MG: 500 INJECTION, SOLUTION INTRAVENOUS at 10:34

## 2023-06-11 RX ADMIN — CEFTRIAXONE 1000 MG: 1 INJECTION, SOLUTION INTRAVENOUS at 17:30

## 2023-06-11 RX ADMIN — GABAPENTIN 200 MG: 100 CAPSULE ORAL at 21:41

## 2023-06-11 RX ADMIN — ASPIRIN 81 MG 81 MG: 81 TABLET ORAL at 09:22

## 2023-06-11 RX ADMIN — LORAZEPAM 1 MG: 1 TABLET ORAL at 00:46

## 2023-06-11 RX ADMIN — CARBIDOPA AND LEVODOPA 1 TABLET: 25; 100 TABLET ORAL at 09:22

## 2023-06-11 RX ADMIN — ONDANSETRON 4 MG: 2 INJECTION INTRAMUSCULAR; INTRAVENOUS at 21:41

## 2023-06-11 RX ADMIN — FUROSEMIDE 40 MG: 10 INJECTION, SOLUTION INTRAMUSCULAR; INTRAVENOUS at 10:35

## 2023-06-11 RX ADMIN — CARBIDOPA AND LEVODOPA 1 TABLET: 25; 100 TABLET ORAL at 16:28

## 2023-06-11 RX ADMIN — ENOXAPARIN SODIUM 40 MG: 100 INJECTION SUBCUTANEOUS at 09:22

## 2023-06-11 RX ADMIN — HYDROCODONE BITARTRATE AND ACETAMINOPHEN 1 TABLET: 5; 325 TABLET ORAL at 20:55

## 2023-06-11 NOTE — ASSESSMENT & PLAN NOTE
Lab Results   Component Value Date    CREATININE 1 38 (H) 06/11/2023    CREATININE 1 34 (H) 06/10/2023    CREATININE 1 33 (H) 06/09/2023    EGFR 48 06/11/2023    EGFR 50 06/10/2023    EGFR 50 06/09/2023   · Patient with history of left nephrectomy  · Creatinine appears to be around baseline  · Given patient's edema and penile edema will trial a dose of Lasix 40 mg IV once  · Repeat BMP in the morning

## 2023-06-11 NOTE — ASSESSMENT & PLAN NOTE
· Continue Sinemet  · Fall precautions and supportive care  · PT/OT recommending rehab    Patient still deciding whether he is interested in rehab or not

## 2023-06-11 NOTE — PROGRESS NOTES
[facility]  Progress Note  Name: Diane Reynoso  MRN: [id number]  Unit/Bed#: -01 I Date of Admission: 6/8/2023   Date of Service: 6/11/2023 I Hospital Day: 2    Assessment/Plan   * Acute diverticulitis  Assessment & Plan  · CT abdomen pelvis: Colonic diverticulosis with mild wall thickening pericolonic inflammation involving the distal descending colon consistent with acute diverticulitis  No perforation or abscess  Trace perihepatic ascites  · Will continue ceftriaxone and Flagyl  · IV fluids as needed  · Pain control as needed  · Diet as tolerated  · No signs of abscess or perforation  · Patient spiked fever overnight  Follow-up blood cultures  · Will request Surgery consultation     Swelling of right upper extremity  Assessment & Plan  · Will check right upper extremity Doppler  · Encourage patient to elevate arm    Penile edema  Assessment & Plan  · Appears to be dependent edema  No tenderness/ pain  · Advised nurse to provide scrotal elevation with towel underneath  · Consult urology due to continued edema    Anemia  Assessment & Plan  · Likely multifactorial, no signs of acute bleeding at this time  · Monitor H&H  · Transfuse as needed  · Iron deficiency noted, will initiate iron supplementation  · Repeat CBC in the morning    Abnormal ECG  Assessment & Plan  · Concern for possible aflutter  · Cardiology input appreciated  · Abnormality likely artifact from patient's Parkinson tremor  · Troponins have been stable  · Echo with no wall motion abnormalities    Parkinson's disease ([address] )  Assessment & Plan  · Continue Sinemet  · Fall precautions and supportive care  · PT/OT recommending rehab    Patient still deciding whether he is interested in rehab or not    Generalized weakness  Assessment & Plan  · Reports several years of generalized weakness, decreasing activity tolerance  · History of Parkinson's disease  · PT OT consult, recommending rehab    Coronary artery disease involving native coronary artery of native heart without angina pectoris  Assessment & Plan  · Denies chest pain  · History of CABG  · Continue aspirin, metoprolol, and atorvastatin    Chronic kidney disease (CKD), stage III (moderate)  Assessment & Plan  Lab Results   Component Value Date    CREATININE 1 38 (H) 2023    CREATININE 1 34 (H) 06/10/2023    CREATININE 1 33 (H) 2023    EGFR 48 2023    EGFR 50 06/10/2023    EGFR 50 2023   · Patient with history of left nephrectomy  · Creatinine appears to be around baseline  · Given patient's edema and penile edema will trial a dose of Lasix 40 mg IV once  · Repeat BMP in the morning         VTE Prophylaxis:  Enoxaparin (Lovenox)    Patient Centered Rounds: I have performed bedside rounds with nursing staff today  Discussions with Specialists or Other Care Team Provider: yes  Education and Discussions with Family / Patient: Spoke with patient's son Shaw Baker over the phone regarding plan of care    Current Length of Stay: 2 day(s)    Current Patient Status: Inpatient   Certification Statement: The patient will continue to require additional inpatient hospital stay due to Diverticulitis    Discharge Plan: PT recommending rehab  Case management for placement once medically stable    Code Status: Level 1 - Full Code    Subjective:   Patient with fever overnight  Blood cultures have been obtained  Also with right upper extremity swelling, Doppler pending  Continues with penile swelling, urology consult pending  Patient denies any chest pain or shortness of breath  Tolerating diet  Objective:     Vitals:   Temp (24hrs), Av 2 °F (37 3 °C), Min:97 9 °F (36 6 °C), Max:101 3 °F (38 5 °C)    Temp:  [97 9 °F (36 6 °C)-101 3 °F (38 5 °C)] 97 9 °F (36 6 °C)  HR:  [64-92] 64  Resp:  [20] 20  BP: ()/(56-73) 100/56  SpO2:  [97 %-98 %] 97 %  Body mass index is 33 08 kg/m²  Input and Output Summary (last 24 hours):        Intake/Output Summary (Last 24 hours) at 6/11/2023 0953  Last data filed at 6/11/2023 0700  Gross per 24 hour   Intake 480 ml   Output 400 ml   Net 80 ml       Physical Exam:   Physical Exam  Constitutional:       General: He is not in acute distress  HENT:      Head: Normocephalic and atraumatic  Nose: Nose normal       Mouth/Throat:      Mouth: Mucous membranes are moist    Eyes:      Extraocular Movements: Extraocular movements intact  Conjunctiva/sclera: Conjunctivae normal    Cardiovascular:      Rate and Rhythm: Normal rate and regular rhythm  Pulmonary:      Effort: Pulmonary effort is normal  No respiratory distress  Abdominal:      Palpations: Abdomen is soft  Tenderness: There is no abdominal tenderness  Genitourinary:     Comments: Penile edema noted, no tenderness  Scrotum without obvious edema  No testicular tenderness  Musculoskeletal:         General: Normal range of motion  Cervical back: Normal range of motion and neck supple  Comments: Right upper extremity swelling  Bilateral lower extremity edema   Skin:     General: Skin is warm and dry  Neurological:      General: No focal deficit present  Mental Status: He is alert  Mental status is at baseline  Cranial Nerves: No cranial nerve deficit        Comments: Tremor of right upper extremity noted   Psychiatric:         Mood and Affect: Mood normal          Behavior: Behavior normal          Additional Data:     Labs:    Results from last 7 days   Lab Units 06/11/23  0504 06/10/23  0547   EOS PCT %  --  2   HEMATOCRIT % 31 1* 29 8*   HEMOGLOBIN g/dL 9 6* 9 3*   LYMPHS PCT %  --  14   MONOS PCT %  --  16*   NEUTROS PCT %  --  67   PLATELETS Thousands/uL 151 130*   WBC Thousand/uL 5 82 5 52     Results from last 7 days   Lab Units 06/11/23  0504 06/10/23  0547   ALK PHOS U/L  --  51   ALT U/L  --  4*   AST U/L  --  25   BUN mg/dL 23 22   CALCIUM mg/dL 7 7* 7 7*   CHLORIDE mmol/L 106 106   CO2 mmol/L 24 26   CREATININE mg/dL 1 38* 1 34*   POTASSIUM mmol/L 4 3 4 3   SODIUM mmol/L 135 136     Results from last 7 days   Lab Units 06/08/23  1516   INR  1 51*               * I Have Reviewed All Lab Data Listed Above  * Additional Pertinent Lab Tests Reviewed: [facility] [address] Admission  Reviewed    Imaging:  Imaging Reports Reviewed Today Include: No new imaging    Recent Cultures (last 7 days):           Last 24 Hours Medication List:   Current Facility-Administered Medications   Medication Dose Route Frequency Provider Last Rate   • acetaminophen  650 mg Oral Q6H PRN [facility], DO     • aspirin  81 mg Oral Daily [facility], DO     • atorvastatin  40 mg Oral Daily With Gobble, CRNP     • carbidopa-levodopa  1 tablet Oral TID [facility], DO     • cefTRIAXone  1,000 mg Intravenous Q24H [facility], DO 1,000 mg (06/10/23 1745)   • enoxaparin  40 mg Subcutaneous Daily [facility] Radha Jiménez, DO     • [START ON 6/12/2023] ferrous sulfate  325 mg Oral Daily With Breakfast Sky Barrios MD     • furosemide  40 mg Intravenous Once Sky Barrios MD     • gabapentin  200 mg Oral HS [facility], DO     • HYDROcodone-acetaminophen  1 tablet Oral Q6H PRN Jody Bower PA-C     • LORazepam  1 mg Oral HS PRN Jody Bower PA-C     • metroNIDAZOLE  500 mg Intravenous Q8H [facility],  mg (06/11/23 0402)   • morphine injection  2 mg Intravenous Q4H PRN Jody Bower PA-C     • ondansetron  4 mg Intravenous Q6H PRN [facility], DO          Today, Patient Was Seen By: Sky Barrios MD    ** Please Note: Dictation voice to text software may have been used in the creation of this document   **

## 2023-06-11 NOTE — CONSULTS
Consultation - General Surgery   Raquel Ureña 66 y o  male MRN: [id number]  Unit/Bed#: -01 Encounter: [id number]    Assessment/Plan     Assessment:  Patient is a pleasant 42-year-old male with a past medical history significant for Parkinson's disease presenting with generalized constitutional complaints for which inpatient admission is indicated  During his work-up CT of the abdomen was obtained which revealed the presence of sigmoid diverticulitis  Plan:  The patient's diverticulitis appears to be mild in degree  I fully expect that this will be a self-limited process that will resolve with conservative therapy  Criteria for failure of medical management of his acute sigmoid diverticulitis outlined for the patient  All questions answered to his satisfaction  Thank you for the consultation and for the privilege of assisting in the care of this gentleman  History of Present Illness   HPI:  Raquel Ureña is a 66 y o  male who presents with constitutional complaints and ambulatory dysfunction for which admission is now indicated  Inpatient consult to Acute Care Surgery  Consult performed by: Kae Hamilton MD  Consult ordered by: Shweta Spears MD          Review of Systems   Constitutional: Negative for chills and fever  HENT: Negative for ear pain and sore throat  Eyes: Negative for pain and visual disturbance  Respiratory: Negative for cough and shortness of breath  Cardiovascular: Negative for chest pain and palpitations  Gastrointestinal: Negative for abdominal pain and vomiting  Genitourinary: Negative for dysuria and hematuria  Musculoskeletal: Positive for gait problem  Negative for arthralgias and back pain  Skin: Negative for color change and rash  Neurological: Negative for seizures and syncope  Parkinson's disease with tremor   All other systems reviewed and are negative        Historical Information   Past Medical History: Diagnosis Date   • Anxiety    • CAD (coronary artery disease)    • Carotid stenosis, right     50-69% internal   • Chronic kidney disease (CKD), stage III (moderate)     baseline Cr 1 50   • Diverticulosis    • GERD (gastroesophageal reflux disease)    • Gout    • History of nephrolithiasis    • History of prostate cancer     s/p radiation/Lupron   • History of renal cell cancer     s/p left nephrectomy   • Osteoarthritis     knees & shoulders   • Parkinson disease    • Pulmonary nodule     7mm PARKER     Past Surgical History:   Procedure Laterality Date   • ARTHROSCOPY KNEE Right    • CARDIAC CATHETERIZATION     • JOINT REPLACEMENT Left     knee   • NEPHRECTOMY Left 2018   • [place] CORONARY ARTERY BYP W/VEIN & ARTERY GRAFT 2 VEIN N/A 9/2/2020    Procedure: CORONARY ARTERY BYPASS GRAFT (CABG) 2 VESSELS: LIMA to LAD, RLE EVH/SVG to PDA; Surgeon: Iban Higgins DO;  Location: [facility];  Service: Cardiac Surgery   • [place] ECHO TRANSESOPHAG R-T 2D W/PRB IMG ACQUISJ I&R N/A 9/2/2020    Procedure: TRANSESOPHAGEAL ECHOCARDIOGRAM (MEGAN); Surgeon: Iban Higgins DO;  Location: [facility];  Service: Cardiac Surgery   • [place] NDSC SURG W/VIDEO-ASSISTED HARVEST VEIN CABG Right 9/2/2020    Procedure: HARVEST VEIN ENDOSCOPIC ([address]);   Surgeon: Iban Higgins DO;  Location: [facility];  Service: Cardiac Surgery   • REPLACEMENT TOTAL KNEE Left    • ROTATOR CUFF REPAIR Right    • TONSILLECTOMY       Social History   Social History     Substance and Sexual Activity   Alcohol Use Yes   • Alcohol/week: 9 0 standard drinks of alcohol   • Types: 9 Standard drinks or equivalent per week    Comment: 3x a week ( 6-7 mixed drinks each time- henok)     Social History     Substance and Sexual Activity   Drug Use Never     E-Cigarette/Vaping   • E-Cigarette Use Never User      E-Cigarette/Vaping Substances   • Nicotine No    • THC No    • CBD No    • Flavoring No    • Other No    • Unknown No      Social History     Tobacco Use   Smoking Status Never "  Smokeless Tobacco Never     Family History: non-contributory    Meds/Allergies   all current active meds have been reviewed  Allergies   Allergen Reactions   • Indomethacin Shortness Of Breath       Objective   First Vitals:   Blood Pressure: 96/51 (06/08/23 1430)  Pulse: 70 (06/08/23 1430)  Temperature: (!) 97 2 °F (36 2 °C) (06/08/23 1430)  Temp Source: Temporal (06/08/23 1430)  Respirations: 18 (06/08/23 1430)  Height: 5' 7\" (170 2 cm) (06/08/23 1430)  Weight - Scale: 90 7 kg (200 lb) (06/08/23 1430)  SpO2: 98 % (06/08/23 1430)    Current Vitals:   Blood Pressure: 116/64 (06/11/23 1545)  Pulse: 84 (06/11/23 1545)  Temperature: 98 4 °F (36 9 °C) (06/11/23 1545)  Temp Source: Temporal (06/08/23 1430)  Respirations: 20 (06/11/23 1545)  Height: 5' 7\" (170 2 cm) (06/09/23 0805)  Weight - Scale: 95 8 kg (211 lb 3 2 oz) (06/09/23 1456)  SpO2: 96 % (06/11/23 1545)      Intake/Output Summary (Last 24 hours) at 6/11/2023 1834  Last data filed at 6/11/2023 1701  Gross per 24 hour   Intake 445 ml   Output 800 ml   Net -355 ml       Invasive Devices     Peripheral Intravenous Line  Duration           Peripheral IV 06/10/23 Distal;Left;Upper;Ventral (anterior) Arm 1 day                Physical Exam  Vitals and nursing note reviewed  Constitutional:       General: He is not in acute distress  Appearance: He is well-developed  HENT:      Head: Normocephalic and atraumatic  Eyes:      Conjunctiva/sclera: Conjunctivae normal    Cardiovascular:      Rate and Rhythm: Normal rate and regular rhythm  Heart sounds: No murmur heard  Pulmonary:      Effort: Pulmonary effort is normal  No respiratory distress  Breath sounds: Normal breath sounds  Abdominal:      Palpations: Abdomen is soft  Tenderness: There is no abdominal tenderness  Comments: Benign abdominal examination with mild left lower quadrant tenderness to deep palpation  No guarding rebound or peritoneal signs    No masses noted no hernias " appreciated  Musculoskeletal:         General: No swelling  Cervical back: Neck supple  Skin:     General: Skin is warm and dry  Capillary Refill: Capillary refill takes less than 2 seconds  Neurological:      Mental Status: He is alert  Psychiatric:         Mood and Affect: Mood normal          Lab Results: I have personally reviewed pertinent lab results  Imaging: I have personally reviewed pertinent reports  EKG, Pathology, and Other Studies: I have personally reviewed pertinent reports  Counseling / Coordination of Care  Total floor / unit time spent today 35 minutes  Greater than 50% of total time was spent with the patient and / or family counseling and / or coordination of care  A description of the counseling / coordination of care: 15

## 2023-06-11 NOTE — ASSESSMENT & PLAN NOTE
· Likely multifactorial, no signs of acute bleeding at this time  · Monitor H&H  · Transfuse as needed  · Iron deficiency noted, will initiate iron supplementation  · Repeat CBC in the morning

## 2023-06-11 NOTE — PLAN OF CARE
Problem: PAIN - ADULT  Goal: Verbalizes/displays adequate comfort level or baseline comfort level  Description: Interventions:  - Encourage patient to monitor pain and request assistance  - Assess pain using appropriate pain scale  - Administer analgesics based on type and severity of pain and evaluate response  - Implement non-pharmacological measures as appropriate and evaluate response  - Consider cultural and social influences on pain and pain management  - Notify physician/advanced practitioner if interventions unsuccessful or patient reports new pain  Outcome: Progressing     Problem: INFECTION - ADULT  Goal: Absence or prevention of progression during hospitalization  Description: INTERVENTIONS:  - Assess and monitor for signs and symptoms of infection  - Monitor lab/diagnostic results  - Monitor all insertion sites, i e  indwelling lines, tubes, and drains  - Monitor endotracheal if appropriate and nasal secretions for changes in amount and color  - [place] appropriate cooling/warming therapies per order  - Administer medications as ordered  - Instruct and encourage patient and family to use good hand hygiene technique  - Identify and instruct in appropriate isolation precautions for identified infection/condition  Outcome: Progressing  Goal: Absence of fever/infection during neutropenic period  Description: INTERVENTIONS:  - Monitor WBC    Outcome: Progressing

## 2023-06-11 NOTE — ASSESSMENT & PLAN NOTE
· Appears to be dependent edema    No tenderness/ pain  · Advised nurse to provide scrotal elevation with towel underneath  · Consult urology due to continued edema

## 2023-06-11 NOTE — PROGRESS NOTES
Order for blood cultures attempted  Pt is edematous, attempted x2  Attempted with ultrasound, unsuccessful  MD made aware

## 2023-06-11 NOTE — ASSESSMENT & PLAN NOTE
· CT abdomen pelvis: Colonic diverticulosis with mild wall thickening pericolonic inflammation involving the distal descending colon consistent with acute diverticulitis  No perforation or abscess  Trace perihepatic ascites  · Will continue ceftriaxone and Flagyl  · IV fluids as needed  · Pain control as needed  · Diet as tolerated  · No signs of abscess or perforation  · Patient spiked fever overnight    Follow-up blood cultures  · Will request Surgery consultation

## 2023-06-12 ENCOUNTER — APPOINTMENT (INPATIENT)
Dept: NON INVASIVE DIAGNOSTICS | Facility: HOSPITAL | Age: 78
DRG: 392 | End: 2023-06-12
Payer: COMMERCIAL

## 2023-06-12 LAB
ANION GAP SERPL CALCULATED.3IONS-SCNC: 7 MMOL/L (ref 4–13)
BASOPHILS # BLD AUTO: 0.04 THOUSANDS/ÂΜL (ref 0–0.1)
BASOPHILS NFR BLD AUTO: 1 % (ref 0–1)
BUN SERPL-MCNC: 25 MG/DL (ref 5–25)
CALCIUM SERPL-MCNC: 7.7 MG/DL (ref 8.4–10.2)
CHLORIDE SERPL-SCNC: 105 MMOL/L (ref 96–108)
CO2 SERPL-SCNC: 24 MMOL/L (ref 21–32)
CREAT SERPL-MCNC: 1.32 MG/DL (ref 0.6–1.3)
EOSINOPHIL # BLD AUTO: 0.06 THOUSAND/ÂΜL (ref 0–0.61)
EOSINOPHIL NFR BLD AUTO: 1 % (ref 0–6)
ERYTHROCYTE [DISTWIDTH] IN BLOOD BY AUTOMATED COUNT: 17.3 % (ref 11.6–15.1)
GFR SERPL CREATININE-BSD FRML MDRD: 51 ML/MIN/1.73SQ M
GLUCOSE SERPL-MCNC: 91 MG/DL (ref 65–140)
HCT VFR BLD AUTO: 31.4 % (ref 36.5–49.3)
HGB BLD-MCNC: 9.6 G/DL (ref 12–17)
IMM GRANULOCYTES # BLD AUTO: 0.03 THOUSAND/UL (ref 0–0.2)
IMM GRANULOCYTES NFR BLD AUTO: 1 % (ref 0–2)
LYMPHOCYTES # BLD AUTO: 0.67 THOUSANDS/ÂΜL (ref 0.6–4.47)
LYMPHOCYTES NFR BLD AUTO: 12 % (ref 14–44)
MCH RBC QN AUTO: 26.7 PG (ref 26.8–34.3)
MCHC RBC AUTO-ENTMCNC: 30.6 G/DL (ref 31.4–37.4)
MCV RBC AUTO: 87 FL (ref 82–98)
MONOCYTES # BLD AUTO: 0.86 THOUSAND/ÂΜL (ref 0.17–1.22)
MONOCYTES NFR BLD AUTO: 15 % (ref 4–12)
NEUTROPHILS # BLD AUTO: 4.11 THOUSANDS/ÂΜL (ref 1.85–7.62)
NEUTS SEG NFR BLD AUTO: 70 % (ref 43–75)
NRBC BLD AUTO-RTO: 0 /100 WBCS
PLATELET # BLD AUTO: 155 THOUSANDS/UL (ref 149–390)
PMV BLD AUTO: 13.1 FL (ref 8.9–12.7)
POTASSIUM SERPL-SCNC: 4.2 MMOL/L (ref 3.5–5.3)
RBC # BLD AUTO: 3.6 MILLION/UL (ref 3.88–5.62)
SODIUM SERPL-SCNC: 136 MMOL/L (ref 135–147)
WBC # BLD AUTO: 5.77 THOUSAND/UL (ref 4.31–10.16)

## 2023-06-12 PROCEDURE — 80048 BASIC METABOLIC PNL TOTAL CA: CPT | Performed by: INTERNAL MEDICINE

## 2023-06-12 PROCEDURE — 99232 SBSQ HOSP IP/OBS MODERATE 35: CPT | Performed by: HOSPITALIST

## 2023-06-12 PROCEDURE — 99232 SBSQ HOSP IP/OBS MODERATE 35: CPT | Performed by: SURGERY

## 2023-06-12 PROCEDURE — 85025 COMPLETE CBC W/AUTO DIFF WBC: CPT | Performed by: INTERNAL MEDICINE

## 2023-06-12 PROCEDURE — 97535 SELF CARE MNGMENT TRAINING: CPT

## 2023-06-12 PROCEDURE — 97530 THERAPEUTIC ACTIVITIES: CPT

## 2023-06-12 PROCEDURE — 93971 EXTREMITY STUDY: CPT | Performed by: SURGERY

## 2023-06-12 PROCEDURE — 97110 THERAPEUTIC EXERCISES: CPT

## 2023-06-12 PROCEDURE — 93971 EXTREMITY STUDY: CPT

## 2023-06-12 PROCEDURE — 97116 GAIT TRAINING THERAPY: CPT

## 2023-06-12 PROCEDURE — 97112 NEUROMUSCULAR REEDUCATION: CPT

## 2023-06-12 RX ORDER — FUROSEMIDE 10 MG/ML
40 INJECTION INTRAMUSCULAR; INTRAVENOUS ONCE
Status: COMPLETED | OUTPATIENT
Start: 2023-06-12 | End: 2023-06-12

## 2023-06-12 RX ORDER — METRONIDAZOLE 500 MG/1
500 TABLET ORAL EVERY 8 HOURS SCHEDULED
Status: DISCONTINUED | OUTPATIENT
Start: 2023-06-12 | End: 2023-06-13 | Stop reason: HOSPADM

## 2023-06-12 RX ORDER — CEPHALEXIN 500 MG/1
500 CAPSULE ORAL EVERY 6 HOURS SCHEDULED
Status: DISCONTINUED | OUTPATIENT
Start: 2023-06-12 | End: 2023-06-13 | Stop reason: HOSPADM

## 2023-06-12 RX ADMIN — FUROSEMIDE 40 MG: 10 INJECTION, SOLUTION INTRAMUSCULAR; INTRAVENOUS at 09:47

## 2023-06-12 RX ADMIN — ENOXAPARIN SODIUM 40 MG: 100 INJECTION SUBCUTANEOUS at 08:55

## 2023-06-12 RX ADMIN — CARBIDOPA AND LEVODOPA 1 TABLET: 25; 100 TABLET ORAL at 08:55

## 2023-06-12 RX ADMIN — CARBIDOPA AND LEVODOPA 1 TABLET: 25; 100 TABLET ORAL at 16:18

## 2023-06-12 RX ADMIN — METRONIDAZOLE 500 MG: 500 INJECTION, SOLUTION INTRAVENOUS at 03:31

## 2023-06-12 RX ADMIN — HYDROCODONE BITARTRATE AND ACETAMINOPHEN 1 TABLET: 5; 325 TABLET ORAL at 21:21

## 2023-06-12 RX ADMIN — METRONIDAZOLE 500 MG: 500 TABLET ORAL at 21:21

## 2023-06-12 RX ADMIN — CARBIDOPA AND LEVODOPA 1 TABLET: 25; 100 TABLET ORAL at 21:21

## 2023-06-12 RX ADMIN — LORAZEPAM 1 MG: 1 TABLET ORAL at 23:17

## 2023-06-12 RX ADMIN — CEPHALEXIN 500 MG: 500 CAPSULE ORAL at 18:01

## 2023-06-12 RX ADMIN — METRONIDAZOLE 500 MG: 500 TABLET ORAL at 11:15

## 2023-06-12 RX ADMIN — CEPHALEXIN 500 MG: 500 CAPSULE ORAL at 11:16

## 2023-06-12 RX ADMIN — HYDROCODONE BITARTRATE AND ACETAMINOPHEN 1 TABLET: 5; 325 TABLET ORAL at 11:23

## 2023-06-12 RX ADMIN — ASPIRIN 81 MG 81 MG: 81 TABLET ORAL at 08:55

## 2023-06-12 RX ADMIN — FERROUS SULFATE TAB 325 MG (65 MG ELEMENTAL FE) 325 MG: 325 (65 FE) TAB at 08:55

## 2023-06-12 RX ADMIN — CEPHALEXIN 500 MG: 500 CAPSULE ORAL at 23:17

## 2023-06-12 RX ADMIN — LORAZEPAM 1 MG: 1 TABLET ORAL at 00:28

## 2023-06-12 RX ADMIN — ATORVASTATIN CALCIUM 40 MG: 40 TABLET, FILM COATED ORAL at 16:18

## 2023-06-12 RX ADMIN — GABAPENTIN 200 MG: 100 CAPSULE ORAL at 21:21

## 2023-06-12 RX ADMIN — METOPROLOL TARTRATE 25 MG: 25 TABLET, FILM COATED ORAL at 21:21

## 2023-06-12 NOTE — ASSESSMENT & PLAN NOTE
· Appears to be dependent edema    No tenderness/ pain  · Status post a urology evaluation  · Supportive therapy only, no plan for any type of acute phase urological intervention  · Continue close monitoring

## 2023-06-12 NOTE — ASSESSMENT & PLAN NOTE
· CT abdomen pelvis: Colonic diverticulosis with mild wall thickening pericolonic inflammation involving the distal descending colon consistent with acute diverticulitis  No perforation or abscess  Trace perihepatic ascites  · Status post a general surgical evaluation -no need/plan for any type of acute phase surgical intervention  · Status post 4 days worth of IV metronidazole, and IV ceftriaxone therapy  · DC IV antibiotics, transition to p o  Flagyl, and p o   Keflex x3 more days  · Status post a PT and OT evaluation-they recommend postacute rehabilitation services  · Case management has been notified -they are working on placement

## 2023-06-12 NOTE — PROGRESS NOTES
[place] 45  Progress Note  Name: Josette Urena  MRN: [id number]  Unit/Bed#: -01 I Date of Admission: 6/8/2023   Date of Service: 6/12/2023 I Hospital Day: 3    Assessment/Plan   * Acute diverticulitis  Assessment & Plan  · CT abdomen pelvis: Colonic diverticulosis with mild wall thickening pericolonic inflammation involving the distal descending colon consistent with acute diverticulitis  No perforation or abscess  Trace perihepatic ascites  · Status post a general surgical evaluation -no need/plan for any type of acute phase surgical intervention  · Status post 4 days worth of IV metronidazole, and IV ceftriaxone therapy  · DC IV antibiotics, transition to p o  Flagyl, and p o  Keflex x3 more days  · Status post a PT and OT evaluation-they recommend postacute rehabilitation services  · Case management has been notified -they are working on placement    Penile edema  Assessment & Plan  · Appears to be dependent edema    No tenderness/ pain  · Status post a urology evaluation  · Supportive therapy only, no plan for any type of acute phase urological intervention  · Continue close monitoring    Generalized weakness  Assessment & Plan  · Multifactorial  · #1 acute diverticulitis  · #2 Parkinson's disease  · #3 advancing age with additional comorbid medical conditions  · Once again, status post the PT and OT evaluation, they recommend rehab    Parkinson's disease ([facility] )  Assessment & Plan  · Continue Sinemet  · Continue fall precautions, continue supportive care    Coronary artery disease involving native coronary artery of native heart without angina pectoris  Assessment & Plan  · Stable  · Continue current cardiac based medications which include aspirin, metoprolol and Lipitor    Anemia  Assessment & Plan  · History of iron deficiency anemia  · H&H is stable  · Continue ferrous sulfate    Swelling of right upper extremity  Assessment & Plan  · Right upper extremity Dopplers pending  · Encourage patient to elevate arm  · Status post treatment with IV Lasix, will repeat a second dose today    Chronic kidney disease (CKD), stage III (moderate)  Assessment & Plan  Lab Results   Component Value Date    CREATININE 1 32 (H) 2023    CREATININE 1 38 (H) 2023    CREATININE 1 34 (H) 06/10/2023    EGFR 51 2023    EGFR 48 2023    EGFR 50 06/10/2023   · Patient with history of left nephrectomy  · Renal function is at baseline  · Continue to monitor    Dizziness  Assessment & Plan  · Resolved  · Echocardiogram-EF of 65%, no gross regional wall motion abnormalities  · No further inpatient testing, treatment, and/or work-up is needed    Abnormal ECG  Assessment & Plan  · Initial concern was for possible aflutter  · Abnormality likely artifact from patient's Parkinson tremor  · Troponins have been stable  · Echo with no wall motion abnormalities           VTE Prophylaxis:  Enoxaparin (Lovenox)    Patient Centered Rounds: I have performed bedside rounds with nursing staff today  Discussions with Specialists or Other Care Team Provider: Urology, general surgery, case management, nursing, pharmacy  Education and Discussions with Family / Patient: Patient's son Dina Rolle was brought up to par at 9:20 AM    Current Length of Stay: 3 day(s)    Current Patient Status: Inpatient   Certification Statement: The patient will continue to require additional inpatient hospital stay due to Need for placement    Discharge Plan: Hopeful discharge planning in 24 to 48 hours pending placement    Code Status: Level 3 - DNAR and DNI    Subjective:   Patient seen, resting in bed, no complaints, no distress    Objective:     Vitals:   Temp (24hrs), Av 5 °F (36 9 °C), Min:98 4 °F (36 9 °C), Max:98 6 °F (37 °C)    Temp:  [98 4 °F (36 9 °C)-98 6 °F (37 °C)] 98 5 °F (36 9 °C)  HR:  [73-84] 73  Resp:  [18-20] 19  BP: (107-116)/(59-64) 110/60  SpO2:  [96 %-98 %] 98 %  Body mass index is 33 08 kg/m²  Input and Output Summary (last 24 hours): Intake/Output Summary (Last 24 hours) at 6/12/2023 0919  Last data filed at 6/12/2023 0700  Gross per 24 hour   Intake 565 ml   Output 1025 ml   Net -460 ml       Physical Exam:   Physical Exam  Vitals and nursing note reviewed  Constitutional:       General: He is not in acute distress  Appearance: Normal appearance  He is not ill-appearing  HENT:      Head: Normocephalic and atraumatic  Nose: Nose normal    Eyes:      Extraocular Movements: Extraocular movements intact  Pupils: Pupils are equal, round, and reactive to light  Cardiovascular:      Rate and Rhythm: Normal rate and regular rhythm  Pulses: Normal pulses  Heart sounds: Normal heart sounds  No murmur heard  No friction rub  No gallop  Pulmonary:      Effort: Pulmonary effort is normal       Breath sounds: Normal breath sounds  Abdominal:      General: There is no distension  Palpations: Abdomen is soft  There is no mass  Tenderness: There is no abdominal tenderness  There is no guarding or rebound  Musculoskeletal:         General: No swelling or tenderness  Normal range of motion  Cervical back: Normal range of motion and neck supple  No rigidity  No muscular tenderness  Right lower leg: No edema  Left lower leg: No edema  Skin:     General: Skin is warm  Capillary Refill: Capillary refill takes less than 2 seconds  Findings: No erythema or rash  Neurological:      General: No focal deficit present  Mental Status: He is alert and oriented to person, place, and time  Mental status is at baseline     Psychiatric:         Mood and Affect: Mood normal          Behavior: Behavior normal          Additional Data:     Labs:    Results from last 7 days   Lab Units 06/12/23  0441   EOS PCT % 1   HEMATOCRIT % 31 4*   HEMOGLOBIN g/dL 9 6*   LYMPHS PCT % 12*   MONOS PCT % 15*   NEUTROS PCT % 70   PLATELETS Thousands/uL 155   WBC Thousand/uL 5 77     Results from last 7 days   Lab Units 06/12/23  0441 06/11/23  0504 06/10/23  0547   ALK PHOS U/L  --   --  51   ALT U/L  --   --  4*   AST U/L  --   --  25   BUN mg/dL 25   < > 22   CALCIUM mg/dL 7 7*   < > 7 7*   CHLORIDE mmol/L 105   < > 106   CO2 mmol/L 24   < > 26   CREATININE mg/dL 1 32*   < > 1 34*   POTASSIUM mmol/L 4 2   < > 4 3   SODIUM mmol/L 136   < > 136    < > = values in this interval not displayed  Results from last 7 days   Lab Units 06/08/23  1516   INR  1 51*               * I Have Reviewed All Lab Data Listed Above  * Additional Pertinent Lab Tests Reviewed: [facility] 66 Admission  Reviewed    Imaging:  Imaging Reports Reviewed Today Include: None    Recent Cultures (last 7 days):     Results from last 7 days   Lab Units 06/11/23  1445   BLOOD CULTURE  Received in Microbiology Lab  Culture in Progress         Last 24 Hours Medication List:   Current Facility-Administered Medications   Medication Dose Route Frequency Provider Last Rate   • acetaminophen  650 mg Oral Q6H PRN [facility] Henao, DO     • aspirin  81 mg Oral Daily [facility], DO     • atorvastatin  40 mg Oral Daily With [place], CRNP     • carbidopa-levodopa  1 tablet Oral TID Beverly Cunningham DO     • cephalexin  500 mg Oral Q6H 2020 Patrick Farrar MD     • enoxaparin  40 mg Subcutaneous Daily [facility], DO     • ferrous sulfate  325 mg Oral Daily With Breakfast Johanna Chandra MD     • gabapentin  200 mg Oral HS Beverly Cunningham DO     • HYDROcodone-acetaminophen  1 tablet Oral Q6H PRN Subha Vale PA-C     • LORazepam  1 mg Oral HS PRN Subha Vale PA-C     • metoprolol tartrate  25 mg Oral Q12H [facility] Lino Mcclure MD     • metroNIDAZOLE  500 mg Oral [facility] Lino Mcclure MD     • morphine injection  2 mg Intravenous Q4H PRN Subha Vale PA-C     • ondansetron  4 mg Intravenous Q6H PRN Beverly Cunningham DO Today, Patient Was Seen By: Therese Rico MD    ** Please Note: Dictation voice to text software may have been used in the creation of this document   **

## 2023-06-12 NOTE — PHYSICAL THERAPY NOTE
"   PHYSICAL THERAPY TREATMENT NOTE  NAME:  Henan Hager  DATE: 06/12/23    Length Of Stay: 3  Performed at least 2 patient identifiers during session: Name and ID bracelet    TREATMENT FLOWSHEET:    06/12/23 0946   PT Last Visit   PT Visit Date 06/12/23   Note Type   Note Type Treatment for insurance authorization   Pain Assessment   Pain Assessment Tool 0-10   Pain Score 6   Pain Location/Orientation Orientation: Right;Location: Shoulder   Pain Onset/Description Onset: Ongoing;Frequency: Constant/Continuous; Descriptor: Aching;Descriptor: Sore  (pain increased with WB)   Patient's Stated Pain Goal No pain   Hospital Pain Intervention(s) Repositioned; Ambulation/increased activity   Restrictions/Precautions   Weight Bearing Precautions Per Order No   Other Precautions Fall Risk;Pain   General   Chart Reviewed Yes   Response to Previous Treatment Patient with no complaints from previous session  Family/Caregiver Present No   Cognition   Overall Cognitive Status WFL   Arousal/Participation Alert; Responsive   Attention Within functional limits   Orientation Level Oriented X4   Memory Within functional limits   Following Commands Follows one step commands without difficulty   Subjective   Subjective \"I did not use the walker at home but they have been since I'm in here  \"   Bed Mobility   Supine to Sit 4  Minimal assistance   Additional items Assist x 1;HOB elevated; Increased time required;Verbal cues;LE management   Additional Comments Pt  tolerated sitting at EOB x 2 mins unsupported without LOB  Transfers   Sit to Stand   (CG)   Additional items Assist x 1; Increased time required;Verbal cues  (RW)   Stand to Sit   (CG)   Additional items Assist x 1; Armrests; Increased time required;Verbal cues   Stand pivot   (CG)   Additional items Assist x 1; Armrests; Increased time required;Verbal cues  (RW wit RUE platform attachment)   Additional Comments VC's provided for proper hand placement during transitions   " Ambulation/Elevation   Gait pattern Improper Weight shift;Decreased foot clearance; Inconsistent alejandro; Short stride; Excessively slow;Decreased heel strike;Decreased toe off;Step through pattern;Decreased hip extension; Foward flexed   Gait Assistance   (CGA)   Additional items Assist x 1;Verbal cues   Assistive Device Rolling walker;Platform walker  (RW with RUE platform attachment)   Distance 15 ft with RW, 50ft x2 with RW with RUE platform attachment   Ambulation/Elevation Additional Comments Pt  initially ambulated 15 ft with RW but reported increased pain in RUE when WB through RW  Second attempt of ait training, therapist fit RW with RUE platform attachment to allow WB through forearm  Pt tolerated ambulating 50 ft x2 reporting improvement in pain level but did not provide a number  Pt  did show improvements with use of AD including decreased amount of assistance required and increased steadiness  VC's for safety and direction required  Balance   Static Sitting Fair +   Dynamic Sitting Fair   Static Standing Fair   Dynamic Standing Fair -   Ambulatory Fair -   Endurance Deficit   Endurance Deficit Yes   Activity Tolerance   Activity Tolerance Patient limited by fatigue;Patient limited by pain   Medical Staff Made Aware CONSTANTINO, CM, and PCA made aware   Nurse Made Aware RN made aware   Exercises   Neuro re-ed dynamic standing balance activities performed while supported by RW and CGAx1  including including multidirectional weight shifting, reaching, and turning B directions  Assessment   Prognosis Fair   Problem List Decreased strength;Decreased range of motion;Decreased endurance; Impaired balance;Decreased mobility; Decreased coordination;Decreased safety awareness; Impaired judgement;Decreased skin integrity;Pain   Goals   Patient Goals to go home   PT Treatment Day 1   Plan   Treatment/Interventions Functional transfer training;LE strengthening/ROM; Elevations; Therapeutic exercise; Endurance training;Patient/family training;Bed mobility; Equipment eval/education;Gait training; Compensatory technique education;Spoke to nursing;OT;Spoke to case management   Progress Progressing toward goals   PT Frequency 3-5x/wk   Recommendation   PT Discharge Recommendation Post acute rehabilitation services   AM-PAC Basic Mobility Inpatient   Turning in Flat Bed Without Bedrails 3   Lying on Back to Sitting on Edge of Flat Bed Without Bedrails 3   Moving Bed to Chair 3   Standing Up From Chair Using Arms 3   Walk in Room 3   Climb 3-5 Stairs With Railing 1   Basic Mobility Inpatient Raw Score 16   Basic Mobility Standardized Score 38 32   Highest Level Of Mobility   -HL Goal 5: Stand one or more mins   JH-HLM Achieved 7: Walk 25 feet or more       The patient's AM-PAC Basic Mobility Inpatient Short Form Raw Score is 16  A raw score 16 suggests the patient may benefit from discharge to post-acute rehabilitation services  Please also refer to the recommendation of the Physical Therapist for safe discharge planning  Pt seen for PT treatment session this date with interventions consisting of bed mobility tasks, transfer training, gait training with RW and RW with RUE platform attachment, application and proper fitting of platform attachment, neuromuscular re-education of movement performed to improve dynamic standing balance, and education provided as needed for safety and direction to improve functional mobility, safety awareness, and activity tolerance  Pt agreeable to PT treatment session upon arrival, pt found resting in bed  At end of session, pt left sitting OOB in recliner without chair alarm per RN and with all needs in reach  In comparison to previous session, pt with improvements in ambulation distance and amount of assistance required for ambulation   Continue to recommend STR at time of d/c in order to maximize pt's functional independence and safety w/ mobility   Pt continues to be functioning below baseline level  PT will continue to see pt while here in order to address the deficits listed above and provide interventions consistent w/ POC in effort to achieve STGs      [place], [place]

## 2023-06-12 NOTE — PROGRESS NOTES
-- Patient:  -- MRN: [id number]  -- Aidin Request ID: [id number]  -- Level of care reserved: Ramez Aguilar  -- Partner Reserved: [address] [phone]  -- Clinical needs requested:  -- Geography searched: 20 miles around [phone]  -- Start of Service:  -- Request sent: 9:40am EDT on 6/12/2023 by Bakari Chung  -- Partner reserved: 3:20pm EDT on 6/12/2023 by Bakari Chung  -- Choice list shared:

## 2023-06-12 NOTE — ASSESSMENT & PLAN NOTE
· Multifactorial  · #1 acute diverticulitis  · #2 Parkinson's disease  · #3 advancing age with additional comorbid medical conditions  · Once again, status post the PT and OT evaluation, they recommend rehab

## 2023-06-12 NOTE — ASSESSMENT & PLAN NOTE
· Resolved  · Echocardiogram-EF of 65%, no gross regional wall motion abnormalities  · No further inpatient testing, treatment, and/or work-up is needed

## 2023-06-12 NOTE — ASSESSMENT & PLAN NOTE
· Right upper extremity Dopplers pending  · Encourage patient to elevate arm  · Status post treatment with IV Lasix, will repeat a second dose today

## 2023-06-12 NOTE — CASE MANAGEMENT
Case Management Discharge Planning Note    Patient name Karmen Henson  Location /-40 MRN [id number]  : 1945 Date 2023       Current Admission Date: 2023  Current Admission Diagnosis:Acute diverticulitis   Patient Active Problem List    Diagnosis Date Noted   • Swelling of right upper extremity 2023   • Penile edema 06/10/2023   • Abnormal ECG 2023   • Anemia 2023   • Dizziness 2023   • Acute diverticulitis 2023   • Parkinson's disease ([facility] [address] ) 2023   • Generalized weakness 2020   • S/P CABG x 2 2020   • Tremor of right hand 2020   • Obesity 2020   • BYNUM (dyspnea on exertion) 2020   • Chronic kidney disease (CKD), stage III (moderate)    • Coronary artery disease involving native coronary artery of native heart without angina pectoris    • Carotid stenosis, right    • Stage 3 chronic kidney disease ([facility] ) 2020   • Coronary artery disease involving native coronary artery of native heart 2020   • Renal insufficiency 2020   • Dyslipidemia 2020      LOS (days): 3  Geometric Mean LOS (GMLOS) (days): 2 60  Days to GMLOS:-0 4     OBJECTIVE:  Risk of Unplanned Readmission Score: 13 27     Current admission status: Inpatient   Preferred Pharmacy:   [address] [address]  Phone: [phone] Fax: [phone] [facility], [address] [facility] [address] [facility] [address] [facility] 38 210 [facility]  Phone: [phone] Fax: [phone]    Primary Care Provider: Nicholas Marx DO    Primary Insurance: Rosario Copeland W Bo  REP  Secondary Insurance:     DISCHARGE DETAILS:    Discharge planning discussed with[de-identified] Pt  Freedom of Choice: Yes  Comments - Freedom of Choice: Pt is recommending STR, pt in agreement with same    Agreeable to blanket referrals being made in [facility] 51 contacted family/caregiver?: Yes    Contacts  Patient Contacts: Jose Licona son [phone]  Contact Method: Phone  Phone Number: [phone]  Reason/Outcome: Discharge Planning   PT had been recommending STR  Pt/OT to evaluate today  Pt is agreeable now to go to a STR  [place] referral made in [address] [place] [address]  TC to son Dipak Kapoor to discuss same  Referrals made in [address] [place] [address]

## 2023-06-12 NOTE — CASE MANAGEMENT
Case Management Discharge Planning Note    Patient name Salas Hatch  Location /-10 MRN [id number]  : 1945 Date 2023       Current Admission Date: 2023  Current Admission Diagnosis:Acute diverticulitis   Patient Active Problem List    Diagnosis Date Noted   • Swelling of right upper extremity 2023   • Penile edema 06/10/2023   • Abnormal ECG 2023   • Anemia 2023   • Dizziness 2023   • Acute diverticulitis 2023   • [facility] ) 2023   • Generalized weakness 2020   • S/P CABG x 2 2020   • Tremor of right hand 2020   • Obesity 2020   • BYNUM (dyspnea on exertion) 2020   • Chronic kidney disease (CKD), stage III (moderate)    • Coronary artery disease involving native coronary artery of native heart without angina pectoris    • Carotid stenosis, right    • Stage 3 chronic kidney disease ([facility] ) 2020   • Coronary artery disease involving native coronary artery of native heart 2020   • Renal insufficiency 2020   • Dyslipidemia 2020      LOS (days): 3  Geometric Mean LOS (GMLOS) (days): 2 60  Days to GMLOS:-0 6     OBJECTIVE:  Risk of Unplanned Readmission Score: 13 48         Current admission status: Inpatient   Preferred Pharmacy:   [facility] [address]  [address]  Phone: [phone] Fax: [phone] [facility], [address] 210 [facility]  Phone: [phone] Fax: [phone]    Primary Care Provider: Ramón Navarrete DO    Primary Insurance: [facility]  Secondary Insurance:     [address] [place] [address] Number: [id number]

## 2023-06-12 NOTE — ASSESSMENT & PLAN NOTE
Lab Results   Component Value Date    CREATININE 1 32 (H) 06/12/2023    CREATININE 1 38 (H) 06/11/2023    CREATININE 1 34 (H) 06/10/2023    EGFR 51 06/12/2023    EGFR 48 06/11/2023    EGFR 50 06/10/2023   · Patient with history of left nephrectomy  · Renal function is at baseline  · Continue to monitor

## 2023-06-12 NOTE — ASSESSMENT & PLAN NOTE
· Stable  · Continue current cardiac based medications which include aspirin, metoprolol and Lipitor

## 2023-06-12 NOTE — ASSESSMENT & PLAN NOTE
· Initial concern was for possible aflutter  · Abnormality likely artifact from patient's Parkinson tremor  · Troponins have been stable  · Echo with no wall motion abnormalities

## 2023-06-12 NOTE — PROGRESS NOTES
"Progress Note - General Surgery   Carrillo Winslow 66 y o  male MRN: [id number]  Unit/Bed#: -01 Encounter: [id number]    Assessment:  68yo PMH Parkinson's disease, CAD, CKD presenting with acute sigmoid diverticulitis   -tolerating diet   -right extremity US for right arm swelling and pain   -last BM was Friday   -afebrile, VSS   -no leukocytosis   -Hgb 9 6 (9 6)   -Cr 1 32, baseline    Plan:  -continue diet  -continue abx  -serial abdominal exams  -monitor vitals and labs  -medical management per SLIM  -will discuss with attending    Subjective/Objective     Subjective: Patient says LLQ abdominal pain has improved since admission  He is tolerating his diet  His last bowel movement was Friday  He usually goes every 3-4 days he says  He says the right arm swelling has gotten a little better since yesterday but he always has pain due to his shoulder  Denies fever, chills, shortness of breath, chest pain  Objective:    Blood pressure 110/60, pulse 73, temperature 98 5 °F (36 9 °C), resp  rate 19, height 5' 7\" (1 702 m), weight 95 8 kg (211 lb 3 2 oz), SpO2 98 %  ,Body mass index is 33 08 kg/m²  Intake/Output Summary (Last 24 hours) at 6/12/2023 0921  Last data filed at 6/12/2023 0700  Gross per 24 hour   Intake 565 ml   Output 1025 ml   Net -460 ml       Invasive Devices     Peripheral Intravenous Line  Duration           Peripheral IV 06/10/23 Distal;Left;Upper;Ventral (anterior) Arm 1 day              Physical Exam  Constitutional:       Appearance: He is obese  HENT:      Head: Normocephalic and atraumatic  Nose: Nose normal       Mouth/Throat:      Mouth: Mucous membranes are moist       Pharynx: Oropharynx is clear  Eyes:      Conjunctiva/sclera: Conjunctivae normal       Pupils: Pupils are equal, round, and reactive to light  Cardiovascular:      Rate and Rhythm: Normal rate and regular rhythm  Pulses: Normal pulses  Heart sounds: Normal heart sounds     Pulmonary:      " Effort: Pulmonary effort is normal       Breath sounds: Normal breath sounds  Abdominal:      General: Abdomen is flat  Bowel sounds are normal       Palpations: Abdomen is soft  Tenderness: There is no abdominal tenderness  Musculoskeletal:         General: Normal range of motion  Comments: Right arm tremor  Skin:     General: Skin is warm and dry  Neurological:      General: No focal deficit present  Mental Status: He is alert     Psychiatric:         Mood and Affect: Mood normal          Lab, Imaging and other studies:  CBC:   Lab Results   Component Value Date    HCT 31 4 (L) 06/12/2023    HGB 9 6 (L) 06/12/2023    MCH 26 7 (L) 06/12/2023    MCHC 30 6 (L) 06/12/2023    MCV 87 06/12/2023    MPV 13 1 (H) 06/12/2023    NRBC 0 06/12/2023     06/12/2023    RBC 3 60 (L) 06/12/2023    RDW 17 3 (H) 06/12/2023    WBC 5 77 06/12/2023   , CMP:   Lab Results   Component Value Date    BUN 25 06/12/2023    CALCIUM 7 7 (L) 06/12/2023     06/12/2023    CO2 24 06/12/2023    CREATININE 1 32 (H) 06/12/2023    EGFR 51 06/12/2023    K 4 2 06/12/2023    SODIUM 136 06/12/2023     VTE Pharmacologic Prophylaxis: Enoxaparin (Lovenox)  VTE Mechanical Prophylaxis: sequential compression device     Moses Boas, PA-C

## 2023-06-12 NOTE — PLAN OF CARE
Problem: OCCUPATIONAL THERAPY ADULT  Goal: Performs self-care activities at highest level of function for planned discharge setting  See evaluation for individualized goals  Description: Treatment Interventions: ADL retraining, Functional transfer training, UE strengthening/ROM, Endurance training, Patient/family training, Compensatory technique education, Energy conservation, Activityengagement          See flowsheet documentation for full assessment, interventions and recommendations  Outcome: Progressing  Note: Limitation: Decreased ADL status, Decreased UE strength, Decreased Safe judgement during ADL, Decreased endurance  Prognosis: Good  Assessment: Patient participated in Skilled OT session this date with interventions consisting of ADL re training with the use of correct body mechnaics, therapeutic exercise to: increase functional use of BUEs, increase BUE muscle strength  and increase OOB/ sitting tolerance   Patient agreeable to OT treatment session, upon arrival patient was found seated OOB to Recliner  Patient requiring verbal cues for correct technique, verbal cues for pacing thru activity steps, one step directives and frequent rest periods, and self-care assistance as noted in flow sheet/AM-PAC  Patient continues to be functioning below baseline level, occupational performance remains limited secondary to factors listed above and increased risk for falls and injury  From OT standpoint, recommendation at time of d/c would be post-acute rehabilitation services  Patient to benefit from continued Occupational Therapy treatment while in the hospital to address deficits as defined above and maximize level of functional independence with ADLs and functional mobility       OT Discharge Recommendation: Post acute rehabilitation services        DOUGIE Huber/CARLOS

## 2023-06-12 NOTE — CASE MANAGEMENT
Yusuf Langley 50 received request for authorization from Care Manager    Authorization request for: SNF  Facility Name: [facility]  NPI: [id number]  Facility MD:  Dr Carolina Fuller: [id number]  Authorization initiated by contacting insurance: Lana Alvarado Via: [employer]  Pending Reference #: [id number]   Clinicals submitted via: Rosita Mccann has received approved authorization from insurance:   Estella Quinteros in by Rep: Franki Encarnacion received for:   Facility:  [address] [place] [address] Rd #: [phone]  Start of Care:   Next Review Date: 6/15  Submit next review to: [address] notified: Lisandra Stark

## 2023-06-12 NOTE — CONSULTS
Consultation - Urology   Sandy Heath 66 y o  male MRN: [id number]  Unit/Bed#: -01 Encounter: [id number]      Assessment/Plan      Assessment:  Foreskin and scrotal edema, dependent  Plan:  Scrotal elevation and observation    History of Present Illness   Attending: Lino Mcclure MD  Reason for Consult / Principal Problem: Penile edema  HPI: Sandy Heath is a 66y o  year old male who presents with acute diverticulitis  He was noted to have penile edema and urology was consulted  Patient denies any complaints relative to the penis or scrotum  He states he is voiding without any difficulty  Inpatient consult to Urology  Consult performed by: Marci Qureshi MD  Consult ordered by: Johanna Chandra MD          Review of Systems   Genitourinary: Negative for difficulty urinating, flank pain and hematuria  Historical Information   Past Medical History:   Diagnosis Date   • Anxiety    • CAD (coronary artery disease)    • Carotid stenosis, right     50-69% internal   • Chronic kidney disease (CKD), stage III (moderate)     baseline Cr 1 50   • Diverticulosis    • GERD (gastroesophageal reflux disease)    • Gout    • History of nephrolithiasis    • History of prostate cancer     s/p radiation/Lupron   • History of renal cell cancer     s/p left nephrectomy   • Osteoarthritis     knees & shoulders   • Parkinson disease    • Pulmonary nodule     7mm PARKER     Past Surgical History:   Procedure Laterality Date   • ARTHROSCOPY KNEE Right    • CARDIAC CATHETERIZATION     • JOINT REPLACEMENT Left     knee   • NEPHRECTOMY Left 2018   • GA CORONARY ARTERY BYP W/VEIN & ARTERY GRAFT 2 VEIN N/A 9/2/2020    Procedure: CORONARY ARTERY BYPASS GRAFT (CABG) 2 VESSELS: LIMA to LAD, RLE EVH/SVG to PDA;   Surgeon: Katie Sifuentes DO;  [facility];  Service: Cardiac Surgery   • GA ECHO TRANSESOPHAG R-T 2D W/PRB IMG ACQUISJ I&R N/A 9/2/2020    Procedure: TRANSESOPHAGEAL ECHOCARDIOGRAM (MEGAN);  Surgeon: Kirk Alvarez DO;  [facility];  Service: Cardiac Surgery   • CO NDSC SURG W/VIDEO-ASSISTED HARVEST VEIN CABG Right 9/2/2020    Procedure: HARVEST VEIN ENDOSCOPIC ([address]);   Surgeon: Kirk Alvarez DO;  [facility];  Service: Cardiac Surgery   • REPLACEMENT TOTAL KNEE Left    • ROTATOR CUFF REPAIR Right    • TONSILLECTOMY       Social History   Social History     Substance and Sexual Activity   Alcohol Use Yes   • Alcohol/week: 9 0 standard drinks of alcohol   • Types: 9 Standard drinks or equivalent per week    Comment: 3x a week ( 6-7 mixed drinks each time- henok)     @DRUGHX  E-Cigarette/Vaping   • E-Cigarette Use Never User      E-Cigarette/Vaping Substances   • Nicotine No    • THC No    • CBD No    • Flavoring No    • Other No    • Unknown No      Social History     Tobacco Use   Smoking Status Never   Smokeless Tobacco Never     Family History: non-contributory    Meds/Allergies   current meds:   Current Facility-Administered Medications   Medication Dose Route Frequency   • acetaminophen (TYLENOL) tablet 650 mg  650 mg Oral Q6H PRN   • aspirin chewable tablet 81 mg  81 mg Oral Daily   • atorvastatin (LIPITOR) tablet 40 mg  40 mg Oral Daily With Dinner   • carbidopa-levodopa (SINEMET)  mg per tablet 1 tablet  1 tablet Oral TID   • cefTRIAXone (ROCEPHIN) IVPB (premix in dextrose) 1,000 mg 50 mL  1,000 mg Intravenous Q24H   • enoxaparin (LOVENOX) subcutaneous injection 40 mg  40 mg Subcutaneous Daily   • ferrous sulfate tablet 325 mg  325 mg Oral Daily With Breakfast   • gabapentin (NEURONTIN) capsule 200 mg  200 mg Oral HS   • HYDROcodone-acetaminophen (NORCO) 5-325 mg per tablet 1 tablet  1 tablet Oral Q6H PRN   • LORazepam (ATIVAN) tablet 1 mg  1 mg Oral HS PRN   • metroNIDAZOLE (FLAGYL) IVPB (premix) 500 mg 100 mL  500 mg Intravenous Q8H   • morphine injection 2 mg  2 mg Intravenous Q4H PRN   • ondansetron (ZOFRAN) injection 4 mg  4 mg Intravenous Q6H PRN "    Allergies   Allergen Reactions   • Indomethacin Shortness Of Breath       Objective   Vitals: Blood pressure 110/60, pulse 73, temperature 98 5 °F (36 9 °C), resp  rate 19, height 5' 7\" (1 702 m), weight 95 8 kg (211 lb 3 2 oz), SpO2 98 %  I/O last 24 hours: In: 325 [P O :125; IV Piggyback:200]  Out: 1025 [Urine:1025]    Invasive Devices     Peripheral Intravenous Line  Duration           Peripheral IV 06/10/23 Distal;Left;Upper;Ventral (anterior) Arm 1 day                Physical Exam  Abdominal:      Palpations: Abdomen is soft  Tenderness: There is no abdominal tenderness  Foreskin has moderate edema, mostly ventral and distal   The foreskin retracts well and the penis is well exposed  Scrotum has mild edema  Testicles and spermatic cords are palpable and normal bilaterally  All of the edema is pitting in nature  Lab Results:   CBC:   Lab Results   Component Value Date    HCT 31 4 (L) 06/12/2023    HGB 9 6 (L) 06/12/2023    MCH 26 7 (L) 06/12/2023    MCHC 30 6 (L) 06/12/2023    MCV 87 06/12/2023    MPV 13 1 (H) 06/12/2023    NRBC 0 06/12/2023     06/12/2023    RBC 3 60 (L) 06/12/2023    RDW 17 3 (H) 06/12/2023    WBC 5 77 06/12/2023     CMP:   Lab Results   Component Value Date    BUN 25 06/12/2023    CALCIUM 7 7 (L) 06/12/2023     06/12/2023    CO2 24 06/12/2023    CREATININE 1 32 (H) 06/12/2023    EGFR 51 06/12/2023    SODIUM 136 06/12/2023     Urinalysis: No results found for: \"BILIRUBINUR\", \"BLOODU\", \"CLARITYU\", \"COLORU\", \"GLUCOSEU\", \"KETONESU\", \"LEUKOCYTESUR\", \"NITRITE\", \"PHUR\", \"PROTEINUA\", \"SPECGRAV\"  Imaging Studies: I have personally reviewed pertinent reports  EKG, Pathology, and Other Studies: I have personally reviewed pertinent reports      VTE Prophylaxis: Sequential compression device Alex Berrios     Code Status: Level 3 - DNAR and DNI  Advance Directive and Living Will:      Power of :    POLST:      Counseling / Coordination of Care  Total floor / unit " time spent today 30 minutes  Greater than 50% of total time was spent with the patient and / or family counseling and / or coordination of care   A description of the counseling / coordination of care: I discussed the case with the hospitalist

## 2023-06-12 NOTE — PLAN OF CARE
Problem: PHYSICAL THERAPY ADULT  Goal: Performs mobility at highest level of function for planned discharge setting  See evaluation for individualized goals  Description: Treatment/Interventions: Functional transfer training, LE strengthening/ROM, Elevations, Therapeutic exercise, Endurance training, Patient/family training, Equipment eval/education, Bed mobility, Gait training, Spoke to nursing, Spoke to case management          See flowsheet documentation for full assessment, interventions and recommendations  Outcome: Progressing  Note: Prognosis: Fair  Problem List: Decreased strength, Decreased range of motion, Decreased endurance, Impaired balance, Decreased mobility, Decreased coordination, Decreased safety awareness, Impaired judgement, Decreased skin integrity, Pain  Assessment: Pt seen for PT treatment session this date with interventions consisting of bed mobility tasks, transfer training, gait training with RW and RW with RUE platform attachment, application and proper fitting of platform attachment, neuromuscular re-education of movement performed to improve dynamic standing balance, and education provided as needed for safety and direction to improve functional mobility, safety awareness, and activity tolerance  Pt agreeable to PT treatment session upon arrival, pt found resting in bed  At end of session, pt left sitting OOB in recliner without chair alarm per RN and with all needs in reach  In comparison to previous session, pt with improvements in ambulation distance and amount of assistance required for ambulation   Continue to recommend STR at time of d/c in order to maximize pt's functional independence and safety w/ mobility  Pt continues to be functioning below baseline level  PT will continue to see pt while here in order to address the deficits listed above and provide interventions consistent w/ POC in effort to achieve STGs    Barriers to Discharge: Inaccessible home environment, Decreased caregiver support     PT Discharge Recommendation: Post acute rehabilitation services    See flowsheet documentation for full assessment

## 2023-06-12 NOTE — PLAN OF CARE
Problem: MOBILITY - ADULT  Goal: Maintain or return to baseline ADL function  Description: INTERVENTIONS:  -  Assess patient's ability to carry out ADLs; assess patient's baseline for ADL function and identify physical deficits which impact ability to perform ADLs (bathing, care of mouth/teeth, toileting, grooming, dressing, etc )  - Assess/evaluate cause of self-care deficits   - Assess range of motion  - Assess patient's mobility; develop plan if impaired  - Assess patient's need for assistive devices and provide as appropriate  - Encourage maximum independence but intervene and supervise when necessary  - Involve family in performance of ADLs  - Assess for home care needs following discharge   - Consider OT consult to assist with ADL evaluation and planning for discharge  - Provide patient education as appropriate  Outcome: Progressing  Goal: Maintains/Returns to pre admission functional level  Description: INTERVENTIONS:  - Perform BMAT or MOVE assessment daily    - Set and communicate daily mobility goal to care team and patient/family/caregiver     - Collaborate with rehabilitation services on mobility goals if consulted  - Out of bed for toileting  - Record patient progress and toleration of activity level   Outcome: Progressing     Problem: DISCHARGE PLANNING  Goal: Discharge to home or other facility with appropriate resources  Description: INTERVENTIONS:  - Identify barriers to discharge w/patient and caregiver  - Arrange for needed discharge resources and transportation as appropriate  - Identify discharge learning needs (meds, wound care, etc )  - Arrange for interpretive services to assist at discharge as needed  - Refer to Case Management Department for coordinating discharge planning if the patient needs post-hospital services based on physician/advanced practitioner order or complex needs related to functional status, cognitive ability, or social support system  Outcome: Progressing     Problem: Knowledge Deficit  Goal: Patient/family/caregiver demonstrates understanding of disease process, treatment plan, medications, and discharge instructions  Description: Complete learning assessment and assess knowledge base    Interventions:  - Provide teaching at level of understanding  - Provide teaching via preferred learning methods  Outcome: Progressing

## 2023-06-12 NOTE — OCCUPATIONAL THERAPY NOTE
"   06/12/23 1016   OT Last Visit   OT Visit Date 06/12/23   Note Type   Note Type Treatment for insurance authorization   Pain Assessment   Pain Assessment Tool 0-10   Pain Score 6   Pain Location/Orientation Orientation: Right;Location: Shoulder  (chronic condition)   Restrictions/Precautions   Weight Bearing Precautions Per Order No   Other Precautions Fall Risk;Pain   Lifestyle   Intrinsic Gratification read, watches TV, used to enjoy some cooking (he reports that it is now difficult,to do and he's doing it for only himself)   ADL   Where Assessed Chair   Grooming Assistance 5  Supervision/Setup   Grooming Deficit Setup   Grooming Comments patient chose to use mouthwash only for mouth care   UB Bathing Assistance 3  Moderate Assistance   UB Bathing Deficit Setup;Verbal cueing;Supervision/safety; Increased time to complete;Right arm;Left arm   LB Bathing Assistance 2  Maximal Assistance   LB Bathing Deficit Setup;Verbal cueing;Supervision/safety; Increased time to complete; Buttocks;Right lower leg including foot; Left lower leg including foot   LB Dressing Comments patient reports having a long-handled shoehorn at home, and that he uses other adapted ways of accomplishing LB dressing   Therapeutic Excerise-Strength   UE Strength Yes   Right Upper Extremity- Strength   R Elbow Elbow flexion;Elbow extension  (and supin/pron-ation)   R Weight/Reps/Sets 10 reps 2 sets each exer  > done Actively   Left Upper Extremity-Strength   L Shoulder Flexion; Horizontal ABduction; Other (Comment)  (pro/re-traction)   L Elbow Elbow flexion;Elbow extension  (in forward and reverse;  Also: supin/pron-ation)   L Weights/Reps/Sets 2 pound weight elbows 10 reps each exer  , and 10 reps shoulder exers  done Actively   Coordination   Gross Motor impaired RUE   Dexterity impaired R hand  (Dominant hand)   Subjective   Subjective \"I've been feeling weak\"  Cognition   Overall Cognitive Status WFL   Arousal/Participation Alert; Responsive " Attention Within functional limits   Orientation Level Oriented X4   Memory Within functional limits   Following Commands Follows one step commands without difficulty   Activity Tolerance   Activity Tolerance Patient limited by fatigue;Patient limited by pain   Assessment   Assessment Patient participated in Skilled OT session this date with interventions consisting of ADL re training with the use of correct body mechnaics, therapeutic exercise to: increase functional use of BUEs, increase BUE muscle strength  and increase OOB/ sitting tolerance   Patient agreeable to OT treatment session, upon arrival patient was found seated OOB to Recliner  Patient requiring verbal cues for correct technique, verbal cues for pacing thru activity steps, one step directives and frequent rest periods, and self-care assistance as noted in flow sheet/AM-PAC  Patient continues to be functioning below baseline level, occupational performance remains limited secondary to factors listed above and increased risk for falls and injury  From OT standpoint, recommendation at time of d/c would be post-acute rehabilitation services  Patient to benefit from continued Occupational Therapy treatment while in the hospital to address deficits as defined above and maximize level of functional independence with ADLs and functional mobility  Plan   Treatment Interventions ADL retraining;UE strengthening/ROM; Patient/family training;Equipment evaluation/education; Activityengagement   Goal Expiration Date 06/19/23   OT Treatment Day 1   Recommendation   OT Discharge Recommendation Post acute rehabilitation services   Additional Comments 2 The patient's raw score on the AM-PAC Daily Activity Inpatient Short Form is 18  A raw score of less than 19 suggests the patient may benefit from discharge to post-acute rehabilitation services  Please refer to the recommendation of the Occupational Therapist for safe discharge planning     AM-PAC Daily Activity Inpatient   Lower Body Dressing 2   Bathing 2   Toileting 3   Upper Body Dressing 3   Grooming 4   Eating 4   Daily Activity Raw Score 18   Daily Activity Standardized Score (Calc for Raw Score >=11) 38 66   AM-PAC Applied Cognition Inpatient   Following a Speech/Presentation 3   Understanding Ordinary Conversation 4   Taking Medications 3   Remembering Where Things Are Placed or Put Away 3   Remembering List of 4-5 Errands 3   Taking Care of Complicated Tasks 3   Applied Cognition Raw Score 19   Applied Cognition Standardized Score 39 77       Feliz Sensing, CONSTANTINO/L

## 2023-06-12 NOTE — PLAN OF CARE
Problem: INFECTION - ADULT  Goal: Absence or prevention of progression during hospitalization  Description: INTERVENTIONS:  - Assess and monitor for signs and symptoms of infection  - Monitor lab/diagnostic results  - Monitor all insertion sites, i e  indwelling lines, tubes, and drains  - Monitor endotracheal if appropriate and nasal secretions for changes in amount and color  - [place] appropriate cooling/warming therapies per order  - Administer medications as ordered  - Instruct and encourage patient and family to use good hand hygiene technique  - Identify and instruct in appropriate isolation precautions for identified infection/condition  Outcome: Progressing  Goal: Absence of fever/infection during neutropenic period  Description: INTERVENTIONS:  - Monitor WBC    Outcome: Progressing

## 2023-06-12 NOTE — NURSING NOTE
Dr Amanda Gaxiola at bedside to evaluate scrotal and penile edema  To elevate Scrotum on a towel  No new orders

## 2023-06-12 NOTE — CASE MANAGEMENT
Case Management Discharge Planning Note    Patient name Kevin Quezada  Location /-08 MRN [id number]  : 1945 Date 2023       Current Admission Date: 2023  Current Admission Diagnosis:Acute diverticulitis   Patient Active Problem List    Diagnosis Date Noted   • Swelling of right upper extremity 2023   • Penile edema 06/10/2023   • Abnormal ECG 2023   • Anemia 2023   • Dizziness 2023   • Acute diverticulitis 2023   • [facility] ) 2023   • Generalized weakness 2020   • S/P CABG x 2 2020   • Tremor of right hand 2020   • Obesity 2020   • BYNUM (dyspnea on exertion) 2020   • Chronic kidney disease (CKD), stage III (moderate)    • Coronary artery disease involving native coronary artery of native heart without angina pectoris    • Carotid stenosis, right    • Stage 3 chronic kidney disease ([facility] ) 2020   • Coronary artery disease involving native coronary artery of native heart 2020   • Renal insufficiency 2020   • Dyslipidemia 2020      LOS (days): 3  Geometric Mean LOS (GMLOS) (days): 2 60  Days to GMLOS:-0 6     OBJECTIVE:  Risk of Unplanned Readmission Score: 13 45     Current admission status: Inpatient   [facility]erred Pharmacy:   [facility] [address] - [place] [address] [place] [place] [address] [place] [address]  Phone: [phone] Fax: [phone] [facility], [address] [facility] [address] [facility] 94 [facility] 38 210 [facility]  Phone: [phone] Fax: [phone]    Primary Care Provider: Trav Garrison DO    Primary Insurance: Venus Cruz [address] [facility] RE[address]  Secondary Insurance:     DISCHARGE DETAILS:  Provided the pt and son with the choice list of accepting facilities  [facility]  Submitted for authorization

## 2023-06-13 ENCOUNTER — NURSING HOME VISIT (OUTPATIENT)
Dept: GERIATRICS | Facility: OTHER | Age: 78
End: 2023-06-13
Payer: COMMERCIAL

## 2023-06-13 VITALS
WEIGHT: 211.2 LBS | RESPIRATION RATE: 21 BRPM | DIASTOLIC BLOOD PRESSURE: 57 MMHG | OXYGEN SATURATION: 97 % | TEMPERATURE: 99.2 F | SYSTOLIC BLOOD PRESSURE: 110 MMHG | HEIGHT: 67 IN | HEART RATE: 64 BPM | BODY MASS INDEX: 33.15 KG/M2

## 2023-06-13 VITALS
WEIGHT: 214.3 LBS | DIASTOLIC BLOOD PRESSURE: 59 MMHG | BODY MASS INDEX: 33.56 KG/M2 | HEART RATE: 68 BPM | RESPIRATION RATE: 20 BRPM | TEMPERATURE: 96.5 F | SYSTOLIC BLOOD PRESSURE: 118 MMHG | OXYGEN SATURATION: 99 %

## 2023-06-13 DIAGNOSIS — N18.32 STAGE 3B CHRONIC KIDNEY DISEASE (HCC): ICD-10-CM

## 2023-06-13 DIAGNOSIS — N48.89 PENILE EDEMA: ICD-10-CM

## 2023-06-13 DIAGNOSIS — D64.9 ANEMIA, UNSPECIFIED TYPE: ICD-10-CM

## 2023-06-13 DIAGNOSIS — I95.1 ORTHOSTATIC HYPOTENSION: ICD-10-CM

## 2023-06-13 DIAGNOSIS — R53.1 GENERALIZED WEAKNESS: ICD-10-CM

## 2023-06-13 DIAGNOSIS — I25.10 CORONARY ARTERY DISEASE INVOLVING NATIVE CORONARY ARTERY OF NATIVE HEART WITHOUT ANGINA PECTORIS: ICD-10-CM

## 2023-06-13 DIAGNOSIS — R26.2 AMBULATORY DYSFUNCTION: ICD-10-CM

## 2023-06-13 DIAGNOSIS — E78.5 DYSLIPIDEMIA: ICD-10-CM

## 2023-06-13 DIAGNOSIS — G20 PARKINSON'S DISEASE (HCC): ICD-10-CM

## 2023-06-13 DIAGNOSIS — K57.92 ACUTE DIVERTICULITIS: Primary | ICD-10-CM

## 2023-06-13 LAB
ANION GAP SERPL CALCULATED.3IONS-SCNC: 4 MMOL/L (ref 4–13)
BASOPHILS # BLD AUTO: 0.03 THOUSANDS/ÂΜL (ref 0–0.1)
BASOPHILS NFR BLD AUTO: 1 % (ref 0–1)
BUN SERPL-MCNC: 24 MG/DL (ref 5–25)
CALCIUM SERPL-MCNC: 8 MG/DL (ref 8.4–10.2)
CHLORIDE SERPL-SCNC: 103 MMOL/L (ref 96–108)
CO2 SERPL-SCNC: 29 MMOL/L (ref 21–32)
CREAT SERPL-MCNC: 1.51 MG/DL (ref 0.6–1.3)
EOSINOPHIL # BLD AUTO: 0.07 THOUSAND/ÂΜL (ref 0–0.61)
EOSINOPHIL NFR BLD AUTO: 1 % (ref 0–6)
ERYTHROCYTE [DISTWIDTH] IN BLOOD BY AUTOMATED COUNT: 17.7 % (ref 11.6–15.1)
FLUAV RNA RESP QL NAA+PROBE: NEGATIVE
FLUBV RNA RESP QL NAA+PROBE: NEGATIVE
GFR SERPL CREATININE-BSD FRML MDRD: 43 ML/MIN/1.73SQ M
GLUCOSE SERPL-MCNC: 91 MG/DL (ref 65–140)
HCT VFR BLD AUTO: 32.8 % (ref 36.5–49.3)
HGB BLD-MCNC: 10.1 G/DL (ref 12–17)
IMM GRANULOCYTES # BLD AUTO: 0.03 THOUSAND/UL (ref 0–0.2)
IMM GRANULOCYTES NFR BLD AUTO: 1 % (ref 0–2)
LYMPHOCYTES # BLD AUTO: 0.7 THOUSANDS/ÂΜL (ref 0.6–4.47)
LYMPHOCYTES NFR BLD AUTO: 11 % (ref 14–44)
MCH RBC QN AUTO: 26.2 PG (ref 26.8–34.3)
MCHC RBC AUTO-ENTMCNC: 30.8 G/DL (ref 31.4–37.4)
MCV RBC AUTO: 85 FL (ref 82–98)
MONOCYTES # BLD AUTO: 0.91 THOUSAND/ÂΜL (ref 0.17–1.22)
MONOCYTES NFR BLD AUTO: 14 % (ref 4–12)
NEUTROPHILS # BLD AUTO: 4.72 THOUSANDS/ÂΜL (ref 1.85–7.62)
NEUTS SEG NFR BLD AUTO: 72 % (ref 43–75)
NRBC BLD AUTO-RTO: 0 /100 WBCS
PLATELET # BLD AUTO: 148 THOUSANDS/UL (ref 149–390)
PMV BLD AUTO: 11.9 FL (ref 8.9–12.7)
POTASSIUM SERPL-SCNC: 4.5 MMOL/L (ref 3.5–5.3)
RBC # BLD AUTO: 3.85 MILLION/UL (ref 3.88–5.62)
RSV RNA RESP QL NAA+PROBE: NEGATIVE
SARS-COV-2 RNA RESP QL NAA+PROBE: NEGATIVE
SODIUM SERPL-SCNC: 136 MMOL/L (ref 135–147)
WBC # BLD AUTO: 6.46 THOUSAND/UL (ref 4.31–10.16)

## 2023-06-13 PROCEDURE — 80048 BASIC METABOLIC PNL TOTAL CA: CPT | Performed by: HOSPITALIST

## 2023-06-13 PROCEDURE — 99239 HOSP IP/OBS DSCHRG MGMT >30: CPT | Performed by: HOSPITALIST

## 2023-06-13 PROCEDURE — 0241U HB NFCT DS VIR RESP RNA 4 TRGT: CPT | Performed by: HOSPITALIST

## 2023-06-13 PROCEDURE — 85025 COMPLETE CBC W/AUTO DIFF WBC: CPT | Performed by: HOSPITALIST

## 2023-06-13 PROCEDURE — 99305 1ST NF CARE MODERATE MDM 35: CPT | Performed by: FAMILY MEDICINE

## 2023-06-13 RX ORDER — FERROUS SULFATE 325(65) MG
325 TABLET ORAL
Qty: 30 TABLET | Refills: 0 | Status: ON HOLD | OUTPATIENT
Start: 2023-06-13 | End: 2023-06-23 | Stop reason: SDUPTHER

## 2023-06-13 RX ORDER — METRONIDAZOLE 500 MG/1
500 TABLET ORAL EVERY 8 HOURS SCHEDULED
Qty: 6 TABLET | Refills: 0 | Status: SHIPPED | OUTPATIENT
Start: 2023-06-13 | End: 2023-06-15

## 2023-06-13 RX ORDER — CEPHALEXIN 500 MG/1
500 CAPSULE ORAL EVERY 6 HOURS SCHEDULED
Qty: 8 CAPSULE | Refills: 0 | Status: SHIPPED | OUTPATIENT
Start: 2023-06-13 | End: 2023-06-15

## 2023-06-13 RX ADMIN — METOPROLOL TARTRATE 25 MG: 25 TABLET, FILM COATED ORAL at 08:25

## 2023-06-13 RX ADMIN — CEPHALEXIN 500 MG: 500 CAPSULE ORAL at 11:37

## 2023-06-13 RX ADMIN — FERROUS SULFATE TAB 325 MG (65 MG ELEMENTAL FE) 325 MG: 325 (65 FE) TAB at 08:25

## 2023-06-13 RX ADMIN — METRONIDAZOLE 500 MG: 500 TABLET ORAL at 05:40

## 2023-06-13 RX ADMIN — CARBIDOPA AND LEVODOPA 1 TABLET: 25; 100 TABLET ORAL at 08:25

## 2023-06-13 RX ADMIN — CEPHALEXIN 500 MG: 500 CAPSULE ORAL at 05:40

## 2023-06-13 RX ADMIN — ASPIRIN 81 MG 81 MG: 81 TABLET ORAL at 08:25

## 2023-06-13 RX ADMIN — ENOXAPARIN SODIUM 40 MG: 100 INJECTION SUBCUTANEOUS at 08:25

## 2023-06-13 NOTE — ASSESSMENT & PLAN NOTE
Patient was admitted for generalized weakness and abdominal discomfort  Will with PT and OT  Keep patient on fall precautions

## 2023-06-13 NOTE — DISCHARGE SUMMARY
Venkata U  66   Discharge- [facility] 1945, 66 y o  male MRN: [id number]  Unit/Bed#: -01 Encounter: [id number]  Primary Care Provider: Yanci Agee DO   Date and time admitted to hospital: 6/8/2023  2:20 PM    * Acute diverticulitis  Assessment & Plan  · CT abdomen pelvis: Colonic diverticulosis with mild wall thickening pericolonic inflammation involving the distal descending colon consistent with acute diverticulitis  No perforation or abscess  Trace perihepatic ascites  · Status post a general surgical evaluation -no need/plan for any type of acute phase surgical intervention  · Status post treatment with IV metronidazole, and IV ceftriaxone while here in house  · DC to rehab today, patient will be discharged on 2 more days worth of Keflex, and metronidazole -upon completion of the 2 more days, inclusive of what she received here in the hospital she will of completed 7 days worth of antibiotic therapy  · Eventual outpatient follow-up with PCP    Penile edema  Assessment & Plan  · Appears to be dependent edema    No tenderness/ pain  · Status post a urology evaluation  · Supportive therapy only, no plan for any type of acute phase urological intervention  · Eventual outpatient follow-up with urology    Generalized weakness  Assessment & Plan  · Multifactorial  · #1 acute diverticulitis  · #2 Parkinson's disease  · #3 advancing age with additional comorbid medical conditions  · DC to rehab today    Parkinson's disease [facility])  Assessment & Plan  · Continue Sinemet postdischarge  · Continue fall precautions, continue supportive care at the skilled nursing facility    Coronary artery disease involving native coronary artery of native heart without angina pectoris  Assessment & Plan  · Stable  · Continue current cardiac based medications which include aspirin, metoprolol and Lipitor post discharge  · No changes to any of his preadmission medications and or preadmission dosing    Anemia  Assessment & Plan  · History of iron deficiency anemia  · H&H is stable  · Continue ferrous sulfate  · Will need continued periodic outpatient CBC monitoring    Swelling of right upper extremity  Assessment & Plan  · No evidence of DVT via Doppler testing  · Status posttreatment with Lasix  · Continued supportive therapy in the outpatient setting such as elevating the arm etc     Chronic kidney disease (CKD), stage III (moderate)  Assessment & Plan  Lab Results   Component Value Date    CREATININE 1 51 (H) 06/13/2023    CREATININE 1 32 (H) 06/12/2023    CREATININE 1 38 (H) 06/11/2023    EGFR 43 06/13/2023    EGFR 51 06/12/2023    EGFR 48 06/11/2023   · Patient with history of left nephrectomy  · Mild bump noted over the past 24 hours, most likely secondary to intravascular depletion from the Lasix therapy  · Will need continued periodic outpatient BMP monitoring via his PCP    Dizziness  Assessment & Plan  · Resolved  · Echocardiogram-EF of 65%, no gross regional wall motion abnormalities  · No further inpatient testing, treatment, and/or work-up is needed    Abnormal ECG  Assessment & Plan  · Initial concern was for possible aflutter  · Abnormality likely artifact from patient's Parkinson tremor  · Troponins have been stable  · Echo with no wall motion abnormalities        Discharging Physician / Practitioner: Leigh Garcia MD  PCP: Madison Oconnell DO  Admission Date:   Admission Orders (From admission, onward)     Ordered        06/09/23 1104  Inpatient Admission  Once            06/08/23 1743  Place in Observation  Once                      Discharge Date: 06/13/23    Medical Problems     Resolved Problems  Date Reviewed: 6/8/2023   None         Consultations During Hospital Stay:  · Cardiology  · General surgery  · Urology    Procedures Performed:   · None    Significant Findings / Test Results:   · CAT scan abdomen pelvis-Colonic diverticulosis with mild wall thickening pericolonic inflammation involving the distal descending colon consistent with acute diverticulitis  No perforation or abscess  Trace perihepatic ascites  Small right pleural effusion of uncertain etiology  Nonobstructing 3 mm right lower pole intrarenal calculus without hydronephrosis  · X-ray chest-no acute cardiopulmonary disease    Incidental Findings:   · None    Test Results Pending at Discharge (will require follow up): · None     Outpatient Tests Requested:  · None    Complications:    • None    Reason for Admission: Acute diverticulitis    Hospital Course:     Darryn Oviedo is a 66 y o  male patient who originally presented to the hospital on 6/8/2023 due to generalized weakness, and abdominal discomfort  Please refer to the initial history and physical examination completed by RAEANN Mororw for the initial presenting features and complaints  In brief, the patient is a 75-year-old male, who was admitted to the hospital for further management of an acute diverticulitis that was diagnosed on CAT scan of the abdomen after the patient came into the ER with the aforementioned chief complaint  Patient also had some initial dizziness and questionable EKG changes  After being admitted to [place] [address], he was treated with 4 days worth of IV antibiotics for the aforementioned diverticulitis  He was seen in conjunction with general surgery, no acute phase surgical intervention was required  He was ultimately transitioned to p o  antibiotics to complete a total of 7 days  He was seen in conjunction with cardiology, it was ultimately felt his EKG had some motion artifact in the setting of the patient having a resting parkinsonian tremor  Cardiogram test revealed an EF of 65%  He was deemed stable by cardiology for discharge  He was also treated with IV Lasix x2-3 doses for volume overload  He had a right upper extremity Doppler performed and DVT was ruled out    He was seen in conjunction with urology for "scrotal edema, no acute phase urological intervention was required  He was seen by PT and OT, they recommended postacute rehabilitation services  Patient was discharged to the [facility] rehab facility on 6/13/2023  Please refer to the assessment/plan portion of this discharge summary as outlined above for additional details regarding his stay  Please see above list of diagnoses and related plan for additional information  Condition at Discharge: good     Discharge Day Visit / Exam:     Subjective: Patient seen, resting, no new complaints  Vitals: Blood Pressure: 110/57 (06/13/23 0647)  Pulse: 64 (06/13/23 0647)  Temperature: 99 2 °F (37 3 °C) (06/12/23 2222)  Temp Source: Temporal (06/08/23 1430)  Respirations: 21 (06/13/23 0647)  Height: 5' 7\" (170 2 cm) (06/09/23 0805)  Weight - Scale: 95 8 kg (211 lb 3 2 oz) (06/09/23 1456)  SpO2: 97 % (06/13/23 0647)  Exam:   Physical Exam  Vitals and nursing note reviewed  Constitutional:       General: He is not in acute distress  Appearance: Normal appearance  He is not ill-appearing  HENT:      Head: Normocephalic and atraumatic  Nose: Nose normal    Eyes:      Extraocular Movements: Extraocular movements intact  Pupils: Pupils are equal, round, and reactive to light  Cardiovascular:      Rate and Rhythm: Normal rate and regular rhythm  Pulses: Normal pulses  Heart sounds: Normal heart sounds  No murmur heard  No friction rub  No gallop  Pulmonary:      Effort: Pulmonary effort is normal       Breath sounds: Normal breath sounds  Abdominal:      General: There is no distension  Palpations: Abdomen is soft  There is no mass  Tenderness: There is no abdominal tenderness  There is no guarding or rebound  Musculoskeletal:         General: No swelling or tenderness  Normal range of motion  Cervical back: Normal range of motion and neck supple  No rigidity  No muscular tenderness  Right lower leg: No edema   " Left lower leg: No edema  Skin:     General: Skin is warm  Capillary Refill: Capillary refill takes less than 2 seconds  Findings: No erythema or rash  Neurological:      General: No focal deficit present  Mental Status: He is alert and oriented to person, place, and time  Mental status is at baseline  Comments: Resting parkinsonian tremors noted   Psychiatric:         Mood and Affect: Mood normal          Behavior: Behavior normal          Discussion with Family: Attempts to call the patient's son Reed Madera answer    Discharge instructions/Information to patient and family:   See after visit summary for information provided to patient and family  Provisions for Follow-Up Care:  See after visit summary for information related to follow-up care and any pertinent home health orders  Disposition:     [facility] at [address] [facility] [facility]      Planned Readmission:   • None     Discharge Statement:  I spent 45 minutes discharging the patient  This time was spent on the day of discharge  I had direct contact with the patient on the day of discharge  Greater than 50% of the total time was spent examining patient, answering all patient questions, arranging and discussing plan of care with patient as well as directly providing post-discharge instructions  Additional time then spent on discharge activities  Discharge Medications:  See after visit summary for reconciled discharge medications provided to patient and family        ** Please Note: This note has been constructed using a voice recognition system **

## 2023-06-13 NOTE — ASSESSMENT & PLAN NOTE
· No evidence of DVT via Doppler testing  · Status posttreatment with Lasix  · Continued supportive therapy in the outpatient setting such as elevating the arm etc

## 2023-06-13 NOTE — ASSESSMENT & PLAN NOTE
· Stable  · Continue current cardiac based medications which include aspirin, metoprolol and Lipitor post discharge  · No changes to any of his preadmission medications and or preadmission dosing

## 2023-06-13 NOTE — ASSESSMENT & PLAN NOTE
Patient was found to have dependent scrotal edema  Urology was consulted and recommended supportive therapy  Monitor for worsening edema  Follow up with urology outpatient

## 2023-06-13 NOTE — ASSESSMENT & PLAN NOTE
Patient was admitted for generalized weakness and abdominal discomfort  CT on admission showed colonic diverticulosis with mild wall thickening and pericolonic inflammation involving the distal descending colon  No bowel obstruction present  There was minimal perihepatic ascites  General surgery was consulted and did not recommend surgical intervention  He was started on IV metronidazole and IV ceftriaxone  He completed 5 days of antibiotics  Continue PO keflex and metronidazole for 2 days  Patient had diarrhea today  Will give florastor for 5 days  If diarrhea continues will test for c  diff  Encourage increased PO fluid intake

## 2023-06-13 NOTE — ASSESSMENT & PLAN NOTE
Patient has a history of Parkinson's disease  Continue sinemet  mg 3 times daily  Keep patient on fall precautions  Monitor orthostatic vitals  Provide supportive care

## 2023-06-13 NOTE — DISCHARGE INSTR - AVS FIRST PAGE
Dear Raquel Ureña,     It was our pleasure to care for you here at [facility], [address] [place] [address]  It is our hope that we were always able to exceed the expected standards for your care during your stay  You were hospitalized due to acute diverticulitis  You were cared for on the medical/surgical floor by Nano Banuelos MD with the [facility] who covers for your primary care physician (PCP), Erica Gregorio DO, while you were hospitalized  You were additionally seen by the [facility], general [facility], and by Dr Jing Rocha from urology  If you have any questions or concerns related to this hospitalization, you may contact us at [phone]  For follow up as well as any medication refills, we recommend that you follow up with your primary care physician  A registered nurse will reach out to you by phone within a few days after your discharge to answer any additional questions that you may have after going home    However, at this time we provide for you here, the most important instructions / recommendations at discharge:     Notable Medication Adjustments -   New prescription Keflex 500 mg take 1 tablet every 6 hours for 2 more days then stop  New prescription metronidazole 500 mg take 1 tablet every 8 hours for 2 more days then stop  Okay to resume all other preadmission medications at the preadmission doses  Testing Required after Discharge -   To be further determined in the outpatient setting by your primary care provider  Important follow up information -   Please follow-up with the providers as outlined in this discharge package  Other Instructions -   Please maintain a healthy low-sodium diet  Please review this entire after visit summary as additional general instructions including medication list, appointments, activity, diet, any pertinent wound care, and other additional recommendations from your care team that may be provided for you        Sincerely,     Baron Martinez MD

## 2023-06-13 NOTE — ASSESSMENT & PLAN NOTE
· CT abdomen pelvis: Colonic diverticulosis with mild wall thickening pericolonic inflammation involving the distal descending colon consistent with acute diverticulitis  No perforation or abscess  Trace perihepatic ascites  · Status post a general surgical evaluation -no need/plan for any type of acute phase surgical intervention  · Status post treatment with IV metronidazole, and IV ceftriaxone while here in house     · DC to rehab today, patient will be discharged on 2 more days worth of Keflex, and metronidazole -upon completion of the 2 more days, inclusive of what she received here in the hospital she will of completed 7 days worth of antibiotic therapy  · Eventual outpatient follow-up with PCP

## 2023-06-13 NOTE — ASSESSMENT & PLAN NOTE
· History of iron deficiency anemia  · H&H is stable  · Continue ferrous sulfate  · Will need continued periodic outpatient CBC monitoring

## 2023-06-13 NOTE — NURSING NOTE
Pt DC to rehab in stable condition via son  All belongings packed and sent with pt  AVS reviewed and sent

## 2023-06-13 NOTE — ASSESSMENT & PLAN NOTE
· Multifactorial  · #1 acute diverticulitis  · #2 Parkinson's disease  · #3 advancing age with additional comorbid medical conditions  · DC to rehab today

## 2023-06-13 NOTE — ASSESSMENT & PLAN NOTE
Hemoglobin trending up today to 10 1  Will continue to monitor CBC  Monitor patient for signs of bleeding  Plan to transfuse if hemoglobin is less than 7 0

## 2023-06-13 NOTE — ASSESSMENT & PLAN NOTE
· Appears to be dependent edema    No tenderness/ pain  · Status post a urology evaluation  · Supportive therapy only, no plan for any type of acute phase urological intervention  · Eventual outpatient follow-up with urology

## 2023-06-13 NOTE — ASSESSMENT & PLAN NOTE
EKG on admission showed abnormal changes that concerning for atrial flutter  Cardiology attributed this abnormality to the patient's Parkinsonian tremor  He was cleared by cardiology for discharge  ECHO during admission showed an EF of 65%  Continue home dose of aspirin 81 mg daily  Continue home dose 25 mg metoprolol  Continue home dose of atorvastatin 80 mg daily

## 2023-06-13 NOTE — ASSESSMENT & PLAN NOTE
Patient was admitted for generalized weakness and abdominal discomfort  Weakness was determined to by multifactorial from acute diverticulitis, Parkinson's disease, older age, and other medical comorbidities  Will work with PT and OT  Keep patient on fall precautions

## 2023-06-13 NOTE — ASSESSMENT & PLAN NOTE
Lab Results   Component Value Date    CREATININE 1 51 (H) 06/13/2023    CREATININE 1 32 (H) 06/12/2023    CREATININE 1 38 (H) 06/11/2023    EGFR 43 06/13/2023    EGFR 51 06/12/2023    EGFR 48 06/11/2023   Creatinine trending up today to 1 51  GFR 43 today  Will continue to monitor CMP  Encourage increased PO fluid intake

## 2023-06-13 NOTE — ASSESSMENT & PLAN NOTE
Lab Results   Component Value Date    CREATININE 1 51 (H) 06/13/2023    CREATININE 1 32 (H) 06/12/2023    CREATININE 1 38 (H) 06/11/2023    EGFR 43 06/13/2023    EGFR 51 06/12/2023    EGFR 48 06/11/2023   · Patient with history of left nephrectomy  · Mild bump noted over the past 24 hours, most likely secondary to intravascular depletion from the Lasix therapy  · Will need continued periodic outpatient BMP monitoring via his PCP

## 2023-06-13 NOTE — CASE MANAGEMENT
Case Management Discharge Planning Note    Patient name Sandy Heath  Location /-20 MRN [id number]  : 1945 Date 2023       Current Admission Date: 2023  Current Admission Diagnosis:Acute diverticulitis   Patient Active Problem List    Diagnosis Date Noted   • Swelling of right upper extremity 2023   • Penile edema 06/10/2023   • Abnormal ECG 2023   • Anemia 2023   • Dizziness 2023   • Acute diverticulitis 2023   • Parkinson's disease ([facility] [address] ) 2023   • Generalized weakness 2020   • S/P CABG x 2 2020   • Tremor of right hand 2020   • Obesity 2020   • BYNUM (dyspnea on exertion) 2020   • Chronic kidney disease (CKD), stage III (moderate)    • Coronary artery disease involving native coronary artery of native heart without angina pectoris    • Carotid stenosis, right    • Stage 3 chronic kidney disease ([facility] [address] ) 2020   • Coronary artery disease involving native coronary artery of native heart 2020   • Renal insufficiency 2020   • Dyslipidemia 2020      LOS (days): 4  Geometric Mean LOS (GMLOS) (days): 2 60  Days to GMLOS:-1 3     OBJECTIVE:  Risk of Unplanned Readmission Score: 13 62     Current admission status: Inpatient   Preferred Pharmacy:   [facility] [address] EDGARD HOLT [address] [place] [address]  Phone: [phone] Fax: [phone] [facility], [address] [facility] [address] [facility] [address] [facility] [address] [facility]  Phone: [phone] Fax: [phone]    Primary Care Provider: Trang Head DO    Primary Insurance: Abraham Sacks [address] [facility] REP  Secondary Insurance:     DISCHARGE DETAILS:    Accepting Facility Name, [address] : [address]   Phone: [phone]  Facility/Agency Fax Number: [phone]     Pt ahs been evaluated by [facility] and is stable to be DC  Received authorization for [facility]  Notified them via [place]  CO[employer] swab completed  Notified pt os same  TC to pt son Dipak Kapoor at [phone], left a  for a return call  As per pt the son works night shift and gets up at 1200  Notified [facility] of same  Anticipate early afternoon PU  Pt does not want the other son called

## 2023-06-13 NOTE — OCCUPATIONAL THERAPY NOTE
"   06/13/23 1221   OT Last Visit   OT Visit Date 06/13/23   Note Type   Note Type Cancelled Session   Cancel Reasons Refusal  (\"I just want to rest - I wish my son was here\")         PIOTR Piper    "

## 2023-06-13 NOTE — ASSESSMENT & PLAN NOTE
Orthostatic vitals showed a lying blood pressure 94/52, a sitting blood pressure 99/51, and a standing blood pressure of 67/45  He has dizziness with standing  Denies headache, syncope, or vomiting  Continue to monitor orthostatic vital signs  Encourage increased PO fluid intake  Wear compression stockings as tolerated  Keep patient on fall precautions

## 2023-06-13 NOTE — PLAN OF CARE
Problem: MOBILITY - ADULT  Goal: Maintain or return to baseline ADL function  Description: INTERVENTIONS:  -  Assess patient's ability to carry out ADLs; assess patient's baseline for ADL function and identify physical deficits which impact ability to perform ADLs (bathing, care of mouth/teeth, toileting, grooming, dressing, etc )  - Assess/evaluate cause of self-care deficits   - Assess range of motion  - Assess patient's mobility; develop plan if impaired  - Assess patient's need for assistive devices and provide as appropriate  - Encourage maximum independence but intervene and supervise when necessary  - Involve family in performance of ADLs  - Assess for home care needs following discharge   - Consider OT consult to assist with ADL evaluation and planning for discharge  - Provide patient education as appropriate  Outcome: Not Progressing  Goal: Maintains/Returns to pre admission functional level  Description: INTERVENTIONS:  - Perform BMAT or MOVE assessment daily    - Set and communicate daily mobility goal to care team and patient/family/caregiver  - Collaborate with rehabilitation services on mobility goals if consulted  - Perform Range of Motion 3 times a day  - Reposition patient every 2 hours    - Dangle patient 3 times a day  - Stand patient 2 times a day  - Ambulate patient 3 times a day  - Out of bed to chair 3 times a day   - Out of bed for meals 3 times a day  - Out of bed for toileting  - Record patient progress and toleration of activity level   Outcome: Not Progressing

## 2023-06-13 NOTE — PROGRESS NOTES
[facility]    History and Physical  POS: 32    Records Reviewed include: Hospital records    Chief Complaint/ Reason for Admission: Acute diverticulitis    History of Present Illness:     66year old male patient with past medical history of coronary artery disease, prostate and renal cell cancer, gout, GERD, Parkinson's disease, and stage 3b chronic kidney disease was admitted to [place] [address] on 6/8 for generalized weakness and abdominal discomfort  CT on admission showed colonic diverticulosis with mild wall thickening and pericolonic inflammation involving the distal descending colon  No bowel obstruction present  There was minimal perihepatic ascites  General surgery was consulted and did not recommend surgical intervention  He was started on IV metronidazole and IV ceftriaxone  He completed 5 days of antibiotics  During his admission his EKG showed abnormal changes that were concerning for atrial flutter  Cardiology was consulted and feels that this abnormality is likely from the patient's Parkinsonian tremor  He was cleared by cardiology for discharge  He is to continue PO keflex and metronidazole for 2 days  Patient was transferred to the Transitional Care Unit for rehabilitation and monitoring  He states he has dizziness on standing  Orthostatic vitals showed a lying blood pressure 94/52, a sitting blood pressure 99/51, and a standing blood pressure of 67/45  He states he has nausea from his antibiotics  Denies headache, syncope, or vomiting  He states his appetite has been fair  Denies abdominal pain, constipation, dysuria, or hematuria  He has scrotal edema that was noted on his admission  He had diarrhea on arrival from the unit  Will continue to monitor  He has a right hand tremor at rest  Denies numbness or tingling in this extremities  He has right shoulder pain that he rates a 7/10  He was independent of his ADLs at home  Will work with PT and OT   Goal is to transfer back to home                 Allergies    Allergies   Allergen Reactions   • Indomethacin Shortness Of Breath       Past Medical History  Past Medical History:   Diagnosis Date   • Anxiety    • CAD (coronary artery disease)    • Carotid stenosis, right     50-69% internal   • Chronic kidney disease (CKD), stage III (moderate)     baseline Cr 1 50   • Diverticulosis    • GERD (gastroesophageal reflux disease)    • Gout    • History of nephrolithiasis    • History of prostate cancer     s/p radiation/Lupron   • History of renal cell cancer     s/p left nephrectomy   • Osteoarthritis     knees & shoulders   • Parkinson disease    • Pulmonary nodule     7mm PARKER        Past Surgical History:   Procedure Laterality Date   • ARTHROSCOPY KNEE Right    • CARDIAC CATHETERIZATION     • JOINT REPLACEMENT Left     knee   • NEPHRECTOMY Left 2018   • ME CORONARY ARTERY BYP W/VEIN & ARTERY GRAFT 2 VEIN N/A 9/2/2020    Procedure: CORONARY ARTERY BYPASS GRAFT (CABG) 2 VESSELS: LIMA to LAD, RLE EVH/SVG to PDA; Surgeon: Uriel Yeung DO;  [facility];  Service: Cardiac Surgery   • ME ECHO TRANSESOPHAG R-T 2D W/PRB IMG ACQUISJ I&R N/A 9/2/2020    Procedure: TRANSESOPHAGEAL ECHOCARDIOGRAM (MEGAN); Surgeon: Uriel Yeung DO;  [facility];  Service: Cardiac Surgery   • ME NDSC SURG W/VIDEO-ASSISTED HARVEST VEIN CABG Right 9/2/2020    Procedure: HARVEST VEIN ENDOSCOPIC ([address]);   Surgeon: Uriel Yeung DO;  Location[facility];  Service: Cardiac Surgery   • REPLACEMENT TOTAL KNEE Left    • ROTATOR CUFF REPAIR Right    • TONSILLECTOMY         Family History  Family History   Problem Relation Age of Onset   • Heart disease Mother    • Heart attack Mother    • Heart attack Sister    • Heart attack Brother        Social History  Social History     Tobacco Use   Smoking Status Never   Smokeless Tobacco Never      Social History     Substance and Sexual Activity   Alcohol Use Yes   • Alcohol/week: 9 0 standard drinks of alcohol   • Types: 9 Standard drinks or equivalent per week    Comment: 3x a week ( 6-7 mixed drinks each time- henok)      Social History     Substance and Sexual Activity   Drug Use Never        Lives: Home,  Social Support: Yes  Fall in the past 12 months: No  Use of assistance Device: None    Physical Exam    Vital Signs    Vitals:    06/13/23 1702   BP: 118/59   Pulse: 68   Resp: 20   Temp: (!) 96 5 °F (35 8 °C)   SpO2: 99%           Constitutional: Alert and oriented to person, place, and time  He is not in acute distress  Physical Exam  Vitals and nursing note reviewed  Constitutional:       General: He is not in acute distress  Appearance: He is obese  He is not diaphoretic  HENT:      Head: Normocephalic and atraumatic  Nose: Nose normal       Mouth/Throat:      Mouth: Mucous membranes are dry  Eyes:      General:         Right eye: No discharge  Left eye: No discharge  Pupils: Pupils are equal, round, and reactive to light  Cardiovascular:      Rate and Rhythm: Normal rate and regular rhythm  Heart sounds: Normal heart sounds  No murmur heard  Pulmonary:      Effort: Pulmonary effort is normal  No respiratory distress  Breath sounds: Normal breath sounds  No wheezing  Abdominal:      General: Bowel sounds are normal       Palpations: Abdomen is soft  Tenderness: There is no abdominal tenderness  There is no guarding or rebound  Musculoskeletal:         General: Swelling present  No tenderness  Cervical back: Normal range of motion and neck supple  Right lower leg: Edema present  Left lower leg: Edema present  Comments: Swelling in right arm  Bilateral leg edema  Skin:     General: Skin is warm and dry  Capillary Refill: Capillary refill takes less than 2 seconds  Findings: Bruising present  Neurological:      Mental Status: He is alert and oriented to person, place, and time  Mental status is at baseline        Sensory: No sensory deficit  Psychiatric:         Behavior: Behavior normal          Review of Systems:  Review of Systems   Constitutional: Positive for activity change and appetite change  Negative for chills, fatigue and fever  HENT: Negative for congestion, hearing loss, rhinorrhea, tinnitus and trouble swallowing  Eyes: Negative for pain and visual disturbance  Respiratory: Negative for cough, shortness of breath and wheezing  Cardiovascular: Positive for leg swelling  Negative for chest pain and palpitations  Gastrointestinal: Positive for diarrhea  Negative for abdominal distention, abdominal pain, constipation, nausea and vomiting  Endocrine: Negative for cold intolerance and heat intolerance  Genitourinary: Positive for penile swelling  Negative for difficulty urinating, dysuria and hematuria  Musculoskeletal: Positive for arthralgias  Right shoulder pain  Skin: Negative for rash and wound  Bruising from previous blood draws on arms bilaterally  Allergic/Immunologic: Negative for environmental allergies  Neurological: Positive for dizziness and tremors  Negative for syncope, light-headedness, numbness and headaches  Dizziness on standing  Hematological: Bruises/bleeds easily  Psychiatric/Behavioral: Negative for behavioral problems and sleep disturbance  List of Current Medications:    Medication reviewed  All orders signed  Complete list is in the paper chart  Allergies    Allergies   Allergen Reactions   • Indomethacin Shortness Of Breath       Labs/Diagnostics (reviewed by this provider): I personally reviewed lab results and imaging studies  Full reports are in the paper chart  Assessment/Plan:    Ambulatory dysfunction  Patient was admitted for generalized weakness and abdominal discomfort  Will with PT and OT  Keep patient on fall precautions  Anemia  Hemoglobin trending up today to 10 1  Will continue to monitor CBC    Monitor patient for signs of bleeding  Plan to transfuse if hemoglobin is less than 7 0  Generalized weakness  Patient was admitted for generalized weakness and abdominal discomfort  Weakness was determined to by multifactorial from acute diverticulitis, Parkinson's disease, older age, and other medical comorbidities  Will work with PT and OT  Keep patient on fall precautions  Dyslipidemia  Continue home dose atorvastatin 80 mg daily  Penile edema  Patient was found to have dependent scrotal edema  Urology was consulted and recommended supportive therapy  Monitor for worsening edema  Follow up with urology outpatient  Chronic kidney disease (CKD), stage III (moderate)  Lab Results   Component Value Date    CREATININE 1 51 (H) 06/13/2023    CREATININE 1 32 (H) 06/12/2023    CREATININE 1 38 (H) 06/11/2023    EGFR 43 06/13/2023    EGFR 51 06/12/2023    EGFR 48 06/11/2023   Creatinine trending up today to 1 51  GFR 43 today  Will continue to monitor CMP  Encourage increased PO fluid intake  Parkinson's disease [facility])  Patient has a history of Parkinson's disease  Continue sinemet  mg 3 times daily  Keep patient on fall precautions  Monitor orthostatic vitals  Provide supportive care  Coronary artery disease involving native coronary artery of native heart without angina pectoris  EKG on admission showed abnormal changes that concerning for atrial flutter  Cardiology attributed this abnormality to the patient's Parkinsonian tremor  He was cleared by cardiology for discharge  ECHO during admission showed an EF of 65%  Continue home dose of aspirin 81 mg daily  Continue home dose 25 mg metoprolol  Continue home dose of atorvastatin 80 mg daily  Acute diverticulitis  Patient was admitted for generalized weakness and abdominal discomfort  CT on admission showed colonic diverticulosis with mild wall thickening and pericolonic inflammation involving the distal descending colon   No bowel obstruction present  There was minimal perihepatic ascites  General surgery was consulted and did not recommend surgical intervention  He was started on IV metronidazole and IV ceftriaxone  He completed 5 days of antibiotics  Continue PO keflex and metronidazole for 2 days  Patient had diarrhea today  Will give florastor for 5 days  If diarrhea continues will test for c  diff  Encourage increased PO fluid intake  Orthostatic hypotension  Orthostatic vitals showed a lying blood pressure 94/52, a sitting blood pressure 99/51, and a standing blood pressure of 67/45  He has dizziness with standing  Denies headache, syncope, or vomiting  Continue to monitor orthostatic vital signs  Encourage increased PO fluid intake  Wear compression stockings as tolerated  Keep patient on fall precautions           Pain: 7/10 right shoulder pain at rest  Rehab Potential:Good  Patient Informed of Medical Condition: Yes   Patient is Capable of Understanding Their Right: Yes   Discharge Plan: Home  Vaccination:   Immunization History   Administered Date(s) Administered   • COVID-19 MODERNA VACC 0 5 ML IM 03/16/2021, 04/15/2021     Advanced Directives: yes: Yes   Code status:DNR (Per discussion with resident or POA)  PCP: Dr Edyta Domínguez MD  Geriatric Medicine  7/17/65671:49 PM

## 2023-06-13 NOTE — ASSESSMENT & PLAN NOTE
· Continue Sinemet postdischarge  · Continue fall precautions, continue supportive care at the skilled nursing facility

## 2023-06-13 NOTE — PLAN OF CARE
Problem: PAIN - ADULT  Goal: Verbalizes/displays adequate comfort level or baseline comfort level  Description: Interventions:  - Encourage patient to monitor pain and request assistance  - Assess pain using appropriate pain scale  - Administer analgesics based on type and severity of pain and evaluate response  - Implement non-pharmacological measures as appropriate and evaluate response  - Consider cultural and social influences on pain and pain management  - Notify physician/advanced practitioner if interventions unsuccessful or patient reports new pain  Outcome: Progressing     Problem: SAFETY ADULT  Goal: Patient will remain free of falls  Description: INTERVENTIONS:  - Educate patient/family on patient safety including physical limitations  - Instruct patient to call for assistance with activity   - Consult OT/PT to assist with strengthening/mobility   - Keep Call bell within reach  - Keep bed low and locked with side rails adjusted as appropriate  - Keep care items and personal belongings within reach  - Initiate and maintain comfort rounds  - Make Fall Risk Sign visible to staff  - Offer Toileting  - Initiate/Maintain   - Obtain necessary fall risk management equipment:  - Apply yellow socks and bracelet for high fall risk patients  - Consider moving patient to room near nurses station  Outcome: Progressing     Problem: MOBILITY - ADULT  Goal: Maintain or return to baseline ADL function  Description: INTERVENTIONS:  -  Assess patient's ability to carry out ADLs; assess patient's baseline for ADL function and identify physical deficits which impact ability to perform ADLs (bathing, care of mouth/teeth, toileting, grooming, dressing, etc )  - Assess/evaluate cause of self-care deficits   - Assess range of motion  - Assess patient's mobility; develop plan if impaired  - Assess patient's need for assistive devices and provide as appropriate  - Encourage maximum independence but intervene and supervise when necessary  - Involve family in performance of ADLs  - Assess for home care needs following discharge   - Consider OT consult to assist with ADL evaluation and planning for discharge  - Provide patient education as appropriate  Outcome: Progressing

## 2023-06-14 ENCOUNTER — NURSING HOME VISIT (OUTPATIENT)
Dept: OTHER | Facility: HOSPITAL | Age: 78
End: 2023-06-14
Payer: COMMERCIAL

## 2023-06-14 DIAGNOSIS — G89.29 CHRONIC RIGHT SHOULDER PAIN: ICD-10-CM

## 2023-06-14 DIAGNOSIS — D64.9 ANEMIA, UNSPECIFIED TYPE: ICD-10-CM

## 2023-06-14 DIAGNOSIS — G47.00 INSOMNIA, UNSPECIFIED TYPE: ICD-10-CM

## 2023-06-14 DIAGNOSIS — I95.1 ORTHOSTATIC HYPOTENSION: ICD-10-CM

## 2023-06-14 DIAGNOSIS — M25.511 CHRONIC RIGHT SHOULDER PAIN: ICD-10-CM

## 2023-06-14 DIAGNOSIS — N18.32 STAGE 3B CHRONIC KIDNEY DISEASE (HCC): ICD-10-CM

## 2023-06-14 DIAGNOSIS — I25.10 CORONARY ARTERY DISEASE INVOLVING NATIVE CORONARY ARTERY OF NATIVE HEART WITHOUT ANGINA PECTORIS: ICD-10-CM

## 2023-06-14 DIAGNOSIS — G20 PARKINSON'S DISEASE (HCC): ICD-10-CM

## 2023-06-14 DIAGNOSIS — K57.92 ACUTE DIVERTICULITIS: Primary | ICD-10-CM

## 2023-06-14 PROCEDURE — 99309 SBSQ NF CARE MODERATE MDM 30: CPT | Performed by: FAMILY MEDICINE

## 2023-06-14 NOTE — ASSESSMENT & PLAN NOTE
Lidocaine patch at night  Add biofreeze during the day  Continue to work with PT and OT  Add tylenol scheduled to his regimen  Consider oxycodone if no improvement    Apply ice TID

## 2023-06-14 NOTE — ASSESSMENT & PLAN NOTE
Continue PO keflex and metronidazole for one more day  Patient had 2 loose bowel movements today  Continue florastor for 4 more days  Encourage increased PO fluid intake

## 2023-06-14 NOTE — UTILIZATION REVIEW
NOTIFICATION OF ADMISSION DISCHARGE   This is a Notification of Discharge from [address]  Please be advised that this patient has been discharge from our facility  Below you will find the admission and discharge date and time including the patient’s disposition  UTILIZATION REVIEW CONTACT:  Oleksandr Coyle  Utilization   Network Utilization Review Department  Phone: [phone] carefully listen to the prompts  All voicemails are confidential   Email: [email or web address]     ADMISSION INFORMATION  PRESENTATION DATE: 6/8/2023  2:20 PM  OBERVATION ADMISSION DATE:   INPATIENT ADMISSION DATE: 6/9/23 11:04 AM   DISCHARGE DATE: 6/13/2023  2:51 PM   DISPOSITION:Released to SNF/TCU/SNU Facility    IMPORTANT INFORMATION:  Send all requests for admission clinical reviews, approved or denied determinations and any other requests to dedicated fax number below belonging to the campus where the patient is receiving treatment   List of dedicated fax numbers:  [address] DENIALS (Administrative/Medical Necessity) [phone]   [address] (Maternity/NICU/Pediatrics) [phone]   [facility] [phone]   [facility] [phone]   [address] [phone]   220 [facility] [phone]   [address] [place] [address] [phone]   [address] [place] [address] 119 [phone]   [facility]  [phone]   4052 [facility] [phone]   412 [facility] 850 [facility] [phone]

## 2023-06-14 NOTE — ASSESSMENT & PLAN NOTE
Continue sinemet  mg TID  Keep patient on fall precautions  Monitor orthostatic vital signs  Provide supportive care

## 2023-06-14 NOTE — ASSESSMENT & PLAN NOTE
Hemoglobin trending down today to 9 2  Continue to monitor CBC  Monitor for signs of bleeding  Plan to transfuse if hemoglobin is less than 7 0

## 2023-06-14 NOTE — ASSESSMENT & PLAN NOTE
Patient states he has been having trouble sleeping  He also states he has been feeling down  Add mirtazapine 7 5 mg daily  Maintain sleep-wake cycle and avoid daytime naps  Provide supportive care

## 2023-06-14 NOTE — PROGRESS NOTES
[address]  [phone]  [facility] 79 of Service: nursing home place of service: POS [address] Coverage      NAME: Sylvia Mccartney  AGE: 66 y o  SEX: male [phone]    DATE OF ENCOUNTER: 6/14/2023    Assessment and Plan     1  Acute diverticulitis  Assessment & Plan:  Continue PO keflex and metronidazole for one more day  Patient had 2 loose bowel movements today  Continue florastor for 4 more days  Encourage increased PO fluid intake  2  Parkinson's disease ([facility] )  Assessment & Plan:  Continue sinemet  mg TID  Keep patient on fall precautions  Monitor orthostatic vital signs  Provide supportive care  3  Orthostatic hypotension  Assessment & Plan:    He states he has dizziness on standing and weakness  Denies headache, syncope, or vomiting  Continue to monitor orthostatic vital signs  Continue to wear compression stockings as tolerated  Add midodrine 2 5 mg TID  Encourage increased PO fluid intake  Keep patient on fall precautions  Add protein supplements to his regimen          4  Coronary artery disease involving native coronary artery of native heart without angina pectoris  Assessment & Plan:  Continue home dose of aspirin 81 mg daily  Continue home dose of atorvastatin 80 mg daily  Hold metoprolol 25 mg daily  Add heparin 5000 units for DVT prophylaxis  5  Stage 3b chronic kidney disease ([facility] )  Assessment & Plan:  Lab Results   Component Value Date    CREATININE 1 43 (H) 06/14/2023    CREATININE 1 51 (H) 06/13/2023    CREATININE 1 32 (H) 06/12/2023    EGFR 46 06/14/2023    EGFR 43 06/13/2023    EGFR 51 06/12/2023   Creatinine trending down today to 1 43  Will continue to monitor CMP  Encourage increased PO fluid intake  6  Anemia, unspecified type  Assessment & Plan:  Hemoglobin trending down today to 9 2  Continue to monitor CBC  Monitor for signs of bleeding   Plan to transfuse if hemoglobin is less than 7 0       7  Chronic right shoulder pain  Assessment & Plan:  Lidocaine patch at night  Add biofreeze during the day  Continue to work with PT and OT  Add tylenol scheduled to his regimen  Consider oxycodone if no improvement  Apply ice TID      8  Insomnia, unspecified type  Assessment & Plan:  Patient states he has been having trouble sleeping  He also states he has been feeling down  Add mirtazapine 7 5 mg daily  Maintain sleep-wake cycle and avoid daytime naps  Provide supportive care  Chief Complaint     Follow up for diverticulitis  History of Present Illness     Letcher Shone is a 66 y o  male who was seen today for follow up  He states he is not feeling great today  He has dizziness and weakness on standing  Denies headache, nausea, vomiting, or syncope  Denies shortness of breath, dyspnea, palpitations, chest pain, or cough  He states his appetite has been good  Denies abdominal pain, constipation, diarrhea, dysuria, or hematuria  He had 2 loose bowel movements today  He states he has right shoulder pain at rest  He states the lidocaine patch is helping, but he is still in pain  He states he is feeling down today  He denies suicidal thoughts  Will continue to monitor  Patient with continue to work with PT and OT  The following portions of the patient's history were reviewed and updated as appropriate: allergies, current medications, past family history, past medical history, past social history, past surgical history and problem list     Review of Systems     Review of Systems   Constitutional: Positive for activity change  Negative for appetite change, chills, fatigue and fever  HENT: Negative for congestion, hearing loss, rhinorrhea, tinnitus and trouble swallowing  Eyes: Negative for pain and visual disturbance  Respiratory: Negative for cough, shortness of breath and wheezing      Cardiovascular: Negative for chest pain, palpitations and leg swelling  Gastrointestinal: Negative for abdominal distention, abdominal pain, constipation, diarrhea, nausea and vomiting  Endocrine: Negative for cold intolerance and heat intolerance  Genitourinary: Positive for scrotal swelling  Negative for dysuria and hematuria  Musculoskeletal: Positive for arthralgias, back pain and gait problem  Skin: Negative for rash and wound  Allergic/Immunologic: Negative for environmental allergies  Neurological: Positive for dizziness, weakness and numbness  Negative for syncope, light-headedness and headaches  Hematological: Does not bruise/bleed easily  Psychiatric/Behavioral: Negative for behavioral problems, sleep disturbance and suicidal ideas  States he has been feeling down  Active Problem List     Patient Active Problem List   Diagnosis   • Coronary artery disease involving native coronary artery of native heart   • Renal insufficiency   • Dyslipidemia   • Stage 3 chronic kidney disease (HCC)   • Chronic kidney disease (CKD), stage III (moderate)   • Coronary artery disease involving native coronary artery of native heart without angina pectoris   • Carotid stenosis, right   • S/P CABG x 2   • Tremor of right hand   • Obesity   • BYNUM (dyspnea on exertion)   • Generalized weakness   • Dizziness   • Acute diverticulitis   • Parkinson's disease ([facility] )   • Abnormal ECG   • Anemia   • Penile edema   • Swelling of right upper extremity   • Ambulatory dysfunction   • Orthostatic hypotension   • Right shoulder pain   • Insomnia       Objective     Vital Signs:    Blood pressure 80/39 Heart Rate: 61 Respiratory Rate 20   Temperature 97 1 degrees F Oxygen Saturation 98% Weight 211 7 lbs    Physical Exam  Vitals and nursing note reviewed  Constitutional:       General: He is not in acute distress  Appearance: He is not diaphoretic  HENT:      Head: Normocephalic and atraumatic        Nose: Nose normal       Mouth/Throat: Mouth: Mucous membranes are dry  Eyes:      General:         Right eye: No discharge  Left eye: No discharge  Pupils: Pupils are equal, round, and reactive to light  Cardiovascular:      Rate and Rhythm: Normal rate and regular rhythm  Heart sounds: Normal heart sounds  No murmur heard  Pulmonary:      Effort: Pulmonary effort is normal  No respiratory distress  Breath sounds: Normal breath sounds  No wheezing  Abdominal:      General: Bowel sounds are normal  There is no distension  Palpations: Abdomen is soft  Tenderness: There is no abdominal tenderness  There is no guarding or rebound  Musculoskeletal:         General: Swelling present  Cervical back: Normal range of motion and neck supple  Comments: Swelling in right arm  Skin:     General: Skin is warm and dry  Capillary Refill: Capillary refill takes less than 2 seconds  Findings: Bruising present  Comments: Bruising on arms  Neurological:      Mental Status: He is alert and oriented to person, place, and time  Sensory: No sensory deficit  Motor: Tremor present  Psychiatric:         Mood and Affect: Mood is depressed  Behavior: Behavior normal          Pertinent Laboratory/Diagnostic Studies:  Laboratory and Imaging studies reviewed  Full report in the paper chart  Current Medications   Medications reviewed and updated in facility chart      Name: Alix Magaña  : 1945  MRN: [id number]        Antonio Zhu MD  Geriatric Medicine  2023 12:43 PM

## 2023-06-14 NOTE — ASSESSMENT & PLAN NOTE
Lab Results   Component Value Date    CREATININE 1 43 (H) 06/14/2023    CREATININE 1 51 (H) 06/13/2023    CREATININE 1 32 (H) 06/12/2023    EGFR 46 06/14/2023    EGFR 43 06/13/2023    EGFR 51 06/12/2023   Creatinine trending down today to 1 43  Will continue to monitor CMP  Encourage increased PO fluid intake

## 2023-06-14 NOTE — ASSESSMENT & PLAN NOTE
He states he has dizziness on standing and weakness  Denies headache, syncope, or vomiting  Continue to monitor orthostatic vital signs  Continue to wear compression stockings as tolerated  Add midodrine 2 5 mg TID  Encourage increased PO fluid intake  Keep patient on fall precautions     Add protein supplements to his regimen

## 2023-06-14 NOTE — ASSESSMENT & PLAN NOTE
Continue home dose of aspirin 81 mg daily  Continue home dose of atorvastatin 80 mg daily  Hold metoprolol 25 mg daily  Add heparin 5000 units for DVT prophylaxis

## 2023-06-16 ENCOUNTER — NURSING HOME VISIT (OUTPATIENT)
Dept: GERIATRICS | Facility: OTHER | Age: 78
End: 2023-06-16
Payer: COMMERCIAL

## 2023-06-16 VITALS
HEART RATE: 65 BPM | RESPIRATION RATE: 20 BRPM | SYSTOLIC BLOOD PRESSURE: 100 MMHG | TEMPERATURE: 96.7 F | OXYGEN SATURATION: 97 % | WEIGHT: 215.2 LBS | BODY MASS INDEX: 33.71 KG/M2 | DIASTOLIC BLOOD PRESSURE: 59 MMHG

## 2023-06-16 DIAGNOSIS — G47.00 INSOMNIA, UNSPECIFIED TYPE: ICD-10-CM

## 2023-06-16 DIAGNOSIS — I95.1 ORTHOSTATIC HYPOTENSION: ICD-10-CM

## 2023-06-16 DIAGNOSIS — R26.2 AMBULATORY DYSFUNCTION: Primary | ICD-10-CM

## 2023-06-16 DIAGNOSIS — N18.32 STAGE 3B CHRONIC KIDNEY DISEASE (HCC): ICD-10-CM

## 2023-06-16 DIAGNOSIS — K57.92 ACUTE DIVERTICULITIS: ICD-10-CM

## 2023-06-16 DIAGNOSIS — G20 PARKINSON'S DISEASE (HCC): ICD-10-CM

## 2023-06-16 DIAGNOSIS — R25.1 TREMOR OF RIGHT HAND: ICD-10-CM

## 2023-06-16 DIAGNOSIS — E78.5 DYSLIPIDEMIA: ICD-10-CM

## 2023-06-16 LAB — BACTERIA BLD CULT: NORMAL

## 2023-06-16 PROCEDURE — 99309 SBSQ NF CARE MODERATE MDM 30: CPT

## 2023-06-16 NOTE — PROGRESS NOTES
[address], [address] [place] [address], [address] [place] [address]  [phone]    NAME: Letcher Shone  AGE: 66 y o   SEX: male    Progress Note    Location: [facility]  POS: 32 (SNF)      Chief complaint / Reason for visit:  Follow-up visit    Assessment/Plan:    Tremor of right hand  · Constant tremors noted in right hand  · Patient states it calms down at night and when he is sleeping  · Continue carbidopa-levodopa  mg po tablet TID    Insomnia  · Continue mirtazapine 7 5 mg po daily at bedtime  · Group nursing activities to allow uninterrupted sleep  · Maintain sleep/wake cycle  · Monitor mentation     Dyslipidemia  · Cardiology, Ajit Walton,  recommendation to decrease dosage of atorvastatin   · Start atorvastatin 40 mg po daily in the evening  · Lipid panel (12/01/22)  · Cholesterol-70  · Triglycerides-85  · LDL-39  · Recommend follow up with PCP and cardiologist    Ambulatory dysfunction  · Continue PT/OT  · Patient is assist x 1 with walker  · Seen ambulating short distances with therapy  · Fall precautions  · Patient states at home ambulatory dysfunction has started a couple of months ago    Parkinson's disease ([facility] [address] )  · Continue carbidopa-levodopa  mg po tablet TID  · Patient with hypotension  · Requiring increasing midodrine to 5 mg po TID  · Provide supportive care  · Patient seen with constant tremors in right hand  · Recommend following up with neurology    Orthostatic hypotension  · Increase midodrine to 5 mg po TID  · ISELA stockings on at time of assessment  · Encourage slow positional changes  · Encourage increase po hydration  · Fall precautions     Acute diverticulitis  · Finished course of antibiotics  · Patient was experiencing loose bowels  · Negative c-diff sample  · Monitor bowel movements  · Encourage increased po hydration  · No tenderness on assessment    Chronic kidney disease (CKD), stage III (moderate)  Lab Results   Component Value Date    EGFR 50 06/16/2023    EGFR 46 06/14/2023    EGFR 43 06/13/2023    CREATININE 1 33 (H) 06/16/2023    CREATININE 1 43 (H) 06/14/2023    CREATININE 1 51 (H) 06/13/2023   · Need to monitor closely  · History of left nephrectomy   · Encourage increased po hydration      This is a 66 y o  male seen today at [address] [place] [address]  Medical history includes, not limited to, diverticulitis, CKD stage 3, orthostatic hypotension, Parkinson's Disease, hyperlipidemia, tremors, atherosclerotic heart disease  Patient with recent hospital admission from 06/08-06/13  Presented to ED with generalized weakness and abdominal discomfort  While in ED patient was diagnosed with diverticulitis on CAT scan  Here at rehab he finished his 7 day course of antibiotics, keflex and metronidazole  Evaluated by therapy with recommendation for rehab  Patient seen today at [address] [place] [address] after participating with therapy  He is alert and oriented x 3  Able to answer all questions appropriately  He currently has pain in his sacrum from sitting in the recliner, he also experiences chronic pain in his right shoulder and neck  He states the biofreeze has been helping slightly  He also has lidoderm patch ordered daily  He is seen with constant tremors in his right hand, he states the tremors decrease at night  He has been trouble sleeping at night due to interruptions by nursing staff  Spoke to him and the nursing supervisor about grouping care and trying to limit interruptions by staff  He states he has been eating well  He states he has too much food that at home he does not generally eat such big meals  Denies nausea and vomiting  Patient continues to have lower blood pressures, I shared with him that I reached out to the cardiologist that saw him at the [address] [place] [address] who recommended discontinuing the metoprolol and decreasing his atorvastatin to 40 mg po daily    Patient was seen working with physical therapy and able to walk a short distance in the hallway  He states his weakness started a couple months ago, he has trouble walking distances, he starts to feel weak and disoriented  He states that when he is driving or in his recliner at home he does not feel these symptoms  Reviewed patient with nursing staff  Vitals and labs were reviewed in [facility], increased patient's midodrine to 5 mg po TID during this encounter  Nursing and prior provider notes reviewed on this visit  Discussed visit with PCP and nursing staff/ supervisor  Review of Systems   Constitutional: Positive for activity change  Negative for appetite change, fatigue and fever  HENT: Negative for congestion and rhinorrhea  Eyes: Negative for pain and visual disturbance  Respiratory: Negative for cough and shortness of breath  Cardiovascular: Positive for leg swelling  Negative for chest pain  Gastrointestinal: Negative for abdominal distention, abdominal pain, constipation, diarrhea, nausea and vomiting  Genitourinary: Negative for difficulty urinating and dysuria  Musculoskeletal: Positive for arthralgias, back pain, gait problem, myalgias and neck pain  Skin: Negative for color change and rash  Neurological: Positive for tremors  Negative for dizziness, syncope, weakness and light-headedness  Psychiatric/Behavioral: Positive for sleep disturbance  Negative for behavioral problems and confusion  All other systems reviewed and are negative  ALLERGY: Reviewed, unchanged  Allergies   Allergen Reactions   • Indomethacin Shortness Of Breath       HISTORY:  Medical Hx: Reviewed, unchanged  Family Hx: Reviewed, unchanged  Soc Hx: Reviewed,  unchanged      Physical Exam  Vitals reviewed  Constitutional:       General: He is not in acute distress  Appearance: Normal appearance  He is not ill-appearing  HENT:      Head: Normocephalic        Right Ear: Tympanic membrane normal       Left Ear: Tympanic membrane normal       Nose: Nose normal  No congestion  Mouth/Throat:      Mouth: Mucous membranes are moist       Pharynx: Oropharynx is clear  Eyes:      General:         Right eye: No discharge  Left eye: No discharge  Extraocular Movements: Extraocular movements intact  Conjunctiva/sclera: Conjunctivae normal       Pupils: Pupils are equal, round, and reactive to light  Cardiovascular:      Rate and Rhythm: Normal rate and regular rhythm  Pulses: Normal pulses  Heart sounds: Normal heart sounds  Pulmonary:      Effort: Pulmonary effort is normal  No respiratory distress  Breath sounds: Normal breath sounds  Chest:      Chest wall: No tenderness  Abdominal:      General: Bowel sounds are normal       Palpations: Abdomen is soft  Tenderness: There is no abdominal tenderness  Musculoskeletal:         General: No swelling  Normal range of motion  Cervical back: Normal range of motion  Right lower leg: Edema present  Left lower leg: Edema present  Skin:     General: Skin is warm and dry  Neurological:      Mental Status: He is alert and oriented to person, place, and time  Mental status is at baseline  Motor: No weakness  Psychiatric:         Mood and Affect: Mood normal          Behavior: Behavior normal          Thought Content: Thought content normal          Judgment: Judgment normal             Laboratory results / Imaging reviewed: Hard copy/ies in medical chart:    Vitals:    06/16/23 0954   BP: 100/59   Pulse: 65   Resp: 20   Temp: (!) 96 7 °F (35 9 °C)   SpO2: 97%       Current Medications: All medications reviewed and updated in Nursing Home Chart    Please note: This note was completed in part utilizing a voice-recognition software may have been used in the preparation of this document   Grammatical errors, random word insertion, spelling mistakes, and incomplete sentences may be an occasional consequence of the system secondary to software limitations, "ambient noise and hardware issues  Occasional wrong word or \"sound-alike\" substitutions may have occurred due to the inherent limitations of voice recognition software  At the time of dictation, efforts were made to edit, clarify and/or correct errors  Interpretation should be guided by context  Please read the chart carefully and recognize, using context, where substitutions have occurred  If you have any questions or concerns about the context, text or information contained within the body of this dictation, please contact myself, the provider, for further clarification        RAEANN Joshua  6/16/2023  "

## 2023-06-16 NOTE — ASSESSMENT & PLAN NOTE
Lab Results   Component Value Date    EGFR 50 06/16/2023    EGFR 46 06/14/2023    EGFR 43 06/13/2023    CREATININE 1 33 (H) 06/16/2023    CREATININE 1 43 (H) 06/14/2023    CREATININE 1 51 (H) 06/13/2023   · Need to monitor closely  · History of left nephrectomy   · Encourage increased po hydration

## 2023-06-16 NOTE — ASSESSMENT & PLAN NOTE
· Continue PT/OT  · Patient is assist x 1 with walker  · Seen ambulating short distances with therapy  · Fall precautions  · Patient states at home ambulatory dysfunction has started a couple of months ago

## 2023-06-16 NOTE — ASSESSMENT & PLAN NOTE
· Continue carbidopa-levodopa  mg po tablet TID  · Patient with hypotension  · Requiring increasing midodrine to 5 mg po TID  · Provide supportive care  · Patient seen with constant tremors in right hand  · Recommend following up with neurology

## 2023-06-16 NOTE — ASSESSMENT & PLAN NOTE
· Finished course of antibiotics  · Patient was experiencing loose bowels  · Negative c-diff sample  · Monitor bowel movements  · Encourage increased po hydration  · No tenderness on assessment

## 2023-06-16 NOTE — ASSESSMENT & PLAN NOTE
· Constant tremors noted in right hand  · Patient states it calms down at night and when he is sleeping  · Continue carbidopa-levodopa  mg po tablet TID

## 2023-06-16 NOTE — ASSESSMENT & PLAN NOTE
· Continue mirtazapine 7 5 mg po daily at bedtime  · Group nursing activities to allow uninterrupted sleep  · Maintain sleep/wake cycle  · Monitor mentation

## 2023-06-16 NOTE — ASSESSMENT & PLAN NOTE
· Increase midodrine to 5 mg po TID  · ISELA stockings on at time of assessment  · Encourage slow positional changes  · Encourage increase po hydration  · Fall precautions

## 2023-06-20 ENCOUNTER — NURSING HOME VISIT (OUTPATIENT)
Dept: GERIATRICS | Facility: OTHER | Age: 78
End: 2023-06-20
Payer: COMMERCIAL

## 2023-06-20 VITALS
RESPIRATION RATE: 18 BRPM | WEIGHT: 225.9 LBS | HEART RATE: 74 BPM | OXYGEN SATURATION: 97 % | DIASTOLIC BLOOD PRESSURE: 55 MMHG | SYSTOLIC BLOOD PRESSURE: 110 MMHG | TEMPERATURE: 97.1 F | BODY MASS INDEX: 35.38 KG/M2

## 2023-06-20 DIAGNOSIS — R25.1 TREMOR OF RIGHT HAND: ICD-10-CM

## 2023-06-20 DIAGNOSIS — R53.1 GENERALIZED WEAKNESS: ICD-10-CM

## 2023-06-20 DIAGNOSIS — D64.9 ANEMIA, UNSPECIFIED TYPE: ICD-10-CM

## 2023-06-20 DIAGNOSIS — G20 PARKINSON'S DISEASE (HCC): ICD-10-CM

## 2023-06-20 DIAGNOSIS — I25.10 CORONARY ARTERY DISEASE INVOLVING NATIVE CORONARY ARTERY OF NATIVE HEART WITHOUT ANGINA PECTORIS: ICD-10-CM

## 2023-06-20 DIAGNOSIS — R42 DIZZINESS: ICD-10-CM

## 2023-06-20 DIAGNOSIS — I95.1 ORTHOSTATIC HYPOTENSION: Primary | ICD-10-CM

## 2023-06-20 DIAGNOSIS — G47.00 INSOMNIA, UNSPECIFIED TYPE: ICD-10-CM

## 2023-06-20 DIAGNOSIS — R26.2 AMBULATORY DYSFUNCTION: ICD-10-CM

## 2023-06-20 DIAGNOSIS — E78.5 DYSLIPIDEMIA: ICD-10-CM

## 2023-06-20 PROCEDURE — 99309 SBSQ NF CARE MODERATE MDM 30: CPT

## 2023-06-20 NOTE — PROGRESS NOTES
[address], [address] [place] [address]  [phone]    NAME: Kelechi Soto  AGE: 66 y o   SEX: male    Progress Note    Location: [facility]  POS: 32 (SNF)      Chief complaint / Reason for visit:  Follow-up visit    Assessment/Plan:    Coronary artery disease involving native coronary artery of native heart without angina pectoris  · Continue aspirin 81 mg po tablet daily  · Consulted cardiology, Dr Thomas Matthews who saw patient during his hospitalization  · Recommendation to discontinue metoprolol  · Decrease dose of atorvastatin to 40 mg po daily  · Continue heparin 5000 units for DVT prophylaxis until discharge  · Monitor for symptoms  · Recommend follow up with cardiologist    Orthostatic hypotension  · continue midodrine to 5 mg po TID  · Noticing slight improvement in blood pressures  · ISELA stockings on at time of assessment  · Encourage slow positional changes  · Encourage increase po hydration  · Fall precautions     Parkinson's disease ([address] )  · Continue carbidopa-levodopa  mg po tablet TID  · Patient with hypotension  · Requiring increasing midodrine to 5 mg po TID  · Provide supportive care  · Patient seen with constant tremors in right hand  · Recommend following up with neurology  · Spoke with son about starting to follow with a neurologist     Ambulatory dysfunction  · Continue PT/OT  · Patient is assist x 1 with walker  · Seen ambulating short distances with therapy  · Fall precautions  · Patient states at home ambulatory dysfunction has started a couple of months ago    Anemia  · Hemoglobin stable  · 8 8 (06/20)  · Plan to recheck tomorrow    Dizziness  · Patient states he does not experience dizziness when standing he experiences more weakness  · Continue to monitor d/t patient being orthostatic when standing    Dyslipidemia  · Cardiology, Dr Vasquez,  recommendation to decrease dosage of atorvastatin   · Start atorvastatin 40 mg po daily in the evening  · Lipid panel (12/01/22)  · Cholesterol-70  · Triglycerides-85  · LDL-39  · Recommend follow up with PCP and cardiologist    Generalized weakness  · Patient stating that when he stands he experiences weakness  · Could be secondary to his Parkinson's disease  · Continue with PT/OT  · Monitor for symptoms  · Fall precautions  ·     Insomnia  · Continue mirtazapine 7 5 mg po daily at bedtime  · Group nursing activities to allow uninterrupted sleep  · Maintain sleep/wake cycle  · Monitor mentation     Tremor of right hand  · Constant tremors noted in right hand  · Secondary to Parkinson's Disease  · Patient states it calms down at night and when he is sleeping  · Continue carbidopa-levodopa  mg po tablet TID  · Recommended to patient and son to follow up with neurologist      This is a 66 y o  male seen today at [address] [place] [address]  Medical history includes, not limited to, diverticulitis, CKD stage 3, orthostatic hypotension, Parkinson's Disease, hyperlipidemia, tremors, atherosclerotic heart disease  Patient with recent hospital admission from 06/08-06/13  Presented to ED with generalized weakness and abdominal discomfort  While in ED patient was diagnosed with diverticulitis on CAT scan  Here at rehab he finished his 7 day course of antibiotics, keflex and metronidazole  Evaluated by therapy with recommendation for rehab  Patient seen today at [address] [place] [address]  He is alert and oriented x 3  Able to answer all questions appropriately  Today we spoke in detail about the need to follow up with his PCP, cardiologist, and establishing care with a neurologist   He states he does not like to follow up with too many doctors and feels he is taking too many medications  It was presented to him that he may be suffering from some depression which he agreed due to his Parkinson's and the increased weakness he has been experiencing over the past few months   He is unsure if he would want to start taking any medication  I mentioned that he didn't have to make a decision now and he could think about it and also start outpatient when he follows up with his PCP  Denies nausea and vomiting  Reviewed patient with nursing staff  Spoke with Son, Leonora Carpenter and updated him on fathers care, recommendations for follow ups  Discussed the depression  Vitals and labs were reviewed in [facility]  Nursing and prior provider notes reviewed on this visit  Discussed visit with PCP and nursing staff/ supervisor  Review of Systems   Constitutional: Positive for activity change and fatigue  Negative for appetite change and fever  HENT: Negative for congestion and rhinorrhea  Eyes: Negative for pain and visual disturbance  Respiratory: Positive for cough  Negative for shortness of breath and wheezing  Cardiovascular: Negative for chest pain and leg swelling  Gastrointestinal: Negative for abdominal pain and constipation  Genitourinary: Negative for difficulty urinating and dysuria  Musculoskeletal: Positive for gait problem  Negative for arthralgias  Skin: Negative for color change and rash  Neurological: Positive for tremors and weakness  Negative for dizziness and syncope  Psychiatric/Behavioral: Negative for behavioral problems and confusion  All other systems reviewed and are negative  ALLERGY: Reviewed, unchanged  Allergies   Allergen Reactions   • Indomethacin Shortness Of Breath       HISTORY:  Medical Hx: Reviewed, unchanged  Family Hx: Reviewed, unchanged  Soc Hx: Reviewed,  unchanged      Physical Exam  Vitals reviewed  Constitutional:       General: He is not in acute distress  Appearance: Normal appearance  He is not ill-appearing  HENT:      Head: Normocephalic  Right Ear: Tympanic membrane normal       Left Ear: Tympanic membrane normal       Nose: Nose normal  No congestion        Mouth/Throat:      Mouth: Mucous membranes are moist       Pharynx: Oropharynx "is clear  Eyes:      General:         Right eye: No discharge  Left eye: No discharge  Extraocular Movements: Extraocular movements intact  Conjunctiva/sclera: Conjunctivae normal       Pupils: Pupils are equal, round, and reactive to light  Cardiovascular:      Rate and Rhythm: Normal rate and regular rhythm  Pulses: Normal pulses  Heart sounds: Normal heart sounds  Pulmonary:      Effort: Pulmonary effort is normal  No respiratory distress  Breath sounds: Normal breath sounds  Chest:      Chest wall: No tenderness  Abdominal:      General: Bowel sounds are normal       Palpations: Abdomen is soft  Tenderness: There is no abdominal tenderness  Musculoskeletal:         General: No swelling  Normal range of motion  Cervical back: Normal range of motion  Right lower leg: No edema  Left lower leg: No edema  Skin:     General: Skin is warm and dry  Neurological:      Mental Status: He is alert and oriented to person, place, and time  Mental status is at baseline  Motor: No weakness  Psychiatric:         Mood and Affect: Mood normal          Behavior: Behavior normal          Thought Content: Thought content normal             Laboratory results / Imaging reviewed: Hard copy/ies in medical chart:    Vitals:    06/20/23 1521   BP: 110/55   Pulse: 74   Resp: 18   Temp: (!) 97 1 °F (36 2 °C)   SpO2: 97%       Current Medications: All medications reviewed and updated in Nursing Home Chart    Please note: This note was completed in part utilizing a voice-recognition software may have been used in the preparation of this document  Grammatical errors, random word insertion, spelling mistakes, and incomplete sentences may be an occasional consequence of the system secondary to software limitations, ambient noise and hardware issues   Occasional wrong word or \"sound-alike\" substitutions may have occurred due to the inherent limitations of voice " recognition software  At the time of dictation, efforts were made to edit, clarify and/or correct errors  Interpretation should be guided by context  Please read the chart carefully and recognize, using context, where substitutions have occurred  If you have any questions or concerns about the context, text or information contained within the body of this dictation, please contact myself, the provider, for further clarification        RAEANN Dorantes  6/22/2023

## 2023-06-21 LAB
ATRIAL RATE: 88 BPM
PR INTERVAL: 116 MS
QRS AXIS: 33 DEGREES
QRSD INTERVAL: 70 MS
QT INTERVAL: 364 MS
QTC INTERVAL: 440 MS
T WAVE AXIS: 141 DEGREES
VENTRICULAR RATE: 88 BPM

## 2023-06-21 PROCEDURE — 93010 ELECTROCARDIOGRAM REPORT: CPT | Performed by: INTERNAL MEDICINE

## 2023-06-22 RX ORDER — MIDODRINE HYDROCHLORIDE 5 MG/1
5 TABLET ORAL
Status: ON HOLD | COMMUNITY
End: 2023-06-23 | Stop reason: SDUPTHER

## 2023-06-22 RX ORDER — NYSTATIN 100000 [USP'U]/G
1 POWDER TOPICAL 2 TIMES DAILY
COMMUNITY

## 2023-06-22 RX ORDER — LIDOCAINE 50 MG/G
1 PATCH TOPICAL DAILY
COMMUNITY

## 2023-06-22 RX ORDER — ACETAMINOPHEN 325 MG/1
650 TABLET ORAL EVERY 6 HOURS PRN
COMMUNITY

## 2023-06-22 RX ORDER — MIRTAZAPINE 7.5 MG/1
7.5 TABLET, FILM COATED ORAL
Status: ON HOLD | COMMUNITY
End: 2023-06-23 | Stop reason: SDUPTHER

## 2023-06-22 RX ORDER — MENTHOL 40 MG/ML
1 GEL TOPICAL 4 TIMES DAILY
COMMUNITY

## 2023-06-22 NOTE — ASSESSMENT & PLAN NOTE
· Cardiology, Mirian Motley,  recommendation to decrease dosage of atorvastatin   · Start atorvastatin 40 mg po daily in the evening  · Lipid panel (12/01/22)  · Cholesterol-70  · Triglycerides-85  · LDL-39  · Recommend follow up with PCP and cardiologist

## 2023-06-22 NOTE — ASSESSMENT & PLAN NOTE
· Patient stating that when he stands he experiences weakness  · Could be secondary to his Parkinson's disease  · Continue with PT/OT  · Monitor for symptoms  · Fall precautions  ·

## 2023-06-22 NOTE — ASSESSMENT & PLAN NOTE
· Constant tremors noted in right hand  · Secondary to Parkinson's Disease  · Patient states it calms down at night and when he is sleeping  · Continue carbidopa-levodopa  mg po tablet TID  · Recommended to patient and son to follow up with neurologist

## 2023-06-22 NOTE — ASSESSMENT & PLAN NOTE
Lab Results   Component Value Date    EGFR 54 06/20/2023    EGFR 50 06/16/2023    EGFR 46 06/14/2023    CREATININE 1 26 06/20/2023    CREATININE 1 33 (H) 06/16/2023    CREATININE 1 43 (H) 06/14/2023   · Need to monitor closely  · History of left nephrectomy   · Encourage increased po hydration

## 2023-06-22 NOTE — ASSESSMENT & PLAN NOTE
· Patient may continue with lidoderm OTC patches when discharged  · Continue biofreeze external gel 4%  · Continue acetaminophen 650 mg po TID  · Continue PT/OT outpatient  · Fall precautions  · Patient requiring assistance with ADLs

## 2023-06-22 NOTE — ASSESSMENT & PLAN NOTE
· Patient stating that when he stands he experiences weakness  · Could be secondary to his Parkinson's disease  · Continue with PT/OT  · Monitor for symptoms  · Fall precautions

## 2023-06-22 NOTE — ASSESSMENT & PLAN NOTE
· Cardiology, Johnny Siddiqui,  recommendation to decrease dosage of atorvastatin   · Start atorvastatin 40 mg po daily in the evening  · Lipid panel (12/01/22)  · Cholesterol-70  · Triglycerides-85  · LDL-39  · Recommend follow up with PCP and cardiologist

## 2023-06-22 NOTE — ASSESSMENT & PLAN NOTE
· Continue PT/OT outpatient  · Patient is assist x 1 with walker  · Seen ambulating short distances with therapy  · Fall precautions  · Patient states at home ambulatory dysfunction has started a couple of months ago  · Will require assistance with ADLs

## 2023-06-22 NOTE — ASSESSMENT & PLAN NOTE
· Continue carbidopa-levodopa  mg po tablet TID  · Patient with hypotension  · Requiring increasing midodrine to 5 mg po TID  · Provide supportive care  · Patient seen with constant tremors in right hand  · Recommend following up with neurology  · Spoke with son about starting to follow with a neurologist

## 2023-06-22 NOTE — ASSESSMENT & PLAN NOTE
· Patient states he does not experience dizziness when standing he experiences more weakness  · Continue to monitor d/t patient being orthostatic when standing

## 2023-06-22 NOTE — ASSESSMENT & PLAN NOTE
· continue midodrine to 5 mg po TID  · Noticing slight improvement in blood pressures  · ISELA stockings on at time of assessment  · Encourage slow positional changes  · Encourage increase po hydration  · Fall precautions

## 2023-06-22 NOTE — ASSESSMENT & PLAN NOTE
· Continue aspirin 81 mg po tablet daily  · Consulted cardiology, Dr Kimberly Crowell who saw patient during his hospitalization  · Recommendation to discontinue metoprolol  · Decrease dose of atorvastatin to 40 mg po daily  · Continue heparin 5000 units for DVT prophylaxis until discharge  · Monitor for symptoms  · Recommend follow up with cardiologist

## 2023-06-22 NOTE — ASSESSMENT & PLAN NOTE
"  Subjective:     Interval History: no  significant events overnight    Scheduled Meds:   pediatric multivitamin  1 mL Per NG tube Daily     Continuous Infusions:  PRN Meds:    Nutritional Support: Enteral: Neosure 22 KCal    Objective:     Vital Signs (Most Recent):  Temp: 98.3 °F (36.8 °C) (23 0200)  Pulse: 147 (23 0500)  Resp: 45 (23 0500)  BP: (!) 70/36 (23)  SpO2: 93 % (23 0500) Vital Signs (24h Range):  Temp:  [98.3 °F (36.8 °C)-99.2 °F (37.3 °C)] 98.3 °F (36.8 °C)  Pulse:  [130-157] 147  Resp:  [40-68] 45  SpO2:  [93 %-100 %] 93 %  BP: (70)/(36) 70/36     Anthropometrics:  Head Circumference: 32 cm  Weight: 2230 g (4 lb 14.7 oz) 18 %ile (Z= -0.93) based on Case (Boys, 22-50 Weeks) weight-for-age data using vitals from 2023.  Weight change: -10 g (-0.4 oz)  Height: 45.5 cm (17.91") 42 %ile (Z= -0.20) based on Case (Boys, 22-50 Weeks) Length-for-age data based on Length recorded on 2023.    Intake/Output - Last 3 Shifts             P[address] 64 71     NG/ 170     TPN 6.5      Total Intake(mL/kg) 240.5 (107.4) 241 (108.1)     Urine (mL/kg/hr) 165 (3.1) 188 (3.5)     Stool 0 0     Total Output 165 188     Net +75.5 +53            Urine Occurrence 1 x      Stool Occurrence 4 x 8 x              Physical Exam  Vitals and nursing note reviewed.   Constitutional:       General: He is active.   HENT:      Head: Normocephalic. Anterior fontanelle is flat.      Comments: Feeding tube secured in nare with intact nasal skin  Cardiovascular:      Rate and Rhythm: Normal rate.      Pulses: Normal pulses.      Heart sounds: Normal heart sounds.   Pulmonary:      Effort: Pulmonary effort is normal.      Breath sounds: Normal breath sounds.   Abdominal:      General: Bowel sounds are normal.      Palpations: Abdomen is soft.   Genitourinary:     Comments: Normal  male genitalia   Musculoskeletal:         " · Continue carbidopa-levodopa  mg po tablet TID  · Patient with hypotension  · Requiring increasing midodrine to 5 mg po TID  · Provide supportive care  · Patient seen with constant tremors in right hand  · Recommend following up with neurology  · Spoke with son about starting to follow with a neurologist "General: Normal range of motion.   Skin:     General: Skin is warm and dry.      Capillary Refill: Capillary refill takes less than 2 seconds.      Comments: pink   Neurological:      Mental Status: He is alert.      Comments: Appropriate tone and activity. Strong suck on pacifier                  No results for input(s): "PH", "PCO2", "PO2", "HCO3", "POCSATURATED", "BE" in the last 72 hours.     Lines/Drains:  Lines/Drains/Airways       Drain  Duration                  NG/OG Tube 11/02/23 2000 nasogastric 5 Fr. Right nostril 2 days                      Laboratory:  Bilirubin (Direct/Total): No results for input(s): "BILIDIR", "BILITOT" in the last 24 hours.    Diagnostic Results:  No new imaging    "

## 2023-06-22 NOTE — PROGRESS NOTES
[address], [address] [place] [address]  [phone]    NAME: Deuce Raymond  AGE: 66 y o   SEX: male    Discharge Summary    Location: [facility]  POS: 32 (SNF)      Chief complaint / Reason for visit:  Discharge    Assessment/Plan:    Tremor of right hand  · Constant tremors noted in right hand  · Secondary to Parkinson's Disease  · Patient states it calms down at night and when he is sleeping  · Continue carbidopa-levodopa  mg po tablet TID  · Recommended to patient and son to follow up with neurologist    Right shoulder pain  · Patient may continue with lidoderm OTC patches when discharged  · Continue biofreeze external gel 4%  · Continue acetaminophen 650 mg po TID  · Continue PT/OT outpatient  · Fall precautions  · Patient requiring assistance with ADLs    Insomnia  · Continue mirtazapine 7 5 mg po daily at bedtime  · Group nursing activities to allow uninterrupted sleep  · Maintain sleep/wake cycle  · Monitor mentation     Generalized weakness  · Patient stating that when he stands he experiences weakness  · Could be secondary to his Parkinson's disease  · Continue with PT/OT  · Monitor for symptoms  · Fall precautions      Dizziness  · Patient states he does not experience dizziness when standing he experiences more weakness  · Continue to monitor d/t patient being orthostatic when standing    Anemia  · Hemoglobin stable  ·   · Follow outpatient with PCP    Ambulatory dysfunction  · Continue PT/OT outpatient  · Patient is assist x 1 with walker  · Seen ambulating short distances with therapy  · Fall precautions  · Patient states at home ambulatory dysfunction has started a couple of months ago  · Will require assistance with ADLs     Acute diverticulitis  · Finished course of antibiotics  · Patient was experiencing loose bowels  · Negative c-diff sample  · Monitor bowel movements  · Encourage increased po hydration  · No tenderness on assessment    Coronary artery disease involving native coronary artery of native heart without angina pectoris  · Continue aspirin 81 mg po tablet daily  · Consulted cardiology, Dr Juan Brown who saw patient during his hospitalization  · Recommendation to discontinue metoprolol  · Decrease dose of atorvastatin to 40 mg po daily  · Continue heparin 5000 units for DVT prophylaxis until discharge  · Monitor for symptoms  · Recommend follow up with cardiologist    Orthostatic hypotension  · continue midodrine to 5 mg po TID  · Noticing slight improvement in blood pressures  · ISELA stockings on at time of assessment  · Encourage slow positional changes  · Encourage increase po hydration  · Fall precautions     Parkinson's disease ([address] )  · Continue carbidopa-levodopa  mg po tablet TID  · Patient with hypotension  · Requiring increasing midodrine to 5 mg po TID  · Provide supportive care  · Patient seen with constant tremors in right hand  · Recommend following up with neurology  · Spoke with son about starting to follow with a neurologist     Chronic kidney disease (CKD), stage III (moderate)  Lab Results   Component Value Date    EGFR 54 06/20/2023    EGFR 50 06/16/2023    EGFR 46 06/14/2023    CREATININE 1 26 06/20/2023    CREATININE 1 33 (H) 06/16/2023    CREATININE 1 43 (H) 06/14/2023   · Need to monitor closely  · History of left nephrectomy   · Encourage increased po hydration    Dyslipidemia  · Cardiology, Dr Vasquez,  recommendation to decrease dosage of atorvastatin   · Start atorvastatin 40 mg po daily in the evening  · Lipid panel (12/01/22)  · Cholesterol-70  · Triglycerides-85  · LDL-39  · Recommend follow up with PCP and cardiologist      This is a 66 y o  male seen today at [address] [place] [address]  Medical history includes, not limited to, diverticulitis, CKD stage 3, orthostatic hypotension, Parkinson's Disease, hyperlipidemia, tremors, atherosclerotic heart disease      Patient with recent hospital admission from 06/08-06/13  Presented to ED with generalized weakness and abdominal discomfort  While in ED patient was diagnosed with diverticulitis on CAT scan  Here at rehab he finished his 7 day course of antibiotics, keflex and metronidazole  Evaluated by therapy with recommendation for rehab  Patient seen today at [address]  He appears to be sitting comfortably OOB in the recliner  He is alert and oriented x 3  Able to answer all questions appropriately  Today we spoke in detail about the need to follow up with his PCP, cardiologist, and establishing care with a neurologist   He is to be discharged today, son will be transporting him home  It was mentioned to the patient that I had a conversation with his son the other day and discussed topics that included his blood pressure, anemia, tremors and depression  We spoke today about his medications, the need to monitor his bowel movements, and speaking with a therapist   Also discussed were his home home aides that would be helping him out at home  Reviewed patient with nursing staff  They are going to review medications, follow ups, and repeat labs with patient and son when he arrives  Vitals and labs were reviewed in [facility]  Nursing and prior provider notes reviewed on this visit  Discussed visit with PCP and nursing staff/ supervisor  Review of Systems   Constitutional: Positive for activity change, appetite change and fatigue  Negative for fever  HENT: Negative for congestion and rhinorrhea  Eyes: Negative for pain and visual disturbance  Respiratory: Positive for cough  Negative for chest tightness, shortness of breath and wheezing  Cardiovascular: Negative for chest pain and leg swelling  Gastrointestinal: Negative for abdominal pain and constipation  Genitourinary: Negative for difficulty urinating and dysuria  Musculoskeletal: Positive for arthralgias and gait problem     Skin: Negative for color change and rash  Neurological: Positive for tremors, weakness and numbness  Negative for dizziness and syncope  Hematological: Bruises/bleeds easily  Psychiatric/Behavioral: Negative for behavioral problems and confusion  All other systems reviewed and are negative  ALLERGY: Reviewed, unchanged  Allergies   Allergen Reactions   • Indomethacin Shortness Of Breath       HISTORY:  Medical Hx: Reviewed, unchanged  Family Hx: Reviewed, unchanged  Soc Hx: Reviewed,  unchanged      Physical Exam  Vitals reviewed  Constitutional:       General: He is not in acute distress  Appearance: Normal appearance  He is not ill-appearing  HENT:      Head: Normocephalic  Right Ear: Tympanic membrane normal       Left Ear: Tympanic membrane normal       Nose: Nose normal  No congestion  Mouth/Throat:      Mouth: Mucous membranes are moist       Pharynx: Oropharynx is clear  Eyes:      General:         Right eye: No discharge  Left eye: No discharge  Extraocular Movements: Extraocular movements intact  Conjunctiva/sclera: Conjunctivae normal       Pupils: Pupils are equal, round, and reactive to light  Cardiovascular:      Rate and Rhythm: Normal rate and regular rhythm  Pulses: Normal pulses  Heart sounds: Normal heart sounds  Pulmonary:      Effort: Pulmonary effort is normal  No respiratory distress  Breath sounds: Normal breath sounds  Chest:      Chest wall: No tenderness  Abdominal:      General: Bowel sounds are normal       Palpations: Abdomen is soft  Tenderness: There is no abdominal tenderness  Musculoskeletal:         General: Tenderness present  No swelling  Normal range of motion  Cervical back: Normal range of motion  Right lower leg: No edema  Left lower leg: No edema  Skin:     General: Skin is warm and dry  Neurological:      Mental Status: He is alert and oriented to person, place, and time   Mental status is at "baseline  Motor: No weakness  Psychiatric:         Mood and Affect: Mood normal          Behavior: Behavior normal          Thought Content: Thought content normal             Laboratory results / Imaging reviewed: Hard copy/ies in medical chart:    Vitals:    06/23/23 1337   BP: 96/64   Pulse: 66   Resp: 20   Temp: (!) 96 9 °F (36 1 °C)   SpO2: 95%       Current Medications:  Current Outpatient Medications   Medication Instructions   • acetaminophen (TYLENOL) 650 mg, Oral, Every 6 hours PRN   • ascorbic acid (VITAMIN C) 500 mg, Oral, Daily   • aspirin 81 mg, Oral, Daily   • atorvastatin (LIPITOR) 80 mg, Oral, Daily with dinner   • carbidopa-levodopa (SINEMET)  mg per tablet 1 tablet, Oral, 3 times daily   • docusate sodium (COLACE) 100 mg, Oral, 2 times daily   • ferrous sulfate 325 mg, Oral, Daily with breakfast   • folic acid (FOLVITE) 903 mcg, Oral, Daily   • gabapentin (NEURONTIN) 200 mg, Oral, Daily at bedtime   • lidocaine (LIDODERM) 5 % 1 patch, Topical, Daily, Remove & Discard patch within 12 hours or as directed by MD   • Menthol, Topical Analgesic, (Biofreeze) 4 % GEL 1 application  , Apply externally, 4 times daily, Right shoulder   • midodrine (PROAMATINE) 5 mg, Oral, 3 times daily before meals   • mirtazapine (REMERON) 7 5 mg, Oral, Daily at bedtime   • nystatin (MYCOSTATIN) powder 1 application  , Topical, 2 times daily   • omeprazole (PRILOSEC) 20 mg, Oral, Daily       Please note: This note was completed in part utilizing a voice-recognition software may have been used in the preparation of this document  Grammatical errors, random word insertion, spelling mistakes, and incomplete sentences may be an occasional consequence of the system secondary to software limitations, ambient noise and hardware issues  Occasional wrong word or \"sound-alike\" substitutions may have occurred due to the inherent limitations of voice recognition software   At the time of dictation, efforts were made to edit, " clarify and/or correct errors  Interpretation should be guided by context  Please read the chart carefully and recognize, using context, where substitutions have occurred  If you have any questions or concerns about the context, text or information contained within the body of this dictation, please contact myself, the provider, for further clarification  Time spent greater than 30 minutes with coordination of care, direct patient care, patient examination, teaching, and chart review        RAEANN Rolon  6/23/2023

## 2023-06-23 ENCOUNTER — NURSING HOME VISIT (OUTPATIENT)
Dept: GERIATRICS | Facility: OTHER | Age: 78
End: 2023-06-23
Payer: COMMERCIAL

## 2023-06-23 VITALS
DIASTOLIC BLOOD PRESSURE: 64 MMHG | BODY MASS INDEX: 34.8 KG/M2 | SYSTOLIC BLOOD PRESSURE: 96 MMHG | RESPIRATION RATE: 20 BRPM | WEIGHT: 222.2 LBS | OXYGEN SATURATION: 95 % | HEART RATE: 66 BPM | TEMPERATURE: 96.9 F

## 2023-06-23 DIAGNOSIS — G89.29 CHRONIC RIGHT SHOULDER PAIN: ICD-10-CM

## 2023-06-23 DIAGNOSIS — K57.92 ACUTE DIVERTICULITIS: ICD-10-CM

## 2023-06-23 DIAGNOSIS — G47.00 INSOMNIA, UNSPECIFIED TYPE: ICD-10-CM

## 2023-06-23 DIAGNOSIS — N18.32 STAGE 3B CHRONIC KIDNEY DISEASE (HCC): ICD-10-CM

## 2023-06-23 DIAGNOSIS — D64.9 ANEMIA: ICD-10-CM

## 2023-06-23 DIAGNOSIS — R25.1 TREMOR OF RIGHT HAND: ICD-10-CM

## 2023-06-23 DIAGNOSIS — G20 PARKINSON'S DISEASE (HCC): Primary | ICD-10-CM

## 2023-06-23 DIAGNOSIS — R26.2 AMBULATORY DYSFUNCTION: ICD-10-CM

## 2023-06-23 DIAGNOSIS — R42 DIZZINESS: ICD-10-CM

## 2023-06-23 DIAGNOSIS — R53.1 GENERALIZED WEAKNESS: ICD-10-CM

## 2023-06-23 DIAGNOSIS — M25.511 CHRONIC RIGHT SHOULDER PAIN: ICD-10-CM

## 2023-06-23 DIAGNOSIS — I25.10 CORONARY ARTERY DISEASE INVOLVING NATIVE CORONARY ARTERY OF NATIVE HEART WITHOUT ANGINA PECTORIS: ICD-10-CM

## 2023-06-23 DIAGNOSIS — I95.1 ORTHOSTATIC HYPOTENSION: ICD-10-CM

## 2023-06-23 DIAGNOSIS — D64.9 ANEMIA, UNSPECIFIED TYPE: ICD-10-CM

## 2023-06-23 DIAGNOSIS — E78.5 DYSLIPIDEMIA: ICD-10-CM

## 2023-06-23 PROCEDURE — 99316 NF DSCHRG MGMT 30 MIN+: CPT

## 2023-06-23 RX ORDER — ASCORBIC ACID 500 MG
500 TABLET ORAL DAILY
Status: ON HOLD | COMMUNITY
End: 2023-06-23 | Stop reason: SDUPTHER

## 2023-06-23 RX ORDER — MIDODRINE HYDROCHLORIDE 5 MG/1
5 TABLET ORAL
Qty: 90 TABLET | Refills: 0 | Status: SHIPPED | OUTPATIENT
Start: 2023-06-23 | End: 2023-07-23

## 2023-06-23 RX ORDER — ASCORBIC ACID 500 MG
500 TABLET ORAL DAILY
Qty: 30 TABLET | Refills: 0 | Status: SHIPPED | OUTPATIENT
Start: 2023-06-23 | End: 2023-07-23

## 2023-06-23 RX ORDER — LANOLIN ALCOHOL/MO/W.PET/CERES
400 CREAM (GRAM) TOPICAL DAILY
Status: ON HOLD | COMMUNITY
End: 2023-06-23 | Stop reason: SDUPTHER

## 2023-06-23 RX ORDER — FERROUS SULFATE 325(65) MG
325 TABLET ORAL
Qty: 30 TABLET | Refills: 0 | Status: SHIPPED | OUTPATIENT
Start: 2023-06-23 | End: 2023-07-23

## 2023-06-23 RX ORDER — LANOLIN ALCOHOL/MO/W.PET/CERES
400 CREAM (GRAM) TOPICAL DAILY
Qty: 30 TABLET | Refills: 0 | Status: SHIPPED | OUTPATIENT
Start: 2023-06-23 | End: 2023-07-23

## 2023-06-23 RX ORDER — GABAPENTIN 100 MG/1
200 CAPSULE ORAL
Qty: 28 CAPSULE | Refills: 0 | Status: SHIPPED | OUTPATIENT
Start: 2023-06-23 | End: 2023-07-07

## 2023-06-23 RX ORDER — MIRTAZAPINE 7.5 MG/1
7.5 TABLET, FILM COATED ORAL
Qty: 14 TABLET | Refills: 0 | Status: SHIPPED | OUTPATIENT
Start: 2023-06-23 | End: 2023-07-07

## 2023-06-26 ENCOUNTER — TELEPHONE (OUTPATIENT)
Dept: FAMILY MEDICINE CLINIC | Facility: CLINIC | Age: 78
End: 2023-06-26

## 2023-06-26 NOTE — TELEPHONE ENCOUNTER
Notify patient I sent in prescription for his omeprazole which he should stay on, at least till I see him. May hold his vitamin supplements until I see him. Lidocaine patches will not be covered by insurance, but there are over-the-counter lidocaine patches if he desires. If its not helping then I would just stop them.

## 2023-06-27 ENCOUNTER — RA CDI HCC (OUTPATIENT)
Dept: OTHER | Facility: HOSPITAL | Age: 78
End: 2023-06-27

## 2023-06-27 NOTE — PROGRESS NOTES
[facility]  coding opportunities       Chart reviewed, no opportunity found:   [address]        Patients Insurance     Medicare Insurance: [employer]

## 2023-06-30 ENCOUNTER — OFFICE VISIT (OUTPATIENT)
Dept: FAMILY MEDICINE CLINIC | Facility: CLINIC | Age: 78
End: 2023-06-30
Payer: COMMERCIAL

## 2023-06-30 VITALS — TEMPERATURE: 98.9 F | DIASTOLIC BLOOD PRESSURE: 58 MMHG | SYSTOLIC BLOOD PRESSURE: 100 MMHG

## 2023-06-30 DIAGNOSIS — K57.92 ACUTE DIVERTICULITIS: ICD-10-CM

## 2023-06-30 DIAGNOSIS — R60.9 PERIPHERAL EDEMA: ICD-10-CM

## 2023-06-30 DIAGNOSIS — G20 PRIMARY PARKINSONISM (HCC): ICD-10-CM

## 2023-06-30 DIAGNOSIS — I25.10 CORONARY ARTERY DISEASE INVOLVING NATIVE CORONARY ARTERY OF NATIVE HEART WITHOUT ANGINA PECTORIS: ICD-10-CM

## 2023-06-30 DIAGNOSIS — G20 PARKINSON'S DISEASE (HCC): ICD-10-CM

## 2023-06-30 DIAGNOSIS — D50.0 IRON DEFICIENCY ANEMIA DUE TO CHRONIC BLOOD LOSS: ICD-10-CM

## 2023-06-30 DIAGNOSIS — N18.30 STAGE 3 CHRONIC KIDNEY DISEASE, UNSPECIFIED WHETHER STAGE 3A OR 3B CKD (HCC): ICD-10-CM

## 2023-06-30 DIAGNOSIS — R53.1 GENERALIZED WEAKNESS: Primary | ICD-10-CM

## 2023-06-30 DIAGNOSIS — I95.1 ORTHOSTATIC HYPOTENSION: ICD-10-CM

## 2023-06-30 DIAGNOSIS — C64.2 RENAL CELL CANCER, LEFT (HCC): ICD-10-CM

## 2023-06-30 DIAGNOSIS — R35.0 FREQUENCY OF MICTURITION: ICD-10-CM

## 2023-06-30 DIAGNOSIS — I25.118 ATHEROSCLEROTIC HEART DISEASE OF NATIVE CORONARY ARTERY WITH OTHER FORMS OF ANGINA PECTORIS (HCC): ICD-10-CM

## 2023-06-30 DIAGNOSIS — E53.8 LOW SERUM VITAMIN B12: ICD-10-CM

## 2023-06-30 DIAGNOSIS — E78.5 DYSLIPIDEMIA: ICD-10-CM

## 2023-06-30 PROCEDURE — 99495 TRANSJ CARE MGMT MOD F2F 14D: CPT | Performed by: INTERNAL MEDICINE

## 2023-06-30 NOTE — ASSESSMENT & PLAN NOTE
Lab Results   Component Value Date    EGFR 54 06/23/2023    EGFR 54 06/20/2023    EGFR 50 06/16/2023    CREATININE 1 25 06/23/2023    CREATININE 1 26 06/20/2023    CREATININE 1 33 (H) 06/16/2023   Labile GFR, will continue to monitor  Blood work within the next week

## 2023-06-30 NOTE — ASSESSMENT & PLAN NOTE
Hemoglobin remains in the 8 range  He is on iron once a day at this point  We will check labs again next week, consider iron infusions given his debility and symptomatology  Etiology of blood loss questionable

## 2023-06-30 NOTE — PROGRESS NOTES
Name: Fredis Shane      : 1945      MRN: [id number]  Encounter Provider: Lori Sargent DO  Encounter Date: 2023   Encounter department: [address] [place] [address] PRIMARY CARE    Assessment & Plan     1  Generalized weakness  Assessment & Plan: Will get outpatient home care as well as home physical therapy for now  Clearly he is taking several steps backwards with regards to his overall wellbeing and health  Orders:  -     CBC and differential; Future  -     Comprehensive metabolic panel; Future  -     Magnesium; Future  -     TSH, 3rd generation; Future  -     Urinalysis with microscopic; Future  -     Referral to [facility]; Future    2  Dyslipidemia  Assessment & Plan:  Remain on a statin, consider tapering given his overall deconditioned state      3  Iron deficiency anemia due to chronic blood loss  Assessment & Plan:  Hemoglobin remains in the 8 range  He is on iron once a day at this point  We will check labs again next week, consider iron infusions given his debility and symptomatology  Etiology of blood loss questionable  Orders:  -     Vitamin B12; Future  -     Iron Panel (Includes Ferritin, Iron Sat%, Iron, and TIBC); Future    4  Low serum vitamin B12  -     Vitamin B12; Future    5  Peripheral edema    6  Frequency of micturition    7  Primary parkinsonism ([address] )  -     Referral to [facility]; Future    8  Renal cell cancer, left [facility])  Assessment & Plan:  Status post left nephrectomy, making his GFR and current renal status concerning  Optimize blood pressure and avoid nonsteroidals for now  9  Atherosclerotic heart disease of native coronary artery with other forms of angina pectoris ([address] )    10  Acute diverticulitis  Assessment & Plan:  Symptoms are now resolved status post antibiotic therapy and extended hospitalization  Remains generally weak from that spell    Edema has been a big problem like related to underlying fluid administration from this illness as well as poor p o  intake and nutritional supplementation  11  Coronary artery disease involving native coronary artery of native heart without angina pectoris  Assessment & Plan:  Currently remains on statins and aspirin  Denies chest pain  Shortness of breath with minimal exertion like related to debility and coronary insufficiency  When he is stronger we will follow-up with cardiology  12  Orthostatic hypotension  Assessment & Plan:  Intolerant to ISELA stockings  Now on midodrine, with extensive swelling  Albumin and protein levels are extremely low  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 or simply related to his anemia or combination of both  13  Parkinson's disease ([facility] )  Assessment & Plan: For now we will continue the same carbidopa levodopa, consider increasing this due to his debility  We will see how he responds to physical therapy  We will order neurology consult next appointment  14  Stage 3 chronic kidney disease, unspecified whether stage 3a or 3b CKD [facility])  Assessment & Plan:  Lab Results   Component Value Date    EGFR 54 06/23/2023    EGFR 54 06/20/2023    EGFR 50 06/16/2023    CREATININE 1 25 06/23/2023    CREATININE 1 26 06/20/2023    CREATININE 1 33 (H) 06/16/2023   Labile GFR, will continue to monitor  Blood work within the next week  Subjective      Karla Mcneil is here after extended hospital and rehab stay  Remains weak  Suffered from acute diverticulitis, and has significant anemia  Appetite has been slowly coming back  Debility related to his Parkinson's is worsened since his hospital stay  Rehab seem to do very little for him  His son states he is improving slowly  He has had no home health care or physical therapy since that time  Apparently someone has been in contact with him from a an agency but not sure which one  Karla Mcneil has not been supplementing at this point    He just feels generally weak, and has poor endurance capacity  Finished a course of antibiotics  He is now on milrinone due to low blood pressures which may be related to his underlying anemia as well as parkinsonian disease  He has no chest pain to speak of  Is bothered by scrotal swelling and his edema  Review of Systems   Constitutional: Positive for fatigue  Negative for chills and fever  Generalized weakness   HENT: Negative for rhinorrhea and sore throat  Eyes: Negative for visual disturbance  Respiratory: Positive for shortness of breath  Negative for cough  Cardiovascular: Positive for leg swelling  Negative for chest pain  Gastrointestinal: Positive for constipation  Negative for abdominal pain, diarrhea, nausea and vomiting  Genitourinary: Positive for scrotal swelling  Negative for dysuria  Musculoskeletal: Positive for arthralgias, back pain and myalgias  Skin: Negative for rash  Neurological: Positive for tremors and weakness  Negative for dizziness and headaches  Psychiatric/Behavioral: Negative for confusion  Seems depressed   All other systems reviewed and are negative  Current Outpatient Medications on File Prior to Visit   Medication Sig   • acetaminophen (TYLENOL) 325 mg tablet Take 650 mg by mouth every 6 (six) hours as needed for mild pain   • aspirin 81 mg chewable tablet Chew 81 mg daily   • carbidopa-levodopa (SINEMET)  mg per tablet Take 1 tablet by mouth 3 (three) times a day   • docusate sodium (COLACE) 100 mg capsule Take 1 capsule (100 mg total) by mouth 2 (two) times a day   • ferrous sulfate 325 (65 Fe) mg tablet Take 1 tablet (325 mg total) by mouth daily with breakfast   • lidocaine (LIDODERM) 5 % Apply 1 patch topically daily Remove & Discard patch within 12 hours or as directed by MD   • Menthol, Topical Analgesic, (Biofreeze) 4 % GEL Apply 1 application   topically 4 (four) times a day Right shoulder   • midodrine (PROAMATINE) 5 mg tablet Take 1 tablet (5 mg total) by mouth 3 (three) times a day before meals   • mirtazapine (REMERON) 7 5 MG tablet Take 1 tablet (7 5 mg total) by mouth daily at bedtime for 14 days   • nystatin (MYCOSTATIN) powder Apply 1 application  topically 2 (two) times a day   • omeprazole (PriLOSEC) 20 mg delayed release capsule Take 1 capsule (20 mg total) by mouth daily   • ascorbic acid (VITAMIN C) 500 MG tablet Take 1 tablet (500 mg total) by mouth daily (Patient not taking: Reported on 6/30/2023)   • atorvastatin (LIPITOR) 80 mg tablet Take 1 tablet (80 mg total) by mouth daily with dinner (Patient not taking: Reported on 1/09/6806)   • folic acid (FOLVITE) 463 mcg tablet Take 1 tablet (400 mcg total) by mouth daily (Patient not taking: Reported on 6/30/2023)   • gabapentin (NEURONTIN) 100 mg capsule Take 2 capsules (200 mg total) by mouth daily at bedtime for 14 days (Patient not taking: Reported on 6/30/2023)       Objective     /58 (BP Location: Left arm, Patient Position: Sitting, Cuff Size: Large)   Temp 98 9 °F (37 2 °C)     Physical Exam  Constitutional:       Appearance: He is ill-appearing  HENT:      Head: Normocephalic and atraumatic  Nose: No congestion or rhinorrhea  Mouth/Throat:      Mouth: Mucous membranes are dry  Eyes:      Extraocular Movements: Extraocular movements intact  Pupils: Pupils are equal, round, and reactive to light  Neck:      Vascular: No carotid bruit  Cardiovascular:      Rate and Rhythm: Normal rate and regular rhythm  Heart sounds: No murmur heard  Pulmonary:      Effort: No respiratory distress  Breath sounds: No wheezing  Chest:      Chest wall: No tenderness  Abdominal:      General: There is no distension  Tenderness: There is no abdominal tenderness  Hernia: No hernia is present  Genitourinary:     Comments: Scrotal swelling  Musculoskeletal:         General: No swelling or tenderness  Right lower leg: Edema present  Left lower leg: Edema present  Comments: 3+ pitting lower extremity edema   Lymphadenopathy:      Cervical: No cervical adenopathy  Skin:     Coloration: Skin is pale  Findings: No rash  Neurological:      General: No focal deficit present  Mental Status: He is alert and oriented to person, place, and time  Sensory: No sensory deficit  Gait: Gait abnormal       Comments: Typical Parkinson's tremors, currently in a wheelchair    Able to walk with a walker short distances   Psychiatric:         Mood and Affect: Mood normal          Behavior: Behavior normal        Emmer Morning, DO

## 2023-06-30 NOTE — ASSESSMENT & PLAN NOTE
Will get outpatient home care as well as home physical therapy for now  Clearly he is taking several steps backwards with regards to his overall wellbeing and health

## 2023-06-30 NOTE — ASSESSMENT & PLAN NOTE
Currently remains on statins and aspirin  Denies chest pain  Shortness of breath with minimal exertion like related to debility and coronary insufficiency  When he is stronger we will follow-up with cardiology

## 2023-06-30 NOTE — ASSESSMENT & PLAN NOTE
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

## 2023-06-30 NOTE — ASSESSMENT & PLAN NOTE
Status post left nephrectomy, making his GFR and current renal status concerning  Optimize blood pressure and avoid nonsteroidals for now

## 2023-06-30 NOTE — ASSESSMENT & PLAN NOTE
For now we will continue the same carbidopa levodopa, consider increasing this due to his debility  We will see how he responds to physical therapy  We will order neurology consult next appointment

## 2023-06-30 NOTE — ASSESSMENT & PLAN NOTE
Symptoms are now resolved status post antibiotic therapy and extended hospitalization  Remains generally weak from that spell  Edema has been a big problem like related to underlying fluid administration from this illness as well as poor p o  intake and nutritional supplementation

## 2023-07-09 ENCOUNTER — APPOINTMENT (EMERGENCY)
Dept: CT IMAGING | Facility: HOSPITAL | Age: 78
DRG: 564 | End: 2023-07-09
Payer: COMMERCIAL

## 2023-07-09 ENCOUNTER — HOSPITAL ENCOUNTER (INPATIENT)
Facility: HOSPITAL | Age: 78
LOS: 8 days | Discharge: NURSING, REHAB OR LONG-TERM CARE | DRG: 564 | End: 2023-07-17
Attending: EMERGENCY MEDICINE | Admitting: INTERNAL MEDICINE
Payer: COMMERCIAL

## 2023-07-09 ENCOUNTER — APPOINTMENT (EMERGENCY)
Dept: RADIOLOGY | Facility: HOSPITAL | Age: 78
DRG: 564 | End: 2023-07-09
Payer: COMMERCIAL

## 2023-07-09 DIAGNOSIS — E87.5 HYPERKALEMIA: ICD-10-CM

## 2023-07-09 DIAGNOSIS — J18.9 PNEUMONIA: ICD-10-CM
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
## 2023-07-09 DIAGNOSIS — Z71.89 GOALS OF CARE, COUNSELING/DISCUSSION: ICD-10-CM

## 2023-07-09 DIAGNOSIS — R74.8 ELEVATED CK: ICD-10-CM

## 2023-07-09 DIAGNOSIS — N17.9 AKI (ACUTE KIDNEY INJURY) (HCC): Primary | ICD-10-CM

## 2023-07-09 DIAGNOSIS — R59.9 ADENOPATHY: ICD-10-CM

## 2023-07-09 PROBLEM — N18.30 ACUTE RENAL FAILURE SUPERIMPOSED ON STAGE 3 CHRONIC KIDNEY DISEASE (HCC): Status: ACTIVE | Noted: 2023-07-09

## 2023-07-09 PROBLEM — Z95.1 HISTORY OF CORONARY ARTERY BYPASS SURGERY: Status: ACTIVE | Noted: 2023-07-09

## 2023-07-09 PROBLEM — R32 URINARY INCONTINENCE: Status: ACTIVE | Noted: 2020-04-10

## 2023-07-09 PROBLEM — M62.82 NON-TRAUMATIC RHABDOMYOLYSIS: Status: ACTIVE | Noted: 2023-07-09

## 2023-07-09 PROBLEM — N28.89 RENAL MASS: Status: ACTIVE | Noted: 2018-01-31

## 2023-07-09 PROBLEM — N28.1 COMPLEX RENAL CYST: Status: ACTIVE | Noted: 2017-08-15

## 2023-07-09 PROBLEM — E87.0 HYPERNATRIURIA: Status: ACTIVE | Noted: 2019-04-10

## 2023-07-09 PROBLEM — N13.5 BILATERAL URETERAL OBSTRUCTION: Status: ACTIVE | Noted: 2018-02-16

## 2023-07-09 PROBLEM — R06.09 DOE (DYSPNEA ON EXERTION): Status: RESOLVED | Noted: 2020-09-02 | Resolved: 2023-07-09

## 2023-07-09 PROBLEM — R25.1 TREMOR, UNSPECIFIED: Status: ACTIVE | Noted: 2020-09-02

## 2023-07-09 PROBLEM — E04.1 THYROID NODULE: Status: ACTIVE | Noted: 2023-07-09

## 2023-07-09 PROBLEM — R82.994 HYPERCALCINURIA: Status: ACTIVE | Noted: 2019-04-10

## 2023-07-09 PROBLEM — C61 ADENOCARCINOMA OF PROSTATE (HCC): Status: ACTIVE | Noted: 2020-04-10

## 2023-07-09 PROBLEM — K57.92 ACUTE DIVERTICULITIS: Status: RESOLVED | Noted: 2023-06-08 | Resolved: 2023-07-09

## 2023-07-09 PROBLEM — N48.1 BALANITIS: Status: ACTIVE | Noted: 2018-03-21

## 2023-07-09 PROBLEM — E78.5 HYPERLIPIDEMIA, UNSPECIFIED: Status: ACTIVE | Noted: 2020-07-09

## 2023-07-09 PROBLEM — I65.21 OCCLUSION AND STENOSIS OF RIGHT CAROTID ARTERY: Status: ACTIVE | Noted: 2020-09-02

## 2023-07-09 LAB
ALBUMIN SERPL BCP-MCNC: 2.5 G/DL (ref 3.5–5)
ALP SERPL-CCNC: 40 U/L (ref 34–104)
ALT SERPL W P-5'-P-CCNC: 19 U/L (ref 7–52)
ANION GAP SERPL CALCULATED.3IONS-SCNC: 10 MMOL/L
APTT PPP: 35 SECONDS (ref 23–37)
AST SERPL W P-5'-P-CCNC: 92 U/L (ref 13–39)
BASOPHILS # BLD AUTO: 0.03 THOUSANDS/ÂΜL (ref 0–0.1)
BASOPHILS NFR BLD AUTO: 0 % (ref 0–1)
BILIRUB SERPL-MCNC: 0.82 MG/DL (ref 0.2–1)
BUN SERPL-MCNC: 41 MG/DL (ref 5–25)
CALCIUM ALBUM COR SERPL-MCNC: 9.1 MG/DL (ref 8.3–10.1)
CALCIUM SERPL-MCNC: 7.9 MG/DL (ref 8.4–10.2)
CHLORIDE SERPL-SCNC: 105 MMOL/L (ref 96–108)
CK SERPL-CCNC: 2305 U/L (ref 39–308)
CO2 SERPL-SCNC: 22 MMOL/L (ref 21–32)
CREAT SERPL-MCNC: 3.01 MG/DL (ref 0.6–1.3)
EOSINOPHIL # BLD AUTO: 0 THOUSAND/ÂΜL (ref 0–0.61)
EOSINOPHIL NFR BLD AUTO: 0 % (ref 0–6)
ERYTHROCYTE [DISTWIDTH] IN BLOOD BY AUTOMATED COUNT: 19.1 % (ref 11.6–15.1)
GFR SERPL CREATININE-BSD FRML MDRD: 18 ML/MIN/1.73SQ M
GLUCOSE SERPL-MCNC: 116 MG/DL (ref 65–140)
HCT VFR BLD AUTO: 29.3 % (ref 36.5–49.3)
HGB BLD-MCNC: 8.8 G/DL (ref 12–17)
IMM GRANULOCYTES # BLD AUTO: 0.07 THOUSAND/UL (ref 0–0.2)
IMM GRANULOCYTES NFR BLD AUTO: 1 % (ref 0–2)
INR PPP: 1.79 (ref 0.84–1.19)
LYMPHOCYTES # BLD AUTO: 0.83 THOUSANDS/ÂΜL (ref 0.6–4.47)
LYMPHOCYTES NFR BLD AUTO: 7 % (ref 14–44)
MCH RBC QN AUTO: 26.5 PG (ref 26.8–34.3)
MCHC RBC AUTO-ENTMCNC: 30 G/DL (ref 31.4–37.4)
MCV RBC AUTO: 88 FL (ref 82–98)
MONOCYTES # BLD AUTO: 1.24 THOUSAND/ÂΜL (ref 0.17–1.22)
MONOCYTES NFR BLD AUTO: 11 % (ref 4–12)
NEUTROPHILS # BLD AUTO: 9.16 THOUSANDS/ÂΜL (ref 1.85–7.62)
NEUTS SEG NFR BLD AUTO: 81 % (ref 43–75)
NRBC BLD AUTO-RTO: 0 /100 WBCS
PLATELET # BLD AUTO: 184 THOUSANDS/UL (ref 149–390)
PMV BLD AUTO: 12.5 FL (ref 8.9–12.7)
POTASSIUM SERPL-SCNC: 5.8 MMOL/L (ref 3.5–5.3)
PROCALCITONIN SERPL-MCNC: 32.13 NG/ML
PROT SERPL-MCNC: 4.7 G/DL (ref 6.4–8.4)
PROTHROMBIN TIME: 20.7 SECONDS (ref 11.6–14.5)
RBC # BLD AUTO: 3.32 MILLION/UL (ref 3.88–5.62)
SODIUM SERPL-SCNC: 137 MMOL/L (ref 135–147)
WBC # BLD AUTO: 11.33 THOUSAND/UL (ref 4.31–10.16)

## 2023-07-09 PROCEDURE — 71045 X-RAY EXAM CHEST 1 VIEW: CPT

## 2023-07-09 PROCEDURE — 71260 CT THORAX DX C+: CPT

## 2023-07-09 PROCEDURE — 36415 COLL VENOUS BLD VENIPUNCTURE: CPT | Performed by: EMERGENCY MEDICINE

## 2023-07-09 PROCEDURE — 84145 PROCALCITONIN (PCT): CPT | Performed by: PHYSICIAN ASSISTANT

## 2023-07-09 PROCEDURE — 99285 EMERGENCY DEPT VISIT HI MDM: CPT

## 2023-07-09 PROCEDURE — 93005 ELECTROCARDIOGRAM TRACING: CPT

## 2023-07-09 PROCEDURE — 85025 COMPLETE CBC W/AUTO DIFF WBC: CPT | Performed by: EMERGENCY MEDICINE

## 2023-07-09 PROCEDURE — 87449 NOS EACH ORGANISM AG IA: CPT | Performed by: PHYSICIAN ASSISTANT

## 2023-07-09 PROCEDURE — 96375 TX/PRO/DX INJ NEW DRUG ADDON: CPT

## 2023-07-09 PROCEDURE — 80053 COMPREHEN METABOLIC PANEL: CPT | Performed by: EMERGENCY MEDICINE

## 2023-07-09 PROCEDURE — 82570 ASSAY OF URINE CREATININE: CPT | Performed by: PHYSICIAN ASSISTANT

## 2023-07-09 PROCEDURE — 99285 EMERGENCY DEPT VISIT HI MDM: CPT | Performed by: EMERGENCY MEDICINE

## 2023-07-09 PROCEDURE — 96361 HYDRATE IV INFUSION ADD-ON: CPT

## 2023-07-09 PROCEDURE — 96374 THER/PROPH/DIAG INJ IV PUSH: CPT

## 2023-07-09 PROCEDURE — 74177 CT ABD & PELVIS W/CONTRAST: CPT

## 2023-07-09 PROCEDURE — 94664 DEMO&/EVAL PT USE INHALER: CPT

## 2023-07-09 PROCEDURE — 82550 ASSAY OF CK (CPK): CPT | Performed by: EMERGENCY MEDICINE

## 2023-07-09 PROCEDURE — 94760 N-INVAS EAR/PLS OXIMETRY 1: CPT

## 2023-07-09 PROCEDURE — 84156 ASSAY OF PROTEIN URINE: CPT | Performed by: PHYSICIAN ASSISTANT

## 2023-07-09 PROCEDURE — 85610 PROTHROMBIN TIME: CPT | Performed by: EMERGENCY MEDICINE

## 2023-07-09 PROCEDURE — 72125 CT NECK SPINE W/O DYE: CPT

## 2023-07-09 PROCEDURE — 99223 1ST HOSP IP/OBS HIGH 75: CPT | Performed by: PHYSICIAN ASSISTANT

## 2023-07-09 PROCEDURE — 85730 THROMBOPLASTIN TIME PARTIAL: CPT | Performed by: EMERGENCY MEDICINE

## 2023-07-09 PROCEDURE — 70450 CT HEAD/BRAIN W/O DYE: CPT

## 2023-07-09 RX ORDER — ASPIRIN 81 MG/1
81 TABLET, CHEWABLE ORAL DAILY
Status: DISCONTINUED | OUTPATIENT
Start: 2023-07-10 | End: 2023-07-17 | Stop reason: HOSPADM

## 2023-07-09 RX ORDER — NYSTATIN 100000 [USP'U]/G
POWDER TOPICAL 2 TIMES DAILY
Status: DISCONTINUED | OUTPATIENT
Start: 2023-07-09 | End: 2023-07-17 | Stop reason: HOSPADM

## 2023-07-09 RX ORDER — LIDOCAINE 50 MG/G
1 PATCH TOPICAL DAILY
Status: DISCONTINUED | OUTPATIENT
Start: 2023-07-10 | End: 2023-07-17 | Stop reason: HOSPADM

## 2023-07-09 RX ORDER — CEFEPIME HYDROCHLORIDE 2 G/50ML
2000 INJECTION, SOLUTION INTRAVENOUS ONCE
Status: COMPLETED | OUTPATIENT
Start: 2023-07-09 | End: 2023-07-09

## 2023-07-09 RX ORDER — ACETAMINOPHEN 325 MG/1
650 TABLET ORAL EVERY 6 HOURS PRN
Status: DISCONTINUED | OUTPATIENT
Start: 2023-07-09 | End: 2023-07-17 | Stop reason: HOSPADM

## 2023-07-09 RX ORDER — PANTOPRAZOLE SODIUM 40 MG/1
40 TABLET, DELAYED RELEASE ORAL
Status: DISCONTINUED | OUTPATIENT
Start: 2023-07-10 | End: 2023-07-17 | Stop reason: HOSPADM

## 2023-07-09 RX ORDER — GUAIFENESIN/DEXTROMETHORPHAN 100-10MG/5
10 SYRUP ORAL EVERY 4 HOURS PRN
Status: DISCONTINUED | OUTPATIENT
Start: 2023-07-09 | End: 2023-07-17 | Stop reason: HOSPADM

## 2023-07-09 RX ORDER — GABAPENTIN 100 MG/1
200 CAPSULE ORAL
Status: DISCONTINUED | OUTPATIENT
Start: 2023-07-09 | End: 2023-07-17 | Stop reason: HOSPADM

## 2023-07-09 RX ORDER — FERROUS SULFATE 325(65) MG
325 TABLET ORAL
Status: DISCONTINUED | OUTPATIENT
Start: 2023-07-10 | End: 2023-07-17 | Stop reason: HOSPADM

## 2023-07-09 RX ORDER — HEPARIN SODIUM 5000 [USP'U]/ML
5000 INJECTION, SOLUTION INTRAVENOUS; SUBCUTANEOUS EVERY 8 HOURS SCHEDULED
Status: DISCONTINUED | OUTPATIENT
Start: 2023-07-09 | End: 2023-07-17 | Stop reason: HOSPADM

## 2023-07-09 RX ORDER — MIDODRINE HYDROCHLORIDE 5 MG/1
5 TABLET ORAL
Status: DISCONTINUED | OUTPATIENT
Start: 2023-07-10 | End: 2023-07-09

## 2023-07-09 RX ORDER — CEFTRIAXONE 1 G/50ML
1000 INJECTION, SOLUTION INTRAVENOUS EVERY 24 HOURS
Status: DISCONTINUED | OUTPATIENT
Start: 2023-07-10 | End: 2023-07-13

## 2023-07-09 RX ORDER — DEXTROSE MONOHYDRATE 25 G/50ML
50 INJECTION, SOLUTION INTRAVENOUS ONCE
Status: COMPLETED | OUTPATIENT
Start: 2023-07-09 | End: 2023-07-09

## 2023-07-09 RX ORDER — SODIUM CHLORIDE 9 MG/ML
125 INJECTION, SOLUTION INTRAVENOUS CONTINUOUS
Status: DISCONTINUED | OUTPATIENT
Start: 2023-07-09 | End: 2023-07-11

## 2023-07-09 RX ORDER — ONDANSETRON 2 MG/ML
4 INJECTION INTRAMUSCULAR; INTRAVENOUS EVERY 6 HOURS PRN
Status: DISCONTINUED | OUTPATIENT
Start: 2023-07-09 | End: 2023-07-17 | Stop reason: HOSPADM

## 2023-07-09 RX ADMIN — NYSTATIN: 100000 POWDER TOPICAL at 21:26

## 2023-07-09 RX ADMIN — INSULIN HUMAN 5 UNITS: 100 INJECTION, SOLUTION PARENTERAL at 18:14

## 2023-07-09 RX ADMIN — SODIUM CHLORIDE 250 ML: 0.9 INJECTION, SOLUTION INTRAVENOUS at 23:14

## 2023-07-09 RX ADMIN — HEPARIN SODIUM 5000 UNITS: 5000 INJECTION INTRAVENOUS; SUBCUTANEOUS at 21:25

## 2023-07-09 RX ADMIN — IOHEXOL 100 ML: 350 INJECTION, SOLUTION INTRAVENOUS at 18:01

## 2023-07-09 RX ADMIN — DEXTROSE MONOHYDRATE 50 ML: 25 INJECTION, SOLUTION INTRAVENOUS at 18:11

## 2023-07-09 RX ADMIN — CARBIDOPA AND LEVODOPA 1 TABLET: 25; 100 TABLET ORAL at 21:25

## 2023-07-09 RX ADMIN — GABAPENTIN 200 MG: 100 CAPSULE ORAL at 21:25

## 2023-07-09 RX ADMIN — CEFEPIME HYDROCHLORIDE 2000 MG: 2 INJECTION, SOLUTION INTRAVENOUS at 19:13

## 2023-07-09 RX ADMIN — SODIUM CHLORIDE 125 ML/HR: 0.9 INJECTION, SOLUTION INTRAVENOUS at 20:32

## 2023-07-09 RX ADMIN — SODIUM CHLORIDE 1000 ML: 0.9 INJECTION, SOLUTION INTRAVENOUS at 17:43

## 2023-07-09 NOTE — ED PROVIDER NOTES
Emergency Department Trauma Note  Yaritza Gutierrez 66 y.o. male MRN: [id number]  Unit/Bed#: /-01 Encounter: [id number]      Trauma Alert: Trauma Acuity: Trauma Evaluation  Model of Arrival:   via    Trauma Team: Current Providers  Attending Provider: Shahab Atkinson DO  Attending Provider: Nurys Qureshi DO  Registered Nurse: Peyman Ramirez RN  Registered Nurse: Herlinda Huynh RN  Registered Nurse: Mariaa Greenfield RN  Patient Care Assistant: Sandy Wagner  Consultants:     None      History of Present Illness     Chief Complaint:   Chief Complaint   Patient presents with   • Fall     Patient arrives via ems patient fell  last night around 8 pm and family found him around 3-4pm today. Blood noted on patients face. No blood thinner. Patient reports no headstrike or loc. Pain in right elbow  and right shoulder     HPI:  Yaritza Gutierrez is a 66 y.o. male who presents with . Mechanism:Details of Incident: patient fell last night around 8pm. was found today around 3pm by family. blood noted on patients face unknown origin. patient denies headstrike. patient reports daily aspirin Injury Date: 07/08/23 Injury Time: 2000      Patient is a 72-year-old male with a history of Parkinson's, who presents for evaluation of a mechanical fall. Patient says that he felt weak and lost his balance while walking last night with his walker. He says he slipped on the floor. This is around 8 PM.  He was on the floor all night until found by family earlier today. The patient denies hitting his head or losing consciousness. He denies headache, Lancer dizziness. When asked if anything hurts he says "everywhere."  Asked if he has a headache, neck pain, back pain, chest pain, abdominal pain he says no. The patient is awake alert and oriented.   The patient has dried blood to the anterior face and some bruising to the anterior chest.        Review of Systems   Constitutional: Negative for chills, fever and unexpected weight change. HENT: Negative for congestion, sore throat and trouble swallowing. Eyes: Negative for pain, discharge and itching. Respiratory: Negative for cough, chest tightness, shortness of breath and wheezing. Cardiovascular: Negative for chest pain, palpitations and leg swelling. Gastrointestinal: Negative for abdominal pain, blood in stool, diarrhea, nausea and vomiting. Endocrine: Negative for polyuria. Genitourinary: Negative for difficulty urinating, dysuria, frequency and hematuria. Musculoskeletal: Negative for arthralgias and back pain. Neurological: Negative for dizziness, syncope, weakness, light-headedness and headaches.        Historical Information     Immunizations:   Immunization History   Administered Date(s) Administered   • COVID-19 MODERNA VACC 0.5 ML IM 03/16/2021, 04/15/2021, 04/15/2021   • Influenza Split High Dose Preservative Free IM 12/06/2021, 10/17/2022   • Tuberculin Skin Test 06/09/2016, 06/13/2023       Past Medical History:   Diagnosis Date   • Anxiety    • CAD (coronary artery disease)    • Carotid stenosis, right     50-69% internal   • Chronic kidney disease (CKD), stage III (moderate)     baseline Cr 1.50   • Diverticulosis    • GERD (gastroesophageal reflux disease)    • Gout    • History of nephrolithiasis    • History of prostate cancer     s/p radiation/Lupron   • History of renal cell cancer     s/p left nephrectomy   • Osteoarthritis     knees & shoulders   • Parkinson disease    • Pulmonary nodule     7mm PARKER       Family History   Problem Relation Age of Onset   • Heart disease Mother    • Heart attack Mother    • Heart attack Sister    • Heart attack Brother      Past Surgical History:   Procedure Laterality Date   • ARTHROSCOPY KNEE Right    • CARDIAC CATHETERIZATION     • JOINT REPLACEMENT Left     knee   • NEPHRECTOMY Left 2018   • HI CORONARY ARTERY BYP W/VEIN & ARTERY GRAFT 2 VEIN N/A 9/2/2020    Procedure: CORONARY ARTERY BYPASS GRAFT (CABG) 2 VESSELS: LIMA to LAD, RLE EVH/SVG to PDA; Surgeon: Santos Escobar DO;  Location: [facility];  Service: Cardiac Surgery   • MI ECHO TRANSESOPHAG R-T 2D W/PRB IMG ACQUISJ I&R N/A 9/2/2020    Procedure: TRANSESOPHAGEAL ECHOCARDIOGRAM (MEGAN); Surgeon: Santos Escobar DO;  Location: [facility];  Service: Cardiac Surgery   • MI NDSC SURG W/VIDEO-ASSISTED HARVEST VEIN CABG Right 9/2/2020    Procedure: HARVEST VEIN ENDOSCOPIC ([address]); Surgeon: Santos Escobar DO;  Location: [facility];  Service: Cardiac Surgery   • REPLACEMENT TOTAL KNEE Left    • ROTATOR CUFF REPAIR Right    • TONSILLECTOMY       Social History     Tobacco Use   • Smoking status: Never   • Smokeless tobacco: Never   Vaping Use   • Vaping Use: Never used   Substance Use Topics   • Alcohol use: Yes     Alcohol/week: 9.0 standard drinks of alcohol     Types: 9 Standard drinks or equivalent per week     Comment: 3x a week ( 6-7 mixed drinks each time- henok)   • Drug use: Never     E-Cigarette/Vaping   • E-Cigarette Use Never User      E-Cigarette/Vaping Substances   • Nicotine No    • THC No    • CBD No    • Flavoring No    • Other No    • Unknown No        Family History: non-contributory    Meds/Allergies   Prior to Admission Medications   Prescriptions Last Dose Informant Patient Reported? Taking? Menthol, Topical Analgesic, (Biofreeze) 4 % GEL Not Taking  Yes No   Sig: Apply 1 application.  topically 4 (four) times a day Right shoulder   Patient not taking: Reported on 7/9/2023   acetaminophen (TYLENOL) 325 mg tablet   Yes No   Sig: Take 650 mg by mouth every 6 (six) hours as needed for mild pain   ascorbic acid (VITAMIN C) 500 MG tablet Not Taking  No No   Sig: Take 1 tablet (500 mg total) by mouth daily   Patient not taking: Reported on 6/30/2023   aspirin 81 mg chewable tablet   Yes No   Sig: Chew 81 mg daily   atorvastatin (LIPITOR) 80 mg tablet  Other (Specify) No No   Sig: Take 1 tablet (80 mg total) by mouth daily with dinner carbidopa-levodopa (SINEMET)  mg per tablet   Yes No   Sig: Take 1 tablet by mouth 3 (three) times a day   docusate sodium (COLACE) 100 mg capsule   No No   Sig: Take 1 capsule (100 mg total) by mouth 2 (two) times a day   ferrous sulfate 325 (65 Fe) mg tablet   No No   Sig: Take 1 tablet (325 mg total) by mouth daily with breakfast   folic acid (FOLVITE) 300 mcg tablet   No No   Sig: Take 1 tablet (400 mcg total) by mouth daily   Patient not taking: Reported on 6/30/2023   gabapentin (NEURONTIN) 100 mg capsule Past Week  No Yes   Sig: Take 2 capsules (200 mg total) by mouth daily at bedtime for 14 days   lidocaine (LIDODERM) 5 %   Yes No   Sig: Apply 1 patch topically daily Remove & Discard patch within 12 hours or as directed by MD   midodrine (PROAMATINE) 5 mg tablet   No No   Sig: Take 1 tablet (5 mg total) by mouth 3 (three) times a day before meals   mirtazapine (REMERON) 7.5 MG tablet   No No   Sig: Take 1 tablet (7.5 mg total) by mouth daily at bedtime for 14 days   nystatin (MYCOSTATIN) powder   Yes No   Sig: Apply 1 application. topically 2 (two) times a day   omeprazole (PriLOSEC) 20 mg delayed release capsule   No No   Sig: Take 1 capsule (20 mg total) by mouth daily      Facility-Administered Medications: None       Allergies   Allergen Reactions   • Indomethacin Shortness Of Breath       PHYSICAL EXAM    PE limited by:     Objective   Vitals:   First set: Temperature: (!) 97.2 °F (36.2 °C) (07/09/23 1633)  Pulse: 76 (07/09/23 1633)  Respirations: 18 (07/09/23 1633)  Blood Pressure: 117/69 (07/09/23 1633)  SpO2: 95 % (07/09/23 1633)    Primary Survey:   (A) Airway: intact  (B) Breathing: CTA b/l  (C) Circulation: Pulses:   normal  (D) Disabliity:  GCS Total:  15  (E) Expose:  Completed    Secondary Survey: (Click on Physical Exam tab above)  Physical Exam  Vitals and nursing note reviewed. Constitutional:       General: He is not in acute distress. Appearance: He is well-developed.    HENT: Head: Normocephalic. Comments: Dry blood to anterior face  No significant lacerations      Right Ear: External ear normal.      Left Ear: External ear normal.   Eyes:      Conjunctiva/sclera: Conjunctivae normal.      Pupils: Pupils are equal, round, and reactive to light. Cardiovascular:      Rate and Rhythm: Normal rate and regular rhythm. Heart sounds: Normal heart sounds. No murmur heard. No friction rub. No gallop. Pulmonary:      Effort: Pulmonary effort is normal. No respiratory distress. Breath sounds: Normal breath sounds. No wheezing or rales. Comments: Mild ecchymosis to anterior lower chest   Abdominal:      General: Bowel sounds are normal. There is no distension. Palpations: Abdomen is soft. Tenderness: There is no abdominal tenderness. There is no guarding. Musculoskeletal:         General: No tenderness or deformity. Normal range of motion. Cervical back: Normal range of motion. Lymphadenopathy:      Cervical: No cervical adenopathy. Skin:     General: Skin is warm and dry. Neurological:      General: No focal deficit present. Mental Status: He is alert and oriented to person, place, and time. Mental status is at baseline. Cranial Nerves: No cranial nerve deficit. Sensory: No sensory deficit. Motor: No weakness or abnormal muscle tone. Psychiatric:         Behavior: Behavior normal.         Cervical spine cleared by clinical criteria?  No (imaging required)      Invasive Devices     Peripheral Intravenous Line  Duration           Peripheral IV 07/09/23 Left;Proximal;Ventral (anterior) Forearm <1 day                Lab Results:   Results Reviewed     Procedure Component Value Units Date/Time    Procalcitonin [[id number]]  (Abnormal) Collected: 07/09/23 1657    Lab Status: Final result Specimen: Blood from Arm, Right Updated: 07/09/23 2109     Procalcitonin 32.13 ng/ml     CK [[id number]]  (Abnormal) Collected: 07/09/23 1657 Lab Status: Final result Specimen: Blood from Arm, Right Updated: 07/09/23 1756     Total CK 2,305 U/L     Comprehensive metabolic panel [[id number]]  (Abnormal) Collected: 07/09/23 1657    Lab Status: Final result Specimen: Blood from Arm, Right Updated: 07/09/23 1730     Sodium 137 mmol/L      Potassium 5.8 mmol/L      Chloride 105 mmol/L      CO2 22 mmol/L      ANION GAP 10 mmol/L      BUN 41 mg/dL      Creatinine 3.01 mg/dL      Glucose 116 mg/dL      Calcium 7.9 mg/dL      Corrected Calcium 9.1 mg/dL      AST 92 U/L      ALT 19 U/L      Alkaline Phosphatase 40 U/L      Total Protein 4.7 g/dL      Albumin 2.5 g/dL      Total Bilirubin 0.82 mg/dL      eGFR 18 ml/min/1.73sq m     Narrative:      [facility] guidelines for Chronic Kidney Disease (CKD):   •  Stage 1 with normal or high GFR (GFR > 90 mL/min/1.73 square meters)  •  Stage 2 Mild CKD (GFR = 60-89 mL/min/1.73 square meters)  •  Stage 3A Moderate CKD (GFR = 45-59 mL/min/1.73 square meters)  •  Stage 3B Moderate CKD (GFR = 30-44 mL/min/1.73 square meters)  •  Stage 4 Severe CKD (GFR = 15-29 mL/min/1.73 square meters)  •  Stage 5 End Stage CKD (GFR <15 mL/min/1.73 square meters)  Note: GFR calculation is accurate only with a steady state creatinine    Protime-INR [[id number]]  (Abnormal) Collected: 07/09/23 1657    Lab Status: Final result Specimen: Blood from Arm, Right Updated: 07/09/23 1719     Protime 20.7 seconds      INR 1.79    APTT [[id number]]  (Normal) Collected: 07/09/23 1657    Lab Status: Final result Specimen: Blood from Arm, Right Updated: 07/09/23 1719     PTT 35 seconds     CBC and differential [[id number]]  (Abnormal) Collected: 07/09/23 1657    Lab Status: Final result Specimen: Blood from Arm, Right Updated: 07/09/23 1703     WBC 11.33 Thousand/uL      RBC 3.32 Million/uL      Hemoglobin 8.8 g/dL      Hematocrit 29.3 %      MCV 88 fL      MCH 26.5 pg      MCHC 30.0 g/dL      RDW 19.1 %      MPV 12.5 fL      Platelets 184 Thousands/uL      nRBC 0 /100 WBCs      Neutrophils Relative 81 %      Immat GRANS % 1 %      Lymphocytes Relative 7 %      Monocytes Relative 11 %      Eosinophils Relative 0 %      Basophils Relative 0 %      Neutrophils Absolute 9.16 Thousands/µL      Immature Grans Absolute 0.07 Thousand/uL      Lymphocytes Absolute 0.83 Thousands/µL      Monocytes Absolute 1.24 Thousand/µL      Eosinophils Absolute 0.00 Thousand/µL      Basophils Absolute 0.03 Thousands/µL                  Imaging Studies:   Direct to CT: No  TRAUMA - CT head wo contrast   Final Result by Rona Berumen MD (07/09 1830)      No acute intracranial abnormality. CT CERVICAL SPINE - WITHOUT CONTRAST      CT CERVICAL SPINE - WITHOUT CONTRAST      INDICATION:   TRAUMA. COMPARISON:  None. TECHNIQUE:  CT examination of the cervical spine was performed without intravenous contrast.  Contiguous axial images were obtained. Multiplanar 2D reformatted images were created from the source data. Radiation dose length product (DLP) for this visit:  921 mGy-cm (accession [id number]), 797 mGy-cm (accession [id number]). This examination, like all CT scans performed in the [facility], was performed utilizing techniques to minimize    radiation dose exposure, including the use of iterative reconstruction and automated exposure control. IMAGE QUALITY:  Diagnostic. FINDINGS:      ALIGNMENT: There is reversal of normal cervical lordosis. There is no significant subluxation. No compression deformity. VERTEBRAE:  No fracture. DEGENERATIVE CHANGES: Moderate multilevel cervical degenerative changes are noted. No critical central canal stenosis. PREVERTEBRAL AND PARASPINAL SOFT TISSUES: 2.9 cm left thyroid nodule.  Incidental discovery of one or more thyroid nodule(s) measuring more than 1.5 cm and without suspicious features is noted in this patient who is above 28years old; according to    guidelines published in the February 2015 white paper on incidental thyroid nodules in the Journal of the [employer] Rebekah Greenwood), further characterization with thyroid ultrasound is recommended. THORACIC INLET:  Normal.      IMPRESSION:      No cervical spine fracture or traumatic malalignment. Incidental thyroid nodule(s) for which nonemergent thyroid ultrasound is recommended. Workstation performed: [id number]         TRAUMA - CT spine cervical wo contrast   Final Result by Filomena Auguste MD (07/09 1830)      No acute intracranial abnormality. CT CERVICAL SPINE - WITHOUT CONTRAST      CT CERVICAL SPINE - WITHOUT CONTRAST      INDICATION:   TRAUMA. COMPARISON:  None. TECHNIQUE:  CT examination of the cervical spine was performed without intravenous contrast.  Contiguous axial images were obtained. Multiplanar 2D reformatted images were created from the source data. Radiation dose length product (DLP) for this visit:  921 mGy-cm (accession [id number]), 438 mGy-cm (accession [id number]). This examination, like all CT scans performed in the [facility], was performed utilizing techniques to minimize    radiation dose exposure, including the use of iterative reconstruction and automated exposure control. IMAGE QUALITY:  Diagnostic. FINDINGS:      ALIGNMENT: There is reversal of normal cervical lordosis. There is no significant subluxation. No compression deformity. VERTEBRAE:  No fracture. DEGENERATIVE CHANGES: Moderate multilevel cervical degenerative changes are noted. No critical central canal stenosis. PREVERTEBRAL AND PARASPINAL SOFT TISSUES: 2.9 cm left thyroid nodule.  Incidental discovery of one or more thyroid nodule(s) measuring more than 1.5 cm and without suspicious features is noted in this patient who is above 28years old; according to    guidelines published in the February 2015 white paper on incidental thyroid nodules in the Journal of the [employer] Rj James), further characterization with thyroid ultrasound is recommended. THORACIC INLET:  Normal.      IMPRESSION:      No cervical spine fracture or traumatic malalignment. Incidental thyroid nodule(s) for which nonemergent thyroid ultrasound is recommended. Workstation performed: [id number]         TRAUMA - CT chest abdomen pelvis w contrast   Final Result by Laine Mathis MD (07/09 1907)         1. Bilateral pleural effusions, low volume ascites, and anasarca. 2. Left upper lobe opacification and volume loss consistent with atelectasis and/or pneumonia. 3. Peripancreatic adenopathy. Metastatic work-up suggested. 4. Low-density right renal lesions demonstrated on venous phase/delayed images. These may or may not be new versus apparent due to technical variation. MRI follow-up suggested given the history of renal cell carcinoma. 5. Stable ectasia ascending aorta. 6. Diverticulosis without evidence of diverticulitis. 7. Left thyroid nodule. Follow-up ultrasound suggested. Findings marked for immediate notification in epic. Workstation performed: [id number]         XR Trauma chest portable   Final Result by Laine Mathis MD (07/09 1809)      Hazy opacity left lung base new since the comparison study and suspicious for pneumonia in the appropriate clinical setting. Findings marked for immediate notification in epic. Workstation performed: [id number]               Procedures  Procedures         ED Course           Medical Decision Making  51-year-old male with Parkinson's presenting for mechanical fall. Lost balance while walking last night around 8 PM.  Was on the floor until earlier today when son found him. Denies head strike or loss of conscious. Is not on blood thinners.   Initially says he has "pain everywhere" but when asked him if he has a headache, neck pain, back pain, chest pain he says "no."  Patient has dried blood to the anterior face, no obvious lacerations. There is some mild ecchymosis to the anterior ribs. Abdomen soft nontender  Bedside fast technically difficult but overall negative  Given dried blood to head, will obtain CT head, C-spine. Will obtain CT chest abdomen pelvis. Will obtain CBC, CMP, coags, CK  CK elevated at 2500. Potassium high at 5.8, patient was given insulin and dextrose. CMP shows MARIANGEL compared to previous values. CT chest shows bilateral pleural effusions. CT imaging shows no acute traumatic injuries. When MARIANGEL, elevated DKA, hyperkalemia, patient was admitted to medicine as inpatient    MARIANGEL (acute kidney injury) [facility]): acute illness or injury  Elevated CK: acute illness or injury  Fall, initial encounter: acute illness or injury  Hyperkalemia: acute illness or injury  Pneumonia: acute illness or injury  Amount and/or Complexity of Data Reviewed  Labs: ordered. Radiology: ordered. Risk  OTC drugs. Prescription drug management. Decision regarding hospitalization. Disposition  [facility] Transfer: No  Final diagnoses:   MARIANGEL (acute kidney injury) ([address])   Hyperkalemia   Elevated CK   Pneumonia   Fall, initial encounter     Time reflects when diagnosis was documented in both MDM as applicable and the Disposition within this note     Time User Action Codes Description Comment    7/9/2023  7:13 PM Devin Peach [N17.9] MARIANGEL (acute kidney injury) ([address])     7/9/2023  7:13 PM Devin Peach [E87.5] Hyperkalemia     7/9/2023  7:14 PM Danielle Anya Add [R74.8] Elevated CK     7/9/2023  7:14 PM Danielle Anya Add [J18.9] Pneumonia     7/9/2023  7:14 PM Danielle Anya Add [E67. Jose Keyla, initial encounter       ED Disposition     ED Disposition   Admit    Condition   Stable    Date/Time   Sun Jul 9, 2023  7:13 PM    Comment   Case was discussed with MAHENDRA and the patient's admission status was agreed to be Admission Status: inpatient status to the service of Dr. Prasanna Almanzar    None       Current Discharge Medication List      CONTINUE these medications which have NOT CHANGED    Details   gabapentin (NEURONTIN) 100 mg capsule Take 2 capsules (200 mg total) by mouth daily at bedtime for 14 days  Qty: 28 capsule, Refills: 0    Associated Diagnoses: Chronic right shoulder pain      acetaminophen (TYLENOL) 325 mg tablet Take 650 mg by mouth every 6 (six) hours as needed for mild pain      ascorbic acid (VITAMIN C) 500 MG tablet Take 1 tablet (500 mg total) by mouth daily  Qty: 30 tablet, Refills: 0    Associated Diagnoses: Anemia, unspecified type      aspirin 81 mg chewable tablet Chew 81 mg daily      atorvastatin (LIPITOR) 80 mg tablet Take 1 tablet (80 mg total) by mouth daily with dinner  Qty: 30 tablet, Refills: 2    Associated Diagnoses: S/P CABG x 2      carbidopa-levodopa (SINEMET)  mg per tablet Take 1 tablet by mouth 3 (three) times a day      docusate sodium (COLACE) 100 mg capsule Take 1 capsule (100 mg total) by mouth 2 (two) times a day  Qty: 60 capsule, Refills: 0    Associated Diagnoses: S/P CABG x 2      ferrous sulfate 325 (65 Fe) mg tablet Take 1 tablet (325 mg total) by mouth daily with breakfast  Qty: 30 tablet, Refills: 0    Associated Diagnoses: Anemia      folic acid (FOLVITE) 358 mcg tablet Take 1 tablet (400 mcg total) by mouth daily  Qty: 30 tablet, Refills: 0    Associated Diagnoses: Anemia, unspecified type      lidocaine (LIDODERM) 5 % Apply 1 patch topically daily Remove & Discard patch within 12 hours or as directed by MD      Menthol, Topical Analgesic, (Biofreeze) 4 % GEL Apply 1 application.  topically 4 (four) times a day Right shoulder      midodrine (PROAMATINE) 5 mg tablet Take 1 tablet (5 mg total) by mouth 3 (three) times a day before meals  Qty: 90 tablet, Refills: 0    Associated Diagnoses: Orthostatic hypotension      mirtazapine (REMERON) 7.5 MG tablet Take 1 tablet (7.5 mg total) by mouth daily at bedtime for 14 days  Qty: 14 tablet, Refills: 0    Associated Diagnoses: Insomnia, unspecified type      nystatin (MYCOSTATIN) powder Apply 1 application. topically 2 (two) times a day      omeprazole (PriLOSEC) 20 mg delayed release capsule Take 1 capsule (20 mg total) by mouth daily  Qty: 30 capsule, Refills: 5    Associated Diagnoses: S/P CABG x 2           No discharge procedures on file.     PDMP Review       Value Time User    PDMP Reviewed  Yes 9/7/2020  9:30 AM Syeda Aleman PA-C          ED Provider  Electronically Signed by         Jj Piedra DO  07/10/23 0020

## 2023-07-09 NOTE — ED PROVIDER NOTES
History  Chief Complaint   Patient presents with   • Fall     Patient arrives via em[facility] patient fell  last night around 8 pm and family found him around 3-4pm today. Blood noted on patients face. No blood thinner. Patient reports no headstrike or loc. Pain in right elbow  and right shoulder     HPI    Prior to Admission Medications   Prescriptions Last Dose Informant Patient Reported? Taking? Menthol, Topical Analgesic, (Biofreeze) 4 % GEL   Yes No   Sig: Apply 1 application.  topically 4 (four) times a day Right shoulder   acetaminophen (TYLENOL) 325 mg tablet   Yes No   Sig: Take 650 mg by mouth every 6 (six) hours as needed for mild pain   ascorbic acid (VITAMIN C) 500 MG tablet   No No   Sig: Take 1 tablet (500 mg total) by mouth daily   Patient not taking: Reported on 6/30/2023   aspirin 81 mg chewable tablet   Yes No   Sig: Chew 81 mg daily   atorvastatin (LIPITOR) 80 mg tablet  Other (Specify) No No   Sig: Take 1 tablet (80 mg total) by mouth daily with dinner   Patient not taking: Reported on 6/30/2023   carbidopa-levodopa (SINEMET)  mg per tablet   Yes No   Sig: Take 1 tablet by mouth 3 (three) times a day   docusate sodium (COLACE) 100 mg capsule   No No   Sig: Take 1 capsule (100 mg total) by mouth 2 (two) times a day   ferrous sulfate 325 (65 Fe) mg tablet   No No   Sig: Take 1 tablet (325 mg total) by mouth daily with breakfast   folic acid (FOLVITE) 567 mcg tablet   No No   Sig: Take 1 tablet (400 mcg total) by mouth daily   Patient not taking: Reported on 6/30/2023   gabapentin (NEURONTIN) 100 mg capsule   No No   Sig: Take 2 capsules (200 mg total) by mouth daily at bedtime for 14 days   Patient not taking: Reported on 6/30/2023   lidocaine (LIDODERM) 5 %   Yes No   Sig: Apply 1 patch topically daily Remove & Discard patch within 12 hours or as directed by MD mendiolarine (PROAMATINE) 5 mg tablet   No No   Sig: Take 1 tablet (5 mg total) by mouth 3 (three) times a day before meals   mirtazapine (REMERON) 7.5 MG tablet   No No   Sig: Take 1 tablet (7.5 mg total) by mouth daily at bedtime for 14 days   nystatin (MYCOSTATIN) powder   Yes No   Sig: Apply 1 application. topically 2 (two) times a day   omeprazole (PriLOSEC) 20 mg delayed release capsule   No No   Sig: Take 1 capsule (20 mg total) by mouth daily      Facility-Administered Medications: None       Past Medical History:   Diagnosis Date   • Anxiety    • CAD (coronary artery disease)    • Carotid stenosis, right     50-69% internal   • Chronic kidney disease (CKD), stage III (moderate)     baseline Cr 1.50   • Diverticulosis    • GERD (gastroesophageal reflux disease)    • Gout    • History of nephrolithiasis    • History of prostate cancer     s/p radiation/Lupron   • History of renal cell cancer     s/p left nephrectomy   • Osteoarthritis     knees & shoulders   • Parkinson disease    • Pulmonary nodule     7mm PARKER       Past Surgical History:   Procedure Laterality Date   • ARTHROSCOPY KNEE Right    • CARDIAC CATHETERIZATION     • JOINT REPLACEMENT Left     knee   • NEPHRECTOMY Left 2018   • RI CORONARY ARTERY BYP W/VEIN & ARTERY GRAFT 2 VEIN N/A 9/2/2020    Procedure: CORONARY ARTERY BYPASS GRAFT (CABG) 2 VESSELS: LIMA to LAD, RLE EVH/SVG to PDA; Surgeon: Alysa Faith DO;  Location: [facility];  Service: Cardiac Surgery   • RI ECHO TRANSESOPHAG R-T 2D W/PRB IMG ACQUISJ I&R N/A 9/2/2020    Procedure: TRANSESOPHAGEAL ECHOCARDIOGRAM (MEGAN); Surgeon: Alysa Faith DO;  Location: [facility];  Service: Cardiac Surgery   • RI NDSC SURG W/VIDEO-ASSISTED HARVEST VEIN CABG Right 9/2/2020    Procedure: HARVEST VEIN ENDOSCOPIC ([address]);   Surgeon: Alysa Faith DO;  Location: [facility];  Service: Cardiac Surgery   • REPLACEMENT TOTAL KNEE Left    • ROTATOR CUFF REPAIR Right    • TONSILLECTOMY         Family History   Problem Relation Age of Onset   • Heart disease Mother    • Heart attack Mother    • Heart attack Sister    • Heart attack Brother      I have reviewed and agree with the history as documented. E-Cigarette/Vaping   • E-Cigarette Use Never User      E-Cigarette/Vaping Substances   • Nicotine No    • THC No    • CBD No    • Flavoring No    • Other No    • Unknown No      Social History     Tobacco Use   • Smoking status: Never   • Smokeless tobacco: Never   Vaping Use   • Vaping Use: Never used   Substance Use Topics   • Alcohol use:  Yes     Alcohol/week: 9.0 standard drinks of alcohol     Types: 9 Standard drinks or equivalent per week     Comment: 3x a week ( 6-7 mixed drinks each time- henok)   • Drug use: Never       Review of Systems    Physical Exam  Physical Exam    Vital Signs  ED Triage Vitals [07/09/23 1633]   Temperature Pulse Respirations Blood Pressure SpO2   (!) 97.2 °F (36.2 °C) 76 18 117/69 95 %      Temp src Heart Rate Source Patient Position - Orthostatic VS BP Location FiO2 (%)   -- Monitor Lying -- --      Pain Score       --           Vitals:    07/09/23 1633   BP: 117/69   Pulse: 76   Patient Position - Orthostatic VS: Lying         Visual Acuity  Visual Acuity    Flowsheet Row Most Recent Value   L Pupil Size (mm) 3   R Pupil Size (mm) 3          ED Medications  Medications   sodium chloride 0.9 % bolus 1,000 mL (has no administration in time range)       Diagnostic Studies  Results Reviewed     Procedure Component Value Units Date/Time    CBC and differential [[id number]]     Lab Status: No result Specimen: Blood     Comprehensive metabolic panel [[id number]]     Lab Status: No result Specimen: Blood     Protime-INR [[id number]]     Lab Status: No result Specimen: Blood     APTT [[id number]]     Lab Status: No result Specimen: Blood     CK [[id number]]     Lab Status: No result Specimen: Blood                  XR Trauma chest portable    (Results Pending)   TRAUMA - CT head wo contrast    (Results Pending)   TRAUMA - CT spine cervical wo contrast    (Results Pending)   TRAUMA - CT chest abdomen pelvis w contrast    (Results Pending)              Procedures  Procedures         ED Course                               SBIRT 22yo+    Flowsheet Row Most Recent Value   Initial Alcohol Screen: US AUDIT-C     1. How often do you have a drink containing alcohol? 0 Filed at: 07/09/2023 1632   2. How many drinks containing alcohol do you have on a typical day you are drinking? 0 Filed at: 07/09/2023 1632   3a. Male UNDER 65: How often do you have five or more drinks on one occasion? 0 Filed at: 07/09/2023 1632   3b. FEMALE Any Age, or MALE 65+: How often do you have 4 or more drinks on one occassion? 0 Filed at: 07/09/2023 1632   Audit-C Score 0 Filed at: 07/09/2023 1632   JORGE: How many times in the past year have you. .. Used an illegal drug or used a prescription medication for non-medical reasons? Never Filed at: 07/09/2023 1632                    MDM    Disposition  Final diagnoses:   None     ED Disposition     None      Follow-up Information    None         Patient's Medications   Discharge Prescriptions    No medications on file       No discharge procedures on file.     PDMP Review       Value Time User    PDMP Reviewed  Yes 9/7/2020  9:30 AM Caroline Miles PA-C          ED Provider  Electronically Signed by           Bhavna Scott DO  07/09/23 2026

## 2023-07-10 PROBLEM — T79.6XXA TRAUMATIC RHABDOMYOLYSIS (HCC): Status: ACTIVE | Noted: 2023-07-09

## 2023-07-10 LAB
ANION GAP SERPL CALCULATED.3IONS-SCNC: 7 MMOL/L
ATRIAL RATE: 73 BPM
BUN SERPL-MCNC: 41 MG/DL (ref 5–25)
CALCIUM SERPL-MCNC: 7.5 MG/DL (ref 8.4–10.2)
CHLORIDE SERPL-SCNC: 105 MMOL/L (ref 96–108)
CK SERPL-CCNC: 1751 U/L (ref 39–308)
CO2 SERPL-SCNC: 24 MMOL/L (ref 21–32)
CREAT SERPL-MCNC: 2.6 MG/DL (ref 0.6–1.3)
CREAT UR-MCNC: 84.6 MG/DL
GFR SERPL CREATININE-BSD FRML MDRD: 22 ML/MIN/1.73SQ M
GLUCOSE SERPL-MCNC: 91 MG/DL (ref 65–140)
L PNEUMO1 AG UR QL IA.RAPID: NEGATIVE
P AXIS: 45 DEGREES
POTASSIUM SERPL-SCNC: 5.4 MMOL/L (ref 3.5–5.3)
PR INTERVAL: 252 MS
PROCALCITONIN SERPL-MCNC: 29.58 NG/ML
PROT UR-MCNC: 51 MG/DL
PROT/CREAT UR: 0.6 MG/G{CREAT} (ref 0–0.1)
QRS AXIS: 16 DEGREES
QRSD INTERVAL: 74 MS
QT INTERVAL: 400 MS
QTC INTERVAL: 440 MS
S PNEUM AG UR QL: NEGATIVE
SODIUM SERPL-SCNC: 136 MMOL/L (ref 135–147)
T WAVE AXIS: 72 DEGREES
VENTRICULAR RATE: 73 BPM

## 2023-07-10 PROCEDURE — 99232 SBSQ HOSP IP/OBS MODERATE 35: CPT | Performed by: HOSPITALIST

## 2023-07-10 PROCEDURE — 93010 ELECTROCARDIOGRAM REPORT: CPT | Performed by: INTERNAL MEDICINE

## 2023-07-10 PROCEDURE — 80048 BASIC METABOLIC PNL TOTAL CA: CPT | Performed by: PHYSICIAN ASSISTANT

## 2023-07-10 PROCEDURE — 99223 1ST HOSP IP/OBS HIGH 75: CPT | Performed by: INTERNAL MEDICINE

## 2023-07-10 PROCEDURE — 97163 PT EVAL HIGH COMPLEX 45 MIN: CPT

## 2023-07-10 PROCEDURE — 97167 OT EVAL HIGH COMPLEX 60 MIN: CPT

## 2023-07-10 PROCEDURE — 82550 ASSAY OF CK (CPK): CPT | Performed by: PHYSICIAN ASSISTANT

## 2023-07-10 PROCEDURE — 84145 PROCALCITONIN (PCT): CPT | Performed by: PHYSICIAN ASSISTANT

## 2023-07-10 RX ADMIN — GABAPENTIN 200 MG: 100 CAPSULE ORAL at 21:51

## 2023-07-10 RX ADMIN — FERROUS SULFATE TAB 325 MG (65 MG ELEMENTAL FE) 325 MG: 325 (65 FE) TAB at 10:06

## 2023-07-10 RX ADMIN — NYSTATIN: 100000 POWDER TOPICAL at 17:15

## 2023-07-10 RX ADMIN — CEFTRIAXONE 1000 MG: 1 INJECTION, SOLUTION INTRAVENOUS at 06:13

## 2023-07-10 RX ADMIN — HEPARIN SODIUM 5000 UNITS: 5000 INJECTION INTRAVENOUS; SUBCUTANEOUS at 17:14

## 2023-07-10 RX ADMIN — NYSTATIN: 100000 POWDER TOPICAL at 10:05

## 2023-07-10 RX ADMIN — CARBIDOPA AND LEVODOPA 1 TABLET: 25; 100 TABLET ORAL at 21:51

## 2023-07-10 RX ADMIN — PANTOPRAZOLE SODIUM 40 MG: 20 TABLET, DELAYED RELEASE ORAL at 05:28

## 2023-07-10 RX ADMIN — SODIUM CHLORIDE 125 ML/HR: 0.9 INJECTION, SOLUTION INTRAVENOUS at 10:03

## 2023-07-10 RX ADMIN — HEPARIN SODIUM 5000 UNITS: 5000 INJECTION INTRAVENOUS; SUBCUTANEOUS at 05:28

## 2023-07-10 RX ADMIN — LIDOCAINE 5% 1 PATCH: 700 PATCH TOPICAL at 10:04

## 2023-07-10 RX ADMIN — ACETAMINOPHEN 650 MG: 325 TABLET ORAL at 22:32

## 2023-07-10 RX ADMIN — SODIUM CHLORIDE 125 ML/HR: 0.9 INJECTION, SOLUTION INTRAVENOUS at 19:48

## 2023-07-10 RX ADMIN — SODIUM CHLORIDE 250 ML: 0.9 INJECTION, SOLUTION INTRAVENOUS at 00:18

## 2023-07-10 RX ADMIN — ASPIRIN 81 MG CHEWABLE TABLET 81 MG: 81 TABLET CHEWABLE at 10:06

## 2023-07-10 RX ADMIN — CARBIDOPA AND LEVODOPA 1 TABLET: 25; 100 TABLET ORAL at 17:14

## 2023-07-10 RX ADMIN — CARBIDOPA AND LEVODOPA 1 TABLET: 25; 100 TABLET ORAL at 10:06

## 2023-07-10 NOTE — H&P
[address]  H&P  Name: Mitchell Taylor 66 y.o. male I MRN: [id number]  Unit/Bed#: -01 I Date of Admission: 7/9/2023   Date of Service: 7/9/2023 I Hospital Day: 0      Assessment/Plan   * Acute renal failure superimposed on stage 3 chronic kidney disease ([address])  Assessment & Plan  · Admit to medicine  · Likely prerenal secondary to dehydration  · Patient also has pleural effusion and anasarca  · We will hydrate saline 125 cc/h  · Hold prehospital midodrine  · Continue to monitor with repeat labs in a.m. · Obtain urine protein creatinine ratio  · Consult nephrology in a.m. Left upper lobe pneumonia  Assessment & Plan  · Noted on CT imaging  · Patient is afebrile and denies cough  · We will obtain urine for strep pneumo and Legionella  · Placed on O2 and respiratory protocol  · Give Robitussin-DM as needed  · Give Rocephin 1 g IV daily    Non-traumatic rhabdomyolysis  Assessment & Plan  · Patient was laying on the floor for approximately 20 hours  · Initial CK 2305  · We will hydrate as per above  · Continue to monitor with repeat CK in a.m. · Await further input from nephrology    Adenopathy  Assessment & Plan  · Peripancreatic adenopathy noted on CT imaging concerning for possible metastatic process  · Results were discussed with patient  · Patient has history of renal and prostate cancer  · In conversation with him he is not sure if he will want a further work-up  · I discussed option of obtaining MRI of his abdomen in a.m. to further characterize these  · He is unsure at this time whether he would want to undergo MRI even if it were to be cancerous he states that he would not want further treatment.   · Patient is going to think about this overnight and let daytime provider know his thoughts and wishes    Thyroid nodule  Assessment & Plan  · Results were discussed with patient  · Tentative plan is for outpatient ultrasound  · Will need to be addressed on discharge    Hyperlipidemia, unspecified  Assessment & Plan  Continue prehospital Lipitor 80 mg p.o. nightly    Gastroesophageal reflux disease  Assessment & Plan  Substitute Protonix 40 mg p.o. daily for prehospital Prilosec    Anemia  Assessment & Plan  · Hemoglobin appears to be at baseline   · Continue prehospital ferrous sulfate 325 mg p.o. daily  · Continue to monitor with repeat labs in    Parkinson's disease [facility])  Assessment & Plan  Continue prehospital Sinemet 25/100 mg p.o. 3 times daily and Neurontin 200 mg p.o. nightly         VTE Prophylaxis: Heparin  Code Status: Level 3  Discussion with family: None present at bedside at time of exam    Anticipated Length of Stay:  Patient will be admitted on an Inpatient basis with an anticipated length of stay of  > 2 midnights. Justification for Hospital Stay: Acute on chronic kidney injury and mild rhabdomyolysis requiring IV fluid resuscitation further nephrology evaluation, left upper lobe pneumonia requiring IV antibiotic    Total Time for Visit, including Counseling / Coordination of Care: 1 hour. Greater than 50% of this total time spent on direct patient counseling and coordination of care. Chief Complaint:   Fall x1 history    History of Present Illness:    Berhane Wood is a 66 y.o. male who presents with fall x1 yesterday. Patient with a known history of Parkinson's and normally uses a walker to ambulate at home and resides at home alone presented to the ER for further evaluation and treatment after a fall last evening at approximately 2000 hrs. Patient states that he was ambulating with his walker when he slipped on the floor and states that he did not really fall so much as he slightly lowered himself to the ground. Was unable to stand up on his own afterwards secondary to weakness not found by his family until approximately 3 to 4 PM today. Patient does have a history of chronic kidney disease stage III.   Patient denies any he states he did not hurt himself when he fell denies any fever or chills but did complain of some vague "breathing problems" while laying on the floor seem to resolve after repositioning himself on the floor. Patient is complaining of generalized pain has a history of chronic shoulder pain. Routine imaging obtained by ER showed a left upper lobe pneumonia as well as peripancreatic adenopathy concerning for metastatic process given his history of prostate and renal cancer. Patient was advised of these findings states that he is unsure if he wants further work-up but will let us know in the morning    Review of Systems:    Review of Systems   Constitutional: Negative for chills and fever. Respiratory: Positive for shortness of breath. Negative for cough and wheezing. Cardiovascular: Negative for chest pain and palpitations. Gastrointestinal: Negative for abdominal pain, diarrhea, nausea and vomiting. Genitourinary: Negative for dysuria, frequency, hematuria and urgency. Musculoskeletal: Positive for gait problem. Neurological: Positive for tremors and weakness. Negative for light-headedness and headaches. All other systems reviewed and are negative.       Past Medical and Surgical History:     Past Medical History:   Diagnosis Date   • Anxiety    • CAD (coronary artery disease)    • Carotid stenosis, right     50-69% internal   • Chronic kidney disease (CKD), stage III (moderate)     baseline Cr 1.50   • Diverticulosis    • GERD (gastroesophageal reflux disease)    • Gout    • History of nephrolithiasis    • History of prostate cancer     s/p radiation/Lupron   • History of renal cell cancer     s/p left nephrectomy   • Osteoarthritis     knees & shoulders   • Parkinson disease    • Pulmonary nodule     7mm PARKER       Past Surgical History:   Procedure Laterality Date   • ARTHROSCOPY KNEE Right    • CARDIAC CATHETERIZATION     • JOINT REPLACEMENT Left     knee   • NEPHRECTOMY Left 2018   • ME CORONARY ARTERY BYP W/VEIN & ARTERY GRAFT 2 VEIN N/A 9/2/2020    Procedure: CORONARY ARTERY BYPASS GRAFT (CABG) 2 VESSELS: LIMA to LAD, RLE EVH/SVG to PDA; Surgeon: Santos Escobar DO;  Location: [facility];  Service: Cardiac Surgery   • NH ECHO TRANSESOPHAG R-T 2D W/PRB IMG ACQUISJ I&R N/A 9/2/2020    Procedure: TRANSESOPHAGEAL ECHOCARDIOGRAM (MEGAN); Surgeon: Santos Escobar DO;  Location: [facility];  Service: Cardiac Surgery   • NH NDSC SURG W/VIDEO-ASSISTED HARVEST VEIN CABG Right 9/2/2020    Procedure: HARVEST VEIN ENDOSCOPIC ([address]); Surgeon: Santos Escobar DO;  Location: [facility];  Service: Cardiac Surgery   • REPLACEMENT TOTAL KNEE Left    • ROTATOR CUFF REPAIR Right    • TONSILLECTOMY         Meds/Allergies:    Prior to Admission medications    Medication Sig Start Date End Date Taking?  Authorizing Provider   gabapentin (NEURONTIN) 100 mg capsule Take 2 capsules (200 mg total) by mouth daily at bedtime for 14 days 6/23/23 7/9/23 Yes RAEANN Perez   acetaminophen (TYLENOL) 325 mg tablet Take 650 mg by mouth every 6 (six) hours as needed for mild pain    Historical Provider, MD   ascorbic acid (VITAMIN C) 500 MG tablet Take 1 tablet (500 mg total) by mouth daily  Patient not taking: Reported on 6/30/2023 6/23/23 7/23/23  RAEANN Perez   aspirin 81 mg chewable tablet Chew 81 mg daily    Historical Provider, MD   atorvastatin (LIPITOR) 80 mg tablet Take 1 tablet (80 mg total) by mouth daily with dinner 9/7/20   Daysi Hunter PA-C   carbidopa-levodopa (SINEMET)  mg per tablet Take 1 tablet by mouth 3 (three) times a day    Historical Provider, MD   docusate sodium (COLACE) 100 mg capsule Take 1 capsule (100 mg total) by mouth 2 (two) times a day 9/7/20 6/30/23  Daysi Hunter PA-C   ferrous sulfate 325 (65 Fe) mg tablet Take 1 tablet (325 mg total) by mouth daily with breakfast 6/23/23 7/23/23  RAEANN Perez   folic acid (FOLVITE) 084 mcg tablet Take 1 tablet (400 mcg total) by mouth daily  Patient not taking: Reported on 6/30/2023 6/23/23 7/23/23  Lawerance Lowing, CRNP   lidocaine (LIDODERM) 5 % Apply 1 patch topically daily Remove & Discard patch within 12 hours or as directed by MD    Historical Provider, MD   Menthol, Topical Analgesic, (Biofreeze) 4 % GEL Apply 1 application. topically 4 (four) times a day Right shoulder  Patient not taking: Reported on 7/9/2023    Historical Provider, MD   midodrine (PROAMATINE) 5 mg tablet Take 1 tablet (5 mg total) by mouth 3 (three) times a day before meals 6/23/23 7/23/23  Lawerance Lowing, CRNP   mirtazapine (REMERON) 7.5 MG tablet Take 1 tablet (7.5 mg total) by mouth daily at bedtime for 14 days 6/23/23 7/7/23  Lawerance Lowing, CRNP   nystatin (MYCOSTATIN) powder Apply 1 application. topically 2 (two) times a day    Historical Provider, MD   omeprazole (PriLOSEC) 20 mg delayed release capsule Take 1 capsule (20 mg total) by mouth daily 6/26/23 7/26/23  Raffi Age, DO     all medications and allergies reviewed    Allergies:    Allergies   Allergen Reactions   • Indomethacin Shortness Of Breath       Social History:     Marital Status:    Occupation: Retired   Patient Pre-hospital Living Situation: Resides at home alone  Patient Pre-hospital Level of Mobility: Full with assist of a walker  Patient Pre-hospital Diet Restrictions: None    Social History     Substance and Sexual Activity   Alcohol Use Yes   • Alcohol/week: 9.0 standard drinks of alcohol   • Types: 9 Standard drinks or equivalent per week    Comment: 3x a week ( 6-7 mixed drinks each time- henok)     Social History     Tobacco Use   Smoking Status Never   Smokeless Tobacco Never     Social History     Substance and Sexual Activity   Drug Use Never       Family History:  I have reviewed the patient's family history    Physical Exam:     Vitals:   Blood Pressure: (!) 102/39 (07/09/23 2020)  Pulse: 69 (07/09/23 2020)  Temperature: 98.1 °F (36.7 °C) (07/09/23 2020)  Temp Source: Axillary (07/09/23 1905)  Respirations: 20 (07/09/23 2020)  SpO2: 95 % (07/09/23 2020)    Physical Exam  Vitals and nursing note reviewed. Constitutional:       General: He is not in acute distress. Appearance: Normal appearance. HENT:      Head: Normocephalic and atraumatic. Right Ear: Tympanic membrane normal.      Left Ear: Tympanic membrane normal.      Nose: Nose normal.      Mouth/Throat:      Mouth: Mucous membranes are moist.      Pharynx: No oropharyngeal exudate or posterior oropharyngeal erythema. Eyes:      Extraocular Movements: Extraocular movements intact. Pupils: Pupils are equal, round, and reactive to light. Cardiovascular:      Rate and Rhythm: Normal rate and regular rhythm. Pulses: Normal pulses. Heart sounds: Normal heart sounds. Pulmonary:      Effort: Pulmonary effort is normal. No respiratory distress. Breath sounds: Normal breath sounds. Abdominal:      General: Abdomen is flat. Bowel sounds are normal.      Palpations: Abdomen is soft. Musculoskeletal:         General: Normal range of motion. Cervical back: Normal range of motion and neck supple. Right lower leg: Edema present. Left lower leg: Edema present. Skin:     General: Skin is warm and dry. Capillary Refill: Capillary refill takes less than 2 seconds. Findings: Bruising present. Comments: Ecchymosis lower anterior chest present upon admission   Neurological:      General: No focal deficit present. Mental Status: He is alert and oriented to person, place, and time. Additional Data:     Lab Results: I have personally reviewed pertinent reports.       Results from last 7 days   Lab Units 07/09/23  1657   WBC Thousand/uL 11.33*   HEMOGLOBIN g/dL 8.8*   HEMATOCRIT % 29.3*   PLATELETS Thousands/uL 184   NEUTROS PCT % 81*   LYMPHS PCT % 7*   MONOS PCT % 11   EOS PCT % 0     Results from last 7 days   Lab Units 07/09/23  1657   SODIUM mmol/L 137   POTASSIUM mmol/L 5.8*   CHLORIDE mmol/L 105   CO2 mmol/L 22   BUN mg/dL 41*   CREATININE mg/dL 3.01*   ANION GAP mmol/L 10   CALCIUM mg/dL 7.9*   ALBUMIN g/dL 2.5*   TOTAL BILIRUBIN mg/dL 0.82   ALK PHOS U/L 40   ALT U/L 19   AST U/L 92*   GLUCOSE RANDOM mg/dL 116     Results from last 7 days   Lab Units 07/09/23  1657   INR  1.79*             Results from last 7 days   Lab Units 07/09/23  1657   PROCALCITONIN ng/ml 32.13*       Imaging: I have personally reviewed pertinent reports. TRAUMA - CT head wo contrast   Final Result by Isamar Morgan MD (07/09 1830)      No acute intracranial abnormality. CT CERVICAL SPINE - WITHOUT CONTRAST      CT CERVICAL SPINE - WITHOUT CONTRAST      INDICATION:   TRAUMA. COMPARISON:  None. TECHNIQUE:  CT examination of the cervical spine was performed without intravenous contrast.  Contiguous axial images were obtained. Multiplanar 2D reformatted images were created from the source data. Radiation dose length product (DLP) for this visit:  921 mGy-cm (accession [id number]), 324 mGy-cm (accession [id number]). This examination, like all CT scans performed in the [facility], was performed utilizing techniques to minimize    radiation dose exposure, including the use of iterative reconstruction and automated exposure control. IMAGE QUALITY:  Diagnostic. FINDINGS:      ALIGNMENT: There is reversal of normal cervical lordosis. There is no significant subluxation. No compression deformity. VERTEBRAE:  No fracture. DEGENERATIVE CHANGES: Moderate multilevel cervical degenerative changes are noted. No critical central canal stenosis. PREVERTEBRAL AND PARASPINAL SOFT TISSUES: 2.9 cm left thyroid nodule.  Incidental discovery of one or more thyroid nodule(s) measuring more than 1.5 cm and without suspicious features is noted in this patient who is above 28years old; according to    guidelines published in the February 2015 white paper on incidental thyroid nodules in the Journal of the [employer] Abdirizak Rivera), further characterization with thyroid ultrasound is recommended. THORACIC INLET:  Normal.      IMPRESSION:      No cervical spine fracture or traumatic malalignment. Incidental thyroid nodule(s) for which nonemergent thyroid ultrasound is recommended. Workstation performed: [id number]         TRAUMA - CT spine cervical wo contrast   Final Result by Manish Rodriguez MD (07/09 1830)      No acute intracranial abnormality. CT CERVICAL SPINE - WITHOUT CONTRAST      CT CERVICAL SPINE - WITHOUT CONTRAST      INDICATION:   TRAUMA. COMPARISON:  None. TECHNIQUE:  CT examination of the cervical spine was performed without intravenous contrast.  Contiguous axial images were obtained. Multiplanar 2D reformatted images were created from the source data. Radiation dose length product (DLP) for this visit:  921 mGy-cm (accession [id number]), 737 mGy-cm (accession [id number]). This examination, like all CT scans performed in the [facility], was performed utilizing techniques to minimize    radiation dose exposure, including the use of iterative reconstruction and automated exposure control. IMAGE QUALITY:  Diagnostic. FINDINGS:      ALIGNMENT: There is reversal of normal cervical lordosis. There is no significant subluxation. No compression deformity. VERTEBRAE:  No fracture. DEGENERATIVE CHANGES: Moderate multilevel cervical degenerative changes are noted. No critical central canal stenosis. PREVERTEBRAL AND PARASPINAL SOFT TISSUES: 2.9 cm left thyroid nodule.  Incidental discovery of one or more thyroid nodule(s) measuring more than 1.5 cm and without suspicious features is noted in this patient who is above 28years old; according to    guidelines published in the February 2015 white paper on incidental thyroid nodules in the Journal of the [employer] Timur Garcia), further characterization with thyroid ultrasound is recommended. THORACIC INLET:  Normal.      IMPRESSION:      No cervical spine fracture or traumatic malalignment. Incidental thyroid nodule(s) for which nonemergent thyroid ultrasound is recommended. Workstation performed: [id number]         TRAUMA - CT chest abdomen pelvis w contrast   Final Result by Jose Manuel Olivia MD (07/09 1907)         1. Bilateral pleural effusions, low volume ascites, and anasarca. 2. Left upper lobe opacification and volume loss consistent with atelectasis and/or pneumonia. 3. Peripancreatic adenopathy. Metastatic work-up suggested. 4. Low-density right renal lesions demonstrated on venous phase/delayed images. These may or may not be new versus apparent due to technical variation. MRI follow-up suggested given the history of renal cell carcinoma. 5. Stable ectasia ascending aorta. 6. Diverticulosis without evidence of diverticulitis. 7. Left thyroid nodule. Follow-up ultrasound suggested. Findings marked for immediate notification in epic. Workstation performed: [id number]         XR Trauma chest portable   Final Result by Jose Manuel Olivia MD (07/09 1809)      Hazy opacity left lung base new since the comparison study and suspicious for pneumonia in the appropriate clinical setting. Findings marked for immediate notification in epic. Workstation performed: [id number]               EKG, Pathology, and Other Studies Reviewed on Admission:   · EKG: N/A    [facility] / Care Everywhere Records Reviewed: Yes    ** Please Note: This note has been constructed using a voice recognition system.  **

## 2023-07-10 NOTE — ASSESSMENT & PLAN NOTE
· Noted on CT imaging  · Patient is afebrile and denies cough  · We will obtain urine for strep pneumo and Legionella  · Placed on O2 and respiratory protocol  · Give Robitussin-DM as needed  · Give Rocephin 1 g IV daily

## 2023-07-10 NOTE — ASSESSMENT & PLAN NOTE
· Hemoglobin appears to be at baseline   · Continue prehospital ferrous sulfate 325 mg p.o. daily  · Continue to monitor with repeat labs in

## 2023-07-10 NOTE — ASSESSMENT & PLAN NOTE
· Results were discussed with patient  · Patient will follow-up in the outpatient setting for further work-up

## 2023-07-10 NOTE — ASSESSMENT & PLAN NOTE
· Results were discussed with patient  · Tentative plan is for outpatient ultrasound  · Will need to be addressed on discharge

## 2023-07-10 NOTE — PLAN OF CARE
Problem: MOBILITY - ADULT  Goal: Maintain or return to baseline ADL function  Description: INTERVENTIONS:  -  Assess patient's ability to carry out ADLs; assess patient's baseline for ADL function and identify physical deficits which impact ability to perform ADLs (bathing, care of mouth/teeth, toileting, grooming, dressing, etc.)  - Assess/evaluate cause of self-care deficits   - Assess range of motion  - Assess patient's mobility; develop plan if impaired  - Assess patient's need for assistive devices and provide as appropriate  - Encourage maximum independence but intervene and supervise when necessary  - Involve family in performance of ADLs  - Assess for home care needs following discharge   - Consider OT consult to assist with ADL evaluation and planning for discharge  - Provide patient education as appropriate  Outcome: Progressing  Goal: Maintains/Returns to pre admission functional level  Description: INTERVENTIONS:  - Perform BMAT or MOVE assessment daily.   - Set and communicate daily mobility goal to care team and patient/family/caregiver. - Collaborate with rehabilitation services on mobility goals if consulted  - Perform Range of Motion 3 times a day. - Reposition patient every 2 hours.   - Dangle patient 3 times a day  - Stand patient 3 times a day  - Ambulate patient 3 times a day  - Out of bed to chair 3 times a day   - Out of bed for meals 3 times a day  - Out of bed for toileting  - Record patient progress and toleration of activity level   Outcome: Progressing     Problem: Prexisting or High Potential for Compromised Skin Integrity  Goal: Skin integrity is maintained or improved  Description: INTERVENTIONS:  - Identify patients at risk for skin breakdown  - Assess and monitor skin integrity  - Assess and monitor nutrition and hydration status  - Monitor labs   - Assess for incontinence   - Turn and reposition patient  - Assist with mobility/ambulation  - Relieve pressure over bony prominences  - Avoid friction and shearing  - Provide appropriate hygiene as needed including keeping skin clean and dry  - Evaluate need for skin moisturizer/barrier cream  - Collaborate with interdisciplinary team   - Patient/family teaching  - Consider wound care consult   Outcome: Progressing     Problem: Nutrition/Hydration-ADULT  Goal: Nutrient/Hydration intake appropriate for improving, restoring or maintaining nutritional needs  Description: Monitor and assess patient's nutrition/hydration status for malnutrition. Collaborate with interdisciplinary team and initiate plan and interventions as ordered. Monitor patient's weight and dietary intake as ordered or per policy. Utilize nutrition screening tool and intervene as necessary. Determine patient's food preferences and provide high-protein, high-caloric foods as appropriate.      INTERVENTIONS:  - Monitor oral intake, urinary output, labs, and treatment plans  - Assess nutrition and hydration status and recommend course of action  - Evaluate amount of meals eaten  - Assist patient with eating if necessary   - Allow adequate time for meals  - Recommend/ encourage appropriate diets, oral nutritional supplements, and vitamin/mineral supplements  - Order, calculate, and assess calorie counts as needed  - Recommend, monitor, and adjust tube feedings and TPN/PPN based on assessed needs  - Assess need for intravenous fluids  - Provide specific nutrition/hydration education as appropriate  - Include patient/family/caregiver in decisions related to nutrition  Outcome: Progressing

## 2023-07-10 NOTE — CONSULTS
Consultation - Nephrology   Felicia Palafox 66 y.o. male MRN: [id number]  Unit/Bed#: -01 Encounter: [id number]      A/P:  1. Acute kidney injury on top of chronic kidney disease   Kidney function improving volume expansion, continue with current care. Further evaluation work-up if indicated depending on a progresses from a clinical standpoint. Continue avoid potential nephrotoxins at this time. 2.  Chronic kidney stage IIIa with a baseline creatinine to 1.2-1.3 mg/dL   3. Hyperkalemia   Likely resolved, patient had hemolysis with blood draw earlier this morning. Reassess tomorrow or another regular interval.  4.  Mild traumatic rhabdomyolysis   Continue volume expansion at this time with normal saline, improving at this time. Continue to monitor electrolytes and replete aggressively. 5.  Left upper lobe pneumonia   Continue to treat empirically with ceftriaxone at this time, patient appears to be improving. 6.  Parkinson disease  7. Peripancreatic adenopathy   Will need to have further work-up to rule out cancer. This will likely occur in the outpatient setting. Thank you for allowing us to participate in the care of your patient. Please feel free to contact us regarding the care of this patient, or any other questions/concerns that may be applicable.     Patient Active Problem List   Diagnosis   • Coronary artery disease involving native coronary artery of native heart   • Renal insufficiency   • Dyslipidemia   • Stage 3 chronic kidney disease (HCC)   • Chronic kidney disease (CKD), stage III (moderate)   • Coronary artery disease involving native coronary artery of native heart without angina pectoris   • Carotid stenosis, right   • S/P CABG x 2   • Tremor of right hand   • Obesity   • Generalized weakness   • Dizziness   • Parkinson's disease (HCC)   • Abnormal ECG   • Anemia   • Penile edema   • Swelling of right upper extremity   • Ambulatory dysfunction   • Orthostatic hypotension • Right shoulder pain   • Insomnia   • Renal cell cancer, left (HCC)   • Peripheral edema   • Frequency of micturition   • Abnormal prostate specific antigen   • Balanitis   • Bilateral ureteral obstruction   • Calculus of kidney   • Adenocarcinoma of prostate (HCC)   • Complex renal cyst   • First degree heart block   • Gastroesophageal reflux disease   • Hyperlipidemia, unspecified   • Idiopathic chronic gout, unspecified site, with tophus (tophi)   • Hypercalcinuria   • Occlusion and stenosis of right carotid artery   • Presence of left artificial knee joint   • Tremor, unspecified   • Urinary incontinence   • History of coronary artery bypass surgery   • Hypernatriuria   • Acute renal failure superimposed on stage 3 chronic kidney disease (HCC)   • Traumatic rhabdomyolysis ([address])   • Left upper lobe pneumonia   • Adenopathy   • Thyroid nodule   • Fall       History of Present Illness   Physician Requesting Consult: Geraldo Cuevas MD  Reason for Consult / Principal Problem: Acute kidney injury  Hx and PE limited by:   HPI: Robert Alonso is a 66y.o. year old male who presented to the emergency room yesterday, due to a fall at home for several hours which resulted in bloody knees and a bloody face. Patient was unable to stand up due to weakness. Patient was brought into the emergency room at which time he was diagnosed with and treated for a left upper lobe pneumonia. Patient at this time is feeling improved, although continues to feel fatigued and sore and achy. CPKs were drawn which were minimally elevated at 2300, and improved at 1700 today. From the renal standpoint, patient appears to have a baseline creatinine between 1.2-1.3 mg/dL, present with a creatinine of 3 mg/dL now improved down to 2.6 mg/dL. Patient had a mild hyperkalemia at presentation, potassium this morning is 5.4 mmol/L with mild hemolysis. He currently has normal saline running at 125 mL/hr.   Further from renal standpoint, he is status post left nephrectomy due to renal cell cancer in the past.    Of note, patient was noted to have peripancreatic adenopathy which is suggestive of metastatic process, this has not yet been delineated during this admission. History obtained from chart review and the patient    Constitutional ROS- Denies fever, chills, night sweats, weight changes. Positive for fatigue  HEENT ROS- Denies history of eye surgeries, glaucoma, headaches or history of trauma, blurred vision. .  Endocrine ROS- No history diabetes mellitus or thyroid disease. Cardiovascular ROS- Denies chest pain, palpitation, dyspnea exertion, orthopnea, claudication. Pulmonary ROS- Denies history of COPD, asthma. Denies cough, hemoptysis, shortness of breath. GI ROS- Denies abdominal pain, diarrhea, nausea, swallowing problems, vomiting, constipation, blood in stools, fecal incontinence. Hematological ROS- Denies history of easy bruising, blood clots, bleeding or blood transfusions. Genitourinary ROS- Denies recent hematuria, pyuria, flank pain, change in urinary stream, decreased urinary output, increased urinary frequency, nocturia, foamy urine, or urinary incontinence. Lymphatic ROS- Denies lymphadenopathy. Musculoskeletal ROS- Denies history of muscle weakness, joint pain. Dermatological ROS- Denies rash, wounds, ulcers, itching, jaundice. Psychiatric ROS- Denies anxiety, depression, hallucinations, disorientation. Neurological ROS- No stroke or TIA symptoms.  .    Historical Information   Past Medical History:   Diagnosis Date   • Anxiety    • CAD (coronary artery disease)    • Carotid stenosis, right     50-69% internal   • Chronic kidney disease (CKD), stage III (moderate)     baseline Cr 1.50   • Diverticulosis    • GERD (gastroesophageal reflux disease)    • Gout    • History of nephrolithiasis    • History of prostate cancer     s/p radiation/Lupron   • History of renal cell cancer     s/p left nephrectomy • Osteoarthritis     knees & shoulders   • Parkinson disease    • Pulmonary nodule     7mm PARKER     Past Surgical History:   Procedure Laterality Date   • ARTHROSCOPY KNEE Right    • CARDIAC CATHETERIZATION     • JOINT REPLACEMENT Left     knee   • NEPHRECTOMY Left 2018   • RI CORONARY ARTERY BYP W/VEIN & ARTERY GRAFT 2 VEIN N/A 9/2/2020    Procedure: CORONARY ARTERY BYPASS GRAFT (CABG) 2 VESSELS: LIMA to LAD, RLE EVH/SVG to PDA; Surgeon: German Andrea DO;  Location: [facility];  Service: Cardiac Surgery   • RI ECHO TRANSESOPHAG R-T 2D W/PRB IMG ACQUISJ I&R N/A 9/2/2020    Procedure: TRANSESOPHAGEAL ECHOCARDIOGRAM (MEGAN); Surgeon: German Andrea DO;  Location: [facility];  Service: Cardiac Surgery   • RI NDSC SURG W/VIDEO-ASSISTED HARVEST VEIN CABG Right 9/2/2020    Procedure: HARVEST VEIN ENDOSCOPIC ([address]);   Surgeon: German Andrea DO;  Location: [facility];  Service: Cardiac Surgery   • REPLACEMENT TOTAL KNEE Left    • ROTATOR CUFF REPAIR Right    • TONSILLECTOMY       Social History   Social History     Substance and Sexual Activity   Alcohol Use Yes   • Alcohol/week: 9.0 standard drinks of alcohol   • Types: 9 Standard drinks or equivalent per week    Comment: 3x a week ( 6-7 mixed drinks each time- henok)     Social History     Substance and Sexual Activity   Drug Use Never     Social History     Tobacco Use   Smoking Status Never   Smokeless Tobacco Never     Family History   Problem Relation Age of Onset   • Heart disease Mother    • Heart attack Mother    • Heart attack Sister    • Heart attack Brother        Meds/Allergies   all current active meds have been reviewed, current meds:   Current Facility-Administered Medications   Medication Dose Route Frequency   • acetaminophen (TYLENOL) tablet 650 mg  650 mg Oral Q6H PRN   • aspirin chewable tablet 81 mg  81 mg Oral Daily   • carbidopa-levodopa (SINEMET)  mg per tablet 1 tablet  1 tablet Oral TID   • cefTRIAXone (ROCEPHIN) IVPB (premix in dextrose) 1,000 mg 50 mL  1,000 mg Intravenous Q24H   • dextromethorphan-guaiFENesin (ROBITUSSIN DM) oral syrup 10 mL  10 mL Oral Q4H PRN   • ferrous sulfate tablet 325 mg  325 mg Oral Daily With Breakfast   • gabapentin (NEURONTIN) capsule 200 mg  200 mg Oral HS   • heparin (porcine) subcutaneous injection 5,000 Units  5,000 Units Subcutaneous Q8H [facility] & longterm   • lidocaine (LIDODERM) 5 % patch 1 patch  1 patch Topical Daily   • nystatin (MYCOSTATIN) powder   Topical BID   • ondansetron (ZOFRAN) injection 4 mg  4 mg Intravenous Q6H PRN   • pantoprazole (PROTONIX) EC tablet 40 mg  40 mg Oral Early Morning   • sodium chloride 0.9 % infusion  125 mL/hr Intravenous Continuous    and PTA meds:    Medications Prior to Admission   Medication   • gabapentin (NEURONTIN) 100 mg capsule   • acetaminophen (TYLENOL) 325 mg tablet   • ascorbic acid (VITAMIN C) 500 MG tablet   • aspirin 81 mg chewable tablet   • atorvastatin (LIPITOR) 80 mg tablet   • carbidopa-levodopa (SINEMET)  mg per tablet   • docusate sodium (COLACE) 100 mg capsule   • ferrous sulfate 325 (65 Fe) mg tablet   • folic acid (FOLVITE) 019 mcg tablet   • lidocaine (LIDODERM) 5 %   • Menthol, Topical Analgesic, (Biofreeze) 4 % GEL   • midodrine (PROAMATINE) 5 mg tablet   • mirtazapine (REMERON) 7.5 MG tablet   • nystatin (MYCOSTATIN) powder   • omeprazole (PriLOSEC) 20 mg delayed release capsule         Allergies   Allergen Reactions   • Indomethacin Shortness Of Breath       Objective     Intake/Output Summary (Last 24 hours) at 7/10/2023 1503  Last data filed at 7/10/2023 1300  Gross per 24 hour   Intake --   Output 550 ml   Net -550 ml       Invasive Devices:        Physical Exam      I/O last 3 completed shifts:  In: -   Out: 200 [Urine:200]    Vitals:    07/10/23 0744   BP: (!) 87/43   Pulse: 72   Resp: 16   Temp: 97.5 °F (36.4 °C)   SpO2: 100%       General Appearance:    No acute distress. Cooperative. Appears stated age. Head:    Normocephalic. Atraumatic.  Normal jaw occlusion. Eyes:    Lids, conjunctiva normal. No scleral icterus. Ears:    Normal external ears. Nose:   Nares normal. No drainage. Mouth:   Lips, tongue normal. Mucosa normal. Phonation normal.   Neck:   Supple. Symmetrical.   Back:     Symmetric. No CVA tenderness. Lungs:     Normal respiratory effort. Clear to auscultation bilaterally. Chest wall:    No tenderness or deformity. Heart:    Regular rate and rhythm. Normal S1 and S2. No murmur. No JVD. Mild bilateral lower extremity edema. Abdomen:     Soft. Non-tender. Bowel sounds active. Genitourinary:   No Bartlett catheter present. Extremities:   Extremities normal. Atraumatic. No cyanosis. Skin:   Warm and dry. No pallor, jaundice, rash, ecchymoses. Neurologic:   Alert and oriented to person, place, time. No focal deficit. Current Weight:    First Weight:      Lab Results:  I have personally reviewed pertinent labs.     CBC:   Lab Results   Component Value Date    WBC 11.33 (H) 07/09/2023    HGB 8.8 (L) 07/09/2023    HCT 29.3 (L) 07/09/2023    MCV 88 07/09/2023     07/09/2023    RBC 3.32 (L) 07/09/2023    MCH 26.5 (L) 07/09/2023    MCHC 30.0 (L) 07/09/2023    RDW 19.1 (H) 07/09/2023    MPV 12.5 07/09/2023    NRBC 0 07/09/2023     CMP:   Lab Results   Component Value Date    K 5.4 (H) 07/10/2023     07/10/2023    CO2 24 07/10/2023    BUN 41 (H) 07/10/2023    CREATININE 2.60 (H) 07/10/2023    CALCIUM 7.5 (L) 07/10/2023    AST 92 (H) 07/09/2023    ALT 19 07/09/2023    ALKPHOS 40 07/09/2023    EGFR 22 07/10/2023     Phosphorus: No results found for: "PHOS"  Magnesium: No results found for: "MG"  Urinalysis: No results found for: "COLORU", "CLARITYU", "Carlynn Croft", "PHUR", "LEUKOCYTESUR", "NITRITE", "PROTEINUA", "GLUCOSEU", "Friddie Prows", "Jobie Yan", "BLOODU"  Ionized Calcium: No results found for: "CAION"  Coagulation:   Lab Results   Component Value Date    INR 1.79 (H) 07/09/2023     Troponin: No results found for: "TROPONINI"  ABG: No results found for: "PHART", "ANO4TJJ", "PO2ART", "HWA5QYW", "X2QWOLCZ", "BEART", "SOURCE"    Results from last 7 days   Lab Units 07/10/23  0527 07/09/23  1657   POTASSIUM mmol/L 5.4* 5.8*   CHLORIDE mmol/L 105 105   CO2 mmol/L 24 22   BUN mg/dL 41* 41*   CREATININE mg/dL 2.60* 3.01*   CALCIUM mg/dL 7.5* 7.9*   ALK PHOS U/L  --  40   ALT U/L  --  19   AST U/L  --  92*       Radiology review:  Procedure: TRAUMA - CT chest abdomen pelvis w contrast    Result Date: 7/9/2023  Narrative: CT CHEST, ABDOMEN AND PELVIS WITH IV CONTRAST INDICATION:   TRAUMA. COMPARISON: CT abdomen and pelvis June 8, 2023. CT chest abdomen and pelvis without intravenous contrast July 30, 2020 and with intravenous contrast August 21, 2017. TECHNIQUE: CT examination of the chest, abdomen and pelvis was performed. Multiplanar 2D reformatted images were created from the source data. 3D reconstructions of the bony thorax were performed in order to improve sensitivity of evaluation for rib fractures. This examination, like all CT scans performed in the [facility], was performed utilizing techniques to minimize radiation dose exposure, including the use of iterative reconstruction and automated exposure control. Radiation dose length product (DLP) for this visit:  2025 mGy-cm IV Contrast:  100 mL of iohexol (OMNIPAQUE) Enteric Contrast: Enteric contrast was not administered. FINDINGS: CHEST LUNGS: There is left upper lobe opacity with air bronchograms and some volume loss consistent with atelectasis/and/or pneumonia. Mild dependent or compressive atelectasis right lung. PLEURA: Moderate posterior layering bilateral pleural effusions greater on the right than the left. HEART/GREAT VESSELS: Heavy atherosclerotic coronary artery calcification is noted. Heart is otherwise unremarkable. There is fusiform ectasia of the ascending thoracic aorta measuring up to 42 mm. This is unchanged since the comparison study. Small amount of soft plaque aortic arch. MEDIASTINUM AND COURTNEY:  Unremarkable. CHEST WALL AND LOWER NECK: Left thyroid nodule as described with cervical spine CT. Ultrasound follow-up suggested. ABDOMEN LIVER/BILIARY TREE:  Unremarkable. GALLBLADDER:  No calcified gallstones. No pericholecystic inflammatory change. SPLEEN:  Unremarkable. PANCREAS:  Unremarkable. ADRENAL GLANDS:  Unremarkable. KIDNEYS/URETERS: Status post left nephrectomy. Several subcentimeter low-density right renal lesions which are not apparent on the comparison images possibly due to different phases of contrast administration. 3 mm nonobstructing right lower pole renal calculus unchanged. STOMACH AND BOWEL: There is colonic diverticulosis without evidence of acute diverticulitis. APPENDIX: A normal appendix was visualized. ABDOMINOPELVIC CAVITY: Low volume ascites surrounding the liver and spleen and extending down into the pelvis. Peripancreatic adenopathy including portacaval node measuring 18 mm short axis and 16 mm relatively low-density node to the right of the pancreatic head. VESSELS:  Atherosclerotic changes are present. No evidence of aneurysm. PELVIS REPRODUCTIVE ORGANS: Status post prostatectomy. URINARY BLADDER: Urinary bladder wall thickening likely due to low volume similar to comparison. ABDOMINAL WALL/INGUINAL REGIONS: Anasarca. OSSEOUS STRUCTURES:  No acute fracture or destructive osseous lesion. Impression: 1. Bilateral pleural effusions, low volume ascites, and anasarca. 2. Left upper lobe opacification and volume loss consistent with atelectasis and/or pneumonia. 3. Peripancreatic adenopathy. Metastatic work-up suggested. 4. Low-density right renal lesions demonstrated on venous phase/delayed images. These may or may not be new versus apparent due to technical variation. MRI follow-up suggested given the history of renal cell carcinoma. 5. Stable ectasia ascending aorta.  6. Diverticulosis without evidence of diverticulitis. 7. Left thyroid nodule. Follow-up ultrasound suggested. Findings marked for immediate notification in epic. Workstation performed: [id number]     Procedure: TRAUMA - CT head wo contrast    Result Date: 7/9/2023  Narrative: CT BRAIN  - WITHOUT CONTRAST INDICATION:   TRAUMA. COMPARISON:  None. TECHNIQUE:  CT examination of the brain was performed. Multiplanar 2D reformatted images were created from the source data. Radiation dose length product (DLP) for this visit:  921 mGy-cm (accession [id number]), 082 mGy-cm (accession [id number]). This examination, like all CT scans performed in the [facility], was performed utilizing techniques to minimize radiation dose exposure, including the use of iterative reconstruction and automated exposure control. IMAGE QUALITY:  Diagnostic. FINDINGS: PARENCHYMA: Decreased attenuation is noted in periventricular and subcortical white matter demonstrating an appearance that is statistically most likely to represent mild microangiopathic change. No CT signs of acute infarction. No intracranial mass, mass effect or midline shift. No acute parenchymal hemorrhage. VENTRICLES AND EXTRA-AXIAL SPACES: Ventricles and extra-axial CSF spaces are prominent commensurate with the degree of volume loss. No hydrocephalus. No acute extra-axial hemorrhage. VISUALIZED ORBITS: Normal visualized orbits. PARANASAL SINUSES: Mild mucosal thickening of the visualized paranasal sinuses. Trace air-fluid levels maxillary sinuses. CALVARIUM AND EXTRACRANIAL SOFT TISSUES:  Normal.     Impression: No acute intracranial abnormality. CT CERVICAL SPINE - WITHOUT CONTRAST CT CERVICAL SPINE - WITHOUT CONTRAST INDICATION:   TRAUMA. COMPARISON:  None. TECHNIQUE:  CT examination of the cervical spine was performed without intravenous contrast.  Contiguous axial images were obtained. Multiplanar 2D reformatted images were created from the source data.  Radiation dose length product (DLP) for this visit:  921 mGy-cm (accession [id number]), 34 33 96 mGy-cm (accession [id number]). This examination, like all CT scans performed in the [facility], was performed utilizing techniques to minimize radiation dose exposure, including the use of iterative reconstruction and automated exposure control. IMAGE QUALITY:  Diagnostic. FINDINGS: ALIGNMENT: There is reversal of normal cervical lordosis. There is no significant subluxation. No compression deformity. VERTEBRAE:  No fracture. DEGENERATIVE CHANGES: Moderate multilevel cervical degenerative changes are noted. No critical central canal stenosis. PREVERTEBRAL AND PARASPINAL SOFT TISSUES: 2.9 cm left thyroid nodule. Incidental discovery of one or more thyroid nodule(s) measuring more than 1.5 cm and without suspicious features is noted in this patient who is above 28years old; according to guidelines published in the February 2015 white paper on incidental thyroid nodules in the Journal of the [employer] of [employer] Kristy Torres), further characterization with thyroid ultrasound is recommended. THORACIC INLET:  Normal. IMPRESSION: No cervical spine fracture or traumatic malalignment. Incidental thyroid nodule(s) for which nonemergent thyroid ultrasound is recommended. Workstation performed: [id number]     Procedure: TRAUMA - CT spine cervical wo contrast    Result Date: 7/9/2023  Narrative: CT BRAIN  - WITHOUT CONTRAST INDICATION:   TRAUMA. COMPARISON:  None. TECHNIQUE:  CT examination of the brain was performed. Multiplanar 2D reformatted images were created from the source data. Radiation dose length product (DLP) for this visit:  921 mGy-cm (accession [id number]), 738 mGy-cm (accession [id number]). This examination, like all CT scans performed in the [facility], was performed utilizing techniques to minimize radiation dose exposure, including the use of iterative reconstruction and automated exposure control. IMAGE QUALITY:  Diagnostic. FINDINGS: PARENCHYMA: Decreased attenuation is noted in periventricular and subcortical white matter demonstrating an appearance that is statistically most likely to represent mild microangiopathic change. No CT signs of acute infarction. No intracranial mass, mass effect or midline shift. No acute parenchymal hemorrhage. VENTRICLES AND EXTRA-AXIAL SPACES: Ventricles and extra-axial CSF spaces are prominent commensurate with the degree of volume loss. No hydrocephalus. No acute extra-axial hemorrhage. VISUALIZED ORBITS: Normal visualized orbits. PARANASAL SINUSES: Mild mucosal thickening of the visualized paranasal sinuses. Trace air-fluid levels maxillary sinuses. CALVARIUM AND EXTRACRANIAL SOFT TISSUES:  Normal.     Impression: No acute intracranial abnormality. CT CERVICAL SPINE - WITHOUT CONTRAST CT CERVICAL SPINE - WITHOUT CONTRAST INDICATION:   TRAUMA. COMPARISON:  None. TECHNIQUE:  CT examination of the cervical spine was performed without intravenous contrast.  Contiguous axial images were obtained. Multiplanar 2D reformatted images were created from the source data. Radiation dose length product (DLP) for this visit:  921 mGy-cm (accession [id number]), 917 mGy-cm (accession [id number]). This examination, like all CT scans performed in the [facility], was performed utilizing techniques to minimize radiation dose exposure, including the use of iterative reconstruction and automated exposure control. IMAGE QUALITY:  Diagnostic. FINDINGS: ALIGNMENT: There is reversal of normal cervical lordosis. There is no significant subluxation. No compression deformity. VERTEBRAE:  No fracture. DEGENERATIVE CHANGES: Moderate multilevel cervical degenerative changes are noted. No critical central canal stenosis. PREVERTEBRAL AND PARASPINAL SOFT TISSUES: 2.9 cm left thyroid nodule.  Incidental discovery of one or more thyroid nodule(s) measuring more than 1.5 cm and without suspicious features is noted in this patient who is above 28years old; according to guidelines published in the February 2015 white paper on incidental thyroid nodules in the Journal of the [employer] of [employer]iology Carmen Wing), further characterization with thyroid ultrasound is recommended. THORACIC INLET:  Normal. IMPRESSION: No cervical spine fracture or traumatic malalignment. Incidental thyroid nodule(s) for which nonemergent thyroid ultrasound is recommended. Workstation performed: [id number]     Procedure: XR Trauma chest portable    Result Date: 7/9/2023  Narrative: CHEST INDICATION:   TRAUMA. COMPARISON: Chest radiograph June 8, 2023 and September 2, 2020. EXAM PERFORMED/VIEWS:  XR CHEST PORTABLE Images:  1 FINDINGS: Sequela of prior coronary artery bypass graft. Cardiomediastinal silhouette appears unremarkable. Hazy opacity left mid to lower lung new since the comparison study. Sternotomy sutures. Severe arthritis both shoulders. Impression: Hazy opacity left lung base new since the comparison study and suspicious for pneumonia in the appropriate clinical setting. Findings marked for immediate notification in epic. Workstation performed: [id number]       Janette Newton DO      This consultation note was produced in part using a dictation device which may document imprecise wording from author's original intent.

## 2023-07-10 NOTE — PROGRESS NOTES
[address] [facility]  Progress Note  Name: Stefany Snyder  MRN: [id number]  Unit/Bed#: -01 I Date of Admission: 7/9/2023   Date of Service: 7/10/2023 I Hospital Day: 1    Assessment/Plan   * Acute renal failure superimposed on stage 3 chronic kidney disease ([address] [facility])  Assessment & Plan  · Present on admission  · Arrival creatinine 3.01  · Prerenal-secondary to dehydration, and rhabdomyolysis (see below)  · Status post treatment with IV fluid  · Repeat creatinine this morning is down to 2.60  · Appreciate nephrology input  · Continue gentle IV fluid  · Repeat BMP in the a.m. Fall  Assessment & Plan  · Patient reports having had a mechanical  fall prior to coming into the hospital  · Trauma work-up was negative  · CT head-within normal limits  · CT cervical spine-no acute fractures and/or traumatic malalignment  · CT chest, abdomen, pelvis- no acute traumatic pathology  · Status post a PT and OT evaluation- they recommend postacute rehabilitation services  · Case management has been notified    Left upper lobe pneumonia  Assessment & Plan  · Noted on CT imaging  · Patient is otherwise asymptomatic  · Testing for COVID-19, respiratory syncytial virus, and influenza a and B is negative  · Urine testing for Streptococcus pneumonia and Legionella antigen testing is negative  · Procalcitonin testing is up- question reliability in the setting of having an acute kidney injury  · Continue ceftriaxone for now    Traumatic rhabdomyolysis [facility])  Assessment & Plan  · Patient was laying on the floor for approximately 20 hours  · Arrival CPK 2305, repeat CPK 1751  · Continue gentle IV fluids  · Repeat CPK testing in the a.m.     Parkinson's disease ([address] )  Assessment & Plan  · Continue prehospital Sinemet 25/100 mg p.o. 3 times daily and Neurontin 200 mg p.o. nightly  · Status post a PT and OT evaluation-they recommend postacute rehabilitation services  · Midodrine on hold    Adenopathy  Assessment & Plan  · Peripancreatic adenopathy noted on CT imaging concerning for possible metastatic process  · Patient has a history of renal and prostate cancer  · Findings were reviewed again with the patient in the presence of his son Timothy Matos  · They will potentially follow-up in the outpatient setting for further work-up    Anemia  Assessment & Plan  · History of iron deficiency anemia  · H&H stable  · Continue ferrous sulfate    Hyperlipidemia, unspecified  Assessment & Plan  · Atorvastatin on hold in view of rhabdo    Gastroesophageal reflux disease  Assessment & Plan  · Continue Protonix    Thyroid nodule  Assessment & Plan  · Results were discussed with patient  · Patient will follow-up in the outpatient setting for further work-up             VTE Prophylaxis:  Heparin    Patient Centered Rounds: I have performed bedside rounds with nursing staff today.     Discussions with Specialists or Other Care Team Provider: Nephrology, case management, nursing  Education and Discussions with Family / Patient: The patient, and his son Timothy Matos who is bedside at the time of my evaluation with both brought up to par, all questions answered to their collective satisfaction    Current Length of Stay: 1 day(s)    Current Patient Status: Inpatient   Certification Statement: The patient will continue to require additional inpatient hospital stay due to Need for continued IV fluids    Discharge Plan: Hopeful discharge planning in 24 to 48 hours    Code Status: Level 3 - DNAR and DNI    Subjective:   Patient seen, sitting up in the chair, wants to get back in bed, continues to complain of some ongoing generalized aches and pains specifically more so in his lower back    Objective:     Vitals:   Temp (24hrs), Av.2 °F (36.8 °C), Min:97.2 °F (36.2 °C), Max:99.4 °F (37.4 °C)    Temp:  [97.2 °F (36.2 °C)-99.4 °F (37.4 °C)] 97.5 °F (36.4 °C)  HR:  [69-78] 72  Resp:  [16-33] 16  BP: ()/(39-89) 87/43  SpO2:  [89 %-100 %] 100 %  There is no height or weight on file to calculate BMI. Input and Output Summary (last 24 hours): Intake/Output Summary (Last 24 hours) at 7/10/2023 0950  Last data filed at 7/10/2023 0253  Gross per 24 hour   Intake --   Output 200 ml   Net -200 ml       Physical Exam:   Physical Exam  Vitals and nursing note reviewed. Constitutional:       General: He is not in acute distress. Appearance: Normal appearance. He is not ill-appearing. HENT:      Head: Normocephalic and atraumatic. Nose: Nose normal.   Eyes:      Extraocular Movements: Extraocular movements intact. Pupils: Pupils are equal, round, and reactive to light. Cardiovascular:      Rate and Rhythm: Normal rate and regular rhythm. Pulses: Normal pulses. Heart sounds: Normal heart sounds. No murmur heard. No friction rub. No gallop. Pulmonary:      Effort: Pulmonary effort is normal.      Breath sounds: Normal breath sounds. Abdominal:      General: There is no distension. Palpations: Abdomen is soft. There is no mass. Tenderness: There is no abdominal tenderness. There is no guarding or rebound. Musculoskeletal:         General: No swelling or tenderness. Normal range of motion. Cervical back: Normal range of motion and neck supple. No rigidity. No muscular tenderness. Right lower leg: No edema. Left lower leg: No edema. Skin:     General: Skin is warm. Capillary Refill: Capillary refill takes less than 2 seconds. Findings: No erythema or rash. Neurological:      General: No focal deficit present. Mental Status: He is alert and oriented to person, place, and time. Mental status is at baseline.       Comments: Resting parkinsonian tremor noted   Psychiatric:         Mood and Affect: Mood normal.         Behavior: Behavior normal.         Additional Data:     Labs:    Results from last 7 days   Lab Units 07/09/23  1657   WBC Thousand/uL 11.33*   HEMOGLOBIN g/dL 8.8*   HEMATOCRIT % 29.3*   PLATELETS Thousands/uL 184   NEUTROS PCT % 81*   LYMPHS PCT % 7*   MONOS PCT % 11   EOS PCT % 0     Results from last 7 days   Lab Units 07/10/23  0527 07/09/23  1657   SODIUM mmol/L 136 137   POTASSIUM mmol/L 5.4* 5.8*   CHLORIDE mmol/L 105 105   CO2 mmol/L 24 22   BUN mg/dL 41* 41*   CREATININE mg/dL 2.60* 3.01*   CALCIUM mg/dL 7.5* 7.9*   ALK PHOS U/L  --  40   ALT U/L  --  19   AST U/L  --  92*     Results from last 7 days   Lab Units 07/09/23  1657   INR  1.79*               * I Have Reviewed All Lab Data Listed Above. * Additional Pertinent Lab Tests Reviewed:  [address] Admission  Reviewed    Imaging:  Imaging Reports Reviewed Today Include: As above    Recent Cultures (last 7 days):     Results from last 7 days   Lab Units 07/09/23  2258   LEGIONELLA URINARY ANTIGEN  Negative       Last 24 Hours Medication List:   Current Facility-Administered Medications   Medication Dose Route Frequency Provider Last Rate   • acetaminophen  650 mg Oral Q6H PRN Anita Lima PA-C     • aspirin  81 mg Oral Daily Anita Lima PA-C     • carbidopa-levodopa  1 tablet Oral TID Anita Lima PA-C     • cefTRIAXone  1,000 mg Intravenous Q24H Anita Lima PA-C 1,000 mg (07/10/23 9883)   • dextromethorphan-guaiFENesin  10 mL Oral Q4H PRN Anita Lima PA-C     • ferrous sulfate  325 mg Oral Daily With Breakfast Anita Lima PA-C     • gabapentin  200 mg Oral HS Anita Lima PA-C     • heparin (porcine)  5,000 Units Subcutaneous [facility] Anita Lima PA-C     • lidocaine  1 patch Topical Daily Anita Lima PA-C     • nystatin   Topical BID Anita Lima PA-C     • ondansetron  4 mg Intravenous Q6H PRN Anita Lima PA-C     • pantoprazole  40 mg Oral Early Morning Anita Lima PA-C     • sodium chloride  125 mL/hr Intravenous Continuous Cathrae Lima PA-C 125 mL/hr (07/09/23 2032)        Today, Patient Was Seen By: Gabrielle Malone MD    ** Please Note: Dictation voice to text software may have been used in the creation of this document.  **

## 2023-07-10 NOTE — CASE MANAGEMENT
Case Management Assessment & Discharge Planning Note    Patient name Garland Francisco  Location [address] [facility] 207/-85 MRN [id number]  : 1945 Date 7/10/2023       Current Admission Date: 2023  Current Admission Diagnosis:Acute renal failure superimposed on stage 3 chronic kidney disease [facility])   Patient Active Problem List    Diagnosis Date Noted   • History of coronary artery bypass surgery 2023   • Acute renal failure superimposed on stage 3 chronic kidney disease ([address]) 2023   • Traumatic rhabdomyolysis ([address]) 2023   • Left upper lobe pneumonia 2023   • Adenopathy 2023   • Thyroid nodule 2023   • Fall 2023   • Renal cell cancer, left ([address]) 2023   • Peripheral edema 2023   • Frequency of micturition 2023   • Right shoulder pain 2023   • Insomnia 2023   • Ambulatory dysfunction 2023   • Orthostatic hypotension 2023   • Swelling of right upper extremity 2023   • Penile edema 06/10/2023   • Abnormal ECG 2023   • Anemia 2023   • Dizziness 2023   • Parkinson's disease ([address]) 2023   • Generalized weakness 2020   • S/P CABG x 2 2020   • Tremor of right hand 2020   • Obesity 2020   • Occlusion and stenosis of right carotid artery 2020   • Tremor, unspecified 2020   • Chronic kidney disease (CKD), stage III (moderate)    • Coronary artery disease involving native coronary artery of native heart without angina pectoris    • Carotid stenosis, right    • Stage 3 chronic kidney disease ([address]) 2020   • Coronary artery disease involving native coronary artery of native heart 2020   • Renal insufficiency 2020   • Dyslipidemia 2020   • Hyperlipidemia, unspecified 2020   • Adenocarcinoma of prostate ([address]) 04/10/2020   • Urinary incontinence 04/10/2020   • Hypercalcinuria 04/10/2019   • Hypernatriuria 04/10/2019   • Balanitis 2018   • Bilateral ureteral obstruction 02/16/2018   • Complex renal cyst 08/15/2017   • Idiopathic chronic gout, unspecified site, 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   • Presence of left artificial knee joint 06/09/2016   • First degree heart block 05/17/2016   • Abnormal prostate specific antigen 03/17/2016   • Calculus of kidney 06/18/2014   • Gastroesophageal reflux disease 06/17/2014      LOS (days): 1  Geometric Mean LOS (GMLOS) (days): 4.70  Days to GMLOS:3.7     OBJECTIVE:    Risk of Unplanned Readmission Score: 30.13         Current admission status: Inpatient  Referral Reason: Other (d/c planning)    Preferred Pharmacy:   [address] [place] [address] [facility] [place] [id number]  Phone: [phone] Fax: Enrique Clark, [address] [place] [address] GIDEON Ruiztram Ann  Phone: [phone] Fax: [phone]    Primary Care Provider: Wade Dewey DO    Primary Insurance: [facility] REP  Secondary Insurance:     ASSESSMENT:  [address], [address] - Son   Primary Phone: [phone] (Mobile)  Home Phone: [phone]                         Readmission Root Cause  30 Day Readmission: Yes  Who directed you to return to the hospital?: Family  Did you understand whom to contact if you had questions or problems?: Yes  Did you get your prescriptions before you left the hospital?: No  Reason[de-identified] Declined service  Were you able to get your prescriptions filled when you left the hospital?: No  Reason[de-identified]  (pt was d/c to snf)  Did you take your medications as prescribed?: Yes (rn gave at the snf)  Were you able to get to your follow-up appointments?: No  Reason[de-identified] Other (comment) (d/c to snf)  During previous admission, was a post-acute recommendation made?: Yes  What post-acute resources were offered?: STR  Patient was readmitted due to: pt was [place] [facility] [place] Yanely Pereira was received pt was d/c 6/13 for diverticulits- p was admitted for a fall, pneumonai,rhabdo, shania  Action Plan: rehab recimmended    Patient Information  Admitted from[de-identified] Home  Mental Status: Alert  During Assessment patient was accompanied by: Not accompanied during assessment  Assessment information provided by[de-identified] Patient  Primary Caregiver: Self  Support Systems: Breezy Weller of Residence: [facility] do you live in?: [address] [facility] [facility] entry access options.  Select all that apply.: Stairs (3 stepn in front the 6 up from the landing- bilevel- pt enters from the garage)  Number of steps to enter home.:  (14 steps 8 from garage landing & 6 up to upper level)  Do the steps have railings?: Yes  Type of Current Residence: Bi-level (8 up landing & 6 up to upper level)  Upon entering residence, is there a bedroom on the main floor (no further steps)?: No  A bedroom is located on the following floor levels of residence (select all that apply):: 2nd Floor  Upon entering residence, is there a bathroom on the main floor (no further steps)?: No  Indicate which floors of current residence have a bathroom (select all the apply):: 2nd Floor  Number of steps to 2nd floor from main floor: 8  In the last 12 months, was there a time when you were not able to pay the mortgage or rent on time?: No  In the last 12 months, was there a time when you did not have a steady place to sleep or slept in a shelter (including now)?: No  Homeless/housing insecurity resource given?: N/A  Living Arrangements: Lives Alone  Is patient a ?: No    Activities of Daily Living Prior to Admission  Functional Status: Independent  Completes ADLs independently?: Yes  Ambulates independently?: Yes  Does patient use assisted devices?: No  Does patient currently own DME?: Yes  What DME does the patient currently own?: Debi Kiser  Does patient have a history of Outpatient Therapy (PT/OT)?: No  Does the patient have a history of Short-Term Rehab?: Yes ([place])  Does patient have a history of HHC?: Yes (pt is unsure)  Does patient currently have [facility]?: No         Patient Information Continued  Income Source: Pension/FDC  Does patient have prescription coverage?: Yes ([employer] [place])  Within the past 12 months, you worried that your food would run out before you got the money to buy more.: Never true  Within the past 12 months, the food you bought just didn't last and you didn't have money to get more.: Never true  Food insecurity resource given?: N/A  Does patient receive dialysis treatments?: No  Does patient have a history of substance abuse?: No  Does patient have a history of Mental Health Diagnosis?: No         Means of Transportation  Means of Transport to Appts[de-identified] Drives Self  In the past 12 months, has lack of transportation kept you from medical appointments or from getting medications?: No  In the past 12 months, has lack of transportation kept you from meetings, work, or from getting things needed for daily living?: No  Was application for public transport provided?: N/A        DISCHARGE DETAILS:    Discharge planning discussed with[de-identified] patient and Daralene Liner was called at 16:54 pm LM & 19:03pm LM- Verena Montero was called and I was told to talk to [address] [place] [address] Avenue of Choice: Yes  Comments - Freedom of Choice: Recommendation is for rehab- pt will not give permission to sned referrals until I talk to Daralene Liner- cm has made attempts to reach Daralene Liner and LM  CM contacted family/caregiver?: Yes             Contacts  Patient Contacts: Shawnee Swanson son [phone]   Relationship to Patient[de-identified] Family (son)  Contact Method: Phone  Phone Number: [phone]  Reason/Outcome: Discharge [address]         Is the patient interested in [facility] at discharge?: No    DME Referral Provided  Referral made for DME?: No    Other Referral/Resources/Interventions Provided:  Interventions: Acute Rehab, Short Term Rehab  Referral Comments: aru vs snf-   pt is waiting a call from the son to get jeromehn to place referrals- pt states I need to talk to his son before I can send referrals for rehab- pt will need authorization    Would you like to participate in our [facility] service program?  : No - Declined    Treatment Team Recommendation:  (d/c plan tbd- tbd)

## 2023-07-10 NOTE — ASSESSMENT & PLAN NOTE
· Continue prehospital Sinemet 25/100 mg p.o. 3 times daily and Neurontin 200 mg p.o. nightly  · Status post a PT and OT evaluation-they recommend postacute rehabilitation services  · Midodrine on hold

## 2023-07-10 NOTE — UTILIZATION REVIEW
Initial Clinical Review    Admission: Date/Time/Statement:   Admission Orders (From admission, onward)     Ordered        07/09/23 1914  INPATIENT ADMISSION  Once                      Orders Placed This Encounter   Procedures   • INPATIENT ADMISSION     Standing Status:   Standing     Number of Occurrences:   1     Order Specific Question:   Level of Care     Answer:   Med Surg [16]     Order Specific Question:   Estimated length of stay     Answer:   More than 2 Midnights     Order Specific Question:   Certification     Answer:   I certify that inpatient services are medically necessary for this patient for a duration of greater than two midnights. See H&P and MD Progress Notes for additional information about the patient's course of treatment. ED Arrival Information     Expected   -    Arrival   7/9/2023 16:27    Acuity   Emergent            Means of arrival   Ambulance    Escorted by   [facility]    Service   Hospitalist    Admission type   Emergency            Arrival complaint   fall           Chief Complaint   Patient presents with   • Fall     Patient arrives via ems patient fell  last night around 8 pm and family found him around 3-4pm today. Blood noted on patients face. No blood thinner. Patient reports no headstrike or loc. Pain in right elbow  and right shoulder       Initial Presentation: 66 y.o. male to the ED from home via EMS with complaints of fall about 12 hours prior to arrival, found on floor by family. H/O CKD, GERD, HLD, anemia, parkinsons,. Was ambulating with walker, slipped and lowered himself to the ground. Unable to stand afterward. Admitted to inpatient for acute on chronic ckd, PARKER pneumonia, rhabdomyolysis. Arrives with bilateral lower extremity edema, ecchymosis to anterior chest. Creat 3.01.  IV fluids started. Check urine studies. Nephrology consult. CT chest shows pneumonia. Started on IV abx.  Peripancreatic adenopathy noted on CT imaging concerning for possible metastatic process. Recommend MRI. Thinking about it. Date: 7/10   Day 2:    Creat improved to 2.6. Continue with gentle iv hydration. Nephrology consult. Repeat BMP. PT/OT recommends post acute rehab. Arrival CPK 2305, repeat CPK 1751    Nephrology consult: MARIANGEL improving with volume expansion. Wound care consult: POA-left elbow wound, dry and brown, swelling to the periwound, no drainage, abrasion vs pressure. POA-abrasions to the face, above the right eyebrow, right cheek and nose. The bridge of the nose dry beefy red partial thickness wound bed, no current drainage. Nose is swollen  3. POA-right and left mid chest abrasions, left chest wound bed with light purple linear areas within scattered   Excoriation, swelling noted beneath the wound bed. Right chest mild erythema, no drainage  POA-left wrist wound, pressure vs abrasion. Wound bed is dry, yellow with beefy red edge, no drainage  5. POA-right wrist mix of pressure and abrasion, dry brown eschar proximally with light purple laterally and distally  6. POA-left elbow wound mix of pressure and abrasion. Dry beefy red abrasion proximally and purple non-blanchable area distally, swelling noted to the periwound  7. POA-deep tissue injury to the right outer buttock, non blanchable purple tissue, no open areas. Right buttock and sacrum blanchable and hyperpigmented  8. POA-deep tissue injury to the penis. Per patient the penis is chronically edematous. Black eschar noted on the lateral sides of the penis with small purple areas proximal to the tip of the penis. Purulent drainage noted at the meatus, RN at bedside and is aware. 9. POA-right and left knee wounds, black eschar, brown and beefy red areas, no drainage or open areas, bilateral knee edema, pressure vs abrasions  10. POA-right great toe partial thickness wound, beefy red and brown, no drainage, appears to be abrasion  11.  POA-left great toe deep tissue injury, non-blanchable, light purple, 2nd toe with light purple non-blanchable area on the joint   Cleanse sounds, keep clean. Date: 7/11    Day 3: Has surpassed a 2nd midnight with active treatments and services, which include discontinue IV fluids. Continue with IV abx.    .    ED Triage Vitals   Temperature Pulse Respirations Blood Pressure SpO2   07/09/23 1633 07/09/23 1633 07/09/23 1633 07/09/23 1633 07/09/23 1633   (!) 97.2 °F (36.2 °C) 76 18 117/69 95 %      Temp Source Heart Rate Source Patient Position - Orthostatic VS BP Location FiO2 (%)   07/09/23 1905 07/09/23 1633 07/09/23 1633 -- --   Axillary Monitor Lying        Pain Score       07/09/23 2020       8          Wt Readings from Last 1 Encounters:   06/23/23 101 kg (222 lb 3.2 oz)     Additional Vital Signs:   Date/Time Temp Pulse Resp BP MAP (mmHg) SpO2 O2 Device Patient Position - Orthostatic VS   07/10/23 07:44:04 97.5 °F (36.4 °C) 72 16 87/43 Abnormal  58 Abnormal  100 % None (Room air) Lying   07/10/23 01:24:07 -- 71 -- 95/41 Abnormal  59 Abnormal  100 % -- --   07/10/23 0013 -- -- -- 89/50 Abnormal  -- -- -- --   07/09/23 1840 -- 75 22 -- -- 93 % -- --   07/09/23 1835 -- 75 23 Abnormal  -- -- 93 % -- --   07/09/23 1830 -- 75 23 Abnormal  112/49 Abnormal  70 91 % -- --   07/09/23 1825 -- 74 22 -- -- 92 % -- --   07/09/23 1820 -- 73 24 Abnormal  -- -- 92 % -- --   07/09/23 1815 -- 72 22 94/53 61 Abnormal  92 % -- --   07/09/23 1810 -- 72 21 -- -- 93 % -- --   07/09/23 1806 -- 73 -- -- -- 92 % -- --   07/09/23 1743 -- 72 24 Abnormal  -- -- 92 % -- --   07/09/23 1740 -- 72 24 Abnormal  -- -- 92 % -- --   07/09/23 1705 -- 75 29 Abnormal  -- -- 92 % -- --   07/09/23 1700 -- 75 24 Abnormal  -- -- 92 % -- --   07/09/23 1655 -- 75 18 -- -- 93 % -- --   07/09/23 1651 -- -- -- -- -- 89 % Abnormal  -- --   07/09/23 1650 -- 76 33 Abnormal  -- -- -- -- --   07/09/23 1645 -- 76 28 Abnormal  117/69 75 90 % -- --   07/09/23 1640 -- 77 -- -- -- 92 % -- --   07/09/23 1635 -- 78 -- -- -- 91 % -- --   07/09/23 1633 97.2 °F (36.2 °C) Abnormal  76 18 117/69 -- 95 % None (Room air) Lying       Pertinent Labs/Diagnostic Test Results:   7/9 EKG: Sinus rhythm with 1st degree A-V block  Nonspecific T wave abnormality  Abnormal ECG  When compared with ECG of 08-JUN-2023 14:38,  Baseline artifact  on prior tracing precludes proper comparison  TRAUMA - CT head wo contrast   Final Result by Mat Jackson MD (07/09 1830)      No acute intracranial abnormality. CT CERVICAL SPINE - WITHOUT CONTRAST      CT CERVICAL SPINE - WITHOUT CONTRAST      INDICATION:   TRAUMA. COMPARISON:  None. TECHNIQUE:  CT examination of the cervical spine was performed without intravenous contrast.  Contiguous axial images were obtained. Multiplanar 2D reformatted images were created from the source data. Radiation dose length product (DLP) for this visit:  921 mGy-cm (accession [id number]), 468 mGy-cm (accession [id number]). This examination, like all CT scans performed in the [facility], was performed utilizing techniques to minimize    radiation dose exposure, including the use of iterative reconstruction and automated exposure control. IMAGE QUALITY:  Diagnostic. FINDINGS:      ALIGNMENT: There is reversal of normal cervical lordosis. There is no significant subluxation. No compression deformity. VERTEBRAE:  No fracture. DEGENERATIVE CHANGES: Moderate multilevel cervical degenerative changes are noted. No critical central canal stenosis. PREVERTEBRAL AND PARASPINAL SOFT TISSUES: 2.9 cm left thyroid nodule.  Incidental discovery of one or more thyroid nodule(s) measuring more than 1.5 cm and without suspicious features is noted in this patient who is above 28years old; according to    guidelines published in the February 2015 white paper on incidental thyroid nodules in the Journal of the [employer]), further characterization with thyroid ultrasound is recommended. THORACIC INLET:  Normal.      IMPRESSION:      No cervical spine fracture or traumatic malalignment. Incidental thyroid nodule(s) for which nonemergent thyroid ultrasound is recommended. Workstation performed: [id number]         TRAUMA - CT spine cervical wo contrast   Final Result by Dio Montenegro MD (07/09 1830)      No acute intracranial abnormality. CT CERVICAL SPINE - WITHOUT CONTRAST      CT CERVICAL SPINE - WITHOUT CONTRAST      INDICATION:   TRAUMA. COMPARISON:  None. TECHNIQUE:  CT examination of the cervical spine was performed without intravenous contrast.  Contiguous axial images were obtained. Multiplanar 2D reformatted images were created from the source data. Radiation dose length product (DLP) for this visit:  921 mGy-cm (accession [id number]), 851 mGy-cm (accession [id number]). This examination, like all CT scans performed in the [facility], was performed utilizing techniques to minimize    radiation dose exposure, including the use of iterative reconstruction and automated exposure control. IMAGE QUALITY:  Diagnostic. FINDINGS:      ALIGNMENT: There is reversal of normal cervical lordosis. There is no significant subluxation. No compression deformity. VERTEBRAE:  No fracture. DEGENERATIVE CHANGES: Moderate multilevel cervical degenerative changes are noted. No critical central canal stenosis. PREVERTEBRAL AND PARASPINAL SOFT TISSUES: 2.9 cm left thyroid nodule. Incidental discovery of one or more thyroid nodule(s) measuring more than 1.5 cm and without suspicious features is noted in this patient who is above 28years old; according to    guidelines published in the February 2015 white paper on incidental thyroid nodules in the Journal of the [employer]), further characterization with thyroid ultrasound is recommended.       THORACIC INLET:  Normal. IMPRESSION:      No cervical spine fracture or traumatic malalignment. Incidental thyroid nodule(s) for which nonemergent thyroid ultrasound is recommended. Workstation performed: [id number]         TRAUMA - CT chest abdomen pelvis w contrast   Final Result by Jose Manuel Olivia MD (07/09 1907)         1. Bilateral pleural effusions, low volume ascites, and anasarca. 2. Left upper lobe opacification and volume loss consistent with atelectasis and/or pneumonia. 3. Peripancreatic adenopathy. Metastatic work-up suggested. 4. Low-density right renal lesions demonstrated on venous phase/delayed images. These may or may not be new versus apparent due to technical variation. MRI follow-up suggested given the history of renal cell carcinoma. 5. Stable ectasia ascending aorta. 6. Diverticulosis without evidence of diverticulitis. 7. Left thyroid nodule. Follow-up ultrasound suggested. Findings marked for immediate notification in epic. Workstation performed: [id number]         XR Trauma chest portable   Final Result by Jose Manuel Olivia MD (07/09 1809)      Hazy opacity left lung base new since the comparison study and suspicious for pneumonia in the appropriate clinical setting. Findings marked for immediate notification in epic.                   Workstation performed: [id number]               Results from last 7 days   Lab Units 07/09/23  1657   WBC Thousand/uL 11.33*   HEMOGLOBIN g/dL 8.8*   HEMATOCRIT % 29.3*   PLATELETS Thousands/uL 184   NEUTROS ABS Thousands/µL 9.16*         Results from last 7 days   Lab Units 07/10/23  0527 07/09/23  1657   SODIUM mmol/L 136 137   POTASSIUM mmol/L 5.4* 5.8*   CHLORIDE mmol/L 105 105   CO2 mmol/L 24 22   ANION GAP mmol/L 7 10   BUN mg/dL 41* 41*   CREATININE mg/dL 2.60* 3.01*   EGFR ml/min/1.73sq m 22 18   CALCIUM mg/dL 7.5* 7.9*     Results from last 7 days   Lab Units 07/09/23  1657   AST U/L 92*   ALT U/L 19   ALK PHOS U/L 40   TOTAL PROTEIN g/dL 4.7*   ALBUMIN g/dL 2.5*   TOTAL BILIRUBIN mg/dL 0.82         Results from last 7 days   Lab Units 07/10/23  0527 07/09/23  1657   GLUCOSE RANDOM mg/dL 91 116       Results from last 7 days   Lab Units 07/10/23  0527 07/09/23  1657   CK TOTAL U/L 1,751* 2,305*     Results from last 7 days   Lab Units 07/09/23  1657   PROTIME seconds 20.7*   INR  1.79*   PTT seconds 35         Results from last 7 days   Lab Units 07/10/23  0527 07/09/23  1657   PROCALCITONIN ng/ml 29.58* 32.13*         Results from last 7 days   Lab Units 07/09/23  2258   CREATININE UR mg/dL 84.6   PROTEIN UR mg/dL 51   PROT/CREAT RATIO UR  0.60*     Results from last 7 days   Lab Units 07/09/23  2258   STREP PNEUMONIAE ANTIGEN, URINE  Negative   LEGIONELLA URINARY ANTIGEN  Negative       ED Treatment:   Medication Administration from 07/09/2023 1627 to 07/09/2023 1953       Date/Time Order Dose Route Action Comments     07/09/2023 1743 EDT sodium chloride 0.9 % bolus 1,000 mL 1,000 mL Intravenous New Bag --     07/09/2023 1814 EDT insulin regular (HumuLIN R,NovoLIN R) injection 5 Units 5 Units Intravenous Given --     07/09/2023 1811 EDT dextrose 50 % IV solution 50 mL 50 mL Intravenous Given --     07/09/2023 1913 EDT cefepime (MAXIPIME) IVPB (premix in dextrose) 2,000 mg 50 mL 2,000 mg Intravenous New Bag --        Past Medical History:   Diagnosis Date   • Anxiety    • CAD (coronary artery disease)    • Carotid stenosis, right     50-69% internal   • Chronic kidney disease (CKD), stage III (moderate)     baseline Cr 1.50   • Diverticulosis    • GERD (gastroesophageal reflux disease)    • Gout    • History of nephrolithiasis    • History of prostate cancer     s/p radiation/Lupron   • History of renal cell cancer     s/p left nephrectomy   • Osteoarthritis     knees & shoulders   • Parkinson disease    • Pulmonary nodule     7mm PARKER         Admitting Diagnosis: Hyperkalemia [E87.5]  Shoulder injury [S49.90XA]  Pneumonia [J18.9]  Elevated CK [R74.8]  MARIANGEL (acute kidney injury) ([facility]) [N17.9]  Fall, initial encounter [W19. XXXA]  Age/Sex: 66 y.o. male  Admission Orders:  Scheduled Medications:  aspirin, 81 mg, Oral, Daily  carbidopa-levodopa, 1 tablet, Oral, TID  cefTRIAXone, 1,000 mg, Intravenous, Q24H  ferrous sulfate, 325 mg, Oral, Daily With Breakfast  gabapentin, 200 mg, Oral, HS  heparin (porcine), 5,000 Units, Subcutaneous, Q8H HAMET  lidocaine, 1 patch, Topical, Daily  nystatin, , Topical, BID  pantoprazole, 40 mg, Oral, Early Morning      Continuous IV Infusions:  sodium chloride, 125 mL/hr, Intravenous, Continuous      PRN Meds:  acetaminophen, 650 mg, Oral, Q6H PRN  dextromethorphan-guaiFENesin, 10 mL, Oral, Q4H PRN  ondansetron, 4 mg, Intravenous, Q6H PRN        IP CONSULT TO NEPHROLOGY    Network Utilization Review Department  ATTENTION: Please call with any questions or concerns to [phone] and carefully listen to the prompts so that you are directed to the right person. All voicemails are confidential.  Herberth Lou all requests for admission clinical reviews, approved or denied determinations and any other requests to dedicated fax number below belonging to the campus where the patient is receiving treatment.  List of dedicated fax numbers for the Facilities:  [place] DENIALS (Administrative/Medical Necessity) [phone]   [address] (Maternity/NICU/Pediatrics) [phone]   [address] [place] [address] Drive [phone]   [facility] [phone]   [address] [phone]   [address] [address] [phone]   [facility] [address] [facility]  [facility] [phone]

## 2023-07-10 NOTE — ASSESSMENT & PLAN NOTE
· Patient has history of Parkinson's  · Normally uses a walker at home  · Resides at home alone  · Patient had recent inpatient rehab day  · Consult PT OT for safe discharge planning possible need for another short rehab stay

## 2023-07-10 NOTE — PLAN OF CARE
Problem: PHYSICAL THERAPY ADULT  Goal: Performs mobility at highest level of function for planned discharge setting. See evaluation for individualized goals. Description: Treatment/Interventions: Functional transfer training, LE strengthening/ROM, Therapeutic exercise, Endurance training, Patient/family training, Equipment eval/education, Bed mobility, Gait training, Spoke to nursing, Spoke to case management, OT  Equipment Recommended:  (continue TBD, pending progress)       See flowsheet documentation for full assessment, interventions and recommendations. Note: Prognosis: Fair  Problem List: Decreased strength, Decreased endurance, Impaired balance, Decreased mobility, Decreased coordination, Impaired judgement, Decreased safety awareness, Obesity, Pain  Assessment: Pt is 66 y.o. male seen for high-complexity PT evaluation on 7/10/2023 s/p admit [address] on 7/9/2023 w/ Acute renal failure superimposed on stage 3 chronic kidney disease ([address] PT was consulted to assess pt's functional mobility and d/c needs. Order placed for PT eval and tx, w/ up and OOB as tolerated order. PTA, pt lives alone in 1 SH c +GIDEON, amb s AD at baseline vs RW, (I) ADL performance. At time of eval, pt requires mod Ax1 for supine>sit, STS, and amb x 3 ft from bed>recliner. Upon evaluation, pt presenting with impaired functional mobility d/t decreased strength, decreased endurance, impaired balance, decreased mobility, decreased coordination, impaired judgement, decreased safety awareness, obesity c BMI of 34.80 kg/m2 and pain. Pertinent PMHx and current co-morbidities affecting pt's physical performance at time of assessment include: Parkinson's disease, anemia, GERD, HLD, non-traumatic rhabdomyolysis, adenopathy, thyroid nodule, s/p fall. Personal factors affecting pt at time of eval include: limited home support, positive fall history and increased age.  The following objective measures performed on IE also reveal limitations: -PAC 6-Clicks: 42/48. Pt's clinical presentation is currently unstable/unpredictable seen in pt's presentation of abnormal lab value(s), need for input for task focus and mobility technique, severe generalized pain impacting overall mobility status, ongoing medical assessment and low BP readings (asymptomatic). Overall, pt's rehab potential and prognosis to return to PLOF is fair as impacted by objective findings, warranting pt to receive further skilled PT interventions to address identified impairments, activity limitation(s), and participation restriction(s). Goal for patient is to feel better. Pt to benefit from continued PT tx to address deficits as defined above and maximize level of functional independent mobility and consistency in order for pt to improve activity tolerance. From PT/mobility standpoint, recommendation at time of d/c would be post acute rehabilitation services pending progress in order to facilitate return to PLOF. Barriers to Discharge: Inaccessible home environment, Decreased caregiver support     PT Discharge Recommendation: Post acute rehabilitation services    See flowsheet documentation for full assessment.

## 2023-07-10 NOTE — ASSESSMENT & PLAN NOTE
· Peripancreatic adenopathy noted on CT imaging concerning for possible metastatic process  · Results were discussed with patient  · Patient has history of renal and prostate cancer  · In conversation with him he is not sure if he will want a further work-up  · I discussed option of obtaining MRI of his abdomen in a.m. to further characterize these  · He is unsure at this time whether he would want to undergo MRI even if it were to be cancerous he states that he would not want further treatment.   · Patient is going to think about this overnight and let daytime provider know his thoughts and wishes

## 2023-07-10 NOTE — PHYSICAL THERAPY NOTE
Physical Therapy Evaluation   Time in: [phone]  Time out: 0757  Total evaluation time: 30 minutes    Patient's Name: Kristyn Mosqueda    Admitting Diagnosis  Hyperkalemia [E87.5]  Shoulder injury [S49.90XA]  Pneumonia [J18.9]  Elevated CK [R74.8]  MARIANGEL (acute kidney injury) ([address]) [N17.9]  Fall, initial encounter [W19. XXXA]    Problem List  Patient Active Problem List   Diagnosis    Coronary artery disease involving native coronary artery of native heart    Renal insufficiency    Dyslipidemia    Stage 3 chronic kidney disease (HCC)    Chronic kidney disease (CKD), stage III (moderate)    Coronary artery disease involving native coronary artery of native heart without angina pectoris    Carotid stenosis, right    S/P CABG x 2    Tremor of right hand    Obesity    Generalized weakness    Dizziness    Parkinson's disease (HCC)    Abnormal ECG    Anemia    Penile edema    Swelling of right upper extremity    Ambulatory dysfunction    Orthostatic hypotension    Right shoulder pain    Insomnia    Renal cell cancer, left (HCC)    Peripheral edema    Frequency of micturition    Abnormal prostate specific antigen    Balanitis    Bilateral ureteral obstruction    Calculus of kidney    Adenocarcinoma of prostate (HCC)    Complex renal cyst    First degree heart block    Gastroesophageal reflux disease    Hyperlipidemia, unspecified    Idiopathic chronic gout, unspecified site, with tophus (tophi)    Hypercalcinuria    Occlusion and stenosis of right carotid artery    Presence of left artificial knee joint    Tremor, unspecified    Urinary incontinence    History of coronary artery bypass surgery    Hypernatriuria    Acute renal failure superimposed on stage 3 chronic kidney disease (HCC)    Non-traumatic rhabdomyolysis    Left upper lobe pneumonia    Adenopathy    Thyroid nodule    Fall       Past Medical History  Past Medical History:   Diagnosis Date    Anxiety     CAD (coronary artery disease)     Carotid stenosis, right 50-69% internal    Chronic kidney disease (CKD), stage III (moderate)     baseline Cr 1.50    Diverticulosis     GERD (gastroesophageal reflux disease)     Gout     History of nephrolithiasis     History of prostate cancer     s/p radiation/Lupron    History of renal cell cancer     s/p left nephrectomy    Osteoarthritis     knees & shoulders    Parkinson disease     Pulmonary nodule     7mm PARKER       Past Surgical History  Past Surgical History:   Procedure Laterality Date    ARTHROSCOPY KNEE Right     CARDIAC CATHETERIZATION      JOINT REPLACEMENT Left     knee    NEPHRECTOMY Left 2018    FL CORONARY ARTERY BYP W/VEIN & ARTERY GRAFT 2 VEIN N/A 9/2/2020    Procedure: CORONARY ARTERY BYPASS GRAFT (CABG) 2 VESSELS: LIMA to LAD, RLE EVH/SVG to PDA; Surgeon: Stan Bennett DO;  [facility];  Service: Cardiac Surgery    FL ECHO TRANSESOPHAG R-T 2D W/PRB IMG ACQUISJ I&R N/A 9/2/2020    Procedure: TRANSESOPHAGEAL ECHOCARDIOGRAM (MEGAN); Surgeon: Stan Bennett DO;  [facility];  Service: Cardiac Surgery    FL NDSC SURG W/VIDEO-ASSISTED HARVEST VEIN CABG Right 9/2/2020    Procedure: HARVEST VEIN ENDOSCOPIC ([address]); Surgeon: Stan Bennett DO;  Location[facility];  Service: Cardiac Surgery    REPLACEMENT TOTAL KNEE Left     ROTATOR CUFF REPAIR Right     TONSILLECTOMY         PT performed at least 2 patient identifiers during session: Name and wristband. 07/10/23 0734   PT Last Visit   PT Visit Date 07/10/23   Note Type   Note type Evaluation   Pain Assessment   Pain Assessment Tool 0-10   Pain Score 8   Pain Location/Orientation Location: Generalized;Orientation: Right;Location: Shoulder   Pain Onset/Description Onset: Ongoing;Frequency: Constant/Continuous   Patient's Stated Pain Goal No pain   Hospital Pain Intervention(s) Repositioned; Emotional support   Restrictions/Precautions   Weight Bearing Precautions Per Order No   Other Precautions Fall Risk;Pain; Chair Alarm; Bed Alarm;Multiple lines Home Living   Type of [address] [place] [address]  One level;Performs ADLs on one level; Able to live on main level with bedroom/bathroom  (garage 8 + 6 GIDEON through garage, front 3 + 6 GIDEON)   Bathroom Shower/Tub Tub/shower unit   Bathroom Toilet Standard   Bathroom Equipment   (no DME)   [address] [place];Long-handled shoehorn   Prior Function   Level of [place] Independent with ADLs; Independent with functional mobility; Independent with IADLS   Lives With Alone   Receives Help From Family  (pt reports his 1 son assisting him as needed - but overall limited support @ baseline)   IADLs Independent with driving; Independent with meal prep; Independent with medication management   Falls in the last 6 months 1 to 4   Vocational Retired   General   Family/Caregiver Present No   Cognition   Arousal/Participation Alert   Orientation Level Oriented X4   Memory Decreased recall of precautions   Following Commands Follows one step commands without difficulty   Comments pt agreeable to PT session   Subjective   Subjective "I can try to get up"   RLE Assessment   RLE Assessment X   Strength RLE   RLE Overall Strength 3+/5   LLE Assessment   LLE Assessment X   Strength LLE   LLE Overall Strength 3+/5   Vision-Basic Assessment   Current Vision Wears glasses only for reading   Coordination   Movements are Fluid and Coordinated 0   Coordination and Movement Description Incremental mobility requiring increased time and tactile facilitation. Parkinsonian tremors present throughout assessment. Sensation X   Bed Mobility   Supine to Sit 2  Maximal assistance   Additional items Assist x 1;HOB elevated   Additional Comments BP supine in bed prior to OOB activity = 87/43; BP sitting at EOB = 90/41, pt denies lightheadedness/dizziness   Transfers   Sit to Stand 3  Moderate assistance   Additional items Assist x 1; Increased time required   Stand to Sit 3  Moderate assistance   Additional items Assist x 1;Increased time required   Stand pivot 3  Moderate assistance   Additional items Assist x 1; Increased time required;Verbal cues  (HHA)   Ambulation/Elevation   Gait pattern Improper Weight shift;Narrow TEO; Forward Flexion;Decreased foot clearance;Shuffling; Step to;Excessively slow   Gait Assistance 3  Moderate assist   Additional items Assist x 1;Verbal cues; Tactile cues   Assistive Device   (HHA)   Distance 3 ft   Stair Management Assistance Not tested   Balance   Static Sitting Fair   Dynamic Sitting Fair -   Static Standing Poor +   Dynamic Standing Poor   Ambulatory Poor   Endurance Deficit   Endurance Deficit Yes   Activity Tolerance   Activity Tolerance Patient limited by fatigue   Medical Staff Made Aware CM Evlyn Dakins made aware of PT session recs   Nurse Made Aware Yes, RN Horace Swain present during session, aware of low BP readings   Assessment   Prognosis Fair   Problem List Decreased strength;Decreased endurance; Impaired balance;Decreased mobility; Decreased coordination; Impaired judgement;Decreased safety awareness; Obesity;Pain   Assessment Pt is 66 y.o. male seen for high-complexity PT evaluation on 7/10/2023 s/p admit [address] on 7/9/2023 w/ Acute renal failure superimposed on stage 3 chronic kidney disease ([address]). PT was consulted to assess pt's functional mobility and d/c needs. Order placed for PT eval and tx, w/ up and OOB as tolerated order. PTA, pt lives alone in 1 SH c +GIDEON, amb s AD at baseline vs RW, (I) ADL performance. At time of eval, pt requires mod Ax1 for supine>sit, STS, and amb x 3 ft from bed>recliner. Upon evaluation, pt presenting with impaired functional mobility d/t decreased strength, decreased endurance, impaired balance, decreased mobility, decreased coordination, impaired judgement, decreased safety awareness, obesity c BMI of 34.80 kg/m2 and pain.  Pertinent PMHx and current co-morbidities affecting pt's physical performance at time of assessment include: Parkinson's disease, anemia, GERD, HLD, non-traumatic rhabdomyolysis, adenopathy, thyroid nodule, s/p fall. Personal factors affecting pt at time of eval include: limited home support, positive fall history and increased age. The following objective measures performed on IE also reveal limitations: AM-PAC 6-Clicks: 81/28. Pt's clinical presentation is currently unstable/unpredictable seen in pt's presentation of abnormal lab value(s), need for input for task focus and mobility technique, severe generalized pain impacting overall mobility status, ongoing medical assessment and low BP readings (asymptomatic). Overall, pt's rehab potential and prognosis to return to PLOF is fair as impacted by objective findings, warranting pt to receive further skilled PT interventions to address identified impairments, activity limitation(s), and participation restriction(s). Goal for patient is to feel better. Pt to benefit from continued PT tx to address deficits as defined above and maximize level of functional independent mobility and consistency in order for pt to improve activity tolerance. From PT/mobility standpoint, recommendation at time of d/c would be post acute rehabilitation services pending progress in order to facilitate return to PLOF. Barriers to Discharge Inaccessible home environment;Decreased caregiver support   Goals   Patient Goals "to feel better"   [facility] Expiration Date 07/20/23   Short Term Goal #1 1.)Patient will complete bed mobility supervision of 1 for decrease need for caregiver assistance, decrease burden of care. 2.) Patient will complete transfers supervision of 1 to decrease risk of falls, facilitate upright standing posture. 3.) BLE strength to greater than/equal to 4/5 gross musculature to increase ability to safely transfer, control descent to chair. 4.) Patient will exhibit increase dynamic standing to Good 3-5 minutes without LOB supervision of 1 to improve activity tolerance.  5.) Patient will exhibit increase dynamic ambulatory balance to Fair > 50 feet w/AD prn supervision of 1 to improve ability to mobilize to toilet, chair and decrease risk for additional medical complications. 6.) Patient will exhibit good self monitoring and ability to follow 2 step commands to increase complexity of tasks and resume ADL's without LOB. PT Treatment Day 0   Plan   Treatment/Interventions Functional transfer training;LE strengthening/ROM; Therapeutic exercise; Endurance training;Patient/family training;Equipment eval/education; Bed mobility;Gait training;Spoke to nursing;Spoke to case management;OT   PT Frequency 3-5x/wk   Recommendation   PT Discharge Recommendation Post acute rehabilitation services   Equipment Recommended   (continue TBD, pending progress)   Additional Comments Pt's raw score on the AM-PAC Basic Mobility inpatient short form is 11, standardized score is 30.25. Patients at this level are likely to benefit from DC to [address] [place] [address] Drive, however, please refer to therapist recommendation for safe DC planning. AM-PAC Basic Mobility Inpatient   Turning in Flat Bed Without Bedrails 2   Lying on Back to Sitting on Edge of Flat Bed Without Bedrails 2   Moving Bed to Chair 2   Standing Up From Chair Using Arms 2   Walk in Room 2   Climb 3-5 Stairs With Railing 1   Basic Mobility Inpatient Raw Score 11   Basic Mobility Standardized Score 30.25   Highest Level Of Mobility   JH-HLM Goal 4: Move to chair/commode   JH-HLM Achieved 4: Move to chair/commode   End of Consult   Patient Position at End of Consult Bedside chair;Bed/Chair alarm activated; All needs within reach       Inocencio Blevins, PT, DPT

## 2023-07-10 NOTE — ASSESSMENT & PLAN NOTE
· Admit to medicine  · Likely prerenal secondary to dehydration  · Patient also has pleural effusion and anasarca  · We will hydrate saline 125 cc/h  · Hold prehospital midodrine  · Continue to monitor with repeat labs in a.m. · Obtain urine protein creatinine ratio  · Consult nephrology in a.m.

## 2023-07-10 NOTE — ASSESSMENT & PLAN NOTE
· Noted on CT imaging  · Patient is otherwise asymptomatic  · Testing for COVID-19, respiratory syncytial virus, and influenza a and B is negative  · Urine testing for Streptococcus pneumonia and Legionella antigen testing is negative  · Procalcitonin testing is up- question reliability in the setting of having an acute kidney injury  · Continue ceftriaxone for now

## 2023-07-10 NOTE — PLAN OF CARE
Problem: OCCUPATIONAL THERAPY ADULT  Goal: Performs self-care activities at highest level of function for planned discharge setting. See evaluation for individualized goals. Description: Treatment Interventions: ADL retraining, Functional transfer training, UE strengthening/ROM, Endurance training, Patient/family training, Equipment evaluation/education, Compensatory technique education, Energy conservation, Activityengagement          See flowsheet documentation for full assessment, interventions and recommendations. Note: Limitation: Decreased ADL status, Decreased UE ROM, Decreased UE strength, Decreased endurance, Decreased self-care trans, Decreased high-level ADLs  Prognosis: Fair  Assessment: Pt is a 66 y.o. male seen for OT evaluation s/p admit to [facility] on 7/9/2023 w/ Acute renal failure superimposed on stage 3 chronic kidney disease ([address]). Prior to admission pt was I with ADLs, IADLs, and functional mobility. Comorbidities impacting pt's functional performance at time of assessment include parkinson's disease, anemia, hyperlipidemia, pneumonia, and fall. Current personal factors limiting pt's occupational performance include steps to enter environment, limited home support, difficulty performing ADLS and difficulty performing IADLS . Current deficits impacting occupational performance include decreased ROM, decreased strength, decreased balance, decreased tolerance and increased pain. Pt to benefit from continued skilled OT tx during this hospital stay to address deficits listed above. Areas of occupational performance to address include eating, grooming, bathing/shower, toilet hygiene, dressing, medication management, functional mobility and clothing management. From an OT standpoint, recommendation at time of d/c would be PAR.      OT Discharge Recommendation: Post acute rehabilitation services     RAFAELA Ortega

## 2023-07-10 NOTE — NUTRITION
07/10/23 1350   Biochemical Data,Medical Tests, and Procedures   Biochemical Data/Medical Tests/Procedures Lab values reviewed; Meds reviewed   Labs (Comment) 7/10/23 K 5.4, BUN 41, creat 2.6, Ca 7.5   Meds (Comment) sinemet, ferrous sulfate, heparin, protonix, NaCl infusion   Nutrition-Focused Physical Exam   Nutrition-Focused Physical Exam Findings RN skin assessment reviewed;Edema; Wound   Nutrition-Focused Physical Exam Findings +1 generalized edema, +2 right UE edema, +2 BL LE edema, perineal non-pitting edema. Patient with multiple pressure injuries per conversation with Wound Care RN. Medical-Related Concerns acute renal failure on stage 3 CKD, Parkinson's, anemia, GERD, renal cancer, prostate cancer   Current PO Intake   Current Diet Order Regular diet, thin liquids   Current Meal Intake Suboptimal, but improving   Estimated calorie intake compared to estimated need Nutrient needs are not met. PES Statement   Oral or Nutritional Support Intake (2) Inadequate oral intake NI-2.1   Related to Decreased appetite; Increased PRO needs   As evidenced by: Per patient/family interview; Intake < estimated needs; Wound healing   Recommendations/Interventions   Malnutrition/BMI Present No   Summary Wound Care RN consulted. Presents s/p fall. Past medical history significant for acute renal failure on stage 3 CKD, Parkinson's, anemia, GERD, renal cancer, prostate cancer. Weight history unknown per EMR - limited data. 6/23/23 222#. BMI 34.80. +1 generalized edema, +2 right UE edema, +2 BL LE edema, perineal non-pitting edema. Patient with multiple pressure injuries per conversation with Wound Care RN. Prescribed a Regular diet, thin liquids. Patient reports having a terrible appetite for months. He reports usually having two meals daily. Has no diet restrictions. Reports no difficulty chewing or swallowing. NKFA. He usually drinks Ensure once daily at home.  He is agreeable to Ensure Compact vanilla once daily and Ensure Plus High Protein strawberry once daily in setting of wound healing. RD to follow. Interventions/Recommendations Continue current diet order;Monitor I & O's;Supplement initiate   Education Assessment   Education Patient/caregiver not appropriate for education at this time;Education not indicated at this time   Patient Nutrition Goals   Goal Meet PO needs; Increase kcal/PRO intake

## 2023-07-10 NOTE — ASSESSMENT & PLAN NOTE
· Peripancreatic adenopathy noted on CT imaging concerning for possible metastatic process  · Patient has a history of renal and prostate cancer  · Findings were reviewed again with the patient in the presence of his son Adalberto Santos  · They will potentially follow-up in the outpatient setting for further work-up

## 2023-07-10 NOTE — RESPIRATORY THERAPY NOTE
RT Protocol Note  Fernando Green 66 y.o. male MRN: [id number]  Unit/Bed#: -01 Encounter: [id number]    Assessment    Principal Problem:    Acute renal failure superimposed on stage 3 chronic kidney disease ([address])  Active Problems:    Parkinson's disease ([address])    Anemia    Gastroesophageal reflux disease    Hyperlipidemia, unspecified    Non-traumatic rhabdomyolysis    Left upper lobe pneumonia    Adenopathy    Thyroid nodule    Fall      Home Pulmonary Medications:none      Home Devices/Therapy:  (none)    Past Medical History:   Diagnosis Date    Anxiety     CAD (coronary artery disease)     Carotid stenosis, right     50-69% internal    Chronic kidney disease (CKD), stage III (moderate)     baseline Cr 1.50    Diverticulosis     GERD (gastroesophageal reflux disease)     Gout     History of nephrolithiasis     History of prostate cancer     s/p radiation/Lupron    History of renal cell cancer     s/p left nephrectomy    Osteoarthritis     knees & shoulders    Parkinson disease     Pulmonary nodule     7mm PARKER     Social History     Socioeconomic History    Marital status:      Spouse name: None    Number of children: None    Years of education: None    Highest education level: None   Occupational History    None   Tobacco Use    Smoking status: Never    Smokeless tobacco: Never   Vaping Use    Vaping Use: Never used   Substance and Sexual Activity    Alcohol use:  Yes     Alcohol/week: 9.0 standard drinks of alcohol     Types: 9 Standard drinks or equivalent per week     Comment: 3x a week ( 6-7 mixed drinks each time- henok)    Drug use: Never    Sexual activity: None   Other Topics Concern    None   Social History Narrative    None     Social Determinants of Health     Financial Resource Strain: Not on file   Food Insecurity: No Food Insecurity (6/9/2023)    Hunger Vital Sign     Worried About Running Out of Food in the Last Year: Never true     Ran Out of Food in the Last Year: Never true Transportation Needs: No Transportation Needs (6/9/2023)    PRAPARE - Transportation     Lack of Transportation (Medical): No     Lack of Transportation (Non-Medical): No   Physical Activity: Not on file   Stress: Not on file   Social Connections: Not on file   Intimate Partner Violence: Not on file   Housing Stability: Low Risk  (6/9/2023)    Housing Stability Vital Sign     Unable to Pay for Housing in the Last Year: No     Number of Places Lived in the Last Year: 1     Unstable Housing in the Last Year: No       Subjective         Objective    Physical Exam:   Assessment Type: Assess only  General Appearance: Sleeping  Respiratory Pattern: Normal  Chest Assessment: Chest expansion symmetrical  Bilateral Breath Sounds: Clear  Cough: None    Vitals:  Blood pressure (!) 82/49, pulse 69, temperature 99.4 °F (37.4 °C), resp. rate 18, SpO2 95 %. Imaging and other studies: I have personally reviewed pertinent reports. 07/09/23 4846   Respiratory Protocol   Protocol Initiated? No   Language Barrier? No   Medical & Social History Reviewed? Yes   Diagnostic Studies Reviewed? Yes   Physical Assessment Performed? Yes   Home Devices/Therapy   (none)   Respiratory Assessment   Assessment Type Assess only   General Appearance Sleeping   Respiratory Pattern Normal   Chest Assessment Chest expansion symmetrical   Bilateral Breath Sounds Clear   Cough None   Resp Comments   (pt assessed as per protocol. BBS clear. no respiratory history noted. will dc protocol)   Additional Assessments   Respirations 18               Plan             Resp Comments:  (pt assessed as per protocol. BBS clear. no respiratory history noted.  will dc protocol)

## 2023-07-10 NOTE — ASSESSMENT & PLAN NOTE
· Present on admission  · Arrival creatinine 3.01  · Prerenal-secondary to dehydration, and rhabdomyolysis (see below)  · Status post treatment with IV fluid  · Repeat creatinine this morning is down to 2.60  · Appreciate nephrology input  · Continue gentle IV fluid  · Repeat BMP in the a.m.

## 2023-07-10 NOTE — ASSESSMENT & PLAN NOTE
· Patient was laying on the floor for approximately 20 hours  · Initial CK 2305  · We will hydrate as per above  · Continue to monitor with repeat CK in a.m.   · Await further input from nephrology

## 2023-07-10 NOTE — OCCUPATIONAL THERAPY NOTE
Occupational Therapy Evaluation       07/10/23 1052   OT Last Visit   OT Visit Date 07/10/23   Note Type   Note type Evaluation   Pain Assessment   Pain Assessment Tool 0-10   Pain Score 8   Pain Location/Orientation Location: Generalized   Pain Onset/Description Onset: Ongoing;Frequency: Constant/Continuous   Patient's Stated Pain Goal No pain   Hospital Pain Intervention(s) Repositioned   Restrictions/Precautions   Weight Bearing Precautions Per Order No   Other Precautions Fall Risk;Pain;Bed Alarm;Multiple lines  (aspiration precautions)   Home Living   Type of [address] One level;Performs ADLs on one level; Able to live on main level with bedroom/bathroom  (garage 8 + 6 GIDEON through garage, front 3 + 6 GIDEON)   Bathroom Shower/Tub Tub/shower unit   Bathroom Toilet Standard   Bathroom Equipment   (no AD at baseline)   [address]ane;Long-handled shoehorn   Prior Function   Level of [place] Independent with ADLs; Independent with functional mobility; Independent with IADLS   Lives With Alone   Receives Help From Family  (pt reports his 1 son assisting him as needed - but overall limited support @ baseline)   IADLs Independent with driving; Independent with meal prep; Independent with medication management   Falls in the last 6 months 1 to 4   Vocational Retired   ADL   Eating Assistance 3  Moderate Assistance   Grooming Assistance 3  Moderate [address] 2  Maximal [address] 1  Total [place] 2  Maximal [place] 1  Total [address]  1  Total Assistance   Bed Mobility   Rolling L 2  Maximal assistance   Additional items Bedrails; Increased time required;Verbal cues;LE management;Assist x 2   Supine to Sit Unable to assess  (secondary to pt fatigue and severity of pain)   Balance   Static Sitting   (unable to assess secondary to pt fatigue and severity of pain)   Activity Tolerance   Activity Tolerance Patient limited by fatigue;Patient limited by pain   Nurse Made Aware RN González   RUE Assessment   RUE Assessment X   RUE Overall AROM   R Shoulder Flexion trace movement   RUE Overall PROM   R Shoulder Flexion ~90 degrees   RUE Strength   RUE Overall Strength Deficits  (2-/5)   LUE Assessment   LUE Assessment X   LUE Overall AROM   L Shoulder Flexion no AROM   LUE Overall PROM   L Shoulder Flexion <15 degrees   LUE Strength   LUE Overall Strength Deficits  (1/5)   Hand Function   Gross Motor Coordination Impaired  (B UE)   Vision-Basic Assessment   Current Vision Wears glasses only for reading   Cognition   Overall Cognitive Status WFL   Arousal/Participation Responsive; Cooperative   Attention Within functional limits   Orientation Level Oriented X4   Memory Within functional limits   Following Commands Follows one step commands without difficulty   Assessment   Limitation Decreased ADL status; Decreased UE ROM; Decreased UE strength;Decreased endurance;Decreased self-care trans;Decreased high-level ADLs   Prognosis Fair   Assessment Pt is a 66 y.o. male seen for OT evaluation s/p admit to [facility] on 7/9/2023 w/ Acute renal failure superimposed on stage 3 chronic kidney disease ([address]). Prior to admission pt was I with ADLs, IADLs, and functional mobility. Comorbidities impacting pt's functional performance at time of assessment include parkinson's disease, anemia, hyperlipidemia, pneumonia, and fall. Current personal factors limiting pt's occupational performance include steps to enter environment, limited home support, difficulty performing ADLS and difficulty performing IADLS . Current deficits impacting occupational performance include decreased ROM, decreased strength, decreased balance, decreased tolerance and increased pain. Pt to benefit from continued skilled OT tx during this hospital stay to address deficits listed above.  Areas of occupational performance to address include eating, grooming, bathing/shower, toilet hygiene, dressing, medication management, functional mobility and clothing management. From an OT standpoint, recommendation at time of d/c would be PAR. Goals   Patient Goals to rest   Plan   Treatment Interventions ADL retraining;Functional transfer training;UE strengthening/ROM; Endurance training;Patient/family training;Equipment evaluation/education; Compensatory technique education; Energy conservation; Activityengagement   Goal Expiration Date 07/20/23   OT Treatment Day 0   OT Frequency 3-5x/wk   Recommendation   OT Discharge Recommendation Post acute rehabilitation services   Additional Comments  The patient's raw score on the AM-PAC Daily Activity Inpatient Short Form is 10. A raw score of less than 19 suggests the patient may benefit from discharge to post-acute rehabilitation services. Please refer to the recommendation of the Occupational Therapist for safe discharge planning. AM-PAC Daily Activity Inpatient   Lower Body Dressing 1   Bathing 2   Toileting 1   Upper Body Dressing 2   Grooming 2   Eating 2   Daily Activity Raw Score 10   Turning Head Towards Sound 4   Follow Simple Instructions 4   Low Function Daily Activity Raw Score 18   Low Function Daily Activity Standardized Score  30.17   AM-[facility] Applied Cognition Inpatient   Following a Speech/Presentation 4   Understanding Ordinary Conversation 4   Taking Medications 3   Remembering Where Things Are Placed or Put Away 3   Remembering List of 4-5 Errands 3   Taking Care of Complicated Tasks 3   Applied Cognition Raw Score 20   Applied Cognition Standardized Score 41.76     GOALS    STG  Pt will achieve the following goals within 5 days    Pt will complete eating/feeding with Min A for increased independence with ADLs. Pt will complete grooming with Min A for increased independence with self care tasks.      Pt will complete ADL transfers with Max A for increased independence with ADLs/ meaningful tasks. Pt will complete UB ADLs including bathing and dressing with Mod A to promote increased independence with self care tasks. Pt will complete LB ADLs including bathing and dressing with Max A using AE prn to promote increased independence with self care tasks. Pt will complete toileting with Max A for clothing management and hygiene to promote independence with self care tasks. Pt will increase static sitting balance and dynamic sitting balance to P+ for increased safety with seated functional tasks. Pt will increase sitting tolerance to 3 min for sustained participation in seated functional tasks. Pt will participate in 10m UE therex to increase overall stamina/activity tolerance for purposeful tasks. Pt will complete bed mobility with Max A for increased independence to manage own comfort and participate in EOB & OOB purposeful tasks. LTG  Pt will achieve the following goals within 10 days    Pt will complete eating/feeding with S for increased independence with ADLs. Pt will complete grooming with S for increased independence with self care tasks. Pt will complete ADL transfers with Mod A for increased independence with ADLs/ meaningful tasks. Pt will complete UB ADLs including bathing and dressing with Min A to promote increased independence with self care tasks. Pt will complete LB ADLs including bathing and dressing with Mod A using AE prn to promote increased independence with self care tasks. Pt will complete toileting with Mod A for clothing management and hygiene to promote independence with self care tasks. Pt will increase static stand balance to P+ and dynamic stand balance to P+ for increased safety with standing functional tasks. Pt will increase stand tolerance to 1 min for sustained participation in standing functional tasks.      Pt will participate in 15m UE therex to increase overall stamina/activity tolerance for purposeful tasks. Pt will complete bed mobility with Mod A for increased independence to manage own comfort and participate in EOB & OOB purposeful tasks.      Lenise Dakin, OTS

## 2023-07-10 NOTE — ASSESSMENT & PLAN NOTE
· Patient was laying on the floor for approximately 20 hours  · Arrival CPK 2305, repeat CPK 1751  · Continue gentle IV fluids  · Repeat CPK testing in the a.m.

## 2023-07-10 NOTE — ASSESSMENT & PLAN NOTE
· Patient reports having had a mechanical  fall prior to coming into the hospital  · Trauma work-up was negative  · CT head-within normal limits  · CT cervical spine-no acute fractures and/or traumatic malalignment  · CT chest, abdomen, pelvis- no acute traumatic pathology  · Status post a PT and OT evaluation- they recommend postacute rehabilitation services  · Case management has been notified

## 2023-07-11 LAB
ANION GAP SERPL CALCULATED.3IONS-SCNC: 9 MMOL/L
BASOPHILS # BLD AUTO: 0.02 THOUSANDS/ÂΜL (ref 0–0.1)
BASOPHILS NFR BLD AUTO: 0 % (ref 0–1)
BUN SERPL-MCNC: 38 MG/DL (ref 5–25)
CALCIUM SERPL-MCNC: 7.1 MG/DL (ref 8.4–10.2)
CHLORIDE SERPL-SCNC: 109 MMOL/L (ref 96–108)
CK SERPL-CCNC: 806 U/L (ref 39–308)
CO2 SERPL-SCNC: 19 MMOL/L (ref 21–32)
CREAT SERPL-MCNC: 1.99 MG/DL (ref 0.6–1.3)
EOSINOPHIL # BLD AUTO: 0.12 THOUSAND/ÂΜL (ref 0–0.61)
EOSINOPHIL NFR BLD AUTO: 2 % (ref 0–6)
ERYTHROCYTE [DISTWIDTH] IN BLOOD BY AUTOMATED COUNT: 18.8 % (ref 11.6–15.1)
GFR SERPL CREATININE-BSD FRML MDRD: 31 ML/MIN/1.73SQ M
GLUCOSE SERPL-MCNC: 106 MG/DL (ref 65–140)
HCT VFR BLD AUTO: 26.1 % (ref 36.5–49.3)
HGB BLD-MCNC: 7.7 G/DL (ref 12–17)
IMM GRANULOCYTES # BLD AUTO: 0.05 THOUSAND/UL (ref 0–0.2)
IMM GRANULOCYTES NFR BLD AUTO: 1 % (ref 0–2)
LYMPHOCYTES # BLD AUTO: 0.58 THOUSANDS/ÂΜL (ref 0.6–4.47)
LYMPHOCYTES NFR BLD AUTO: 8 % (ref 14–44)
MCH RBC QN AUTO: 26.9 PG (ref 26.8–34.3)
MCHC RBC AUTO-ENTMCNC: 29.5 G/DL (ref 31.4–37.4)
MCV RBC AUTO: 91 FL (ref 82–98)
MONOCYTES # BLD AUTO: 0.75 THOUSAND/ÂΜL (ref 0.17–1.22)
MONOCYTES NFR BLD AUTO: 10 % (ref 4–12)
NEUTROPHILS # BLD AUTO: 5.95 THOUSANDS/ÂΜL (ref 1.85–7.62)
NEUTS SEG NFR BLD AUTO: 79 % (ref 43–75)
NRBC BLD AUTO-RTO: 0 /100 WBCS
PLATELET # BLD AUTO: 148 THOUSANDS/UL (ref 149–390)
PMV BLD AUTO: 11.3 FL (ref 8.9–12.7)
POTASSIUM SERPL-SCNC: 4.6 MMOL/L (ref 3.5–5.3)
RBC # BLD AUTO: 2.86 MILLION/UL (ref 3.88–5.62)
SODIUM SERPL-SCNC: 137 MMOL/L (ref 135–147)
WBC # BLD AUTO: 7.47 THOUSAND/UL (ref 4.31–10.16)

## 2023-07-11 PROCEDURE — 82550 ASSAY OF CK (CPK): CPT | Performed by: HOSPITALIST

## 2023-07-11 PROCEDURE — 85025 COMPLETE CBC W/AUTO DIFF WBC: CPT | Performed by: HOSPITALIST

## 2023-07-11 PROCEDURE — 99232 SBSQ HOSP IP/OBS MODERATE 35: CPT | Performed by: INTERNAL MEDICINE

## 2023-07-11 PROCEDURE — 99232 SBSQ HOSP IP/OBS MODERATE 35: CPT | Performed by: HOSPITALIST

## 2023-07-11 PROCEDURE — 80048 BASIC METABOLIC PNL TOTAL CA: CPT | Performed by: HOSPITALIST

## 2023-07-11 RX ORDER — FUROSEMIDE 10 MG/ML
80 INJECTION INTRAMUSCULAR; INTRAVENOUS ONCE
Status: COMPLETED | OUTPATIENT
Start: 2023-07-11 | End: 2023-07-11

## 2023-07-11 RX ADMIN — NYSTATIN: 100000 POWDER TOPICAL at 09:39

## 2023-07-11 RX ADMIN — HEPARIN SODIUM 5000 UNITS: 5000 INJECTION INTRAVENOUS; SUBCUTANEOUS at 21:00

## 2023-07-11 RX ADMIN — CARBIDOPA AND LEVODOPA 1 TABLET: 25; 100 TABLET ORAL at 15:02

## 2023-07-11 RX ADMIN — CARBIDOPA AND LEVODOPA 1 TABLET: 25; 100 TABLET ORAL at 09:39

## 2023-07-11 RX ADMIN — GABAPENTIN 200 MG: 100 CAPSULE ORAL at 21:00

## 2023-07-11 RX ADMIN — FUROSEMIDE 80 MG: 10 INJECTION, SOLUTION INTRAMUSCULAR; INTRAVENOUS at 09:38

## 2023-07-11 RX ADMIN — LIDOCAINE 5% 1 PATCH: 700 PATCH TOPICAL at 09:39

## 2023-07-11 RX ADMIN — HEPARIN SODIUM 5000 UNITS: 5000 INJECTION INTRAVENOUS; SUBCUTANEOUS at 15:02

## 2023-07-11 RX ADMIN — NYSTATIN: 100000 POWDER TOPICAL at 17:11

## 2023-07-11 RX ADMIN — PANTOPRAZOLE SODIUM 40 MG: 20 TABLET, DELAYED RELEASE ORAL at 05:16

## 2023-07-11 RX ADMIN — ASPIRIN 81 MG CHEWABLE TABLET 81 MG: 81 TABLET CHEWABLE at 09:39

## 2023-07-11 RX ADMIN — HEPARIN SODIUM 5000 UNITS: 5000 INJECTION INTRAVENOUS; SUBCUTANEOUS at 05:16

## 2023-07-11 RX ADMIN — ACETAMINOPHEN 650 MG: 325 TABLET ORAL at 15:02

## 2023-07-11 RX ADMIN — CARBIDOPA AND LEVODOPA 1 TABLET: 25; 100 TABLET ORAL at 20:57

## 2023-07-11 RX ADMIN — SODIUM CHLORIDE 125 ML/HR: 0.9 INJECTION, SOLUTION INTRAVENOUS at 01:33

## 2023-07-11 RX ADMIN — CEFTRIAXONE 1000 MG: 1 INJECTION, SOLUTION INTRAVENOUS at 06:26

## 2023-07-11 RX ADMIN — FERROUS SULFATE TAB 325 MG (65 MG ELEMENTAL FE) 325 MG: 325 (65 FE) TAB at 09:39

## 2023-07-11 NOTE — ASSESSMENT & PLAN NOTE
· Present on admission  · Arrival creatinine 3.01  · Prerenal-secondary to dehydration, and rhabdomyolysis (see below)  · Status post treatment with IV fluid  · Repeat creatinine this morning is down to 1.99  · Appreciate nephrology input  · IV fluid has been discontinued as per nephrology, patient has been started on some Lasix in view of mild volume overload  · Repeat BMP in the a.m.   · Discharge planning is in place, case management is working on [employer] placement

## 2023-07-11 NOTE — ASSESSMENT & PLAN NOTE
· Patient reports having had a mechanical  fall prior to coming into the hospital  · Trauma work-up was negative  · CT head-within normal limits  · CT cervical spine-no acute fractures and/or traumatic malalignment  · CT chest, abdomen, pelvis- no acute traumatic pathology  · Status post a PT and OT evaluation- they recommend postacute rehabilitation services  · Case management is working on STR placement

## 2023-07-11 NOTE — ASSESSMENT & PLAN NOTE
· Noted on CT imaging  · Patient is otherwise asymptomatic  · Testing for COVID-19, respiratory syncytial virus, and influenza a and B is negative  · Urine testing for Streptococcus pneumonia and Legionella antigen testing is negative  · Procalcitonin testing is up- question reliability in the setting of having an acute kidney injury  · Continue ceftriaxone day 2

## 2023-07-11 NOTE — ASSESSMENT & PLAN NOTE
· Peripancreatic adenopathy noted on CT imaging concerning for possible metastatic process  · Patient has a history of renal and prostate cancer  · Findings were reviewed again with the patient in the presence of his son Timothy Matos  · They will potentially follow-up in the outpatient setting for further work-up

## 2023-07-11 NOTE — PROGRESS NOTES
[address]  Progress Note  Name: Johny Amor  MRN: [id number]  Unit/Bed#: -01 I Date of Admission: 7/9/2023   Date of Service: 7/11/2023 I Hospital Day: 2    Assessment/Plan   * Acute renal failure superimposed on stage 3 chronic kidney disease ([address])  Assessment & Plan  · Present on admission  · Arrival creatinine 3.01  · Prerenal-secondary to dehydration, and rhabdomyolysis (see below)  · Status post treatment with IV fluid  · Repeat creatinine this morning is down to 1.99  · Appreciate nephrology input  · IV fluid has been discontinued as per nephrology, patient has been started on some Lasix in view of mild volume overload  · Repeat BMP in the a.m.   · Discharge planning is in place, case management is working on [facility] placement    [address]  · Patient reports having had a mechanical  fall prior to coming into the hospital  · Trauma work-up was negative  · CT head-within normal limits  · CT cervical spine-no acute fractures and/or traumatic malalignment  · CT chest, abdomen, pelvis- no acute traumatic pathology  · Status post a PT and OT evaluation- they recommend postacute rehabilitation services  · Case management is working on STR placement    Left upper lobe pneumonia  Assessment & Plan  · Noted on CT imaging  · Patient is otherwise asymptomatic  · Testing for COVID-19, respiratory syncytial virus, and influenza a and B is negative  · Urine testing for Streptococcus pneumonia and Legionella antigen testing is negative  · Procalcitonin testing is up- question reliability in the setting of having an acute kidney injury  · Continue ceftriaxone day 2    Traumatic rhabdomyolysis ([address])  Assessment & Plan  · Patient was laying on the floor for approximately 20 hours prior to arrival  · CPK [id number]  · Status post treatment with IV fluid  · Renal function has improved  · Continue supportive therapy    Parkinson's disease ([address])  Assessment & Plan  · Continue prehospital Sinemet 25/100 mg p.o. 3 times daily and Neurontin 200 mg p.o. nightly  · Status post a PT and OT evaluation-they recommend postacute rehabilitation services  · Midodrine on hold    Adenopathy  Assessment & Plan  · Peripancreatic adenopathy noted on CT imaging concerning for possible metastatic process  · Patient has a history of renal and prostate cancer  · Findings were reviewed again with the patient in the presence of his son Braxton Sequeira  · They will potentially follow-up in the outpatient setting for further work-up  · Case reviewed with the patient's son again at great depth, tomorrow afternoon we will have an in person goals of care counseling meeting    Anemia  Assessment & Plan  · History of iron deficiency anemia  · Dilutional drop in H&H noted  · Patient remains hemodynamically stable  · Continue ferrous sulfate    Hyperlipidemia, unspecified  Assessment & Plan  · Atorvastatin on hold in view of rhabdo    Gastroesophageal reflux disease  Assessment & Plan  · Continue Protonix    Thyroid nodule  Assessment & Plan  · Results were discussed with patient  · Patient will follow-up in the outpatient setting for further work-up             VTE Prophylaxis:  Heparin    Patient Centered Rounds: I have performed bedside rounds with nursing staff today.     Discussions with Specialists or Other Care Team Provider: Nephrology, case management, nursing  Education and Discussions with Family / Patient: Patient was brought up to par with the plan of care for today, patient's son Braxton Sequeira results are brought up to par     Current Length of Stay: 2 day(s)    Current Patient Status: Inpatient   Certification Statement: The patient will continue to require additional inpatient hospital stay due to The need for IV Lasix, IV antibiotics, and placement    Discharge Plan: Hopeful discharge planning over the next 24 to 48 hours    Code Status: Level 3 - DNAR and DNI    Subjective:   Patient seen, resting in bed, feels tired, no new complaints otherwise    Objective:     Vitals:   Temp (24hrs), Av.9 °F (37.7 °C), Min:99.6 °F (37.6 °C), Max:100.2 °F (37.9 °C)    Temp:  [99.6 °F (37.6 °C)-100.2 °F (37.9 °C)] 100 °F (37.8 °C)  HR:  [72-79] 79  Resp:  [18-20] 18  BP: (101-125)/(55-67) 101/67  SpO2:  [99 %-100 %] 100 %  Body mass index is 37.02 kg/m². Input and Output Summary (last 24 hours): Intake/Output Summary (Last 24 hours) at 2023 1622  Last data filed at 2023 1402  Gross per 24 hour   Intake 360 ml   Output 550 ml   Net -190 ml       Physical Exam:   Physical Exam  Vitals and nursing note reviewed. Constitutional:       General: He is not in acute distress. Appearance: Normal appearance. He is not ill-appearing. HENT:      Head: Normocephalic and atraumatic. Nose: Nose normal.   Eyes:      Extraocular Movements: Extraocular movements intact. Pupils: Pupils are equal, round, and reactive to light. Cardiovascular:      Rate and Rhythm: Normal rate and regular rhythm. Pulses: Normal pulses. Heart sounds: Normal heart sounds. No murmur heard. No friction rub. No gallop. Pulmonary:      Effort: Pulmonary effort is normal.      Breath sounds: Normal breath sounds. Comments: Decreased breath sounds bilaterally at the bases  Abdominal:      General: There is no distension. Palpations: Abdomen is soft. There is no mass. Tenderness: There is no abdominal tenderness. There is no guarding or rebound. Musculoskeletal:         General: No swelling or tenderness. Normal range of motion. Cervical back: Normal range of motion and neck supple. No rigidity. No muscular tenderness. Right lower leg: No edema. Left lower leg: No edema. Comments: Mild volume overload changes noted, bilateral lower extremity minimal swelling noted, bilateral upper extremity minimal swelling noted also   Skin:     General: Skin is warm.       Capillary Refill: Capillary refill takes less than 2 seconds. Findings: No erythema or rash. Neurological:      General: No focal deficit present. Mental Status: He is alert and oriented to person, place, and time. Mental status is at baseline. Psychiatric:         Mood and Affect: Mood normal.         Behavior: Behavior normal.         Additional Data:     Labs:    Results from last 7 days   Lab Units 07/11/23  0725   WBC Thousand/uL 7.47   HEMOGLOBIN g/dL 7.7*   HEMATOCRIT % 26.1*   PLATELETS Thousands/uL 148*   NEUTROS PCT % 79*   LYMPHS PCT % 8*   MONOS PCT % 10   EOS PCT % 2     Results from last 7 days   Lab Units 07/11/23  0517 07/10/23  0527 07/09/23  1657   SODIUM mmol/L 137   < > 137   POTASSIUM mmol/L 4.6   < > 5.8*   CHLORIDE mmol/L 109*   < > 105   CO2 mmol/L 19*   < > 22   BUN mg/dL 38*   < > 41*   CREATININE mg/dL 1.99*   < > 3.01*   CALCIUM mg/dL 7.1*   < > 7.9*   ALK PHOS U/L  --   --  40   ALT U/L  --   --  19   AST U/L  --   --  92*    < > = values in this interval not displayed. Results from last 7 days   Lab Units 07/09/23  1657   INR  1.79*               * I Have Reviewed All Lab Data Listed Above. * Additional Pertinent Lab Tests Reviewed:  [address] Admission  Reviewed    Imaging:  Imaging Reports Reviewed Today Include: None    Recent Cultures (last 7 days):     Results from last 7 days   Lab Units 07/09/23  2258   LEGIONELLA URINARY ANTIGEN  Negative       Last 24 Hours Medication List:   Current Facility-Administered Medications   Medication Dose Route Frequency Provider Last Rate   • acetaminophen  650 mg Oral Q6H PRN Orval Socks, PA-C     • aspirin  81 mg Oral Daily Orval Socks, PA-C     • carbidopa-levodopa  1 tablet Oral TID Orval Socks, PA-C     • cefTRIAXone  1,000 mg Intravenous Q24H Orval Socks, PA-C 1,000 mg (07/11/23 0626)   • dextromethorphan-guaiFENesin  10 mL Oral Q4H PRN Orval Socks, PA-C     • ferrous sulfate  325 mg Oral Daily With Breakfast Orval Socks, PA-C     • gabapentin  200 mg Oral HS Ryan [place], PA-C     • heparin (porcine)  5,000 Units Subcutaneous [facility] Ryan [place], PA-C     • lidocaine  1 patch Topical Daily Ryan [place], PA-C     • nystatin   Topical BID [place], PA-C     • ondansetron  4 mg Intravenous Q6H PRN Ryan [place], PA-C     • pantoprazole  40 mg Oral Early Morning Ryan [place], PA-C          Today, Patient Was Seen By: Kita Lackey MD    ** Please Note: Dictation voice to text software may have been used in the creation of this document.  **

## 2023-07-11 NOTE — ASSESSMENT & PLAN NOTE
· Patient was laying on the floor for approximately 20 hours prior to arrival  · CPK [id number]  · Status post treatment with IV fluid  · Renal function has improved  · Continue supportive therapy

## 2023-07-11 NOTE — DISCHARGE INSTR - OTHER ORDERS
Skin and Wound Care Plan:   1. Apply skin nourishing cream to the skin daily  2. Elevate heels off of bed with pillows to offload  3. Cleanse wounds with Remedy foaming cleanser and water, pat dry. Apply hydraguard to left anterior great toe, penis, forehead and chest wounds BID and PRN. 4. Turn and reposition patient Q2 hours  5. Cleanse wounds with gently with Remedy foaming cleanser and water, pat dry. Apply 3M No Sting to bilateral knee wounds, right and left wrists, bilateral elbows and nose daily  6. Allevyn life silicone bordered foam dressing to the sacrum, dea with T, date, peel back daily for wound and skin assessment, reapply and change every 3 days and PRN  7. Allevyn life heel foams to bilateral heels, dea with P, date, and peel back daily for skin assessment, change every 3 days or with soilage or dislodgement. 8. Cleanse right great to wound with Remedy foaming cleanser, pat dry.  Place Dermagran on toe wound, cover with 2X2 and wrap with 1" gauze, change every other day and PRN  9. P-500 low air loss therapy surface

## 2023-07-11 NOTE — PROGRESS NOTES
Progress Note - Nephrology   Que Sensing 66 y.o. male MRN: [id number]  Unit/Bed#: -01 Encounter: [id number]    A/P:  1. Acute kidney injury on top of chronic kidney disease              Creatinine continues to improve, now down to about 2 mg/dL from 2.6 mg/dL. Continue to monitor and avoid potential nephrotoxins. 2.  Chronic kidney stage IIIa with a baseline creatinine to 1.2-1.3 mg/dL   3. Hyperkalemia              Resolved status post volume expansion. 4.  Mild traumatic rhabdomyolysis              CPKs continue to trend down, now under thousand. Continue with supportive care at this time. 5.   Volume overload   Will discontinue IV fluids at this time and transition the patient to diuretics. We will provide a one-time dose of furosemide 80 mg IV, additional diuresis depending progresses from the standpoint. 6.  Left upper lobe pneumonia              Continue to treat empirically with ceftriaxone at this time, patient appears to be improving. 7.  Parkinson disease  8.   Peripancreatic adenopathy    Follow up reason for today's visit: Acute kidney injury/chronic kidney disease/volume management    Acute renal failure superimposed on stage 3 chronic kidney disease [facility])    Patient Active Problem List   Diagnosis   • Coronary artery disease involving native coronary artery of native heart   • Renal insufficiency   • Dyslipidemia   • Stage 3 chronic kidney disease (HCC)   • Chronic kidney disease (CKD), stage III (moderate)   • Coronary artery disease involving native coronary artery of native heart without angina pectoris   • Carotid stenosis, right   • S/P CABG x 2   • Tremor of right hand   • Obesity   • Generalized weakness   • Dizziness   • Parkinson's disease (HCC)   • Abnormal ECG   • Anemia   • Penile edema   • Swelling of right upper extremity   • Ambulatory dysfunction   • Orthostatic hypotension   • Right shoulder pain   • Insomnia   • Renal cell cancer, left (HCC)   • Peripheral edema   • Frequency of micturition   • Abnormal prostate specific antigen   • Balanitis   • Bilateral ureteral obstruction   • Calculus of kidney   • Adenocarcinoma of prostate ([address])   • Complex renal cyst   • First degree heart block   • Gastroesophageal reflux disease   • Hyperlipidemia, unspecified   • Idiopathic chronic gout, unspecified site, with tophus (tophi)   • Hypercalcinuria   • Occlusion and stenosis of right carotid artery   • Presence of left artificial knee joint   • Tremor, unspecified   • Urinary incontinence   • History of coronary artery bypass surgery   • Hypernatriuria   • Acute renal failure superimposed on stage 3 chronic kidney disease (HCC)   • Traumatic rhabdomyolysis ([address])   • Left upper lobe pneumonia   • Adenopathy   • Thyroid nodule   • Fall         Subjective:   Patient feels achy and sore all over, has no other acute complaints at this time    Objective:     Vitals: Blood pressure 125/62, pulse 74, temperature 99.6 °F (37.6 °C), resp. rate 18, SpO2 100 %. ,There is no height or weight on file to calculate BMI.     Weight (last 2 days)     None            Intake/Output Summary (Last 24 hours) at 7/11/2023 0952  Last data filed at 7/11/2023 0818  Gross per 24 hour   Intake 240 ml   Output 350 ml   Net -110 ml     I/O last 3 completed shifts:  In: -   Out: 550 [Urine:550]         Physical Exam: /62   Pulse 74   Temp 99.6 °F (37.6 °C)   Resp 18   SpO2 100%     General Appearance:    Alert, cooperative, no distress, appears stated age   Head:    Normocephalic, without obvious abnormality, atraumatic   Eyes:    Conjunctiva/corneas clear   Ears:    Normal external ears   Nose:   Nares normal, septum midline, mucosa normal, no drainage    or sinus tenderness   Throat:   Lips, mucosa, and tongue normal; teeth and gums normal   Neck:   Supple   Back:     Symmetric, no curvature, ROM normal, no CVA tenderness   Lungs:    Reduced bilaterally with occasional wheezing   Chest wall:    No tenderness or deformity   Heart:    Regular rate and rhythm, S1 and S2 normal, no murmur, rub   or gallop   Abdomen:     Soft, non-tender, bowel sounds active   Extremities:   Extremities normal, atraumatic, no cyanosis, +3 bilateral lower extremity edema   Skin:   Skin color, texture, turgor normal, no rashes or lesions   Lymph nodes:   Cervical normal   Neurologic:   CNII-XII intact            Lab, Imaging and other studies: I have personally reviewed pertinent labs. CBC:   Lab Results   Component Value Date    WBC 7.47 07/11/2023    HGB 7.7 (L) 07/11/2023    HCT 26.1 (L) 07/11/2023    MCV 91 07/11/2023     (L) 07/11/2023    RBC 2.86 (L) 07/11/2023    MCH 26.9 07/11/2023    MCHC 29.5 (L) 07/11/2023    RDW 18.8 (H) 07/11/2023    MPV 11.3 07/11/2023    NRBC 0 07/11/2023     CMP:   Lab Results   Component Value Date    K 4.6 07/11/2023     (H) 07/11/2023    CO2 19 (L) 07/11/2023    BUN 38 (H) 07/11/2023    CREATININE 1.99 (H) 07/11/2023    CALCIUM 7.1 (L) 07/11/2023    EGFR 31 07/11/2023       .   Results from last 7 days   Lab Units 07/11/23  0517 07/10/23  0527 07/09/23  1657   POTASSIUM mmol/L 4.6 5.4* 5.8*   CHLORIDE mmol/L 109* 105 105   CO2 mmol/L 19* 24 22   BUN mg/dL 38* 41* 41*   CREATININE mg/dL 1.99* 2.60* 3.01*   CALCIUM mg/dL 7.1* 7.5* 7.9*   ALK PHOS U/L  --   --  40   ALT U/L  --   --  19   AST U/L  --   --  92*         Phosphorus: No results found for: "PHOS"  Magnesium: No results found for: "MG"  Urinalysis: No results found for: "COLORU", "CLARITYU", "SPECGRAV", "PHUR", "LEUKOCYTESUR", "NITRITE", "PROTEINUA", "GLUCOSEU", "Guinevere Inou[place]", "BILIRUBINUR", "BLOODU"  Ionized Calcium: No results found for: "CAION"  Coagulation: No results found for: "PT", "INR", "APTT"  Troponin: No results found for: "TROPONINI"  ABG: No results found for: "PHART", "ZRA7GWZ", "PO2ART", "KFJ6PUE", "P8LWVEVX", "BEART", "SOURCE"  Radiology review:     IMAGING  Procedure: TRAUMA - CT chest abdomen pelvis w contrast    Result Date: 7/9/2023  Narrative: CT CHEST, ABDOMEN AND PELVIS WITH IV CONTRAST INDICATION:   TRAUMA. COMPARISON: CT abdomen and pelvis June 8, 2023. CT chest abdomen and pelvis without intravenous contrast July 30, 2020 and with intravenous contrast August 21, 2017. TECHNIQUE: CT examination of the chest, abdomen and pelvis was performed. Multiplanar 2D reformatted images were created from the source data. 3D reconstructions of the bony thorax were performed in order to improve sensitivity of evaluation for rib fractures. This examination, like all CT scans performed in the [facility], was performed utilizing techniques to minimize radiation dose exposure, including the use of iterative reconstruction and automated exposure control. Radiation dose length product (DLP) for this visit:  2025 mGy-cm IV Contrast:  100 mL of iohexol (OMNIPAQUE) Enteric Contrast: Enteric contrast was not administered. FINDINGS: CHEST LUNGS: There is left upper lobe opacity with air bronchograms and some volume loss consistent with atelectasis/and/or pneumonia. Mild dependent or compressive atelectasis right lung. PLEURA: Moderate posterior layering bilateral pleural effusions greater on the right than the left. HEART/GREAT VESSELS: Heavy atherosclerotic coronary artery calcification is noted. Heart is otherwise unremarkable. There is fusiform ectasia of the ascending thoracic aorta measuring up to 42 mm. This is unchanged since the comparison study. Small amount of soft plaque aortic arch. MEDIASTINUM AND COURTNEY:  Unremarkable. CHEST WALL AND LOWER NECK: Left thyroid nodule as described with cervical spine CT. Ultrasound follow-up suggested. ABDOMEN LIVER/BILIARY TREE:  Unremarkable. GALLBLADDER:  No calcified gallstones. No pericholecystic inflammatory change. SPLEEN:  Unremarkable. PANCREAS:  Unremarkable. ADRENAL GLANDS:  Unremarkable. KIDNEYS/URETERS: Status post left nephrectomy.  Several subcentimeter low-density right renal lesions which are not apparent on the comparison images possibly due to different phases of contrast administration. 3 mm nonobstructing right lower pole renal calculus unchanged. STOMACH AND BOWEL: There is colonic diverticulosis without evidence of acute diverticulitis. APPENDIX: A normal appendix was visualized. ABDOMINOPELVIC CAVITY: Low volume ascites surrounding the liver and spleen and extending down into the pelvis. Peripancreatic adenopathy including portacaval node measuring 18 mm short axis and 16 mm relatively low-density node to the right of the pancreatic head. VESSELS:  Atherosclerotic changes are present. No evidence of aneurysm. PELVIS REPRODUCTIVE ORGANS: Status post prostatectomy. URINARY BLADDER: Urinary bladder wall thickening likely due to low volume similar to comparison. ABDOMINAL WALL/INGUINAL REGIONS: Anasarca. OSSEOUS STRUCTURES:  No acute fracture or destructive osseous lesion. Impression: 1. Bilateral pleural effusions, low volume ascites, and anasarca. 2. Left upper lobe opacification and volume loss consistent with atelectasis and/or pneumonia. 3. Peripancreatic adenopathy. Metastatic work-up suggested. 4. Low-density right renal lesions demonstrated on venous phase/delayed images. These may or may not be new versus apparent due to technical variation. MRI follow-up suggested given the history of renal cell carcinoma. 5. Stable ectasia ascending aorta. 6. Diverticulosis without evidence of diverticulitis. 7. Left thyroid nodule. Follow-up ultrasound suggested. Findings marked for immediate notification in epic. Workstation performed: [id number]     Procedure: TRAUMA - CT head wo contrast    Result Date: 7/9/2023  Narrative: CT BRAIN  - WITHOUT CONTRAST INDICATION:   TRAUMA. COMPARISON:  None. TECHNIQUE:  CT examination of the brain was performed. Multiplanar 2D reformatted images were created from the source data.  Radiation dose length product (DLP) for this visit:  921 mGy-cm (accession [id number]), 34 33 96 mGy-cm (accession [id number]). This examination, like all CT scans performed in the [facility], was performed utilizing techniques to minimize radiation dose exposure, including the use of iterative reconstruction and automated exposure control. IMAGE QUALITY:  Diagnostic. FINDINGS: PARENCHYMA: Decreased attenuation is noted in periventricular and subcortical white matter demonstrating an appearance that is statistically most likely to represent mild microangiopathic change. No CT signs of acute infarction. No intracranial mass, mass effect or midline shift. No acute parenchymal hemorrhage. VENTRICLES AND EXTRA-AXIAL SPACES: Ventricles and extra-axial CSF spaces are prominent commensurate with the degree of volume loss. No hydrocephalus. No acute extra-axial hemorrhage. VISUALIZED ORBITS: Normal visualized orbits. PARANASAL SINUSES: Mild mucosal thickening of the visualized paranasal sinuses. Trace air-fluid levels maxillary sinuses. CALVARIUM AND EXTRACRANIAL SOFT TISSUES:  Normal.     Impression: No acute intracranial abnormality. CT CERVICAL SPINE - WITHOUT CONTRAST CT CERVICAL SPINE - WITHOUT CONTRAST INDICATION:   TRAUMA. COMPARISON:  None. TECHNIQUE:  CT examination of the cervical spine was performed without intravenous contrast.  Contiguous axial images were obtained. Multiplanar 2D reformatted images were created from the source data. Radiation dose length product (DLP) for this visit:  921 mGy-cm (accession [id number]), 139 mGy-cm (accession [id number]). This examination, like all CT scans performed in the [facility], was performed utilizing techniques to minimize radiation dose exposure, including the use of iterative reconstruction and automated exposure control. IMAGE QUALITY:  Diagnostic. FINDINGS: ALIGNMENT: There is reversal of normal cervical lordosis. There is no significant subluxation. No compression deformity. VERTEBRAE:  No fracture. DEGENERATIVE CHANGES: Moderate multilevel cervical degenerative changes are noted. No critical central canal stenosis. PREVERTEBRAL AND PARASPINAL SOFT TISSUES: 2.9 cm left thyroid nodule. Incidental discovery of one or more thyroid nodule(s) measuring more than 1.5 cm and without suspicious features is noted in this patient who is above 28years old; according to guidelines published in the February 2015 white paper on incidental thyroid nodules in the Journal of the [employer] Jennifer Novak), further characterization with thyroid ultrasound is recommended. THORACIC INLET:  Normal. IMPRESSION: No cervical spine fracture or traumatic malalignment. Incidental thyroid nodule(s) for which nonemergent thyroid ultrasound is recommended. Workstation performed: [id number]     Procedure: TRAUMA - CT spine cervical wo contrast    Result Date: 7/9/2023  Narrative: CT BRAIN  - WITHOUT CONTRAST INDICATION:   TRAUMA. COMPARISON:  None. TECHNIQUE:  CT examination of the brain was performed. Multiplanar 2D reformatted images were created from the source data. Radiation dose length product (DLP) for this visit:  921 mGy-cm (accession [id number]), 871 mGy-cm (accession [id number]). This examination, like all CT scans performed in the [facility], was performed utilizing techniques to minimize radiation dose exposure, including the use of iterative reconstruction and automated exposure control. IMAGE QUALITY:  Diagnostic. FINDINGS: PARENCHYMA: Decreased attenuation is noted in periventricular and subcortical white matter demonstrating an appearance that is statistically most likely to represent mild microangiopathic change. No CT signs of acute infarction. No intracranial mass, mass effect or midline shift. No acute parenchymal hemorrhage. VENTRICLES AND EXTRA-AXIAL SPACES: Ventricles and extra-axial CSF spaces are prominent commensurate with the degree of volume loss.   No hydrocephalus. No acute extra-axial hemorrhage. VISUALIZED ORBITS: Normal visualized orbits. PARANASAL SINUSES: Mild mucosal thickening of the visualized paranasal sinuses. Trace air-fluid levels maxillary sinuses. CALVARIUM AND EXTRACRANIAL SOFT TISSUES:  Normal.     Impression: No acute intracranial abnormality. CT CERVICAL SPINE - WITHOUT CONTRAST CT CERVICAL SPINE - WITHOUT CONTRAST INDICATION:   TRAUMA. COMPARISON:  None. TECHNIQUE:  CT examination of the cervical spine was performed without intravenous contrast.  Contiguous axial images were obtained. Multiplanar 2D reformatted images were created from the source data. Radiation dose length product (DLP) for this visit:  921 mGy-cm (accession [id number]), 298 mGy-cm (accession [id number]). This examination, like all CT scans performed in the [facility], was performed utilizing techniques to minimize radiation dose exposure, including the use of iterative reconstruction and automated exposure control. IMAGE QUALITY:  Diagnostic. FINDINGS: ALIGNMENT: There is reversal of normal cervical lordosis. There is no significant subluxation. No compression deformity. VERTEBRAE:  No fracture. DEGENERATIVE CHANGES: Moderate multilevel cervical degenerative changes are noted. No critical central canal stenosis. PREVERTEBRAL AND PARASPINAL SOFT TISSUES: 2.9 cm left thyroid nodule. Incidental discovery of one or more thyroid nodule(s) measuring more than 1.5 cm and without suspicious features is noted in this patient who is above 28years old; according to guidelines published in the February 2015 white paper on incidental thyroid nodules in the Journal of the [employer] of [employer]iology Trell Titus), further characterization with thyroid ultrasound is recommended. THORACIC INLET:  Normal. IMPRESSION: No cervical spine fracture or traumatic malalignment. Incidental thyroid nodule(s) for which nonemergent thyroid ultrasound is recommended.  Workstation performed: [id number]     Procedure: XR Trauma chest portable    Result Date: 7/9/2023  Narrative: CHEST INDICATION:   TRAUMA. COMPARISON: Chest radiograph June 8, 2023 and September 2, 2020. EXAM PERFORMED/VIEWS:  XR CHEST PORTABLE Images:  1 FINDINGS: Sequela of prior coronary artery bypass graft. Cardiomediastinal silhouette appears unremarkable. Hazy opacity left mid to lower lung new since the comparison study. Sternotomy sutures. Severe arthritis both shoulders. Impression: Hazy opacity left lung base new since the comparison study and suspicious for pneumonia in the appropriate clinical setting. Findings marked for immediate notification in epic. Workstation performed: [id number]       Current Facility-Administered Medications   Medication Dose Route Frequency   • acetaminophen (TYLENOL) tablet 650 mg  650 mg Oral Q6H PRN   • aspirin chewable tablet 81 mg  81 mg Oral Daily   • carbidopa-levodopa (SINEMET)  mg per tablet 1 tablet  1 tablet Oral TID   • cefTRIAXone (ROCEPHIN) IVPB (premix in dextrose) 1,000 mg 50 mL  1,000 mg Intravenous Q24H   • dextromethorphan-guaiFENesin (ROBITUSSIN DM) oral syrup 10 mL  10 mL Oral Q4H PRN   • ferrous sulfate tablet 325 mg  325 mg Oral Daily With Breakfast   • gabapentin (NEURONTIN) capsule 200 mg  200 mg Oral HS   • heparin (porcine) subcutaneous injection 5,000 Units  5,000 Units Subcutaneous Q8H [address]   • lidocaine (LIDODERM) 5 % patch 1 patch  1 patch Topical Daily   • nystatin (MYCOSTATIN) powder   Topical BID   • ondansetron (ZOFRAN) injection 4 mg  4 mg Intravenous Q6H PRN   • pantoprazole (PROTONIX) EC tablet 40 mg  40 mg Oral Early Morning     Medications Discontinued During This Encounter   Medication Reason   • midodrine (PROAMATINE) tablet 5 mg    • Menthol (Topical Analgesic) 4 % GEL 1 application.     • sodium chloride 0.9 % infusion        Stormy Granado, DO      This progress note was produced in part using a dictation device which may document imprecise wording from author's original intent.

## 2023-07-11 NOTE — PLAN OF CARE
Problem: MOBILITY - ADULT  Goal: Maintain or return to baseline ADL function  Description: INTERVENTIONS:  -  Assess patient's ability to carry out ADLs; assess patient's baseline for ADL function and identify physical deficits which impact ability to perform ADLs (bathing, care of mouth/teeth, toileting, grooming, dressing, etc.)  - Assess/evaluate cause of self-care deficits   - Assess range of motion  - Assess patient's mobility; develop plan if impaired  - Assess patient's need for assistive devices and provide as appropriate  - Encourage maximum independence but intervene and supervise when necessary  - Involve family in performance of ADLs  - Assess for home care needs following discharge   - Consider OT consult to assist with ADL evaluation and planning for discharge  - Provide patient education as appropriate  Outcome: Progressing  Goal: Maintains/Returns to pre admission functional level  Description: INTERVENTIONS:  - Perform BMAT or MOVE assessment daily.   - Set and communicate daily mobility goal to care team and patient/family/caregiver. - Collaborate with rehabilitation services on mobility goals if consulted  - Perform Range of Motion 3 times a day. - Reposition patient every 2 hours.   - Dangle patient 3 times a day  - Stand patient 3 times a day  - Ambulate patient 3 times a day  - Out of bed to chair 3 times a day   - Out of bed for meals 3 times a day  - Out of bed for toileting  - Record patient progress and toleration of activity level   Outcome: Progressing     Problem: Prexisting or High Potential for Compromised Skin Integrity  Goal: Skin integrity is maintained or improved  Description: INTERVENTIONS:  - Identify patients at risk for skin breakdown  - Assess and monitor skin integrity  - Assess and monitor nutrition and hydration status  - Monitor labs   - Assess for incontinence   - Turn and reposition patient  - Assist with mobility/ambulation  - Relieve pressure over bony prominences  - Avoid friction and shearing  - Provide appropriate hygiene as needed including keeping skin clean and dry  - Evaluate need for skin moisturizer/barrier cream  - Collaborate with interdisciplinary team   - Patient/family teaching  - Consider wound care consult   Outcome: Progressing     Problem: Nutrition/Hydration-ADULT  Goal: Nutrient/Hydration intake appropriate for improving, restoring or maintaining nutritional needs  Description: Monitor and assess patient's nutrition/hydration status for malnutrition. Collaborate with interdisciplinary team and initiate plan and interventions as ordered. Monitor patient's weight and dietary intake as ordered or per policy. Utilize nutrition screening tool and intervene as necessary. Determine patient's food preferences and provide high-protein, high-caloric foods as appropriate.      INTERVENTIONS:  - Monitor oral intake, urinary output, labs, and treatment plans  - Assess nutrition and hydration status and recommend course of action  - Evaluate amount of meals eaten  - Assist patient with eating if necessary   - Allow adequate time for meals  - Recommend/ encourage appropriate diets, oral nutritional supplements, and vitamin/mineral supplements  - Order, calculate, and assess calorie counts as needed  - Recommend, monitor, and adjust tube feedings and TPN/PPN based on assessed needs  - Assess need for intravenous fluids  - Provide specific nutrition/hydration education as appropriate  - Include patient/family/caregiver in decisions related to nutrition  Outcome: Progressing     Problem: SKIN/TISSUE INTEGRITY - ADULT  Goal: Skin Integrity remains intact(Skin Breakdown Prevention)  Description: Assess:  -Perform Jhony assessment every   -Clean and moisturize skin every   -Inspect skin when repositioning, toileting, and assisting with ADLS  -Assess under medical devices such as  every   -Assess extremities for adequate circulation and sensation     Bed Management:  -Have minimal linens on bed & keep smooth, unwrinkled  -Change linens as needed when moist or perspiring  -Avoid sitting or lying in one position for more than  hours while in bed  -Keep HOB at degrees     Toileting:  -Offer bedside commode  -Assess for incontinence every   -Use incontinent care products after each incontinent episode such as     Activity:  -Mobilize patient  times a day  -Encourage activity and walks on unit  -Encourage or provide ROM exercises   -Turn and reposition patient every Hours  -Use appropriate equipment to lift or move patient in bed  -Instruct/ Assist with weight shifting every  when out of bed in chair  -Consider limitation of chair time  hour intervals    Skin Care:  -Avoid use of baby powder, tape, friction and shearing, hot water or constrictive clothing  -Relieve pressure over bony prominences using   -Do not massage red bony areas    Next Steps:  -Teach patient strategies to minimize risks such as    -Consider consults to  interdisciplinary teams such as   Outcome: Progressing  Goal: Incision(s), wounds(s) or drain site(s) healing without S/S of infection  Description: INTERVENTIONS  - Assess and document dressing, incision, wound bed, drain sites and surrounding tissue  - Provide patient and family education  - Perform skin care/dressing changes every   Outcome: Progressing  Goal: Pressure injury heals and does not worsen  Description: Interventions:  - Implement low air loss mattress or specialty surface (Criteria met)  - Apply silicone foam dressing  - Instruct/assist with weight shifting every  minutes when in chair   - Limit chair time to  hour intervals  - Use special pressure reducing interventions such as  when in chair   - Apply fecal or urinary incontinence containment device   - Perform passive or active ROM every   - Turn and reposition patient & offload bony prominences every  hours   - Utilize friction reducing device or surface for transfers   - Consider consults to  interdisciplinary teams such as   - Use incontinent care products after each incontinent episode such as  - Consider nutrition services referral as needed  Outcome: Progressing     Problem: MUSCULOSKELETAL - ADULT  Goal: Maintain or return mobility to safest level of function  Description: INTERVENTIONS:  - Assess patient's ability to carry out ADLs; assess patient's baseline for ADL function and identify physical deficits which impact ability to perform ADLs (bathing, care of mouth/teeth, toileting, grooming, dressing, etc.)  - Assess/evaluate cause of self-care deficits   - Assess range of motion  - Assess patient's mobility  - Assess patient's need for assistive devices and provide as appropriate  - Encourage maximum independence but intervene and supervise when necessary  - Involve family in performance of ADLs  - Assess for home care needs following discharge   - Consider OT consult to assist with ADL evaluation and planning for discharge  - Provide patient education as appropriate  Outcome: Progressing  Goal: Maintain proper alignment of affected body part  Description: INTERVENTIONS:  - Support, maintain and protect limb and body alignment  - Provide patient/ family with appropriate education  Outcome: Progressing

## 2023-07-11 NOTE — PLAN OF CARE
Problem: MOBILITY - ADULT  Goal: Maintain or return to baseline ADL function  Description: INTERVENTIONS:  -  Assess patient's ability to carry out ADLs; assess patient's baseline for ADL function and identify physical deficits which impact ability to perform ADLs (bathing, care of mouth/teeth, toileting, grooming, dressing, etc.)  - Assess/evaluate cause of self-care deficits   - Assess range of motion  - Assess patient's mobility; develop plan if impaired  - Assess patient's need for assistive devices and provide as appropriate  - Encourage maximum independence but intervene and supervise when necessary  - Involve family in performance of ADLs  - Assess for home care needs following discharge   - Consider OT consult to assist with ADL evaluation and planning for discharge  - Provide patient education as appropriate  Outcome: Progressing  Goal: Maintains/Returns to pre admission functional level  Description: INTERVENTIONS:  - Perform BMAT or MOVE assessment daily.   - Set and communicate daily mobility goal to care team and patient/family/caregiver. - Collaborate with rehabilitation services on mobility goals if consulted  - Perform Range of Motion 3 times a day. - Reposition patient every 2 hours.   - Dangle patient 3 times a day  - Stand patient 3 times a day  - Ambulate patient 3 times a day  - Out of bed to chair 3 times a day   - Out of bed for meals 3 times a day  - Out of bed for toileting  - Record patient progress and toleration of activity level   Outcome: Progressing     Problem: Prexisting or High Potential for Compromised Skin Integrity  Goal: Skin integrity is maintained or improved  Description: INTERVENTIONS:  - Identify patients at risk for skin breakdown  - Assess and monitor skin integrity  - Assess and monitor nutrition and hydration status  - Monitor labs   - Assess for incontinence   - Turn and reposition patient  - Assist with mobility/ambulation  - Relieve pressure over bony prominences  - Avoid friction and shearing  - Provide appropriate hygiene as needed including keeping skin clean and dry  - Evaluate need for skin moisturizer/barrier cream  - Collaborate with interdisciplinary team   - Patient/family teaching  - Consider wound care consult   Outcome: Progressing     Problem: Nutrition/Hydration-ADULT  Goal: Nutrient/Hydration intake appropriate for improving, restoring or maintaining nutritional needs  Description: Monitor and assess patient's nutrition/hydration status for malnutrition. Collaborate with interdisciplinary team and initiate plan and interventions as ordered. Monitor patient's weight and dietary intake as ordered or per policy. Utilize nutrition screening tool and intervene as necessary. Determine patient's food preferences and provide high-protein, high-caloric foods as appropriate.      INTERVENTIONS:  - Monitor oral intake, urinary output, labs, and treatment plans  - Assess nutrition and hydration status and recommend course of action  - Evaluate amount of meals eaten  - Assist patient with eating if necessary   - Allow adequate time for meals  - Recommend/ encourage appropriate diets, oral nutritional supplements, and vitamin/mineral supplements  - Order, calculate, and assess calorie counts as needed  - Recommend, monitor, and adjust tube feedings and TPN/PPN based on assessed needs  - Assess need for intravenous fluids  - Provide specific nutrition/hydration education as appropriate  - Include patient/family/caregiver in decisions related to nutrition  Outcome: Progressing     Problem: SKIN/TISSUE INTEGRITY - ADULT  Goal: Skin Integrity remains intact(Skin Breakdown Prevention)  Description: Assess:  -Perform Jhony assessment every shift  -Clean and moisturize skin every day  -Inspect skin when repositioning, toileting, and assisting with ADLS  -Assess under medical devices such as protective Allyven every shift  -Assess extremities for adequate circulation and sensation     Bed Management:  -Have minimal linens on bed & keep smooth, unwrinkled  -Change linens as needed when moist or perspiring  -Avoid sitting or lying in one position for more than 2 hours while in bed  -Keep HOB at 30 degrees     Toileting:  -Offer bedside commode  -Assess for incontinence every interaction  -Use incontinent care products after each incontinent episode such as wipes    Activity:  -Mobilize patient 3 times a day  -Encourage activity and walks on unit  -Encourage or provide ROM exercises   -Turn and reposition patient every 2 Hours  -Use appropriate equipment to lift or move patient in bed  -Instruct/ Assist with weight shifting every 15 [id number] when out of bed in chair  -Consider limitation of chair time 2 hour intervals    Skin Care:  -Avoid use of baby powder, tape, friction and shearing, hot water or constrictive clothing  -Relieve pressure over bony prominences using pillows/cushions  -Do not massage red bony areas    Next Steps:  -Teach patient strategies to minimize risks such as weight shifting   -Consider consults to  interdisciplinary teams such as PT and OT  Outcome: Progressing  Goal: Incision(s), wounds(s) or drain site(s) healing without S/S of infection  Description: INTERVENTIONS  - Assess and document dressing, incision, wound bed, drain sites and surrounding tissue  - Provide patient and family education  - Perform skin care/dressing changes as ordered  Outcome: Progressing  Goal: Pressure injury heals and does not worsen  Description: Interventions:  - Implement low air loss mattress or specialty surface (Criteria met)  - Apply silicone foam dressing  - Instruct/assist with weight shifting every 15 minutes when in chair   - Limit chair time to 2 hour intervals  - Use special pressure reducing interventions such as waffle cushion when in chair   - Apply fecal or urinary incontinence containment device   - Perform passive or active ROM  TID  - Turn and reposition patient & offload bony prominences every 2 hours   - Utilize friction reducing device or surface for transfers   - Consider consults to  interdisciplinary teams such as PT and OT   - Use incontinent care products after each incontinent episode such as wipes  - Consider nutrition services referral as needed  Outcome: Progressing     Problem: MUSCULOSKELETAL - ADULT  Goal: Maintain or return mobility to safest level of function  Description: INTERVENTIONS:  - Assess patient's ability to carry out ADLs; assess patient's baseline for ADL function and identify physical deficits which impact ability to perform ADLs (bathing, care of mouth/teeth, toileting, grooming, dressing, etc.)  - Assess/evaluate cause of self-care deficits   - Assess range of motion  - Assess patient's mobility  - Assess patient's need for assistive devices and provide as appropriate  - Encourage maximum independence but intervene and supervise when necessary  - Involve family in performance of ADLs  - Assess for home care needs following discharge   - Consider OT consult to assist with ADL evaluation and planning for discharge  - Provide patient education as appropriate  Outcome: Progressing  Goal: Maintain proper alignment of affected body part  Description: INTERVENTIONS:  - Support, maintain and protect limb and body alignment  - Provide patient/ family with appropriate education  Outcome: Progressing

## 2023-07-11 NOTE — ASSESSMENT & PLAN NOTE
· History of iron deficiency anemia  · Dilutional drop in H&H noted  · Patient remains hemodynamically stable  · Continue ferrous sulfate

## 2023-07-11 NOTE — CASE MANAGEMENT
Case Management Discharge Planning Note    Patient name Felicia Palafox  Location /-34 MRN [id number]  : 1945 Date 2023       Current Admission Date: 2023  Current Admission Diagnosis:Acute renal failure superimposed on stage 3 chronic kidney disease [facility])   Patient Active Problem List    Diagnosis Date Noted   • History of coronary artery bypass surgery 2023   • Acute renal failure superimposed on stage 3 chronic kidney disease ([address]) 2023   • Traumatic rhabdomyolysis ([address]) 2023   • Left upper lobe pneumonia 2023   • Adenopathy 2023   • Thyroid nodule 2023   • Fall 2023   • Renal cell cancer, left ([address]) 2023   • Peripheral edema 2023   • Frequency of micturition 2023   • Right shoulder pain 2023   • Insomnia 2023   • Ambulatory dysfunction 2023   • Orthostatic hypotension 2023   • Swelling of right upper extremity 2023   • Penile edema 06/10/2023   • Abnormal ECG 2023   • Anemia 2023   • Dizziness 2023   • Parkinson's disease ([address]) 2023   • Generalized weakness 2020   • S/P CABG x 2 2020   • Tremor of right hand 2020   • Obesity 2020   • Occlusion and stenosis of right carotid artery 2020   • Tremor, unspecified 2020   • Chronic kidney disease (CKD), stage III (moderate)    • Coronary artery disease involving native coronary artery of native heart without angina pectoris    • Carotid stenosis, right    • Stage 3 chronic kidney disease ([address]) 2020   • Coronary artery disease involving native coronary artery of native heart 2020   • Renal insufficiency 2020   • Dyslipidemia 2020   • Hyperlipidemia, unspecified 2020   • Adenocarcinoma of prostate ([address]) 04/10/2020   • Urinary incontinence 04/10/2020   • Hypercalcinuria 04/10/2019   • Hypernatriuria 04/10/2019   • Balanitis 2018   • Bilateral ureteral obstruction 02/16/2018   • Complex renal cyst 08/15/2017   • Idiopathic chronic gout, unspecified site, 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   • Presence of left artificial knee joint 06/09/2016   • First degree heart block 05/17/2016   • Abnormal prostate specific antigen 03/17/2016   • Calculus of kidney 06/18/2014   • Gastroesophageal reflux disease 06/17/2014      LOS (days): 2  Geometric Mean LOS (GMLOS) (days): 4.70  Days to GMLOS:3.1     OBJECTIVE:  Risk of Unplanned Readmission Score: 28.56         Current admission status: Inpatient   Preferred Pharmacy:   [facility] [address] [address] [place], [place] - [address]  [address] [facility]  [address] [address]  Phone: [phone] Fax: [place] [address] [place]ES[place]  Phone: [phone] Fax: [phone]    Primary Care Provider: Mj Booker DO    Primary Insurance: [facility] RE[facility]  Secondary Insurance:     DISCHARGE DETAILS:    Discharge planning discussed with[de-identified] patient and pt's son Angie Ralph returned my phone call last evening  Freedom of Choice: Yes  Comments - Freedom of Choice: recommendation is for rehab-  permission was given to send a referral to [facility] and blanket referral for snf rehab                     Requested [address]         Is the patient interested in [address] at discharge?: No    DME Referral Provided  Referral made for DME?: No    Other Referral/Resources/Interventions Provided:  Interventions: Acute Rehab, Short Term Rehab  Referral Comments: aru vs snf  auth is needed- referrals were sent via seth    Would you like to participate in our [address] service program?  : No - Declined    Treatment Team Recommendation:  (aru vs snf Rena Terry is needed- TBD)

## 2023-07-11 NOTE — WOUND OSTOMY CARE
Consult Note - Wound   Chika Matt 66 y.o. male MRN: [id number]  Unit/Bed#: -01 Encounter: [id number]    History and Present Illness:  66year old male patient admitted with acute renal failure superimposed on stage 3 CKD. Wound care consulted for multiple wounds. Patient history significant for Parkinson's disease, anemia, traumatic rhabdomyolysis, left upper lobe pneumonia, CAD, penile edema, swelling of right upper extremity, ambulatory dysfunction, left kidney renal cell CA, prostate CA, and tremor of right hand. Patient was a fall at home and was down for approx 20 hours, he states he was face down on the floor and was not able to move. Due to amount of time down patient has the potential to develop further skin breakdown over the next 3-4 days that are pressure related wounds. Patient has multiple wounds that etiology may be abrasions vs pressure injuries of those wounds that the etiology is unclear. All wounds are present on admission. Assessment Findings:   Patient in bed for assessment. He is awake, alert and cooperative with assessment and imaging of wounds. He has a bedside urinal, can be incontinent of urine at times, he is not incontinent of stool. He is an assist to turn for assessment. Patient has nutritional supplements, not documented as able to feed himself. Patient is tired and complained of constant interruptions that he cannot sleep. 1. POA-left elbow wound, dry and brown, swelling to the periwound, no drainage, abrasion vs pressure  2. POA-abrasions to the face, above the right eyebrow, right cheek and nose. The bridge of the nose dry beefy red partial thickness wound bed, no current drainage. Nose is swollen  3. POA-right and left mid chest abrasions, left chest wound bed with light purple linear areas within scattered   Excoriation, swelling noted beneath the wound bed. Right chest mild erythema, no drainage  4. POA-left wrist wound, pressure vs abrasion.  Wound bed is dry, yellow with beefy red edge, no drainage  5. POA-right wrist mix of pressure and abrasion, dry brown eschar proximally with light purple laterally and distally  6. POA-left elbow wound mix of pressure and abrasion. Dry beefy red abrasion proximally and purple non-blanchable area distally, swelling noted to the periwound  7. POA-deep tissue injury to the right outer buttock, non blanchable purple tissue, no open areas. Right buttock and sacrum blanchable and hyperpigmented  8. POA-deep tissue injury to the penis. Per patient the penis is chronically edematous. Black eschar noted on the lateral sides of the penis with small purple areas proximal to the tip of the penis. Purulent drainage noted at the meatus, RN at bedside and is aware. 9. POA-right and left knee wounds, black eschar, brown and beefy red areas, no drainage or open areas, bilateral knee edema, pressure vs abrasions  10. POA-right great toe partial thickness wound, beefy red and brown, no drainage, appears to be abrasion  11. POA-left great toe deep tissue injury, non-blanchable, light purple, 2nd toe with light purple non-blanchable area on the joint    Skin and Wound Care Plan:   1. Apply skin nourishing cream to the skin daily  2. Elevate heels off of bed with pillows to offload  3. Cleanse wounds with Remedy foaming cleanser and water, pat dry. Apply hydraguard to left anterior great toe, penis, forehead and chest wounds BID and PRN. 4. Turn and reposition patient Q2 hours  5. Cleanse wounds with gently with Remedy foaming cleanser and water, pat dry. Apply 3M No Sting to bilateral knee wounds, right and left wrists, bilateral elbows and nose daily  6. Allevyn life silicone bordered foam dressing to the sacrum, dea with T, date, peel back daily for wound and skin assessment, reapply and change every 3 days and PRN  7.  Allevyn life heel foams to bilateral heels, dea with P, date, and peel back daily for skin assessment, change every 3 days or with soilage or dislodgement. 8. Cleanse right great to wound with Remedy foaming cleanser, pat dry. Place Dermagran on toe wound, cover with 2X2 and wrap with 1" gauze, change every other day and PRN  9. P-500 low air loss therapy surface    Wounds:  Wound 07/10/23 (Active)   Wound Image   07/10/23 1112   Wound Description Dry;Beefy red;Brown 07/10/23 1112   Joyce-wound Assessment Excoriated 07/10/23 1112   Wound Length (cm) 0.6 cm 07/10/23 1048   Wound Width (cm) 0.8 cm 07/10/23 1048   Wound Surface Area (cm^2) 0.48 cm^2 07/10/23 1048   Dressing Moisture barrier;Protective barrier 07/10/23 1112   Patient Tolerance Tolerated well 07/10/23 1112       Wound 07/10/23 Toe (Comment  which one) Anterior;Right (Active)   Wound Image   07/10/23 1051   Wound Description Brown;Beefy red 07/10/23 1051   Joyce-wound Assessment Erythema 07/10/23 1051   Wound Length (cm) 1 cm 07/10/23 1051   Wound Width (cm) 0.6 cm 07/10/23 1051   Wound Depth (cm) 0.1 cm 07/10/23 1051   Wound Surface Area (cm^2) 0.6 cm^2 07/10/23 1051   Wound Volume (cm^3) 0.06 cm^3 07/10/23 1051   Calculated Wound Volume (cm^3) 0.06 cm^3 07/10/23 1051   Drainage Amount None 07/10/23 1051   Non-staged Wound Description Partial thickness 07/10/23 1051   Dressing Dermagran gauze;Dry dressing;Gauze 07/10/23 1051   Patient Tolerance Tolerated well 07/10/23 1051       Wound 07/10/23 Toe (Comment  which one) Anterior; Left (Active)   Wound Image   07/10/23 1052   Wound Description Light purple 07/10/23 1052   Pressure Injury Stage DTPI 07/10/23 1052   Joyce-wound Assessment Intact 07/10/23 1052   Wound Length (cm) 0.3 cm 07/10/23 1052   Wound Width (cm) 0.6 cm 07/10/23 1052   Wound Depth (cm) 0 cm 07/10/23 1052   Wound Surface Area (cm^2) 0.18 cm^2 07/10/23 1052   Wound Volume (cm^3) 0 cm^3 07/10/23 1052   Calculated Wound Volume (cm^3) 0 cm^3 07/10/23 1052   Drainage Amount None 07/10/23 1052   Dressing Moisture barrier 07/10/23 1052   Patient Tolerance Tolerated well 07/10/23 1052       Wound 07/10/23 Knee Anterior; Left (Active)   Wound Image   07/10/23 1053   Wound Description Dry;Brown;Eschar 07/10/23 1053   Joyce-wound Assessment Swelling 07/10/23 1053   Wound Length (cm) 2 cm 07/10/23 1053   Wound Width (cm) 4.7 cm 07/10/23 1053   Wound Depth (cm) 0 cm 07/10/23 1053   Wound Surface Area (cm^2) 9.4 cm^2 07/10/23 1053   Wound Volume (cm^3) 0 cm^3 07/10/23 1053   Calculated Wound Volume (cm^3) 0 cm^3 07/10/23 1053   Dressing Protective barrier 07/10/23 1053   Patient Tolerance Tolerated well 07/10/23 1053       Wound 07/10/23 Knee Anterior;Right (Active)   Wound Image   07/10/23 1054   Wound Description Beefy red;Brown;Eschar 07/10/23 1054   Joyce-wound Assessment Swelling 07/10/23 1054   Wound Length (cm) 7.3 cm 07/10/23 1054   Wound Width (cm) 5 cm 07/10/23 1054   Wound Depth (cm) 0 cm 07/10/23 1054   Wound Surface Area (cm^2) 36.5 cm^2 07/10/23 1054   Wound Volume (cm^3) 0 cm^3 07/10/23 1054   Calculated Wound Volume (cm^3) 0 cm^3 07/10/23 1054   Drainage Amount None 07/10/23 1054   Dressing Protective barrier 07/10/23 1054   Patient Tolerance Tolerated well 07/10/23 1054       Wound 07/10/23 Penis (Active)   Wound Image    07/10/23 1056   Wound Description Beefy red;Fragile;Light purple 07/10/23 1056   Pressure Injury Stage DTPI 07/10/23 1056   Joyce-wound Assessment Swelling 07/10/23 1056   Wound Length (cm) 6.5 cm 07/10/23 1056   Wound Width (cm) 6 cm 07/10/23 1056   Wound Depth (cm) 0.1 cm 07/10/23 1056   Wound Surface Area (cm^2) 39 cm^2 07/10/23 1056   Wound Volume (cm^3) 3.9 cm^3 07/10/23 1056   Calculated Wound Volume (cm^3) 3.9 cm^3 07/10/23 1056   Drainage Amount None 07/10/23 1056   Dressing Moisture barrier 07/10/23 1056   Patient Tolerance Tolerated well 07/10/23 1056       Wound 07/10/23 Buttocks (Active)   Wound Image   07/10/23 1100   Wound Description Intact;Fragile;Light purple 07/10/23 1100   Pressure Injury Stage DTPI 07/10/23 1100   Joyce-wound Assessment Hyperpigmented 07/10/23 1100   Wound Length (cm) 5 cm 07/10/23 1100   Wound Width (cm) 2.5 cm 07/10/23 1100   Wound Depth (cm) 0 cm 07/10/23 1100   Wound Surface Area (cm^2) 12.5 cm^2 07/10/23 1100   Wound Volume (cm^3) 0 cm^3 07/10/23 1100   Calculated Wound Volume (cm^3) 0 cm^3 07/10/23 1100   Drainage Amount None 07/10/23 1100   Dressing Foam, Silicon (eg. Allevyn, etc) 07/10/23 1100   Patient Tolerance Tolerated well 07/10/23 1100       Wound 07/10/23 Elbow Posterior;Right (Active)   Wound Image   07/10/23 1101   Wound Description Beefy red;Edema;Fragile;Light purple 07/10/23 1101   Joyce-wound Assessment Swelling 07/10/23 1101   Wound Length (cm) 4 cm 07/10/23 1101   Wound Width (cm) 3 cm 07/10/23 1101   Wound Depth (cm) 0.1 cm 07/10/23 1101   Wound Surface Area (cm^2) 12 cm^2 07/10/23 1101   Wound Volume (cm^3) 1.2 cm^3 07/10/23 1101   Calculated Wound Volume (cm^3) 1.2 cm^3 07/10/23 1101   Drainage Amount Scant 07/10/23 1101   Drainage Description Serosanguineous 07/10/23 1101   Non-staged Wound Description Partial thickness 07/10/23 1101   Dressing Protective barrier 07/10/23 1101   Patient Tolerance Tolerated well 07/10/23 1101       Wound 07/10/23 Wrist Posterior;Right (Active)   Wound Image   07/10/23 1102   Wound Description Light purple;Brown;Dry 07/10/23 1102   Pressure Injury Stage DTPI 07/10/23 1102   Joyce-wound Assessment Swelling 07/10/23 1102   Wound Length (cm) 4.3 cm 07/10/23 1102   Wound Width (cm) 3 cm 07/10/23 1102   Wound Depth (cm) 0.1 cm 07/10/23 1102   Wound Surface Area (cm^2) 12.9 cm^2 07/10/23 1102   Wound Volume (cm^3) 1.29 cm^3 07/10/23 1102   Calculated Wound Volume (cm^3) 1.29 cm^3 07/10/23 1102   Dressing Protective barrier 07/10/23 1102   Patient Tolerance Tolerated well 07/10/23 1102       Wound 07/10/23 Arm Distal;Left;Posterior; Lower (Active)   Wound Image   07/10/23 1104   Wound Description Dry;Beefy red;Yellow 07/10/23 1104   Pressure Injury Stage DTPI 07/10/23 1104   Joyce-wound Assessment Intact 07/10/23 1104   Wound Length (cm) 1.5 cm 07/10/23 1104   Wound Width (cm) 1.8 cm 07/10/23 1104   Wound Depth (cm) 0.1 cm 07/10/23 1104   Wound Surface Area (cm^2) 2.7 cm^2 07/10/23 1104   Wound Volume (cm^3) 0.27 cm^3 07/10/23 1104   Calculated Wound Volume (cm^3) 0.27 cm^3 07/10/23 1104   Drainage Amount None 07/10/23 1104   Dressing Protective barrier 07/10/23 1104   Patient Tolerance Tolerated well 07/10/23 1104       Wound 07/10/23 Chest Left (Active)   Wound Image   07/10/23 1106   Wound Description Beefy red;Dry 07/10/23 1106   Joyce-wound Assessment Edema 07/10/23 1106   Wound Length (cm) 2.5 cm 07/10/23 1106   Wound Width (cm) 10 cm 07/10/23 1106   Wound Depth (cm) 0.1 cm 07/10/23 1106   Wound Surface Area (cm^2) 25 cm^2 07/10/23 1106   Wound Volume (cm^3) 2.5 cm^3 07/10/23 1106   Calculated Wound Volume (cm^3) 2.5 cm^3 07/10/23 1106   Drainage Amount None 07/10/23 1106   Non-staged Wound Description Partial thickness 07/10/23 1106   Dressing Moisture barrier 07/10/23 1106   Patient Tolerance Tolerated well 07/10/23 1106       Wound 07/10/23 Chest Right;Upper (Active)   Wound Image   07/10/23 1107   Wound Description Beefy red 07/10/23 1107   Joyce-wound Assessment Edema 07/10/23 1107   Wound Length (cm) 5.5 cm 07/10/23 1107   Wound Width (cm) 6 cm 07/10/23 1107   Wound Depth (cm) 0.1 cm 07/10/23 1107   Wound Surface Area (cm^2) 33 cm^2 07/10/23 1107   Wound Volume (cm^3) 3.3 cm^3 07/10/23 1107   Calculated Wound Volume (cm^3) 3.3 cm^3 07/10/23 1107   Drainage Amount None 07/10/23 1107   Dressing Moisture barrier 07/10/23 1107   Patient Tolerance Tolerated well 07/10/23 1107       Wound 07/10/23 Elbow Left;Posterior (Active)   Wound Image   07/10/23 1116   Wound Description Beefy red;Light purple 07/10/23 1116   Joyce-wound Assessment Swelling 07/10/23 1116   Wound Length (cm) 1 cm 07/10/23 1116   Wound Width (cm) 0.6 cm 07/10/23 1116   Wound Depth (cm) 0 cm 07/10/23 1116 Wound Surface Area (cm^2) 0.6 cm^2 07/10/23 1116   Wound Volume (cm^3) 0 cm^3 07/10/23 1116   Calculated Wound Volume (cm^3) 0 cm^3 07/10/23 1116   Drainage Amount None 07/10/23 1116   Patient Tolerance Tolerated well 07/10/23 1116     Reviewed plan of care with primary RN González  Recommendations written as orders  Wound care team to follow weekly while admitted  Questions or concerns [facility] Nurse    Jagjit PADRONN, RN, Miguel Diaz

## 2023-07-12 ENCOUNTER — HOME CARE VISIT (OUTPATIENT)
Dept: HOME HEALTH SERVICES | Facility: HOME HEALTHCARE | Age: 78
End: 2023-07-12
Payer: MEDICARE

## 2023-07-12 ENCOUNTER — HOSPICE ADMISSION (OUTPATIENT)
Dept: HOME HOSPICE | Facility: HOSPICE | Age: 78
End: 2023-07-12
Payer: MEDICARE

## 2023-07-12 PROBLEM — Z71.89 GOALS OF CARE, COUNSELING/DISCUSSION: Status: ACTIVE | Noted: 2023-07-12

## 2023-07-12 LAB
ABO GROUP BLD: NORMAL
ANION GAP SERPL CALCULATED.3IONS-SCNC: 7 MMOL/L
BASOPHILS # BLD AUTO: 0.03 THOUSANDS/ÂΜL (ref 0–0.1)
BASOPHILS NFR BLD AUTO: 0 % (ref 0–1)
BLD GP AB SCN SERPL QL: NEGATIVE
BUN SERPL-MCNC: 33 MG/DL (ref 5–25)
CALCIUM SERPL-MCNC: 7.3 MG/DL (ref 8.4–10.2)
CHLORIDE SERPL-SCNC: 104 MMOL/L (ref 96–108)
CO2 SERPL-SCNC: 24 MMOL/L (ref 21–32)
CREAT SERPL-MCNC: 1.64 MG/DL (ref 0.6–1.3)
EOSINOPHIL # BLD AUTO: 0.14 THOUSAND/ÂΜL (ref 0–0.61)
EOSINOPHIL NFR BLD AUTO: 2 % (ref 0–6)
ERYTHROCYTE [DISTWIDTH] IN BLOOD BY AUTOMATED COUNT: 18.6 % (ref 11.6–15.1)
GFR SERPL CREATININE-BSD FRML MDRD: 39 ML/MIN/1.73SQ M
GLUCOSE SERPL-MCNC: 92 MG/DL (ref 65–140)
HCT VFR BLD AUTO: 25.8 % (ref 36.5–49.3)
HGB BLD-MCNC: 7.9 G/DL (ref 12–17)
IMM GRANULOCYTES # BLD AUTO: 0.03 THOUSAND/UL (ref 0–0.2)
IMM GRANULOCYTES NFR BLD AUTO: 0 % (ref 0–2)
LYMPHOCYTES # BLD AUTO: 0.5 THOUSANDS/ÂΜL (ref 0.6–4.47)
LYMPHOCYTES NFR BLD AUTO: 7 % (ref 14–44)
MCH RBC QN AUTO: 27 PG (ref 26.8–34.3)
MCHC RBC AUTO-ENTMCNC: 30.6 G/DL (ref 31.4–37.4)
MCV RBC AUTO: 88 FL (ref 82–98)
MONOCYTES # BLD AUTO: 0.75 THOUSAND/ÂΜL (ref 0.17–1.22)
MONOCYTES NFR BLD AUTO: 10 % (ref 4–12)
NEUTROPHILS # BLD AUTO: 5.86 THOUSANDS/ÂΜL (ref 1.85–7.62)
NEUTS SEG NFR BLD AUTO: 81 % (ref 43–75)
NRBC BLD AUTO-RTO: 0 /100 WBCS
PLATELET # BLD AUTO: 169 THOUSANDS/UL (ref 149–390)
PMV BLD AUTO: 12.3 FL (ref 8.9–12.7)
POTASSIUM SERPL-SCNC: 4.9 MMOL/L (ref 3.5–5.3)
RBC # BLD AUTO: 2.93 MILLION/UL (ref 3.88–5.62)
RH BLD: POSITIVE
SODIUM SERPL-SCNC: 135 MMOL/L (ref 135–147)
SPECIMEN EXPIRATION DATE: NORMAL
WBC # BLD AUTO: 7.31 THOUSAND/UL (ref 4.31–10.16)

## 2023-07-12 PROCEDURE — 86850 RBC ANTIBODY SCREEN: CPT | Performed by: EMERGENCY MEDICINE

## 2023-07-12 PROCEDURE — 85025 COMPLETE CBC W/AUTO DIFF WBC: CPT | Performed by: PHYSICIAN ASSISTANT

## 2023-07-12 PROCEDURE — 80048 BASIC METABOLIC PNL TOTAL CA: CPT | Performed by: HOSPITALIST

## 2023-07-12 PROCEDURE — 99232 SBSQ HOSP IP/OBS MODERATE 35: CPT | Performed by: INTERNAL MEDICINE

## 2023-07-12 PROCEDURE — 97116 GAIT TRAINING THERAPY: CPT

## 2023-07-12 PROCEDURE — 97530 THERAPEUTIC ACTIVITIES: CPT

## 2023-07-12 PROCEDURE — 97110 THERAPEUTIC EXERCISES: CPT

## 2023-07-12 PROCEDURE — 99232 SBSQ HOSP IP/OBS MODERATE 35: CPT | Performed by: HOSPITALIST

## 2023-07-12 PROCEDURE — 99497 ADVNCD CARE PLAN 30 MIN: CPT | Performed by: HOSPITALIST

## 2023-07-12 PROCEDURE — 86900 BLOOD TYPING SEROLOGIC ABO: CPT | Performed by: EMERGENCY MEDICINE

## 2023-07-12 PROCEDURE — 86901 BLOOD TYPING SEROLOGIC RH(D): CPT | Performed by: EMERGENCY MEDICINE

## 2023-07-12 PROCEDURE — 99498 ADVNCD CARE PLAN ADDL 30 MIN: CPT | Performed by: HOSPITALIST

## 2023-07-12 RX ORDER — FUROSEMIDE 10 MG/ML
60 INJECTION INTRAMUSCULAR; INTRAVENOUS ONCE
Status: DISCONTINUED | OUTPATIENT
Start: 2023-07-12 | End: 2023-07-12

## 2023-07-12 RX ORDER — FUROSEMIDE 10 MG/ML
80 INJECTION INTRAMUSCULAR; INTRAVENOUS ONCE
Status: COMPLETED | OUTPATIENT
Start: 2023-07-12 | End: 2023-07-12

## 2023-07-12 RX ADMIN — HEPARIN SODIUM 5000 UNITS: 5000 INJECTION INTRAVENOUS; SUBCUTANEOUS at 15:26

## 2023-07-12 RX ADMIN — ACETAMINOPHEN 650 MG: 325 TABLET ORAL at 07:31

## 2023-07-12 RX ADMIN — LIDOCAINE 5% 1 PATCH: 700 PATCH TOPICAL at 08:37

## 2023-07-12 RX ADMIN — GABAPENTIN 200 MG: 100 CAPSULE ORAL at 21:48

## 2023-07-12 RX ADMIN — CARBIDOPA AND LEVODOPA 1 TABLET: 25; 100 TABLET ORAL at 08:37

## 2023-07-12 RX ADMIN — FERROUS SULFATE TAB 325 MG (65 MG ELEMENTAL FE) 325 MG: 325 (65 FE) TAB at 07:31

## 2023-07-12 RX ADMIN — NYSTATIN: 100000 POWDER TOPICAL at 18:37

## 2023-07-12 RX ADMIN — PANTOPRAZOLE SODIUM 40 MG: 20 TABLET, DELAYED RELEASE ORAL at 05:19

## 2023-07-12 RX ADMIN — NYSTATIN: 100000 POWDER TOPICAL at 08:38

## 2023-07-12 RX ADMIN — HEPARIN SODIUM 5000 UNITS: 5000 INJECTION INTRAVENOUS; SUBCUTANEOUS at 21:48

## 2023-07-12 RX ADMIN — FUROSEMIDE 80 MG: 10 INJECTION, SOLUTION INTRAMUSCULAR; INTRAVENOUS at 09:35

## 2023-07-12 RX ADMIN — HEPARIN SODIUM 5000 UNITS: 5000 INJECTION INTRAVENOUS; SUBCUTANEOUS at 05:19

## 2023-07-12 RX ADMIN — CEFTRIAXONE 1000 MG: 1 INJECTION, SOLUTION INTRAVENOUS at 07:32

## 2023-07-12 RX ADMIN — CARBIDOPA AND LEVODOPA 1 TABLET: 25; 100 TABLET ORAL at 15:26

## 2023-07-12 RX ADMIN — ACETAMINOPHEN 650 MG: 325 TABLET ORAL at 15:26

## 2023-07-12 RX ADMIN — ASPIRIN 81 MG CHEWABLE TABLET 81 MG: 81 TABLET CHEWABLE at 08:37

## 2023-07-12 RX ADMIN — CARBIDOPA AND LEVODOPA 1 TABLET: 25; 100 TABLET ORAL at 21:48

## 2023-07-12 NOTE — CASE MANAGEMENT
Case Management Discharge Planning Note    Patient name Isael Elliott  Location /-88 MRN [id number]  : 1945 Date 2023       Current Admission Date: 2023  Current Admission Diagnosis:Acute renal failure superimposed on stage 3 chronic kidney disease [facility])   Patient Active Problem List    Diagnosis Date Noted   • Goals of care, counseling/discussion 2023   • History of coronary artery bypass surgery 2023   • Acute renal failure superimposed on stage 3 chronic kidney disease ([address]) 2023   • Traumatic rhabdomyolysis ([address]) 2023   • Left upper lobe pneumonia 2023   • Adenopathy 2023   • Thyroid nodule 2023   • Fall 2023   • Renal cell cancer, left ([address]) 2023   • Peripheral edema 2023   • Frequency of micturition 2023   • Right shoulder pain 2023   • Insomnia 2023   • Ambulatory dysfunction 2023   • Orthostatic hypotension 2023   • Swelling of right upper extremity 2023   • Penile edema 06/10/2023   • Abnormal ECG 2023   • Anemia 2023   • Dizziness 2023   • Parkinson's disease ([address]) 2023   • Generalized weakness 2020   • S/P CABG x 2 2020   • Tremor of right hand 2020   • Obesity 2020   • Occlusion and stenosis of right carotid artery 2020   • Tremor, unspecified 2020   • Chronic kidney disease (CKD), stage III (moderate)    • Coronary artery disease involving native coronary artery of native heart without angina pectoris    • Carotid stenosis, right    • Stage 3 chronic kidney disease ([address]) 2020   • Coronary artery disease involving native coronary artery of native heart 2020   • Renal insufficiency 2020   • Dyslipidemia 2020   • Hyperlipidemia, unspecified 2020   • Adenocarcinoma of prostate ([address]) 04/10/2020   • Urinary incontinence 04/10/2020   • Hypercalcinuria 04/10/2019   • Hypernatriuria 04/10/2019 • Balanitis 03/21/2018   • Bilateral ureteral obstruction 02/16/2018   • Complex renal cyst 08/15/2017   • Idiopathic chronic gout, unspecified site, 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   • Presence of left artificial knee joint 06/09/2016   • First degree heart block 05/17/2016   • Abnormal prostate specific antigen 03/17/2016   • Calculus of kidney 06/18/2014   • Gastroesophageal reflux disease 06/17/2014      LOS (days): 3  Geometric Mean LOS (GMLOS) (days): 4.70  Days to GMLOS:1.9     OBJECTIVE:  Risk of Unplanned Readmission Score: 28.97         Current admission status: Inpatient   Preferred Pharmacy:   [facility] [address]OLT [address] [place], [place] - [address]  [address] [facility]  [address] [facility] [address]  Phone: [phone] Fax: [facility] [address] [place] [id number] [address]  Phone: [phone] Fax: [phone]    Primary Care Provider: Arnold Barnes DO    Primary Insurance: Regine Melendez [facility] REP  Secondary Insurance:     DISCHARGE DETAILS:    Discharge planning discussed with[de-identified] patient and Dave Chin at the bedside  Freedom of Choice: Yes  Comments - Freedom of Choice: receommendation was for rehab- son was given the list of accepting facilities-   son is considering possible hospice-  consult for an eval was receieved- son was given choices of hospice agencies- his choice [facility] hospice. CM contacted family/caregiver?: Yes             Contacts  Patient Contacts: Elza Medina son [phone]   Relationship to Patient[de-identified] Family  Contact Method:  In Person  Reason/Outcome: Discharge [address]         Is the patient interested in [facility] at discharge?: No    DME Referral Provided  Referral made for DME?: No    Other Referral/Resources/Interventions Provided:  Interventions: Short Term Rehab, Hospice  Referral Comments: aru denied the pt - snf lsit was given- hospice referral sent Treatment Team Recommendation: Short Term Rehab (snf for rehab vs hospice-tbd)

## 2023-07-12 NOTE — ASSESSMENT & PLAN NOTE
· In the presence of the patient's son Frandy Henson, and also in the presence of my advanced practitioner student in [address] [place] [address], I had a long discussion with the patient, and his son regarding overall goals of care. · Patient has mentioned to nurses and other hospital staff that he is tired and feels exhausted and has gone as far to say as he does not want to live much longer. · We reviewed options specifically regarding quantity of life versus quality of life. · Patient has multiple medical comorbid conditions including but not limited to Parkinson's disease, history of renal cell carcinoma, history of prostate cancer, anemia of chronic disease, mixed dyslipidemia, acid reflux disease, and now the new findings of a peripancreatic adenopathy, and a thyroid nodule. · Patient made it very clear that he is "sick and tired of hospitals, blood draws, needles, and nursing". He reported that his quality of life has significantly declined over the past couple of months and that he is tired. We reviewed the possibility of a hospice evaluation. The patient at this time is open to meeting with the hospice liaison to further explore the options that hospice will have to offer. · In the interim, we will continue full-blown medical management. · Patient remains a full DNR/DNI.   · A separate advance care/critical care conversation note will be completed

## 2023-07-12 NOTE — PROGRESS NOTES
[address] [facility]  Progress Note  Name: Nithin Leon  MRN: [id number]  Unit/Bed#: -01 I Date of Admission: 7/9/2023   Date of Service: 7/12/2023 I Hospital Day: 3    Assessment/Plan   * Acute renal failure superimposed on stage 3 chronic kidney disease ([address])  Assessment & Plan  · Present on admission  · Arrival creatinine 3.01  · Prerenal-secondary to dehydration, and rhabdomyolysis (see below)  · Status post treatment with IV fluid  · Repeat creatinine this morning is down to 1.64  · Appreciate nephrology input  · Status post treatment with IV fluid  · Status post treatment with IV Lasix yesterday, plan is to repeat another dose of IV Lasix today as per nephrology recommendations  · Repeat BMP in the a.m. · Discharge planning is in place, case management is working on STR placement    Goals of care, counseling/discussion  Assessment & Plan  · In the presence of the patient's son Raina Maher, and also in the presence of my advanced practitioner student in [address] [place] [address], I had a long discussion with the patient, and his son regarding overall goals of care. · Patient has mentioned to nurses and other hospital staff that he is tired and feels exhausted and has gone as far to say as he does not want to live much longer. · We reviewed options specifically regarding quantity of life versus quality of life. · Patient has multiple medical comorbid conditions including but not limited to Parkinson's disease, history of renal cell carcinoma, history of prostate cancer, anemia of chronic disease, mixed dyslipidemia, acid reflux disease, and now the new findings of a peripancreatic adenopathy, and a thyroid nodule. · Patient made it very clear that he is "sick and tired of hospitals, blood draws, needles, and nursing". He reported that his quality of life has significantly declined over the past couple of months and that he is tired. We reviewed the possibility of a hospice evaluation.   The patient at this time is open to meeting with the hospice liaison to further explore the options that hospice will have to offer. · He reported that even if we did an extensive work-up for either the peripancreatic adenopathy, and/or the thyroid nodule, he would not be interested in any type of treatment such as chemo, and/or radiation. · In the interim, we will continue full-blown medical management. · Patient remains a full DNR/DNI.   · A separate advance care/critical care conversation note will be completed    Fall  Assessment & Plan  · Patient reports having had a mechanical  fall prior to coming into the hospital  · Trauma work-up was negative  · CT head-within normal limits  · CT cervical spine-no acute fractures and/or traumatic malalignment  · CT chest, abdomen, pelvis- no acute traumatic pathology  · Status post a PT and OT evaluation- they recommend postacute rehabilitation services  · Case management is working on STR placement    Left upper lobe pneumonia  Assessment & Plan  · Noted on CT imaging  · Patient is otherwise asymptomatic  · Testing for COVID-19, respiratory syncytial virus, and influenza a and B is negative  · Urine testing for Streptococcus pneumonia and Legionella antigen testing is negative  · Procalcitonin testing is up- question reliability in the setting of having an acute kidney injury  · Continue ceftriaxone day 3 -we will transition to p.o. cefdinir at time of discharge to complete a total of 7 days    Traumatic rhabdomyolysis ([address])  Assessment & Plan  · Patient was laying on the floor for approximately 20 hours prior to arrival  · CPK [id number]  · Status post treatment with IV fluid  · Renal function has improved  · Continue supportive therapy  · No need for further CPK testing    Parkinson's disease (HCC)  Assessment & Plan  · Continue prehospital Sinemet 25/100 mg p.o. 3 times daily and Neurontin 200 mg p.o. nightly  · Status post a PT and OT evaluation-they recommend postacute rehabilitation services  · Midodrine on hold    Adenopathy  Assessment & Plan  · Peripancreatic adenopathy noted on CT imaging concerning for possible metastatic process  · Patient has a history of renal and prostate cancer  · Findings were reviewed again with the patient in the presence of his son Raina Maher  · They will potentially follow-up in the outpatient setting for further work-up    Anemia  Assessment & Plan  · History of iron deficiency anemia  · Dilutional drop in H&H noted  · Patient remains hemodynamically stable  · Continue ferrous sulfate    Hyperlipidemia, unspecified  Assessment & Plan  · Atorvastatin on hold in view of rhabdo    Gastroesophageal reflux disease  Assessment & Plan  · Continue Protonix    Thyroid nodule  Assessment & Plan  · Results were discussed with patient  · Patient will follow-up in the outpatient setting for further work-up             VTE Prophylaxis:  Heparin    Patient Centered Rounds: I have performed bedside rounds with nursing staff today.     Discussions with Specialists or Other Care Team Provider: Nephrology, case management, nursing  Education and Discussions with Family / Patient: The patient, and his son Raina Maher who is bedside at the time of my rounding evaluation were both brought up to par    Current Length of Stay: 3 day(s)    Current Patient Status: Inpatient   Certification Statement: The patient will continue to require additional inpatient hospital stay due to The need for hospice evaluation    Discharge Plan: Hopeful discharge planning within 24 hours either to hospice, and/or to a short-term rehab for placement depending upon what the patient decides    Code Status: Level 3 - DNAR and DNI    Subjective:   Patient seen, resting in bed, appears comfortable, no new complaints from medical standpoint    Objective:     Vitals:   Temp (24hrs), Av °F (37.8 °C), Min:98.8 °F (37.1 °C), Max:101.1 °F (38.4 °C)    Temp:  [98.8 °F (37.1 °C)-101.1 °F (38.4 °C)] 98.8 °F (37.1 °C)  HR:  [77-83] 83  Resp:  [18] 18  BP: (101-118)/(62-67) 118/63  SpO2:  [99 %-100 %] 99 %  Body mass index is 37.19 kg/m². Input and Output Summary (last 24 hours): Intake/Output Summary (Last 24 hours) at 7/12/2023 1345  Last data filed at 7/12/2023 1223  Gross per 24 hour   Intake 360 ml   Output 1000 ml   Net -640 ml       Physical Exam:   Physical Exam  Vitals and nursing note reviewed. Constitutional:       General: He is not in acute distress. Appearance: Normal appearance. He is not ill-appearing. HENT:      Head: Normocephalic and atraumatic. Nose: Nose normal.   Eyes:      Extraocular Movements: Extraocular movements intact. Pupils: Pupils are equal, round, and reactive to light. Cardiovascular:      Rate and Rhythm: Normal rate and regular rhythm. Pulses: Normal pulses. Heart sounds: Normal heart sounds. No murmur heard. No friction rub. No gallop. Pulmonary:      Effort: Pulmonary effort is normal.      Breath sounds: Normal breath sounds. Abdominal:      General: There is no distension. Palpations: Abdomen is soft. There is no mass. Tenderness: There is no abdominal tenderness. There is no guarding or rebound. Musculoskeletal:         General: No swelling or tenderness. Normal range of motion. Cervical back: Normal range of motion and neck supple. No rigidity. No muscular tenderness. Right lower leg: No edema. Left lower leg: No edema. Comments: Mild volume overload persists, swelling noted on the bilateral upper extremities   Skin:     General: Skin is warm. Capillary Refill: Capillary refill takes less than 2 seconds. Findings: No erythema or rash. Neurological:      General: No focal deficit present. Mental Status: He is alert and oriented to person, place, and time. Mental status is at baseline.       Comments: Resting parkinsonian tremor noted   Psychiatric:         Mood and Affect: Mood normal.         Behavior: Behavior normal.         Additional Data:     Labs:    Results from last 7 days   Lab Units 07/12/23  0630   WBC Thousand/uL 7.31   HEMOGLOBIN g/dL 7.9*   HEMATOCRIT % 25.8*   PLATELETS Thousands/uL 169   NEUTROS PCT % 81*   LYMPHS PCT % 7*   MONOS PCT % 10   EOS PCT % 2     Results from last 7 days   Lab Units 07/12/23  0630 07/10/23  0527 07/09/23  1657   SODIUM mmol/L 135   < > 137   POTASSIUM mmol/L 4.9   < > 5.8*   CHLORIDE mmol/L 104   < > 105   CO2 mmol/L 24   < > 22   BUN mg/dL 33*   < > 41*   CREATININE mg/dL 1.64*   < > 3.01*   CALCIUM mg/dL 7.3*   < > 7.9*   ALK PHOS U/L  --   --  40   ALT U/L  --   --  19   AST U/L  --   --  92*    < > = values in this interval not displayed. Results from last 7 days   Lab Units 07/09/23  1657   INR  1.79*               * I Have Reviewed All Lab Data Listed Above. * Additional Pertinent Lab Tests Reviewed:  [facility] Admission  Reviewed    Imaging:  Imaging Reports Reviewed Today Include: None    Recent Cultures (last 7 days):     Results from last 7 days   Lab Units 07/09/23  2258   LEGIONELLA URINARY ANTIGEN  Negative       Last 24 Hours Medication List:   Current Facility-Administered Medications   Medication Dose Route Frequency Provider Last Rate   • acetaminophen  650 mg Oral Q6H PRN Diane Hampton PA-C     • aspirin  81 mg Oral Daily Diane Hampton PA-C     • carbidopa-levodopa  1 tablet Oral TID Diane Hampton PA-C     • cefTRIAXone  1,000 mg Intravenous Q24H Diane Hampton PA-C 1,000 mg (07/12/23 0732)   • dextromethorphan-guaiFENesin  10 mL Oral Q4H PRN Diane Hampton PA-C     • ferrous sulfate  325 mg Oral Daily With Breakfast Diane Hampton PA-C     • gabapentin  200 mg Oral HS Diane Hampton PA-C     • heparin (porcine)  5,000 Units Subcutaneous [facility] Diane Hampton PA-C     • lidocaine  1 patch Topical Daily Diane Hampton PA-C     • nystatin   Topical BID Diane Hampton PA-C     • ondansetron  4 mg Intravenous Q6H PRN Guy Altman PA-C     • pantoprazole  40 mg Oral Early Morning Guy Altman PA-C          Today, Patient Was Seen By: Luz Wilson MD    ** Please Note: Dictation voice to text software may have been used in the creation of this document.  **

## 2023-07-12 NOTE — PLAN OF CARE
Problem: PHYSICAL THERAPY ADULT  Goal: Performs mobility at highest level of function for planned discharge setting. See evaluation for individualized goals. Description: Treatment/Interventions: Functional transfer training, LE strengthening/ROM, Therapeutic exercise, Endurance training, Patient/family training, Equipment eval/education, Bed mobility, Gait training, Spoke to nursing, Spoke to case management, OT  Equipment Recommended: bariatric RW       See flowsheet documentation for full assessment, interventions and recommendations. Outcome: Progressing  Note: Prognosis: Fair  Problem List: Decreased strength, Decreased endurance, Impaired balance, Decreased mobility, Decreased coordination, Obesity, Pain  Assessment: Pt seen for PT treatment session this date with interventions consisting of gait training to advance toward previous level of function w/ emphasis on improving pt's ability to ambulate level surfaces x 5 feet with mod A provided by therapist with bariatric RW, Therapeutic exercise to improve stability and endurance consisting of: AROM 2 sets of 20 reps B LE in sitting position and therapeutic activity to reduce fall risk consisting of training: bed mobility, supine<>sit transfers, sit<>stand transfers, static sitting tolerance at EOB for >10 minutes w/ B UE support, static standing tolerance for 2 minutes w/ B UE support, vc and tactile cues for static sitting posture faciliation, vc and tactile cues for static standing posture faciliation and stand pivot transfers towards R direction. Pt agreeable to PT treatment session upon arrival, pt found supine in bed w/ HOB elevated, A&O x 4. In comparison to previous session, pt with improvements in ambulatory distance, static standing balance. Post session: pt returned back to recliner, chair alarm engaged, all needs in reach and RN notified of session findings/recommendations.  Continue to recommend post acute rehabilitation services at time of d/c in order to maximize pt's functional independence and safety w/ mobility. Pt continues to be functioning below baseline level, and remains limited 2* factors listed above and including weakness, pain, impaired balance, activity intolerance, decreased endurance, gait deviations. PT will continue to see pt during current hospitalization in order to address the deficits listed above and provide interventions consistent w/ POC in effort to achieve STGs. Barriers to Discharge: Inaccessible home environment, Decreased caregiver support     PT Discharge Recommendation: Post acute rehabilitation services (maintained recommendation)    See flowsheet documentation for full assessment.

## 2023-07-12 NOTE — ASSESSMENT & PLAN NOTE
· Present on admission  · Arrival creatinine 3.01  · Prerenal-secondary to dehydration, and rhabdomyolysis (see below)  · Status post treatment with IV fluid  · Repeat creatinine this morning is down to 1.64  · Appreciate nephrology input  · Status post treatment with IV fluid  · Status post treatment with IV Lasix yesterday, plan is to repeat another dose of IV Lasix today as per nephrology recommendations  · Repeat BMP in the a.m.   · Discharge planning is in place, case management is working on [employer] placement

## 2023-07-12 NOTE — PROGRESS NOTES
-- Patient: Carola Luna  -- MRN: [id number]  -- Aidin Request ID: [id number]  -- Level of care reserved: translation missing: en.provider. provider_type  -- Partner Reserved:  -- Clinical needs requested:  -- Geography searched: [id number]  -- Start of Service:  -- Request sent: 2:09pm EDT on 7/12/2023 by Kristen Camacho  -- Partner reserved: by Kristen Camacho  -- Choice list shared: 4:12pm EDT on 7/12/2023 by Kristen Camacho

## 2023-07-12 NOTE — PHYSICAL THERAPY NOTE
Physical Therapy Treatment Session Note    Patient's Name: DocbookMD    Admitting Diagnosis  Hyperkalemia [E87.5]  Shoulder injury [S49.90XA]  Pneumonia [J18.9]  Elevated CK [R74.8]  MARIANGEL (acute kidney injury) ([address]) [N17.9]  Fall, initial encounter [W19. XXXA]    Problem List  Patient Active Problem List   Diagnosis    Coronary artery disease involving native coronary artery of native heart    Renal insufficiency    Dyslipidemia    Stage 3 chronic kidney disease (HCC)    Chronic kidney disease (CKD), stage III (moderate)    Coronary artery disease involving native coronary artery of native heart without angina pectoris    Carotid stenosis, right    S/P CABG x 2    Tremor of right hand    Obesity    Generalized weakness    Dizziness    Parkinson's disease (HCC)    Abnormal ECG    Anemia    Penile edema    Swelling of right upper extremity    Ambulatory dysfunction    Orthostatic hypotension    Right shoulder pain    Insomnia    Renal cell cancer, left (HCC)    Peripheral edema    Frequency of micturition    Abnormal prostate specific antigen    Balanitis    Bilateral ureteral obstruction    Calculus of kidney    Adenocarcinoma of prostate (HCC)    Complex renal cyst    First degree heart block    Gastroesophageal reflux disease    Hyperlipidemia, unspecified    Idiopathic chronic gout, unspecified site, with tophus (tophi)    Hypercalcinuria    Occlusion and stenosis of right carotid artery    Presence of left artificial knee joint    Tremor, unspecified    Urinary incontinence    History of coronary artery bypass surgery    Hypernatriuria    Acute renal failure superimposed on stage 3 chronic kidney disease (HCC)    Traumatic rhabdomyolysis (HCC)    Left upper lobe pneumonia    Adenopathy    Thyroid nodule    Fall       Past Medical History  Past Medical History:   Diagnosis Date    Anxiety     CAD (coronary artery disease)     Carotid stenosis, right     50-69% internal    Chronic kidney disease (CKD), stage III (moderate)     baseline Cr 1.50    Diverticulosis     GERD (gastroesophageal reflux disease)     Gout     History of nephrolithiasis     History of prostate cancer     s/p radiation/Lupron    History of renal cell cancer     s/p left nephrectomy    Osteoarthritis     knees & shoulders    Parkinson disease     Pulmonary nodule     7mm PARKER       Past Surgical History  Past Surgical History:   Procedure Laterality Date    ARTHROSCOPY KNEE Right     CARDIAC CATHETERIZATION      JOINT REPLACEMENT Left     knee    NEPHRECTOMY Left 2018    MN CORONARY ARTERY BYP W/VEIN & ARTERY GRAFT 2 VEIN N/A 9/2/2020    Procedure: CORONARY ARTERY BYPASS GRAFT (CABG) 2 VESSELS: LIMA to LAD, RLE EVH/SVG to PDA; Surgeon: Gabriela Coleman DO;  Location: [facility];  Service: Cardiac Surgery    MN ECHO TRANSESOPHAG R-T 2D W/PRB IMG ACQUISJ I&R N/A 9/2/2020    Procedure: TRANSESOPHAGEAL ECHOCARDIOGRAM (MEGAN); Surgeon: Gabriela Coleman DO;  Location: [facility];  Service: Cardiac Surgery    MN NDSC SURG W/VIDEO-ASSISTED HARVEST VEIN CABG Right 9/2/2020    Procedure: HARVEST VEIN ENDOSCOPIC ([address]); Surgeon: Gabriela Coleman DO;  Location: [facility];  Service: Cardiac Surgery    REPLACEMENT TOTAL KNEE Left     ROTATOR CUFF REPAIR Right     TONSILLECTOMY          07/12/23 0904   PT Last Visit   PT Visit Date 07/12/23   Pain Assessment   Pain Assessment Tool 0-10   Pain Score 7   Pain Location/Orientation Orientation: Right;Location: Shoulder   Pain Radiating Towards yes, into R hand   Pain Onset/Description Onset: Ongoing;Frequency: Constant/Continuous; Descriptor: Aching;Descriptor: Sore   Effect of Pain on Daily Activities yes   Patient's Stated Pain Goal No pain   Hospital Pain Intervention(s) Repositioned; Ambulation/increased activity; Elevated; Emotional support;Medication (See MAR)  ("pain patch")   Multiple Pain Sites Yes   Pain 2   Pain Score 2 7   Pain Location/Orientation 2 Orientation: Bilateral;Location: Leg   Pain Onset/Description 2 Onset: Ongoing;Frequency: Constant/Continuous; Descriptor: Aching; Other (Comment)  ("if you touch em they hurt")   Patient's Stated Pain Goal 2 No pain   Hospital Pain Intervention(s) 2 Repositioned; Ambulation/increased activity; Elevated; Emotional support; Environmental changes   Restrictions/Precautions   Weight Bearing Precautions Per Order No   Other Precautions Chair Alarm; Bed Alarm; Fall Risk;Pain  (obesity)   General   Chart Reviewed Yes   Response to Previous Treatment Patient with no complaints from previous session. Family/Caregiver Present No   Cognition   Overall Cognitive Status WFL   Arousal/Participation Alert; Cooperative   Attention Attends with cues to redirect   Orientation Level Oriented X4   Memory Within functional limits   Following Commands Follows one step commands with increased time or repetition   Comments Salome Sanchez was agreeable to PT treatment session. Subjective   Subjective "why not" -when asked if he is willing to try BLE ther ex   Bed Mobility   Rolling R 3  Moderate assistance   Additional items Assist x 2;Bedrails; Increased time required;Verbal cues;LE management   Supine to Sit 2  Maximal assistance   Additional items Assist x 2;HOB elevated; Bedrails; Increased time required;Verbal cues;LE management   Sit to Supine   (DNT Salome Sanchez was sitting out of bed on the recliner upon conclusion.)   Additional Comments Verbal cues for bedrail utilization and proper body mechanics. Increased time provided for bed level tasks. Transfers   Sit to Stand 3  Moderate assistance   Additional items Assist x 1;Bedrails; Increased time required;Verbal cues  (bariatric RW utilization)   Stand to Sit 3  Moderate assistance   Additional items Assist x 1;Bedrails; Increased time required;Verbal cues; Other  (second staff member present for safety)   Stand pivot 3  Moderate assistance   Additional items Assist x 1; Increased time required;Verbal cues; Other   Additional Comments Verbal cues for bedrail utilization and proper body mechanics. External cues provided for hand placement with transitional movements. James Sullivan had difficulty with placement R>L due to pain and swelling. Ambulation/Elevation   Gait pattern Improper Weight shift;Narrow TEO; Forward Flexion;Decreased foot clearance; Short stride; Step to;Excessively slow   Gait Assistance 3  Moderate assist   Additional items Assist x 1;Verbal cues; Tactile cues   Assistive Device Bariatric Rolling walker   Distance 5 feet  (out of bed to the recliner)   Stair Management Assistance Not tested   Ambulation/Elevation Additional Comments Verbal cues for base of support widening for stabilization, proper AD utilization, and safety while turning. Balance   Static Sitting Fair +   Dynamic Sitting Fair -   Static Standing Fair -   Dynamic Standing Poor +   Ambulatory Poor +   Endurance Deficit   Endurance Deficit Yes   Activity Tolerance   Activity Tolerance Patient limited by fatigue;Patient limited by pain   Medical Staff Made Aware yes, Kris Ware    Nurse Made Aware Yes, Vivian PCA assisted with mobility. I appreciated the care coordination. Exercises   Quad Sets Sitting;20 reps;AROM; Bilateral  (2 sets with increased time for all BLE ther ex)   Glute Sets Sitting;20 reps;AROM; Bilateral  (2 sets)   Hip Abduction Sitting;20 reps;AROM; Bilateral  (2 sets)   Hip Adduction Sitting;20 reps;AROM; Bilateral  (2 sets)   Ankle Pumps Sitting;20 reps;AROM; Bilateral  (2 sets)   UE Exercise Sitting;20 reps;AROM; Bilateral  (2 sets of elbow flexion)   Assessment   Prognosis Fair   Problem List Decreased strength;Decreased endurance; Impaired balance;Decreased mobility; Decreased coordination;Obesity;Pain   Assessment Pt seen for PT treatment session this date with interventions consisting of gait training to advance toward previous level of function w/ emphasis on improving pt's ability to ambulate level surfaces x 5 feet with mod A provided by therapist with bariatric RW, Therapeutic exercise to improve stability and endurance consisting of: AROM 2 sets of 20 reps B LE in sitting position and therapeutic activity to reduce fall risk consisting of training: bed mobility, supine<>sit transfers, sit<>stand transfers, static sitting tolerance at EOB for >10 minutes w/ B UE support, static standing tolerance for 2 minutes w/ B UE support, vc and tactile cues for static sitting posture faciliation, vc and tactile cues for static standing posture faciliation and stand pivot transfers towards R direction. Pt agreeable to PT treatment session upon arrival, pt found supine in bed w/ HOB elevated, A&O x 4. In comparison to previous session, pt with improvements in ambulatory distance, static standing balance. Post session: pt returned back to recliner, chair alarm engaged, all needs in reach and RN notified of session findings/recommendations. Continue to recommend post acute rehabilitation services at time of d/c in order to maximize pt's functional independence and safety w/ mobility. Pt continues to be functioning below baseline level, and remains limited 2* factors listed above and including weakness, pain, impaired balance, activity intolerance, decreased endurance, gait deviations. PT will continue to see pt during current hospitalization in order to address the deficits listed above and provide interventions consistent w/ POC in effort to achieve STGs. Barriers to Discharge Inaccessible home environment;Decreased caregiver support   Goals   Patient Goals to have less pain   STG Expiration Date 07/20/23   Short Term Goal #1 STGs remain appropriate   PT Treatment Day 1   Plan   Treatment/Interventions Functional transfer training;LE strengthening/ROM; Therapeutic exercise;Elevations; Endurance training;Patient/family training;Equipment eval/education; Bed mobility;Gait training; Compensatory technique education;Spoke to nursing;Spoke to case management   Progress Slow progress, decreased activity tolerance   PT Frequency 3-5x/wk   Recommendation   PT Discharge Recommendation Post acute rehabilitation services  (maintained recommendation)   Equipment Recommended [facility] Recommended HD Bariatric wheeled walker   Change/add to Bioclones? No   Additional Comments Upon conclusion, Jarocho Rodriguez was sitting out of bed on the recliner. The chair alarm was engaged.    AM-PAC Basic Mobility Inpatient   Turning in Flat Bed Without Bedrails 2   Lying on Back to Sitting on Edge of Flat Bed Without Bedrails 2   Moving Bed to Chair 2   Standing Up From Chair Using Arms 2   Walk in Room 2   Climb 3-5 Stairs With Railing 1   Basic Mobility Inpatient Raw Score 11   Basic Mobility Standardized Score 30.25   Highest Level Of Mobility   JH-HLM Goal 4: Move to chair/commode   JH-HLM Achieved 5: Stand (1 or more minutes)   Education   Education Provided Mobility training;Assistive device   Patient Demonstrates acceptance/verbal understanding     Treatment Time: [id number]    Aubrie Ch, PT

## 2023-07-12 NOTE — ASSESSMENT & PLAN NOTE
· Peripancreatic adenopathy noted on CT imaging concerning for possible metastatic process  · Patient has a history of renal and prostate cancer  · Findings were reviewed again with the patient in the presence of his son Ryne Nan  · They will potentially follow-up in the outpatient setting for further work-up

## 2023-07-12 NOTE — OCCUPATIONAL THERAPY NOTE
07/12/23 1013   OT Last Visit   OT Visit Date 07/12/23   Note Type   Note Type Treatment  (completed [id number])   Pain Assessment   Pain Assessment Tool 0-10   Pain Score 7   Pain Location/Orientation Orientation: Right;Location: Shoulder  (and Right hand ("It's so swollen, I can't close it"))   Restrictions/Precautions   Weight Bearing Precautions Per Order No   Other Precautions Chair Alarm; Bed Alarm; Fall Risk;Pain   Lifestyle   Reciprocal Relationships one son is the main caregiver; one daughter "helped one day" and another son "helped one day"   Intrinsic Gratification "I have no physical life anymore"   ADL   Eating Comments pt. reports needing help to feed self, but later managed his water cup to drink from it, without difficulty (hand-to-mouth)  (*using L non-dominant hand, due to edema of R hand)   Therapeutic Exercise - ROM   UE-ROM Yes   ROM- Right Upper Extremities   R Shoulder AAROM; Flexion; Extension;Horizontal ABduction;ABduction  (horizontal adduction;  Pro/re-traction)   R Elbow AAROM;Elbow flexion;Elbow extension  (And supin/pron-ation)   R Weight/Reps/Sets 5 reps each exer. RUE ROM Comment Patient verbalized "I can't do any of this on my own". ROM - Left Upper Extremities    L Shoulder AROM; Flexion; Horizontal ABduction; Extension;ABduction  (horizontal adduction; Pro/re-traction)   L Elbow AROM;Elbow flexion;Elbow extension  (And supin/pron-ation)   L Weight/Reps/Sets 5 reps each exer. Coordination   Gross Motor impaired   Dexterity impaired   Subjective   Subjective reports decline for last 2 months  (**patient asked if he could ask me a serious question, which was "what's the best way to die?" > I told him to speak to the doctor. .. He also then said "nobody Wants to die, but I'm in bad shape and can't do things anymore". **  --  Doctor Merle Gottlieb notified.)   Cognition   Overall Cognitive Status [facility]   Arousal/Participation Alert; Cooperative   Attention Attends with cues to redirect   Orientation Level Oriented X4   Memory Within functional limits   Following Commands Follows one step commands with increased time or repetition   Comments Patient very negative/pessimistic about his status - provided emotional support as able. Activity Tolerance   Activity Tolerance Patient limited by pain; Patient limited by fatigue   Assessment   Assessment Patient participated in Skilled OT session this date with interventions consisting of therapeutic exercise to: increase functional use of BUEs, increase BUE muscle strength  and increase OOB/ sitting tolerance toward increased self-care accomplishment and functional mobility . Patient agreeable to OT treatment session, upon arrival patient was found seated OOB to Recliner. Patient requiring verbal cues for correct technique and frequent rest periods, and self-care assistance as noted in flow sheet/AM-PAC. Patient continues to be functioning below baseline level, occupational performance remains limited secondary to factors listed above and increased risk for falls and injury. From OT standpoint, recommendation at time of d/c would be post-acute rehabilitation services (pending goals of care decisions per scheduled meeting this date). Patient to benefit from continued Occupational Therapy treatment while in the hospital to address deficits as defined above and maximize level of functional independence with ADLs and functional mobility. Plan   Treatment Interventions UE strengthening/ROM;ADL retraining;Patient/family training; Activityengagement; Compensatory technique education   Goal Expiration Date 07/20/23   OT Treatment Day 1   Recommendation   OT Discharge Recommendation Post acute rehabilitation services   Additional Comments 2 The patient's raw score on the AM-PAC Daily Activity Inpatient Short Form is 11. A raw score of less than 19 suggests the patient may benefit from discharge to post-acute rehabilitation services.  Please refer to the recommendation of the Occupational Therapist for safe discharge planning. AM-PAC Daily Activity Inpatient   Lower Body Dressing 1   Bathing 2   Toileting 1   Upper Body Dressing 2   Grooming 2   Eating 3   Daily Activity Raw Score 11   Daily Activity Standardized Score (Calc for Raw Score >=11) 29.04   Turning Head Towards Sound 4   Follow Simple Instructions 4   Low Function Daily Activity Raw Score 19   Low Function Daily Activity Standardized Score  31.34   AM-PAC Applied Cognition Inpatient   Following a Speech/Presentation 4   Understanding Ordinary Conversation 4   Taking Medications 3  (*has some physical difficulty with same presently)   Remembering Where Things Are Placed or Put Away 3   Remembering List of 4-5 Errands 3   Taking Care of Complicated Tasks 3   Applied Cognition Raw Score 20   Applied Cognition Standardized Score 41.76   *patient with significant crepitus noted during RUE shoulder movement (*Partial ROM per his ability).   DOUGIE King/L

## 2023-07-12 NOTE — PROGRESS NOTES
Progress Note - Nephrology   Radha Rodgers 66 y.o. male MRN: [id number]  Unit/Bed#: -01 Encounter: [id number]    A/P:  1.  Acute kidney injury on top of chronic kidney disease              Creatinine continues to improve, now down to 1.64 mg/dL. Continue to optimize care and avoid potential nephrotoxins. We will continue diuresis, please refer below. 2.  Chronic kidney stage IIIa with a baseline creatinine to 1.2-1.3 mg/dL   3.  Hyperkalemia             Resolved, will remove potassium restriction on the patient's diet as he will be receiving diuresis with loop diuretics. 4.  Mild traumatic rhabdomyolysis              Resolved status post medical management. 5.   Volume overload              Patient volume status improved, however remains volume overloaded. We will provided another dose of 80 mg IV today, urine output likely not reported completely as the patient had urinary incontinence, however weight suggests that there is still significant volume that needs to be addressed. We will consider additional dosing of furosemide depending on how he responds today. Of note symptomatically, and on physical exam, patient is improved from a volume standpoint today. 6.  Left upper lobe pneumonia              Continue treatment according to hospitalist recommendations. Patient has a fever this morning.   7.  Parkinson disease  8.  Peripancreatic adenopathy    Follow up reason for today's visit: Acute kidney injury/chronic kidney disease/electrolyte disorder/volume management    Acute renal failure superimposed on stage 3 chronic kidney disease [facility])    Patient Active Problem List   Diagnosis   • Coronary artery disease involving native coronary artery of native heart   • Renal insufficiency   • Dyslipidemia   • Stage 3 chronic kidney disease (HCC)   • Chronic kidney disease (CKD), stage III (moderate)   • Coronary artery disease involving native coronary artery of native heart without angina pectoris   • Carotid stenosis, right   • S/P CABG x 2   • Tremor of right hand   • Obesity   • Generalized weakness   • Dizziness   • Parkinson's disease (HCC)   • Abnormal ECG   • Anemia   • Penile edema   • Swelling of right upper extremity   • Ambulatory dysfunction   • Orthostatic hypotension   • Right shoulder pain   • Insomnia   • Renal cell cancer, left (HCC)   • Peripheral edema   • Frequency of micturition   • Abnormal prostate specific antigen   • Balanitis   • Bilateral ureteral obstruction   • Calculus of kidney   • Adenocarcinoma of prostate ([address])   • Complex renal cyst   • First degree heart block   • Gastroesophageal reflux disease   • Hyperlipidemia, unspecified   • Idiopathic chronic gout, unspecified site, with tophus (tophi)   • Hypercalcinuria   • Occlusion and stenosis of right carotid artery   • Presence of left artificial knee joint   • Tremor, unspecified   • Urinary incontinence   • History of coronary artery bypass surgery   • Hypernatriuria   • Acute renal failure superimposed on stage 3 chronic kidney disease (HCC)   • Traumatic rhabdomyolysis (HCC)   • Left upper lobe pneumonia   • Adenopathy   • Thyroid nodule   • Fall         Subjective:   No acute distress or issues at this time. Objective:     Vitals: Blood pressure 118/63, pulse 82, temperature (!) 101.1 °F (38.4 °C), temperature source Oral, resp. rate 18, weight 108 kg (237 lb 7 oz), SpO2 99 %. ,Body mass index is 37.19 kg/m². Weight (last 2 days)     Date/Time Weight    07/12/23 0600 108 (237.44)     Weight: weight patient with the air matter machine and SCD machine off 1 pillow 1 sheet no blanket weight patient without the air matter machine got 94.2 KG at 07/12/23 0600    07/11/23 1234 107 (236.33)    07/11/23 0956 107 (236.33)            Intake/Output Summary (Last 24 hours) at 7/12/2023 0852  Last data filed at 7/12/2023 0643  Gross per 24 hour   Intake 240 ml   Output 950 ml   Net -710 ml     I/O last 3 completed shifts:   In: 480 [P.O.:480]  Out: 950 [Urine:950]         Physical Exam: /63 (BP Location: Right arm)   Pulse 82   Temp (!) 101.1 °F (38.4 °C) (Oral)   Resp 18   Wt 108 kg (237 lb 7 oz) Comment: weight patient with the air matter machine and SCD machine off 1 pillow 1 sheet no blanket weight patient without the air matter machine got 94.2 KG  SpO2 99%   BMI 37.19 kg/m²     General Appearance:    Alert, cooperative, no distress, appears stated age   Head:    Normocephalic, without obvious abnormality, atraumatic   Eyes:    Conjunctiva/corneas clear   Ears:    Normal external ears   Nose:   Nares normal, septum midline, mucosa normal, no drainage    or sinus tenderness   Throat:   Lips, mucosa, and tongue normal; teeth and gums normal   Neck:   Supple   Back:     Symmetric, no curvature, ROM normal, no CVA tenderness   Lungs:    Reduced bilaterally   Chest wall:    No tenderness or deformity   Heart:    Regular rate and rhythm, S1 and S2 normal, no murmur, rub   or gallop   Abdomen:     Soft, non-tender, bowel sounds active   Extremities:   Extremities normal, atraumatic, no cyanosis, +2 bilateral lower extremity edema up to the presacral region   Skin:   Skin color, texture, turgor normal, no rashes or lesions   Lymph nodes:   Cervical normal   Neurologic:   CNII-XII intact, tremor present            Lab, Imaging and other studies: I have personally reviewed pertinent labs. CBC:   Lab Results   Component Value Date    WBC 7.31 07/12/2023    HGB 7.9 (L) 07/12/2023    HCT 25.8 (L) 07/12/2023    MCV 88 07/12/2023     07/12/2023    RBC 2.93 (L) 07/12/2023    MCH 27.0 07/12/2023    MCHC 30.6 (L) 07/12/2023    RDW 18.6 (H) 07/12/2023    MPV 12.3 07/12/2023    NRBC 0 07/12/2023     CMP:   Lab Results   Component Value Date    K 4.9 07/12/2023     07/12/2023    CO2 24 07/12/2023    BUN 33 (H) 07/12/2023    CREATININE 1.64 (H) 07/12/2023    CALCIUM 7.3 (L) 07/12/2023    EGFR 39 07/12/2023       .   Results from last 7 days   Lab Units 07/12/23  0630 07/11/23  0517 07/10/23  0527 07/09/23  1657   POTASSIUM mmol/L 4.9 4.6 5.4* 5.8*   CHLORIDE mmol/L 104 109* 105 105   CO2 mmol/L 24 19* 24 22   BUN mg/dL 33* 38* 41* 41*   CREATININE mg/dL 1.64* 1.99* 2.60* 3.01*   CALCIUM mg/dL 7.3* 7.1* 7.5* 7.9*   ALK PHOS U/L  --   --   --  40   ALT U/L  --   --   --  19   AST U/L  --   --   --  92*         Phosphorus: No results found for: "PHOS"  Magnesium: No results found for: "MG"  Urinalysis: No results found for: "COLORU", "CLARITYU", "SPECGRAV", "PHUR", "LEUKOCYTESUR", "NITRITE", "PROTEINUA", "GLUCOSEU", "Priscille Dawn", "BILIRUBINUR", "BLOODU"  Ionized Calcium: No results found for: "CAION"  Coagulation: No results found for: "PT", "INR", "APTT"  Troponin: No results found for: "TROPONINI"  ABG: No results found for: "PHART", "KXP5KCO", "PO2ART", "LXC2ORM", "[id number]", "BEART", "SOURCE"  Radiology review:     IMAGING  Procedure: TRAUMA - CT chest abdomen pelvis w contrast    Result Date: 7/9/2023  Narrative: CT CHEST, ABDOMEN AND PELVIS WITH IV CONTRAST INDICATION:   TRAUMA. COMPARISON: CT abdomen and pelvis June 8, 2023. CT chest abdomen and pelvis without intravenous contrast July 30, 2020 and with intravenous contrast August 21, 2017. TECHNIQUE: CT examination of the chest, abdomen and pelvis was performed. Multiplanar 2D reformatted images were created from the source data. 3D reconstructions of the bony thorax were performed in order to improve sensitivity of evaluation for rib fractures. This examination, like all CT scans performed in the [facility], was performed utilizing techniques to minimize radiation dose exposure, including the use of iterative reconstruction and automated exposure control. Radiation dose length product (DLP) for this visit:  2025 mGy-cm IV Contrast:  100 mL of iohexol (OMNIPAQUE) Enteric Contrast: Enteric contrast was not administered.  FINDINGS: CHEST LUNGS: There is left upper lobe opacity with air bronchograms and some volume loss consistent with atelectasis/and/or pneumonia. Mild dependent or compressive atelectasis right lung. PLEURA: Moderate posterior layering bilateral pleural effusions greater on the right than the left. HEART/GREAT VESSELS: Heavy atherosclerotic coronary artery calcification is noted. Heart is otherwise unremarkable. There is fusiform ectasia of the ascending thoracic aorta measuring up to 42 mm. This is unchanged since the comparison study. Small amount of soft plaque aortic arch. MEDIASTINUM AND COURTNEY:  Unremarkable. CHEST WALL AND LOWER NECK: Left thyroid nodule as described with cervical spine CT. Ultrasound follow-up suggested. ABDOMEN LIVER/BILIARY TREE:  Unremarkable. GALLBLADDER:  No calcified gallstones. No pericholecystic inflammatory change. SPLEEN:  Unremarkable. PANCREAS:  Unremarkable. ADRENAL GLANDS:  Unremarkable. KIDNEYS/URETERS: Status post left nephrectomy. Several subcentimeter low-density right renal lesions which are not apparent on the comparison images possibly due to different phases of contrast administration. 3 mm nonobstructing right lower pole renal calculus unchanged. STOMACH AND BOWEL: There is colonic diverticulosis without evidence of acute diverticulitis. APPENDIX: A normal appendix was visualized. ABDOMINOPELVIC CAVITY: Low volume ascites surrounding the liver and spleen and extending down into the pelvis. Peripancreatic adenopathy including portacaval node measuring 18 mm short axis and 16 mm relatively low-density node to the right of the pancreatic head. VESSELS:  Atherosclerotic changes are present. No evidence of aneurysm. PELVIS REPRODUCTIVE ORGANS: Status post prostatectomy. URINARY BLADDER: Urinary bladder wall thickening likely due to low volume similar to comparison. ABDOMINAL WALL/INGUINAL REGIONS: Anasarca. OSSEOUS STRUCTURES:  No acute fracture or destructive osseous lesion. Impression: 1.  Bilateral pleural effusions, low volume ascites, and anasarca. 2. Left upper lobe opacification and volume loss consistent with atelectasis and/or pneumonia. 3. Peripancreatic adenopathy. Metastatic work-up suggested. 4. Low-density right renal lesions demonstrated on venous phase/delayed images. These may or may not be new versus apparent due to technical variation. MRI follow-up suggested given the history of renal cell carcinoma. 5. Stable ectasia ascending aorta. 6. Diverticulosis without evidence of diverticulitis. 7. Left thyroid nodule. Follow-up ultrasound suggested. Findings marked for immediate notification in epic. Workstation performed: [id number]     Procedure: TRAUMA - CT head wo contrast    Result Date: 7/9/2023  Narrative: CT BRAIN  - WITHOUT CONTRAST INDICATION:   TRAUMA. COMPARISON:  None. TECHNIQUE:  CT examination of the brain was performed. Multiplanar 2D reformatted images were created from the source data. Radiation dose length product (DLP) for this visit:  921 mGy-cm (accession [id number]), 073 mGy-cm (accession [id number]). This examination, like all CT scans performed in the [facility], was performed utilizing techniques to minimize radiation dose exposure, including the use of iterative reconstruction and automated exposure control. IMAGE QUALITY:  Diagnostic. FINDINGS: PARENCHYMA: Decreased attenuation is noted in periventricular and subcortical white matter demonstrating an appearance that is statistically most likely to represent mild microangiopathic change. No CT signs of acute infarction. No intracranial mass, mass effect or midline shift. No acute parenchymal hemorrhage. VENTRICLES AND EXTRA-AXIAL SPACES: Ventricles and extra-axial CSF spaces are prominent commensurate with the degree of volume loss. No hydrocephalus. No acute extra-axial hemorrhage. VISUALIZED ORBITS: Normal visualized orbits. PARANASAL SINUSES: Mild mucosal thickening of the visualized paranasal sinuses.  Trace air-fluid levels maxillary sinuses. CALVARIUM AND EXTRACRANIAL SOFT TISSUES:  Normal.     Impression: No acute intracranial abnormality. CT CERVICAL SPINE - WITHOUT CONTRAST CT CERVICAL SPINE - WITHOUT CONTRAST INDICATION:   TRAUMA. COMPARISON:  None. TECHNIQUE:  CT examination of the cervical spine was performed without intravenous contrast.  Contiguous axial images were obtained. Multiplanar 2D reformatted images were created from the source data. Radiation dose length product (DLP) for this visit:  921 mGy-cm (accession [id number]), 761 mGy-cm (accession [id number]). This examination, like all CT scans performed in the [facility], was performed utilizing techniques to minimize radiation dose exposure, including the use of iterative reconstruction and automated exposure control. IMAGE QUALITY:  Diagnostic. FINDINGS: ALIGNMENT: There is reversal of normal cervical lordosis. There is no significant subluxation. No compression deformity. VERTEBRAE:  No fracture. DEGENERATIVE CHANGES: Moderate multilevel cervical degenerative changes are noted. No critical central canal stenosis. PREVERTEBRAL AND PARASPINAL SOFT TISSUES: 2.9 cm left thyroid nodule. Incidental discovery of one or more thyroid nodule(s) measuring more than 1.5 cm and without suspicious features is noted in this patient who is above 28years old; according to guidelines published in the February 2015 white paper on incidental thyroid nodules in the Journal of the [employer] of Radiology Russell Santos), further characterization with thyroid ultrasound is recommended. THORACIC INLET:  Normal. IMPRESSION: No cervical spine fracture or traumatic malalignment. Incidental thyroid nodule(s) for which nonemergent thyroid ultrasound is recommended. Workstation performed: [id number]     Procedure: TRAUMA - CT spine cervical wo contrast    Result Date: 7/9/2023  Narrative: CT BRAIN  - WITHOUT CONTRAST INDICATION:   TRAUMA. COMPARISON:  None.  TECHNIQUE:  CT examination of the brain was performed. Multiplanar 2D reformatted images were created from the source data. Radiation dose length product (DLP) for this visit:  921 mGy-cm (accession [id number]), 419 mGy-cm (accession [id number]). This examination, like all CT scans performed in the [facility], was performed utilizing techniques to minimize radiation dose exposure, including the use of iterative reconstruction and automated exposure control. IMAGE QUALITY:  Diagnostic. FINDINGS: PARENCHYMA: Decreased attenuation is noted in periventricular and subcortical white matter demonstrating an appearance that is statistically most likely to represent mild microangiopathic change. No CT signs of acute infarction. No intracranial mass, mass effect or midline shift. No acute parenchymal hemorrhage. VENTRICLES AND EXTRA-AXIAL SPACES: Ventricles and extra-axial CSF spaces are prominent commensurate with the degree of volume loss. No hydrocephalus. No acute extra-axial hemorrhage. VISUALIZED ORBITS: Normal visualized orbits. PARANASAL SINUSES: Mild mucosal thickening of the visualized paranasal sinuses. Trace air-fluid levels maxillary sinuses. CALVARIUM AND EXTRACRANIAL SOFT TISSUES:  Normal.     Impression: No acute intracranial abnormality. CT CERVICAL SPINE - WITHOUT CONTRAST CT CERVICAL SPINE - WITHOUT CONTRAST INDICATION:   TRAUMA. COMPARISON:  None. TECHNIQUE:  CT examination of the cervical spine was performed without intravenous contrast.  Contiguous axial images were obtained. Multiplanar 2D reformatted images were created from the source data. Radiation dose length product (DLP) for this visit:  921 mGy-cm (accession [id number]), 552 mGy-cm (accession [id number]). This examination, like all CT scans performed in the [facility], was performed utilizing techniques to minimize radiation dose exposure, including the use of iterative reconstruction and automated exposure control.  IMAGE QUALITY: Diagnostic. FINDINGS: ALIGNMENT: There is reversal of normal cervical lordosis. There is no significant subluxation. No compression deformity. VERTEBRAE:  No fracture. DEGENERATIVE CHANGES: Moderate multilevel cervical degenerative changes are noted. No critical central canal stenosis. PREVERTEBRAL AND PARASPINAL SOFT TISSUES: 2.9 cm left thyroid nodule. Incidental discovery of one or more thyroid nodule(s) measuring more than 1.5 cm and without suspicious features is noted in this patient who is above 28years old; according to guidelines published in the February 2015 white paper on incidental thyroid nodules in the Journal of the [address] [place] [address],  of Radiology Yamel Abarca), further characterization with thyroid ultrasound is recommended. THORACIC INLET:  Normal. IMPRESSION: No cervical spine fracture or traumatic malalignment. Incidental thyroid nodule(s) for which nonemergent thyroid ultrasound is recommended. Workstation performed: [id number]     Procedure: XR Trauma chest portable    Result Date: 7/9/2023  Narrative: CHEST INDICATION:   TRAUMA. COMPARISON: Chest radiograph June 8, 2023 and September 2, 2020. EXAM PERFORMED/VIEWS:  XR CHEST PORTABLE Images:  1 FINDINGS: Sequela of prior coronary artery bypass graft. Cardiomediastinal silhouette appears unremarkable. Hazy opacity left mid to lower lung new since the comparison study. Sternotomy sutures. Severe arthritis both shoulders. Impression: Hazy opacity left lung base new since the comparison study and suspicious for pneumonia in the appropriate clinical setting. Findings marked for immediate notification in epic.  Workstation performed: [id number]       Current Facility-Administered Medications   Medication Dose Route Frequency   • acetaminophen (TYLENOL) tablet 650 mg  650 mg Oral Q6H PRN   • aspirin chewable tablet 81 mg  81 mg Oral Daily   • carbidopa-levodopa (SINEMET)  mg per tablet 1 tablet  1 tablet Oral TID   • cefTRIAXone (ROCEPHIN) IVPB (premix in dextrose) 1,000 mg 50 mL  1,000 mg Intravenous Q24H   • dextromethorphan-guaiFENesin (ROBITUSSIN DM) oral syrup 10 mL  10 mL Oral Q4H PRN   • ferrous sulfate tablet 325 mg  325 mg Oral Daily With Breakfast   • gabapentin (NEURONTIN) capsule 200 mg  200 mg Oral HS   • heparin (porcine) subcutaneous injection 5,000 Units  5,000 Units Subcutaneous Q8H [address]   • lidocaine (LIDODERM) 5 % patch 1 patch  1 patch Topical Daily   • nystatin (MYCOSTATIN) powder   Topical BID   • ondansetron (ZOFRAN) injection 4 mg  4 mg Intravenous Q6H PRN   • pantoprazole (PROTONIX) EC tablet 40 mg  40 mg Oral Early Morning     Medications Discontinued During This Encounter   Medication Reason   • midodrine (PROAMATINE) tablet 5 mg    • Menthol (Topical Analgesic) 4 % GEL 1 application. • sodium chloride 0.9 % infusion        Jesus Sidhu, DO      This progress note was produced in part using a dictation device which may document imprecise wording from author's original intent.

## 2023-07-12 NOTE — HOSPICE NOTE
Received referral, I will be meeting with son tomorrow at 1pm to review hospice services. Wilfrido Dyer was updated.

## 2023-07-12 NOTE — PLAN OF CARE
Problem: METABOLIC, FLUID AND ELECTROLYTES - ADULT  Goal: Fluid balance maintained  Description: INTERVENTIONS:  - Monitor labs   - Monitor I/O and WT  - Instruct patient on fluid and nutrition as appropriate  - Assess for signs & symptoms of volume excess or deficit  Outcome: Progressing     Problem: Nutrition/Hydration-ADULT  Goal: Nutrient/Hydration intake appropriate for improving, restoring or maintaining nutritional needs  Description: Monitor and assess patient's nutrition/hydration status for malnutrition. Collaborate with interdisciplinary team and initiate plan and interventions as ordered. Monitor patient's weight and dietary intake as ordered or per policy. Utilize nutrition screening tool and intervene as necessary. Determine patient's food preferences and provide high-protein, high-caloric foods as appropriate.      INTERVENTIONS:  - Monitor oral intake, urinary output, labs, and treatment plans  - Assess nutrition and hydration status and recommend course of action  - Evaluate amount of meals eaten  - Assist patient with eating if necessary   - Allow adequate time for meals  - Recommend/ encourage appropriate diets, oral nutritional supplements, and vitamin/mineral supplements  - Order, calculate, and assess calorie counts as needed  - Recommend, monitor, and adjust tube feedings and TPN/PPN based on assessed needs  - Assess need for intravenous fluids  - Provide specific nutrition/hydration education as appropriate  - Include patient/family/caregiver in decisions related to nutrition  Outcome: Progressing     Problem: SKIN/TISSUE INTEGRITY - ADULT  Goal: Skin Integrity remains intact(Skin Breakdown Prevention)  Description: Assess:  -Perform Jhony assessment every shift  -Clean and moisturize skin every day  -Inspect skin when repositioning, toileting, and assisting with ADLS  -Assess under medical devices such as protective Allyven every shift  -Assess extremities for adequate circulation and sensation     Bed Management:  -Have minimal linens on bed & keep smooth, unwrinkled  -Change linens as needed when moist or perspiring  -Avoid sitting or lying in one position for more than 2 hours while in bed  -Keep HOB at 30 degrees     Toileting:  -Offer bedside commode  -Assess for incontinence every interaction  -Use incontinent care products after each incontinent episode such as wipes    Activity:  -Mobilize patient 3 times a day  -Encourage activity and walks on unit  -Encourage or provide ROM exercises   -Turn and reposition patient every 2 Hours  -Use appropriate equipment to lift or move patient in bed  -Instruct/ Assist with weight shifting every 15 [id number] when out of bed in chair  -Consider limitation of chair time 2 hour intervals    Skin Care:  -Avoid use of baby powder, tape, friction and shearing, hot water or constrictive clothing  -Relieve pressure over bony prominences using pillows/cushions  -Do not massage red bony areas    Next Steps:  -Teach patient strategies to minimize risks such as weight shifting   -Consider consults to  interdisciplinary teams such as PT and OT  Outcome: Progressing

## 2023-07-12 NOTE — ASSESSMENT & PLAN NOTE
· Noted on CT imaging  · Patient is otherwise asymptomatic  · Testing for COVID-19, respiratory syncytial virus, and influenza a and B is negative  · Urine testing for Streptococcus pneumonia and Legionella antigen testing is negative  · Procalcitonin testing is up- question reliability in the setting of having an acute kidney injury  · Continue ceftriaxone day 3 -we will transition to p.o. cefdinir at time of discharge to complete a total of 7 days

## 2023-07-12 NOTE — ACP (ADVANCE CARE PLANNING)
Advanced Care Planning Progress Note    Serious Illness Conversation    1. What is your understanding now of where you are with your illness? Prognostic Understanding: appropriate understanding of prognosis  Patient understands that he has a lot of comorbid medical conditions, he has advancing Parkinson's disease, he knows that the new diagnosis of a peripancreatic adenopathy might be related to his history of prostate cancer, and or renal cancer. He also acknowledges that he has a lot of comorbid medical conditions, and that he is having a significant deterioration in his quality of life. 2. How much information about what is likely to be ahead with your illness would you like to have? Information: patient wants to be fully informed  Patient would like to be fully informed regarding all issues that may arise regarding his medical conditions in the future. 3. What did you (clinician) communicate to the patient? Prognostic Communication: Time - I wish we were not in this situation, but I am worried that time may be as short as weeks to months. Patient has a known history of both renal cancer, and prostate cancer. MRI testing is suggestive of a peripancreatic adenopathy and the radiologist is recommending a further metastatic work-up. Patient at this time is reluctant to proceed with a metastatic work-up since he would not want any further aggressive testing and treatment. 4. If your health situation worsens, what are your most important goals? Goals: be independent, be emotionally at peace, be physically comfortable, not be a burden  Patient made it very clear that his main goal is not to be a burden on anyone since he does not want anybody waiting on him hand and foot     5. What are the biggest fears and worries about the future and your health? Fears/Worries: being a physical burden, being alone, being an emotional burden, getting treatments I do not want, loss of dignity     6.  What abilities are so critical to your life that you cannot imagine living without them? Unacceptable Function: not being myself, being in chronic severe pain     7. What gives you strength as you think about the future with your illness? The patient reported no specific strengths about the future with his illness, he just feels that he has no quality of life at this time     8. If you become sicker, how much are you willing to go through for the possibility of gaining more time? Be in the hospital: No Have a feeding tube: No   Be in the ICU: No Live in a nursing home: Yes   Be on a ventilator: No Be uncomfortable: No   Be on dialysis: No Undergo aggressive test and/or procedures: No   9. How much does your proxy and family know about your priorities and wishes? Discussion Discussion: extensive discussion with family about goals and wishes     Edvin Gilman heard you say that not being a burden on anybody is really important to you. Keeping that in mind, and what we know about your illness, I recommend that we request a hospice evaluation. This will help us make sure that your treatment plans reflect what’s important to you. How does this plan sound to you? I will do everything I can to help you through this.   Patient verbalized understanding of the plan     I have spent 45 minutes speaking with my patient on advanced care planning today or during this visit     Advanced directives  Five Wishes: Patient does not have Five Wishes- would not like information         Mode Powell MD

## 2023-07-12 NOTE — ASSESSMENT & PLAN NOTE
· Patient was laying on the floor for approximately 20 hours prior to arrival  · CPK [id number]  · Status post treatment with IV fluid  · Renal function has improved  · Continue supportive therapy  · No need for further CPK testing

## 2023-07-12 NOTE — PLAN OF CARE
Problem: OCCUPATIONAL THERAPY ADULT  Goal: Performs self-care activities at highest level of function for planned discharge setting. See evaluation for individualized goals. Description: Treatment Interventions: ADL retraining, Functional transfer training, UE strengthening/ROM, Endurance training, Patient/family training, Equipment evaluation/education, Compensatory technique education, Energy conservation, Activityengagement          See flowsheet documentation for full assessment, interventions and recommendations. Outcome: Progressing  Note: Limitation: Decreased ADL status, Decreased UE ROM, Decreased UE strength, Decreased endurance, Decreased self-care trans, Decreased high-level ADLs  Prognosis: Fair  Assessment: Patient participated in Skilled OT session this date with interventions consisting of therapeutic exercise to: increase functional use of BUEs, increase BUE muscle strength  and increase OOB/ sitting tolerance toward increased self-care accomplishment and functional mobility . Patient agreeable to OT treatment session, upon arrival patient was found seated OOB to Recliner. Patient requiring verbal cues for correct technique and frequent rest periods, and self-care assistance as noted in flow sheet/AM-PAC. Patient continues to be functioning below baseline level, occupational performance remains limited secondary to factors listed above and increased risk for falls and injury. From OT standpoint, recommendation at time of d/c would be post-acute rehabilitation services (pending goals of care decisions per scheduled meeting this date). Patient to benefit from continued Occupational Therapy treatment while in the hospital to address deficits as defined above and maximize level of functional independence with ADLs and functional mobility.      OT Discharge Recommendation: Post acute rehabilitation services        DOUGIE Espinosa/L

## 2023-07-12 NOTE — PLAN OF CARE
Problem: MOBILITY - ADULT  Goal: Maintain or return to baseline ADL function  Description: INTERVENTIONS:  -  Assess patient's ability to carry out ADLs; assess patient's baseline for ADL function and identify physical deficits which impact ability to perform ADLs (bathing, care of mouth/teeth, toileting, grooming, dressing, etc.)  - Assess/evaluate cause of self-care deficits   - Assess range of motion  - Assess patient's mobility; develop plan if impaired  - Assess patient's need for assistive devices and provide as appropriate  - Encourage maximum independence but intervene and supervise when necessary  - Involve family in performance of ADLs  - Assess for home care needs following discharge   - Consider OT consult to assist with ADL evaluation and planning for discharge  - Provide patient education as appropriate  Outcome: Progressing  Goal: Maintains/Returns to pre admission functional level  Description: INTERVENTIONS:  - Perform BMAT or MOVE assessment daily.   - Set and communicate daily mobility goal to care team and patient/family/caregiver. - Collaborate with rehabilitation services on mobility goals if consulted  - Perform Range of Motion 3 times a day. - Reposition patient every 2 hours.   - Dangle patient 3 times a day  - Stand patient 3 times a day  - Ambulate patient 3 times a day  - Out of bed to chair 3 times a day   - Out of bed for meals 3 times a day  - Out of bed for toileting  - Record patient progress and toleration of activity level   Outcome: Progressing     Problem: Prexisting or High Potential for Compromised Skin Integrity  Goal: Skin integrity is maintained or improved  Description: INTERVENTIONS:  - Identify patients at risk for skin breakdown  - Assess and monitor skin integrity  - Assess and monitor nutrition and hydration status  - Monitor labs   - Assess for incontinence   - Turn and reposition patient  - Assist with mobility/ambulation  - Relieve pressure over bony prominences  - Avoid friction and shearing  - Provide appropriate hygiene as needed including keeping skin clean and dry  - Evaluate need for skin moisturizer/barrier cream  - Collaborate with interdisciplinary team   - Patient/family teaching  - Consider wound care consult   Outcome: Progressing     Problem: Nutrition/Hydration-ADULT  Goal: Nutrient/Hydration intake appropriate for improving, restoring or maintaining nutritional needs  Description: Monitor and assess patient's nutrition/hydration status for malnutrition. Collaborate with interdisciplinary team and initiate plan and interventions as ordered. Monitor patient's weight and dietary intake as ordered or per policy. Utilize nutrition screening tool and intervene as necessary. Determine patient's food preferences and provide high-protein, high-caloric foods as appropriate.      INTERVENTIONS:  - Monitor oral intake, urinary output, labs, and treatment plans  - Assess nutrition and hydration status and recommend course of action  - Evaluate amount of meals eaten  - Assist patient with eating if necessary   - Allow adequate time for meals  - Recommend/ encourage appropriate diets, oral nutritional supplements, and vitamin/mineral supplements  - Order, calculate, and assess calorie counts as needed  - Recommend, monitor, and adjust tube feedings and TPN/PPN based on assessed needs  - Assess need for intravenous fluids  - Provide specific nutrition/hydration education as appropriate  - Include patient/family/caregiver in decisions related to nutrition  Outcome: Progressing     Problem: SKIN/TISSUE INTEGRITY - ADULT  Goal: Skin Integrity remains intact(Skin Breakdown Prevention)  Description: Assess:  -Perform Jhony assessment every shift  -Clean and moisturize skin every day  -Inspect skin when repositioning, toileting, and assisting with ADLS  -Assess under medical devices such as protective Allyven every shift  -Assess extremities for adequate circulation and sensation     Bed Management:  -Have minimal linens on bed & keep smooth, unwrinkled  -Change linens as needed when moist or perspiring  -Avoid sitting or lying in one position for more than 2 hours while in bed  -Keep HOB at 30 degrees     Toileting:  -Offer bedside commode  -Assess for incontinence every interaction  -Use incontinent care products after each incontinent episode such as wipes    Activity:  -Mobilize patient 3 times a day  -Encourage activity and walks on unit  -Encourage or provide ROM exercises   -Turn and reposition patient every 2 Hours  -Use appropriate equipment to lift or move patient in bed  -Instruct/ Assist with weight shifting every 15 [id number] when out of bed in chair  -Consider limitation of chair time 2 hour intervals    Skin Care:  -Avoid use of baby powder, tape, friction and shearing, hot water or constrictive clothing  -Relieve pressure over bony prominences using pillows/cushions  -Do not massage red bony areas    Next Steps:  -Teach patient strategies to minimize risks such as weight shifting   -Consider consults to  interdisciplinary teams such as PT and OT  Outcome: Progressing  Goal: Incision(s), wounds(s) or drain site(s) healing without S/S of infection  Description: INTERVENTIONS  - Assess and document dressing, incision, wound bed, drain sites and surrounding tissue  - Provide patient and family education  - Perform skin care/dressing changes as ordered  Outcome: Progressing  Goal: Pressure injury heals and does not worsen  Description: Interventions:  - Implement low air loss mattress or specialty surface (Criteria met)  - Apply silicone foam dressing  - Instruct/assist with weight shifting every 15 minutes when in chair   - Limit chair time to 2 hour intervals  - Use special pressure reducing interventions such as waffle cushion when in chair   - Apply fecal or urinary incontinence containment device   - Perform passive or active ROM  TID  - Turn and reposition patient & offload bony prominences every 2 hours   - Utilize friction reducing device or surface for transfers   - Consider consults to  interdisciplinary teams such as PT and OT   - Use incontinent care products after each incontinent episode such as wipes  - Consider nutrition services referral as needed  Outcome: Progressing     Problem: MUSCULOSKELETAL - ADULT  Goal: Maintain or return mobility to safest level of function  Description: INTERVENTIONS:  - Assess patient's ability to carry out ADLs; assess patient's baseline for ADL function and identify physical deficits which impact ability to perform ADLs (bathing, care of mouth/teeth, toileting, grooming, dressing, etc.)  - Assess/evaluate cause of self-care deficits   - Assess range of motion  - Assess patient's mobility  - Assess patient's need for assistive devices and provide as appropriate  - Encourage maximum independence but intervene and supervise when necessary  - Involve family in performance of ADLs  - Assess for home care needs following discharge   - Consider OT consult to assist with ADL evaluation and planning for discharge  - Provide patient education as appropriate  Outcome: Progressing  Goal: Maintain proper alignment of affected body part  Description: INTERVENTIONS:  - Support, maintain and protect limb and body alignment  - Provide patient/ family with appropriate education  Outcome: Progressing

## 2023-07-12 NOTE — PROGRESS NOTES
Pt attempted to have a bowel movement with out success. Small amount of bright blood noted in commode after getting up. Provider notified.

## 2023-07-13 LAB
ANION GAP SERPL CALCULATED.3IONS-SCNC: 7 MMOL/L
ANISOCYTOSIS BLD QL SMEAR: ABNORMAL
BASOPHILS # BLD AUTO: 0.03 THOUSANDS/ÂΜL (ref 0–0.1)
BASOPHILS NFR BLD AUTO: 0 % (ref 0–1)
BUN SERPL-MCNC: 31 MG/DL (ref 5–25)
CALCIUM SERPL-MCNC: 7.5 MG/DL (ref 8.4–10.2)
CHLORIDE SERPL-SCNC: 103 MMOL/L (ref 96–108)
CO2 SERPL-SCNC: 24 MMOL/L (ref 21–32)
CREAT SERPL-MCNC: 1.48 MG/DL (ref 0.6–1.3)
EOSINOPHIL # BLD AUTO: 0.15 THOUSAND/ÂΜL (ref 0–0.61)
EOSINOPHIL NFR BLD AUTO: 2 % (ref 0–6)
ERYTHROCYTE [DISTWIDTH] IN BLOOD BY AUTOMATED COUNT: 18.6 % (ref 11.6–15.1)
GFR SERPL CREATININE-BSD FRML MDRD: 44 ML/MIN/1.73SQ M
GLUCOSE SERPL-MCNC: 87 MG/DL (ref 65–140)
HCT VFR BLD AUTO: 26.4 % (ref 36.5–49.3)
HGB BLD-MCNC: 7.8 G/DL (ref 12–17)
IMM GRANULOCYTES # BLD AUTO: 0.05 THOUSAND/UL (ref 0–0.2)
IMM GRANULOCYTES NFR BLD AUTO: 1 % (ref 0–2)
LYMPHOCYTES # BLD AUTO: 0.6 THOUSANDS/ÂΜL (ref 0.6–4.47)
LYMPHOCYTES NFR BLD AUTO: 8 % (ref 14–44)
MCH RBC QN AUTO: 26.3 PG (ref 26.8–34.3)
MCHC RBC AUTO-ENTMCNC: 29.5 G/DL (ref 31.4–37.4)
MCV RBC AUTO: 89 FL (ref 82–98)
MONOCYTES # BLD AUTO: 0.81 THOUSAND/ÂΜL (ref 0.17–1.22)
MONOCYTES NFR BLD AUTO: 11 % (ref 4–12)
NEUTROPHILS # BLD AUTO: 5.62 THOUSANDS/ÂΜL (ref 1.85–7.62)
NEUTS SEG NFR BLD AUTO: 78 % (ref 43–75)
NRBC BLD AUTO-RTO: 0 /100 WBCS
OVALOCYTES BLD QL SMEAR: ABNORMAL
PLATELET # BLD AUTO: 137 THOUSANDS/UL (ref 149–390)
PLATELET BLD QL SMEAR: ABNORMAL
PMV BLD AUTO: 13.4 FL (ref 8.9–12.7)
POTASSIUM SERPL-SCNC: 3.9 MMOL/L (ref 3.5–5.3)
RBC # BLD AUTO: 2.97 MILLION/UL (ref 3.88–5.62)
RBC MORPH BLD: PRESENT
SODIUM SERPL-SCNC: 134 MMOL/L (ref 135–147)
WBC # BLD AUTO: 7.26 THOUSAND/UL (ref 4.31–10.16)

## 2023-07-13 PROCEDURE — 85025 COMPLETE CBC W/AUTO DIFF WBC: CPT | Performed by: HOSPITALIST

## 2023-07-13 PROCEDURE — 80048 BASIC METABOLIC PNL TOTAL CA: CPT | Performed by: HOSPITALIST

## 2023-07-13 PROCEDURE — 99232 SBSQ HOSP IP/OBS MODERATE 35: CPT | Performed by: HOSPITALIST

## 2023-07-13 PROCEDURE — 99232 SBSQ HOSP IP/OBS MODERATE 35: CPT | Performed by: INTERNAL MEDICINE

## 2023-07-13 RX ORDER — CEFDINIR 300 MG/1
300 CAPSULE ORAL EVERY 12 HOURS SCHEDULED
Status: COMPLETED | OUTPATIENT
Start: 2023-07-13 | End: 2023-07-17

## 2023-07-13 RX ADMIN — CARBIDOPA AND LEVODOPA 1 TABLET: 25; 100 TABLET ORAL at 21:36

## 2023-07-13 RX ADMIN — ACETAMINOPHEN 650 MG: 325 TABLET ORAL at 14:52

## 2023-07-13 RX ADMIN — LIDOCAINE 5% 1 PATCH: 700 PATCH TOPICAL at 08:06

## 2023-07-13 RX ADMIN — FUROSEMIDE 120 MG: 10 INJECTION, SOLUTION INTRAMUSCULAR; INTRAVENOUS at 12:10

## 2023-07-13 RX ADMIN — NYSTATIN: 100000 POWDER TOPICAL at 10:03

## 2023-07-13 RX ADMIN — ASPIRIN 81 MG CHEWABLE TABLET 81 MG: 81 TABLET CHEWABLE at 08:06

## 2023-07-13 RX ADMIN — FERROUS SULFATE TAB 325 MG (65 MG ELEMENTAL FE) 325 MG: 325 (65 FE) TAB at 07:51

## 2023-07-13 RX ADMIN — HEPARIN SODIUM 5000 UNITS: 5000 INJECTION INTRAVENOUS; SUBCUTANEOUS at 05:49

## 2023-07-13 RX ADMIN — CARBIDOPA AND LEVODOPA 1 TABLET: 25; 100 TABLET ORAL at 08:06

## 2023-07-13 RX ADMIN — CEFTRIAXONE 1000 MG: 1 INJECTION, SOLUTION INTRAVENOUS at 07:50

## 2023-07-13 RX ADMIN — CEFDINIR 300 MG: 300 CAPSULE ORAL at 21:36

## 2023-07-13 RX ADMIN — NYSTATIN: 100000 POWDER TOPICAL at 17:54

## 2023-07-13 RX ADMIN — CARBIDOPA AND LEVODOPA 1 TABLET: 25; 100 TABLET ORAL at 17:54

## 2023-07-13 RX ADMIN — GABAPENTIN 200 MG: 100 CAPSULE ORAL at 21:36

## 2023-07-13 RX ADMIN — HEPARIN SODIUM 5000 UNITS: 5000 INJECTION INTRAVENOUS; SUBCUTANEOUS at 21:36

## 2023-07-13 RX ADMIN — PANTOPRAZOLE SODIUM 40 MG: 20 TABLET, DELAYED RELEASE ORAL at 05:49

## 2023-07-13 NOTE — CASE MANAGEMENT
[address] [facility] [address] received request for authorization from Care Manager.   Authorization request for: [facility]  Facility Name: [facility]  NPI: [id number]  Facility MD:  Dr. Salas Cater: [id number]  Authorization initiated by contacting insurance: Robyn Blair Via: Availity  Pending Reference #: [id number]   Clinicals submitted via: Carlos Alberto Garrison

## 2023-07-13 NOTE — HOSPICE NOTE
Patient was approved for [facility] (at home or SNF). I met with patient and his sons at bedside to review hospice serivces. At this time they are leaning towards STR then transitioning to Rehab. I provided them with my number. I will continue to follow patient.  Dr. Kim Dempsey was updated as well as Edgar Rasheed.

## 2023-07-13 NOTE — ASSESSMENT & PLAN NOTE
· Please refer to my goals of care counseling/discussion from my progress note dated 7/12/2023 for the initial details  · Patient is status post a hospice evaluation  · The patient, and his 2 sons Ruthie Barbosa and Abby Villeda are now in agreement with hospice  · They would like for the patient to be transition to a short-term rehab facility with eventual further transition to hospice  · Case management has been made aware  · Hopeful discharge planning in 24 hours

## 2023-07-13 NOTE — PROGRESS NOTES
Progress Note - Nephrology   Isael Elliott 66 y.o. male MRN: [id number]  Unit/Bed#: -01 Encounter: [id number]    A/P:  1.  Acute kidney injury on top of chronic kidney disease              Creatinine continues to improve, now down to 1.48 mg/dL. Continue to optimize care and avoid nephrotoxins. We will continue diuresis, please refer below. 2.  Chronic kidney stage IIIa with a baseline creatinine to 1.2-1.3 mg/dL   3.  Hyperkalemia             Patient doing well on diet without potassium restriction. 4.  Mild traumatic rhabdomyolysis              Resolved  5.   Volume overload              Patient has received 80 mg of IV furosemide 2 days in a row, on July 11 and 12, will increase dosing to 120 mg IV today, patient does not wish to have longer diuresis over the course of the day and therefore will optimize dosing. Closely monitor kidney function and electrolytes, may ultimately need to extend diuresis over the course of the day as opposed to higher dosing. 6.  Left upper lobe pneumonia              Continue with antibiotics according to hospitalist recommendations, patient has a fever again this morning. Continue to monitor, clinically, otherwise he appears to be doing well at this time.   7.  Parkinson disease  8.  Peripancreatic adenopathy      Follow up reason for today's visit: Acute kidney injury/chronic kidney disease/electrolyte disorder/volume management    Acute renal failure superimposed on stage 3 chronic kidney disease [facility])    Patient Active Problem List   Diagnosis   • Coronary artery disease involving native coronary artery of native heart   • Renal insufficiency   • Dyslipidemia   • Stage 3 chronic kidney disease (HCC)   • Chronic kidney disease (CKD), stage III (moderate)   • Coronary artery disease involving native coronary artery of native heart without angina pectoris   • Carotid stenosis, right   • S/P CABG x 2   • Tremor of right hand   • Obesity   • Generalized weakness   • Dizziness   • Parkinson's disease ([address])   • Abnormal ECG   • Anemia   • Penile edema   • Swelling of right upper extremity   • Ambulatory dysfunction   • Orthostatic hypotension   • Right shoulder pain   • Insomnia   • Renal cell cancer, left (HCC)   • Peripheral edema   • Frequency of micturition   • Abnormal prostate specific antigen   • Balanitis   • Bilateral ureteral obstruction   • Calculus of kidney   • Adenocarcinoma of prostate ([address])   • Complex renal cyst   • First degree heart block   • Gastroesophageal reflux disease   • Hyperlipidemia, unspecified   • Idiopathic chronic gout, unspecified site, with tophus (tophi)   • Hypercalcinuria   • Occlusion and stenosis of right carotid artery   • Presence of left artificial knee joint   • Tremor, unspecified   • Urinary incontinence   • History of coronary artery bypass surgery   • Hypernatriuria   • Acute renal failure superimposed on stage 3 chronic kidney disease (HCC)   • Traumatic rhabdomyolysis (HCC)   • Left upper lobe pneumonia   • Adenopathy   • Thyroid nodule   • Fall   • Goals of care, counseling/discussion         Subjective:   No acute issues, patient continues to feel fatigued. Objective:     Vitals: Blood pressure 129/54, pulse 78, temperature (!) 100.9 °F (38.3 °C), resp. rate 18, height 5' 7" (1.702 m), weight 90.8 kg (200 lb 2.8 oz), SpO2 98 %. ,Body mass index is 31.35 kg/m².     Weight (last 2 days)     Date/Time Weight    07/13/23 0600 90.8 (200.18)     Weight: weighed 3x, only pillow, sheet and jose rafael on bed at 07/13/23 0600    07/12/23 0600 108 (237.44)     Weight: weight patient with the air matter machine and SCD machine off 1 pillow 1 sheet no blanket weight patient without the air matter machine got 94.2 KG at 07/12/23 0600    07/11/23 1234 107 (236.33)    07/11/23 0956 107 (236.33)            Intake/Output Summary (Last 24 hours) at 7/13/2023 0838  Last data filed at 7/13/2023 0818  Gross per 24 hour   Intake 960 ml   Output 1525 ml Net -565 ml     I/O last 3 completed shifts: In: 5 [P.O.:720]  Out: 1925 [Urine:1925]         Physical Exam: /54 (BP Location: Left arm)   Pulse 78   Temp (!) 100.9 °F (38.3 °C)   Resp 18   Ht 5' 7" (1.702 m)   Wt 90.8 kg (200 lb 2.8 oz) Comment: weighed 3x, only pillow, sheet and jose rafael on bed  SpO2 98%   BMI 31.35 kg/m²     General Appearance:    Alert, cooperative, no distress, appears stated age   Head:    Normocephalic, without obvious abnormality, atraumatic   Eyes:    Conjunctiva/corneas clear   Ears:    Normal external ears   Nose:   Nares normal, septum midline, mucosa normal, no drainage    or sinus tenderness   Throat:   Lips, mucosa, and tongue normal; teeth and gums normal   Neck:   Supple   Back:     Symmetric, no curvature, ROM normal, no CVA tenderness   Lungs:    Reduced bilaterally   Chest wall:    No tenderness or deformity   Heart:    Regular rate and rhythm, S1 and S2 normal, no murmur, rub   or gallop   Abdomen:     Soft, non-tender, bowel sounds active   Extremities:   Extremities normal, atraumatic, no cyanosis, +3 bilateral lower extremity edema   Skin:   Skin color, texture, turgor normal, no rashes or lesions   Lymph nodes:   Cervical normal   Neurologic:   CNII-XII intact            Lab, Imaging and other studies: I have personally reviewed pertinent labs. CBC:   Lab Results   Component Value Date    WBC 7.26 07/13/2023    HGB 7.8 (L) 07/13/2023    HCT 26.4 (L) 07/13/2023    MCV 89 07/13/2023     (L) 07/13/2023    RBC 2.97 (L) 07/13/2023    MCH 26.3 (L) 07/13/2023    MCHC 29.5 (L) 07/13/2023    RDW 18.6 (H) 07/13/2023    MPV 13.4 (H) 07/13/2023    NRBC 0 07/13/2023     CMP:   Lab Results   Component Value Date    K 3.9 07/13/2023     07/13/2023    CO2 24 07/13/2023    BUN 31 (H) 07/13/2023    CREATININE 1.48 (H) 07/13/2023    CALCIUM 7.5 (L) 07/13/2023    EGFR 44 07/13/2023       .   Results from last 7 days   Lab Units 07/13/23  0540 07/12/23  0630 07/11/23  0517 07/10/23  0527 07/09/23  1657   POTASSIUM mmol/L 3.9 4.9 4.6   < > 5.8*   CHLORIDE mmol/L 103 104 109*   < > 105   CO2 mmol/L 24 24 19*   < > 22   BUN mg/dL 31* 33* 38*   < > 41*   CREATININE mg/dL 1.48* 1.64* 1.99*   < > 3.01*   CALCIUM mg/dL 7.5* 7.3* 7.1*   < > 7.9*   ALK PHOS U/L  --   --   --   --  40   ALT U/L  --   --   --   --  19   AST U/L  --   --   --   --  92*    < > = values in this interval not displayed. Phosphorus: No results found for: "PHOS"  Magnesium: No results found for: "MG"  Urinalysis: No results found for: "COLORU", "CLARITYU", "Aleksey Rhymes", "PHUR", "LEUKOCYTESUR", "NITRITE", "Lamona Ankit", "GLUCOSEU", "Liliana Console", "Dee Rimes", "BLOODU"  Ionized Calcium: No results found for: "CAION"  Coagulation: No results found for: "PT", "INR", "APTT"  Troponin: No results found for: "TROPONINI"  ABG: No results found for: "PHART", "EVQ7NHZ", "PO2ART", "XMH4WFI", "Q1FCZZIY", "BEART", "SOURCE"  Radiology review:     IMAGING  No results found.     Current Facility-Administered Medications   Medication Dose Route Frequency   • acetaminophen (TYLENOL) tablet 650 mg  650 mg Oral Q6H PRN   • aspirin chewable tablet 81 mg  81 mg Oral Daily   • carbidopa-levodopa (SINEMET)  mg per tablet 1 tablet  1 tablet Oral TID   • cefTRIAXone (ROCEPHIN) IVPB (premix in dextrose) 1,000 mg 50 mL  1,000 mg Intravenous Q24H   • dextromethorphan-guaiFENesin (ROBITUSSIN DM) oral syrup 10 mL  10 mL Oral Q4H PRN   • ferrous sulfate tablet 325 mg  325 mg Oral Daily With Breakfast   • furosemide (LASIX) 120 mg in dextrose 5 % 50 mL IVPB  120 mg Intravenous Once   • gabapentin (NEURONTIN) capsule 200 mg  200 mg Oral HS   • heparin (porcine) subcutaneous injection 5,000 Units  5,000 Units Subcutaneous Q8H [address]   • lidocaine (LIDODERM) 5 % patch 1 patch  1 patch Topical Daily   • nystatin (MYCOSTATIN) powder   Topical BID   • ondansetron (ZOFRAN) injection 4 mg  4 mg Intravenous Q6H PRN   • pantoprazole (PROTONIX) EC tablet 40 mg  40 mg Oral Early Morning     Medications Discontinued During This Encounter   Medication Reason   • midodrine (PROAMATINE) tablet 5 mg    • Menthol (Topical Analgesic) 4 % GEL 1 application. • sodium chloride 0.9 % infusion    • furosemide (LASIX) injection 60 mg        Mario Salgado,       This progress note was produced in part using a dictation device which may document imprecise wording from author's original intent.

## 2023-07-13 NOTE — ASSESSMENT & PLAN NOTE
· Patient was laying on the floor for approximately 20 hours prior to arrival  · CPK [id number]  · Status post treatment with IV fluid  · Renal function has improved  · Continue supportive therapy  · No need for further CPK testing

## 2023-07-13 NOTE — PROGRESS NOTES
[address] [facility]  Progress Note  Name: Cassy Humphrey  MRN: [id number]  Unit/Bed#: -01 I Date of Admission: 7/9/2023   Date of Service: 7/13/2023 I Hospital Day: 4       · Addendum at 5 PM-notified by the nurse that the patient would like his IV out, will transition the patient to oral antibiotics  Assessment/Plan   * Acute renal failure superimposed on stage 3 chronic kidney disease ([address])  Assessment & Plan  · Present on admission  · Arrival creatinine 3.01  · Prerenal-secondary to dehydration, and rhabdomyolysis (see below)  · Status post treatment with IV fluid  · Repeat creatinine this morning is down to 1.48  · Appreciate nephrology input  · Status post treatment with IV fluid initially  · Status posttreatment with IV Lasix x2 doses over the past 2 days  · Discharge planning is in place, case management is working on STR placement after which the patient will be transition to hospice  · See below    Goals of care, counseling/discussion  Assessment & Plan  · Please refer to my goals of care counseling/discussion from my progress note dated 7/12/2023 for the initial details  · Patient is status post a hospice evaluation  · The patient, and his 2 sons Ryne Montana and Sasha Siddiqui are now in agreement with hospice  · They would like for the patient to be transition to a short-term rehab facility with eventual further transition to hospice  · Case management has been made aware  · Hopeful discharge planning in 24 hours    [address]  · Patient reports having had a mechanical  fall prior to coming into the hospital  · Trauma work-up was negative  · CT head-within normal limits  · CT cervical spine-no acute fractures and/or traumatic malalignment  · CT chest, abdomen, pelvis- no acute traumatic pathology  · Status post a PT and OT evaluation- they recommend postacute rehabilitation services  · Case management is working on STR placement    Left upper lobe pneumonia  Assessment & Plan  · Noted on CT imaging  · Patient is otherwise asymptomatic  · Testing for COVID-19, respiratory syncytial virus, and influenza a and B is negative  · Urine testing for Streptococcus pneumonia and Legionella antigen testing is negative  · Procalcitonin testing is up- question reliability in the setting of having an acute kidney injury  · Continue ceftriaxone day 4 -we will transition to p.o. cefdinir at time of discharge to complete a total of 7 days    Traumatic rhabdomyolysis [facility])  Assessment & Plan  · Patient was laying on the floor for approximately 20 hours prior to arrival  · CPK [id number]  · Status post treatment with IV fluid  · Renal function has improved  · Continue supportive therapy  · No need for further CPK testing    Parkinson's disease (HCC)  Assessment & Plan  · Continue prehospital Sinemet 25/100 mg p.o. 3 times daily and Neurontin 200 mg p.o. nightly  · Status post a PT and OT evaluation-they recommend postacute rehabilitation services  · Midodrine on hold    Adenopathy  Assessment & Plan  · Peripancreatic adenopathy noted on CT imaging concerning for possible metastatic process  · Patient has a history of renal and prostate cancer  · No further testing, treatment, and/or work-up regarding this adenopathy, the patient, and his sons understand that this could be metastatic cancer, and would like to proceed with just hospice    Anemia  Assessment & Plan  · History of iron deficiency anemia  · Dilutional drop in H&H noted  · Patient remains hemodynamically stable  · Continue ferrous sulfate    Hyperlipidemia, unspecified  Assessment & Plan  · Atorvastatin on hold in view of rhabdo    Gastroesophageal reflux disease  Assessment & Plan  · Continue Protonix    Thyroid nodule  Assessment & Plan  · Results were reviewed with the patient, and his 2 sons  · No further work-up in view of being transitioned to eventual hospice             VTE Prophylaxis:  Heparin    Patient Centered Rounds: I have performed bedside rounds with nursing staff today. Discussions with Specialists or Other Care Team Provider: Case management, nursing  Education and Discussions with Family / Patient: The patient, and his 2 sons Jesus Alberto Meléndez and Miguel A Bai were both brought up to par    Current Length of Stay: 4 day(s)    Current Patient Status: Inpatient   Certification Statement: The patient will continue to require additional inpatient hospital stay due to Need for placement    Discharge Plan: Hopeful discharge planning in 24 to 48 hours pending Case management setting up placement    Code Status: Level 3 - DNAR and DNI    Subjective:   Patient seen, resting in bed, no new complaints    Objective:     Vitals:   Temp (24hrs), Av.5 °F (38.1 °C), Min:99.5 °F (37.5 °C), Max:101.3 °F (38.5 °C)    Temp:  [99.5 °F (37.5 °C)-101.3 °F (38.5 °C)] 101.3 °F (38.5 °C)  HR:  [78-82] 82  Resp:  [18] 18  BP: (102-129)/(47-63) 128/63  SpO2:  [97 %-99 %] 97 %  Body mass index is 31.35 kg/m². Input and Output Summary (last 24 hours): Intake/Output Summary (Last 24 hours) at 2023 1450  Last data filed at 2023 1421  Gross per 24 hour   Intake 840 ml   Output 1550 ml   Net -710 ml       Physical Exam:   Physical Exam  Vitals and nursing note reviewed. Constitutional:       General: He is not in acute distress. Appearance: Normal appearance. He is not ill-appearing. HENT:      Head: Normocephalic and atraumatic. Nose: Nose normal.   Eyes:      Extraocular Movements: Extraocular movements intact. Pupils: Pupils are equal, round, and reactive to light. Cardiovascular:      Rate and Rhythm: Normal rate and regular rhythm. Pulses: Normal pulses. Heart sounds: Normal heart sounds. No murmur heard. No friction rub. No gallop. Pulmonary:      Effort: Pulmonary effort is normal.      Breath sounds: Normal breath sounds. Abdominal:      General: There is no distension. Palpations: Abdomen is soft.  There is no mass. Tenderness: There is no abdominal tenderness. There is no guarding or rebound. Musculoskeletal:         General: No swelling or tenderness. Normal range of motion. Cervical back: Normal range of motion and neck supple. No rigidity. No muscular tenderness. Right lower leg: No edema. Left lower leg: No edema. Comments: Generalized swelling has improved   Skin:     General: Skin is warm. Capillary Refill: Capillary refill takes less than 2 seconds. Findings: No erythema or rash. Neurological:      General: No focal deficit present. Mental Status: He is alert and oriented to person, place, and time. Mental status is at baseline. Psychiatric:         Mood and Affect: Mood normal.         Behavior: Behavior normal.      Comments: Flat affect         Additional Data:     Labs:    Results from last 7 days   Lab Units 07/13/23  0540   WBC Thousand/uL 7.26   HEMOGLOBIN g/dL 7.8*   HEMATOCRIT % 26.4*   PLATELETS Thousands/uL 137*   NEUTROS PCT % 78*   LYMPHS PCT % 8*   MONOS PCT % 11   EOS PCT % 2     Results from last 7 days   Lab Units 07/13/23  0540 07/10/23  0527 07/09/23  1657   SODIUM mmol/L 134*   < > 137   POTASSIUM mmol/L 3.9   < > 5.8*   CHLORIDE mmol/L 103   < > 105   CO2 mmol/L 24   < > 22   BUN mg/dL 31*   < > 41*   CREATININE mg/dL 1.48*   < > 3.01*   CALCIUM mg/dL 7.5*   < > 7.9*   ALK PHOS U/L  --   --  40   ALT U/L  --   --  19   AST U/L  --   --  92*    < > = values in this interval not displayed. Results from last 7 days   Lab Units 07/09/23  1657   INR  1.79*               * I Have Reviewed All Lab Data Listed Above. * Additional Pertinent Lab Tests Reviewed:  [facility] Admission  Reviewed    Imaging:  Imaging Reports Reviewed Today Include: None    Recent Cultures (last 7 days):     Results from last 7 days   Lab Units 07/09/23  2258   LEGIONELLA URINARY ANTIGEN  Negative       Last 24 Hours Medication List:   Current Facility-Administered Medications   Medication Dose Route Frequency Provider Last Rate   • acetaminophen  650 mg Oral Q6H PRN India Azul PA-C     • aspirin  81 mg Oral Daily India Azul PA-C     • carbidopa-levodopa  1 tablet Oral TID India Azul PA-C     • cefTRIAXone  1,000 mg Intravenous Q24H India Azul PA-C 1,000 mg (07/13/23 0750)   • dextromethorphan-guaiFENesin  10 mL Oral Q4H PRN India Azul PA-C     • ferrous sulfate  325 mg Oral Daily With Breakfast India Azul PA-C     • gabapentin  200 mg Oral HS India Azul PA-C     • heparin (porcine)  5,000 Units Subcutaneous [facility] India Azul PA-C     • lidocaine  1 patch Topical Daily India Azul PA-C     • nystatin   Topical BID India Azul PA-C     • ondansetron  4 mg Intravenous Q6H PRN India Azul PA-C     • pantoprazole  40 mg Oral Early Morning India Azul PA-C          Today, Patient Was Seen By: Juni Davalos MD    ** Please Note: Dictation voice to text software may have been used in the creation of this document.  **

## 2023-07-13 NOTE — CASE MANAGEMENT
Case Management Discharge Planning Note    Patient name Viet Garner  Location /-61 MRN [id number]  : 1945 Date 2023       Current Admission Date: 2023  Current Admission Diagnosis:Acute renal failure superimposed on stage 3 chronic kidney disease [facility])   Patient Active Problem List    Diagnosis Date Noted   • Goals of care, counseling/discussion 2023   • History of coronary artery bypass surgery 2023   • Acute renal failure superimposed on stage 3 chronic kidney disease ([address]) 2023   • Traumatic rhabdomyolysis ([address]) 2023   • Left upper lobe pneumonia 2023   • Adenopathy 2023   • Thyroid nodule 2023   • Fall 2023   • Renal cell cancer, left ([address]) 2023   • Peripheral edema 2023   • Frequency of micturition 2023   • Right shoulder pain 2023   • Insomnia 2023   • Ambulatory dysfunction 2023   • Orthostatic hypotension 2023   • Swelling of right upper extremity 2023   • Penile edema 06/10/2023   • Abnormal ECG 2023   • Anemia 2023   • Dizziness 2023   • Parkinson's disease ([address]) 2023   • Generalized weakness 2020   • S/P CABG x 2 2020   • Tremor of right hand 2020   • Obesity 2020   • Occlusion and stenosis of right carotid artery 2020   • Tremor, unspecified 2020   • Chronic kidney disease (CKD), stage III (moderate)    • Coronary artery disease involving native coronary artery of native heart without angina pectoris    • Carotid stenosis, right    • Stage 3 chronic kidney disease ([address]) 2020   • Coronary artery disease involving native coronary artery of native heart 2020   • Renal insufficiency 2020   • Dyslipidemia 2020   • Hyperlipidemia, unspecified 2020   • Adenocarcinoma of prostate ([address]) 04/10/2020   • Urinary incontinence 04/10/2020   • Hypercalcinuria 04/10/2019   • Hypernatriuria 04/10/2019 • Balanitis 03/21/2018   • Bilateral ureteral obstruction 02/16/2018   • Complex renal cyst 08/15/2017   • Idiopathic chronic gout, unspecified site, 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   • Presence of left artificial knee joint 06/09/2016   • First degree heart block 05/17/2016   • Abnormal prostate specific antigen 03/17/2016   • Calculus of kidney 06/18/2014   • Gastroesophageal reflux disease 06/17/2014      LOS (days): 4  Geometric Mean LOS (GMLOS) (days): 4.70  Days to GMLOS:1.1     OBJECTIVE:  Risk of Unplanned Readmission Score: 26.62         Current admission status: Inpatient   Preferred Pharmacy:   [facility] [address], [facility]  [address]  [address] [facility] [address]  Phone: [phone] Fax: [facility], [address] [place] [address] [address]  Phone: [phone] Fax: [phone]    Primary Care Provider: Magdalena Evans DO    Primary Insurance: Rosemary Albarado [facility]  Secondary Insurance:     DISCHARGE DETAILS:    Additional Comments: CM following for discharge planning. Chart reviewed. Jaycee from [address] to meet with son at 56 today. Final discharge disposition to be determined following same.

## 2023-07-13 NOTE — ASSESSMENT & PLAN NOTE
· Noted on CT imaging  · Patient is otherwise asymptomatic  · Testing for COVID-19, respiratory syncytial virus, and influenza a and B is negative  · Urine testing for Streptococcus pneumonia and Legionella antigen testing is negative  · Procalcitonin testing is up- question reliability in the setting of having an acute kidney injury  · Continue ceftriaxone day 4 -we will transition to p.o. cefdinir at time of discharge to complete a total of 7 days

## 2023-07-13 NOTE — ASSESSMENT & PLAN NOTE
· Results were reviewed with the patient, and his 2 sons  · No further work-up in view of being transitioned to eventual hospice

## 2023-07-13 NOTE — PLAN OF CARE
Problem: MOBILITY - ADULT  Goal: Maintain or return to baseline ADL function  Description: INTERVENTIONS:  -  Assess patient's ability to carry out ADLs; assess patient's baseline for ADL function and identify physical deficits which impact ability to perform ADLs (bathing, care of mouth/teeth, toileting, grooming, dressing, etc.)  - Assess/evaluate cause of self-care deficits   - Assess range of motion  - Assess patient's mobility; develop plan if impaired  - Assess patient's need for assistive devices and provide as appropriate  - Encourage maximum independence but intervene and supervise when necessary  - Involve family in performance of ADLs  - Assess for home care needs following discharge   - Consider OT consult to assist with ADL evaluation and planning for discharge  - Provide patient education as appropriate  Outcome: Progressing     Problem: Nutrition/Hydration-ADULT  Goal: Nutrient/Hydration intake appropriate for improving, restoring or maintaining nutritional needs  Description: Monitor and assess patient's nutrition/hydration status for malnutrition. Collaborate with interdisciplinary team and initiate plan and interventions as ordered. Monitor patient's weight and dietary intake as ordered or per policy. Utilize nutrition screening tool and intervene as necessary. Determine patient's food preferences and provide high-protein, high-caloric foods as appropriate.      INTERVENTIONS:  - Monitor oral intake, urinary output, labs, and treatment plans  - Assess nutrition and hydration status and recommend course of action  - Evaluate amount of meals eaten  - Assist patient with eating if necessary   - Allow adequate time for meals  - Recommend/ encourage appropriate diets, oral nutritional supplements, and vitamin/mineral supplements  - Order, calculate, and assess calorie counts as needed  - Recommend, monitor, and adjust tube feedings and TPN/PPN based on assessed needs  - Assess need for intravenous fluids  - Provide specific nutrition/hydration education as appropriate  - Include patient/family/caregiver in decisions related to nutrition  Outcome: Progressing     Problem: SKIN/TISSUE INTEGRITY - ADULT  Goal: Skin Integrity remains intact(Skin Breakdown Prevention)  Description: Assess:  -Perform Jhony assessment every shift  -Clean and moisturize skin every day  -Inspect skin when repositioning, toileting, and assisting with ADLS  -Assess under medical devices such as protective Allyven every shift  -Assess extremities for adequate circulation and sensation     Bed Management:  -Have minimal linens on bed & keep smooth, unwrinkled  -Change linens as needed when moist or perspiring  -Avoid sitting or lying in one position for more than 2 hours while in bed  -Keep HOB at 30 degrees     Toileting:  -Offer bedside commode  -Assess for incontinence every interaction  -Use incontinent care products after each incontinent episode such as wipes    Activity:  -Mobilize patient 3 times a day  -Encourage activity and walks on unit  -Encourage or provide ROM exercises   -Turn and reposition patient every 2 Hours  -Use appropriate equipment to lift or move patient in bed  -Instruct/ Assist with weight shifting every 15 [id number] when out of bed in chair  -Consider limitation of chair time 2 hour intervals    Skin Care:  -Avoid use of baby powder, tape, friction and shearing, hot water or constrictive clothing  -Relieve pressure over bony prominences using pillows/cushions  -Do not massage red bony areas    Next Steps:  -Teach patient strategies to minimize risks such as weight shifting   -Consider consults to  interdisciplinary teams such as PT and OT  Outcome: Progressing

## 2023-07-13 NOTE — CASE MANAGEMENT
Case Management Discharge Planning Note    Patient name Nicolle Pulse  Location /-37 MRN [id number]  : 1945 Date 2023       Current Admission Date: 2023  Current Admission Diagnosis:Acute renal failure superimposed on stage 3 chronic kidney disease [facility])   Patient Active Problem List    Diagnosis Date Noted   • Goals of care, counseling/discussion 2023   • History of coronary artery bypass surgery 2023   • Acute renal failure superimposed on stage 3 chronic kidney disease ([address]) 2023   • Traumatic rhabdomyolysis ([address]) 2023   • Left upper lobe pneumonia 2023   • Adenopathy 2023   • Thyroid nodule 2023   • Fall 2023   • Renal cell cancer, left ([address]) 2023   • Peripheral edema 2023   • Frequency of micturition 2023   • Right shoulder pain 2023   • Insomnia 2023   • Ambulatory dysfunction 2023   • Orthostatic hypotension 2023   • Swelling of right upper extremity 2023   • Penile edema 06/10/2023   • Abnormal ECG 2023   • Anemia 2023   • Dizziness 2023   • Parkinson's disease ([address]) 2023   • Generalized weakness 2020   • S/P CABG x 2 2020   • Tremor of right hand 2020   • Obesity 2020   • Occlusion and stenosis of right carotid artery 2020   • Tremor, unspecified 2020   • Chronic kidney disease (CKD), stage III (moderate)    • Coronary artery disease involving native coronary artery of native heart without angina pectoris    • Carotid stenosis, right    • Stage 3 chronic kidney disease ([address]) 2020   • Coronary artery disease involving native coronary artery of native heart 2020   • Renal insufficiency 2020   • Dyslipidemia 2020   • Hyperlipidemia, unspecified 2020   • Adenocarcinoma of prostate ([address]) 04/10/2020   • Urinary incontinence 04/10/2020   • Hypercalcinuria 04/10/2019   • Hypernatriuria 04/10/2019 • Balanitis 03/21/2018   • Bilateral ureteral obstruction 02/16/2018   • Complex renal cyst 08/15/2017   • Idiopathic chronic gout, unspecified site, 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   • Presence of left artificial knee joint 06/09/2016   • First degree heart block 05/17/2016   • Abnormal prostate specific antigen 03/17/2016   • Calculus of kidney 06/18/2014   • Gastroesophageal reflux disease 06/17/2014      LOS (days): 4  Geometric Mean LOS (GMLOS) (days): 4.70  Days to GMLOS:0.9     OBJECTIVE:  Risk of Unplanned Readmission Score: 26.68         Current admission status: Inpatient   Preferred Pharmacy:   [facility] [address]OLT [address] [facility] [address]  Robbie Landa  [facility] [facility]  [address] [place] [address]  Phone: [phone] Fax: [facility] [address]  Phone: [phone] Fax: [phone]    Primary Care Provider: Qamar Salinas DO    [facility]  Secondary Insurance:     DISCHARGE DETAILS:    Discharge planning discussed with[de-identified] pt, Jayden Serna son  Freedom of Choice: Yes          Additional Comments: Per Dr. Farhat Saul and Trini Ray from hospice pt and family do not wish to pursue hospice treatment at this time. CM spoke with pt and son Jayden Serna. Provided another copy of [facility] choice list. They have chosen [email or web address] CM will submit for insurance auth for STR at [facility] as per discussion. Dr. Farhat Saul updated.

## 2023-07-13 NOTE — ASSESSMENT & PLAN NOTE
· Peripancreatic adenopathy noted on CT imaging concerning for possible metastatic process  · Patient has a history of renal and prostate cancer  · No further testing, treatment, and/or work-up regarding this adenopathy, the patient, and his sons understand that this could be metastatic cancer, and would like to proceed with just hospice

## 2023-07-13 NOTE — ASSESSMENT & PLAN NOTE
· Present on admission  · Arrival creatinine 3.01  · Prerenal-secondary to dehydration, and rhabdomyolysis (see below)  · Status post treatment with IV fluid  · Repeat creatinine this morning is down to 1.48  · Appreciate nephrology input  · Status post treatment with IV fluid initially  · Status posttreatment with IV Lasix x2 doses over the past 2 days  · Discharge planning is in place, case management is working on STR placement after which the patient will be transition to hospice  · See below

## 2023-07-14 PROCEDURE — 99232 SBSQ HOSP IP/OBS MODERATE 35: CPT | Performed by: INTERNAL MEDICINE

## 2023-07-14 PROCEDURE — 99232 SBSQ HOSP IP/OBS MODERATE 35: CPT | Performed by: HOSPITALIST

## 2023-07-14 RX ORDER — BUMETANIDE 1 MG/1
2 TABLET ORAL ONCE
Status: COMPLETED | OUTPATIENT
Start: 2023-07-14 | End: 2023-07-14

## 2023-07-14 RX ORDER — BUMETANIDE 0.25 MG/ML
2 INJECTION INTRAMUSCULAR; INTRAVENOUS ONCE
Status: DISCONTINUED | OUTPATIENT
Start: 2023-07-14 | End: 2023-07-14

## 2023-07-14 RX ORDER — TRAMADOL HYDROCHLORIDE 50 MG/1
50 TABLET ORAL EVERY 6 HOURS PRN
Status: DISCONTINUED | OUTPATIENT
Start: 2023-07-14 | End: 2023-07-16

## 2023-07-14 RX ADMIN — BUMETANIDE 2 MG: 1 TABLET ORAL at 14:50

## 2023-07-14 RX ADMIN — GABAPENTIN 200 MG: 100 CAPSULE ORAL at 20:34

## 2023-07-14 RX ADMIN — FERROUS SULFATE TAB 325 MG (65 MG ELEMENTAL FE) 325 MG: 325 (65 FE) TAB at 07:45

## 2023-07-14 RX ADMIN — ASPIRIN 81 MG CHEWABLE TABLET 81 MG: 81 TABLET CHEWABLE at 09:30

## 2023-07-14 RX ADMIN — LIDOCAINE 5% 1 PATCH: 700 PATCH TOPICAL at 09:31

## 2023-07-14 RX ADMIN — CEFDINIR 300 MG: 300 CAPSULE ORAL at 09:30

## 2023-07-14 RX ADMIN — TRAMADOL HYDROCHLORIDE 50 MG: 50 TABLET, COATED ORAL at 20:34

## 2023-07-14 RX ADMIN — CEFDINIR 300 MG: 300 CAPSULE ORAL at 20:35

## 2023-07-14 RX ADMIN — ACETAMINOPHEN 650 MG: 325 TABLET ORAL at 07:45

## 2023-07-14 RX ADMIN — CARBIDOPA AND LEVODOPA 1 TABLET: 25; 100 TABLET ORAL at 20:35

## 2023-07-14 RX ADMIN — HEPARIN SODIUM 5000 UNITS: 5000 INJECTION INTRAVENOUS; SUBCUTANEOUS at 05:20

## 2023-07-14 RX ADMIN — CARBIDOPA AND LEVODOPA 1 TABLET: 25; 100 TABLET ORAL at 17:09

## 2023-07-14 RX ADMIN — NYSTATIN: 100000 POWDER TOPICAL at 17:09

## 2023-07-14 RX ADMIN — PANTOPRAZOLE SODIUM 40 MG: 20 TABLET, DELAYED RELEASE ORAL at 05:20

## 2023-07-14 RX ADMIN — NYSTATIN: 100000 POWDER TOPICAL at 09:31

## 2023-07-14 RX ADMIN — CARBIDOPA AND LEVODOPA 1 TABLET: 25; 100 TABLET ORAL at 09:30

## 2023-07-14 NOTE — PROGRESS NOTES
Progress Note - Nephrology   Isael Elliott 66 y.o. male MRN: [id number]  Unit/Bed#: -01 Encounter: [id number]    A/P:  1. Acute kidney injury on chronic kidney disease, creatinine trend stable at 1.4 on 7/13. Baseline 1.2 to 1.3 mg/dL. Remains hypervolemic, continue to prioritize volume status. Status post 120 mg IV Lasix on 7/13, weight is only down 1 pound. Urine output 1.6 L. He is agitated about frequent urination and this led to large bolus of once daily diuretics. He understands diuretic mechanism is to increase urination. We will attempt Bumex 2 mg IV x1 and assess response. We will try to limit frequency of diuretics for patient comfort. Fu chemistry today. 2.  Volume overload, weight only down 1 pound status post Lasix 120 mg IV. He is not on fluid or sodium restriction which is contributing to issue. Start moderate fluid restriction 2 L. He is agitated about frequent urination. We will attempt Bumex 2 mg IV to assess response. 3.  Mild traumatic rhabdomyolysis, not significant enough to cause SHANIA. No need to trend from renal perspective. 4.  Left upper lobe pneumonia, continues with fevers. Management per primary team.     5. Hypocalcemia, uncorrected Ca 7.5 on 7/13. Complicated by shania/diuresis. Check chem today. Use Ical or uncorrected Ca for replacement. Follow up reason for today's visit:     Acute renal failure superimposed on stage 3 chronic kidney disease (HCC)      Subjective:   Patient seen and examined today. He appears agitated. Reports continued upper extremity edema and lower extremity edema. He is upset that he frequently urinates with diuretics. Denies chest pain, shortness of breath. Continues with fevers. Urine output 1.6 L in past 24 hours. Weight down 1 pound. A complete 10 point review of systems was performed and is otherwise negative.     Objective:     Vitals: Blood pressure 137/61, pulse 77, temperature (!) 101 °F (38.3 °C), temperature source Axillary, resp. rate 16, height 5' 7" (1.702 m), weight 90.5 kg (199 lb 8.3 oz), SpO2 97 %. ,Body mass index is 31.25 kg/m². Weight (last 2 days)     Date/Time Weight    07/14/23 0600 90.5 (199.52)    07/13/23 0600 90.8 (200.18)     Weight: weighed 3x, only pillow, sheet and jose rafael on bed at 07/13/23 0600    07/12/23 0600 108 (237.44)     Weight: weight patient with the air matter machine and SCD machine off 1 pillow 1 sheet no blanket weight patient without the air matter machine got 94.2 KG at 07/12/23 0600            Intake/Output Summary (Last 24 hours) at 7/14/2023 0917  Last data filed at 7/14/2023 0826  Gross per 24 hour   Intake 120 ml   Output 1600 ml   Net -1480 ml     I/O last 3 completed shifts: In: 360 [P.O.:360]  Out: 1900 [Urine:1900]         Physical Exam: /61   Pulse 77   Temp (!) 101 °F (38.3 °C) (Axillary)   Resp 16   Ht 5' 7" (1.702 m)   Wt 90.5 kg (199 lb 8.3 oz)   SpO2 97%   BMI 31.25 kg/m²     General Appearance:    Alert, cooperative, no distress, appears stated age   Head:    Normocephalic, without obvious abnormality, atraumatic   Eyes:    Conjunctiva/corneas clear   Ears:    Normal external ears   Nose:   Nares normal, septum midline, mucosa normal, no drainage    or sinus tenderness   Throat:   Lips, mucosa, and tongue normal; teeth and gums normal   Neck:    Back:      Lungs:     Clear to auscultation bilaterally, respirations unlabored   Chest wall:    No tenderness or deformity   Heart:    Regular rate and rhythm, S1 and S2 normal, no murmur, rub   or gallop   Abdomen:     Soft, non-tender, bowel sounds active   Extremities:    2 + bl le edema   RUE edema    Skin:   Skin color, texture, turgor normal, no rashes or lesions   Lymph nodes:   Cervical normal   Neurologic:   CNII-XII intact            Lab, Imaging and other studies: I have personally reviewed pertinent labs.   CBC: No results found for: "WBC", "HGB", "HCT", "MCV", "PLT", "ADJUSTEDWBC", "RBC", "MCH", "MCHC", "RDW", "MPV", "NRBC"  CMP: No results found for: "NA", "K", "CL", "CO2", "ANIONGAP", "BUN", "CREATININE", "GLUCOSE", "CALCIUM", "AST", "ALT", "ALKPHOS", "PROT", "BILITOT", "EGFR"    . Results from last 7 days   Lab Units 07/13/23  0540 07/12/23  0630 07/11/23  0517 07/10/23  0527 07/09/23  1657   POTASSIUM mmol/L 3.9 4.9 4.6   < > 5.8*   CHLORIDE mmol/L 103 104 109*   < > 105   CO2 mmol/L 24 24 19*   < > 22   BUN mg/dL 31* 33* 38*   < > 41*   CREATININE mg/dL 1.48* 1.64* 1.99*   < > 3.01*   CALCIUM mg/dL 7.5* 7.3* 7.1*   < > 7.9*   ALK PHOS U/L  --   --   --   --  40   ALT U/L  --   --   --   --  19   AST U/L  --   --   --   --  92*    < > = values in this interval not displayed. Phosphorus: No results found for: "PHOS"  Magnesium: No results found for: "MG"  Urinalysis: No results found for: "COLORU", "CLARITYU", "Viveca Rosie", "PHUR", "LEUKOCYTESUR", "NITRITE", "Roosvelt Folks", "GLUCOSEU", "Alberto Feeler", "Forrestine Bakari", "BLOODU"  Ionized Calcium: No results found for: "CAION"  Coagulation: No results found for: "PT", "INR", "APTT"  Troponin: No results found for: "TROPONINI"  ABG: No results found for: "PHART", "ZUD3VNX", "PO2ART", "VBR1AKZ", "Z7PYOQMW", "BEART", "SOURCE"  Radiology review:     IMAGING  No results found.     Current Facility-Administered Medications   Medication Dose Route Frequency   • acetaminophen (TYLENOL) tablet 650 mg  650 mg Oral Q6H PRN   • aspirin chewable tablet 81 mg  81 mg Oral Daily   • bumetanide (BUMEX) injection 2 mg  2 mg Intravenous Once   • carbidopa-levodopa (SINEMET)  mg per tablet 1 tablet  1 tablet Oral TID   • cefdinir (OMNICEF) capsule 300 mg  300 mg Oral Q12H [address]   • dextromethorphan-guaiFENesin (ROBITUSSIN DM) oral syrup 10 mL  10 mL Oral Q4H PRN   • ferrous sulfate tablet 325 mg  325 mg Oral Daily With Breakfast   • gabapentin (NEURONTIN) capsule 200 mg  200 mg Oral HS   • heparin (porcine) subcutaneous injection 5,000 Units  5,000 Units Subcutaneous Q8H [facility]   • lidocaine (LIDODERM) 5 % patch 1 patch  1 patch Topical Daily   • nystatin (MYCOSTATIN) powder   Topical BID   • ondansetron (ZOFRAN) injection 4 mg  4 mg Intravenous Q6H PRN   • pantoprazole (PROTONIX) EC tablet 40 mg  40 mg Oral Early Morning     Medications Discontinued During This Encounter   Medication Reason   • midodrine (PROAMATINE) tablet 5 mg    • Menthol (Topical Analgesic) 4 % GEL 1 application. • sodium chloride 0.9 % infusion    • furosemide (LASIX) injection 60 mg    • cefTRIAXone (ROCEPHIN) IVPB (premix in dextrose) 1,000 mg 50 mL        Heide Longoe and Michelle, DO      This progress note was produced in part using a dictation device which may document imprecise wording from author's original intent.

## 2023-07-14 NOTE — ASSESSMENT & PLAN NOTE
· Present on admission  · Arrival creatinine 3.01  · Prerenal-secondary to dehydration, and rhabdomyolysis (see below)  · Status post treatment with IV fluid  · Repeat creatinine this morning is down to 1.48  · Appreciate nephrology input  · Status post treatment with IV fluid initially  · Status post treatment with IV Lasix also  · Patient is refusing blood draws, and also does not want an IV in place anymore  · Patient is agreeable to p.o. diuretics  · We will treat with p.o.  Bumex  · Case management is working on short-term rehab after which she will be transition to hospice while at the rehab facility  · Continue present management

## 2023-07-14 NOTE — CASE MANAGEMENT
Case Management Discharge Planning Note    Patient name Jamas Place  Location /-04 MRN [id number]  : 1945 Date 2023       Current Admission Date: 2023  Current Admission Diagnosis:Acute renal failure superimposed on stage 3 chronic kidney disease [facility])   Patient Active Problem List    Diagnosis Date Noted   • Goals of care, counseling/discussion 2023   • History of coronary artery bypass surgery 2023   • Acute renal failure superimposed on stage 3 chronic kidney disease ([address]) 2023   • Traumatic rhabdomyolysis ([address]) 2023   • Left upper lobe pneumonia 2023   • Adenopathy 2023   • Thyroid nodule 2023   • Fall 2023   • Renal cell cancer, left ([address]) 2023   • Peripheral edema 2023   • Frequency of micturition 2023   • Right shoulder pain 2023   • Insomnia 2023   • Ambulatory dysfunction 2023   • Orthostatic hypotension 2023   • Swelling of right upper extremity 2023   • Penile edema 06/10/2023   • Abnormal ECG 2023   • Anemia 2023   • Dizziness 2023   • Parkinson's disease ([address]) 2023   • Generalized weakness 2020   • S/P CABG x 2 2020   • Tremor of right hand 2020   • Obesity 2020   • Occlusion and stenosis of right carotid artery 2020   • Tremor, unspecified 2020   • Chronic kidney disease (CKD), stage III (moderate)    • Coronary artery disease involving native coronary artery of native heart without angina pectoris    • Carotid stenosis, right    • Stage 3 chronic kidney disease ([address]) 2020   • Coronary artery disease involving native coronary artery of native heart 2020   • Renal insufficiency 2020   • Dyslipidemia 2020   • Hyperlipidemia, unspecified 2020   • Adenocarcinoma of prostate ([address]) 04/10/2020   • Urinary incontinence 04/10/2020   • Hypercalcinuria 04/10/2019   • Hypernatriuria 04/10/2019 • Balanitis 03/21/2018   • Bilateral ureteral obstruction 02/16/2018   • Complex renal cyst 08/15/2017   • Idiopathic chronic gout, unspecified site, 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   • Presence of left artificial knee joint 06/09/2016   • First degree heart block 05/17/2016   • Abnormal prostate specific antigen 03/17/2016   • Calculus of kidney 06/18/2014   • Gastroesophageal reflux disease 06/17/2014      LOS (days): 5  Geometric Mean LOS (GMLOS) (days): 4.70  Days to GMLOS:0.2     OBJECTIVE:  Risk of Unplanned Readmission Score: 26.96         Current admission status: Inpatient   Preferred Pharmacy:   [facility] [address] [place], [place] - [address]  [address] [facility]  [address] [address]  Phone: [phone] Fax: [facility] [address] [place] [id number] [address]  Phone: [phone] Fax: [phone]    Primary Care Provider: Sade Villarreal DO    Primary Insurance: Leni Moeller [facility]  Secondary Insurance:     DISCHARGE DETAILS:    Discharge planning discussed with[de-identified] cm spoke with Katheryn Batista at [facility] at 8:09 am  Freedom of Choice: Yes  Comments - Freedom of Choice: recommendation is for rehab-=  pt & family would like rehab                               Other Referral/Resources/Interventions Provided:  Interventions: Short Term Rehab  Referral Comments: pt was accepted to [facility] family & pt's choice - auth started         Treatment Team Recommendation: Short Term Rehab ([facility] needed- TBD)                                            Additional Comments: cm spoke with Katheryn Batista in admissions at [facility]-   she is aware the authorization has been started- pt is able to come this weekend as long as we have authorization- son is doing the paperwork today at 2pm-  no covid  test is needed-  rn will receive the fax# from the fax# to send avs from the rn when report is given    Accepting Facility Name, City & State : [facility]  Receiving Facility/Agency Phone Number: [phone]  Facility/Agency Fax Number: rn will give [facility] fax# when you call for report

## 2023-07-14 NOTE — QUICK NOTE
Pt has no IV access and refusing IV/medicine /labs. Not ready for hospice yet, going to rehab first.  Examined hypervolemic. bumex 2mg PO given instead of IV. Can utilize PO diuretics. Ideally would check labs periodically on diuretics.

## 2023-07-14 NOTE — CASE MANAGEMENT
Received call from [facility] ([phone] ) at [facility] stating member has HMO plan and no OON benefits. Called Janelle back and she stated she called [facility]ran[facility] and tried multiple different NPI's and names with them and all were OON. [facility] can approved SNF auth for medical necessity but would need a par facility. [facility] is in until 3:30 to assist if new facility is reserved but if not, a VM can be left for her. CM notified.

## 2023-07-14 NOTE — PLAN OF CARE
Problem: MOBILITY - ADULT  Goal: Maintain or return to baseline ADL function  Description: INTERVENTIONS:  -  Assess patient's ability to carry out ADLs; assess patient's baseline for ADL function and identify physical deficits which impact ability to perform ADLs (bathing, care of mouth/teeth, toileting, grooming, dressing, etc.)  - Assess/evaluate cause of self-care deficits   - Assess range of motion  - Assess patient's mobility; develop plan if impaired  - Assess patient's need for assistive devices and provide as appropriate  - Encourage maximum independence but intervene and supervise when necessary  - Involve family in performance of ADLs  - Assess for home care needs following discharge   - Consider OT consult to assist with ADL evaluation and planning for discharge  - Provide patient education as appropriate  Outcome: Progressing  Goal: Maintains/Returns to pre admission functional level  Description: INTERVENTIONS:  - Perform BMAT or MOVE assessment daily.   - Set and communicate daily mobility goal to care team and patient/family/caregiver. - Collaborate with rehabilitation services on mobility goals if consulted  - Perform Range of Motion 3 times a day. - Reposition patient every 2 hours.   - Dangle patient 3 times a day  - Stand patient 3 times a day  - Ambulate patient 3 times a day  - Out of bed to chair 3 times a day   - Out of bed for meals 3 times a day  - Out of bed for toileting  - Record patient progress and toleration of activity level   Outcome: Progressing     Problem: Prexisting or High Potential for Compromised Skin Integrity  Goal: Skin integrity is maintained or improved  Description: INTERVENTIONS:  - Identify patients at risk for skin breakdown  - Assess and monitor skin integrity  - Assess and monitor nutrition and hydration status  - Monitor labs   - Assess for incontinence   - Turn and reposition patient  - Assist with mobility/ambulation  - Relieve pressure over bony prominences  - Avoid friction and shearing  - Provide appropriate hygiene as needed including keeping skin clean and dry  - Evaluate need for skin moisturizer/barrier cream  - Collaborate with interdisciplinary team   - Patient/family teaching  - Consider wound care consult   Outcome: Progressing     Problem: SKIN/TISSUE INTEGRITY - ADULT  Goal: Skin Integrity remains intact(Skin Breakdown Prevention)  Description: Assess:  -Perform Jhony assessment every shift  -Clean and moisturize skin every day  -Inspect skin when repositioning, toileting, and assisting with ADLS  -Assess under medical devices such as protective Allyven every shift  -Assess extremities for adequate circulation and sensation     Bed Management:  -Have minimal linens on bed & keep smooth, unwrinkled  -Change linens as needed when moist or perspiring  -Avoid sitting or lying in one position for more than 2 hours while in bed  -Keep HOB at 30 degrees     Toileting:  -Offer bedside commode  -Assess for incontinence every interaction  -Use incontinent care products after each incontinent episode such as wipes    Activity:  -Mobilize patient 3 times a day  -Encourage activity and walks on unit  -Encourage or provide ROM exercises   -Turn and reposition patient every 2 Hours  -Use appropriate equipment to lift or move patient in bed  -Instruct/ Assist with weight shifting every 15 [id number] when out of bed in chair  -Consider limitation of chair time 2 hour intervals    Skin Care:  -Avoid use of baby powder, tape, friction and shearing, hot water or constrictive clothing  -Relieve pressure over bony prominences using pillows/cushions  -Do not massage red bony areas    Next Steps:  -Teach patient strategies to minimize risks such as weight shifting   -Consider consults to  interdisciplinary teams such as PT and OT  Outcome: Progressing  Goal: Incision(s), wounds(s) or drain site(s) healing without S/S of infection  Description: INTERVENTIONS  - Assess and document dressing, incision, wound bed, drain sites and surrounding tissue  - Provide patient and family education  - Perform skin care/dressing changes as ordered  Outcome: Progressing  Goal: Pressure injury heals and does not worsen  Description: Interventions:  - Implement low air loss mattress or specialty surface (Criteria met)  - Apply silicone foam dressing  - Instruct/assist with weight shifting every 15 minutes when in chair   - Limit chair time to 2 hour intervals  - Use special pressure reducing interventions such as waffle cushion when in chair   - Apply fecal or urinary incontinence containment device   - Perform passive or active ROM  TID  - Turn and reposition patient & offload bony prominences every 2 hours   - Utilize friction reducing device or surface for transfers   - Consider consults to  interdisciplinary teams such as PT and OT   - Use incontinent care products after each incontinent episode such as wipes  - Consider nutrition services referral as needed  Outcome: Progressing     Problem: MUSCULOSKELETAL - ADULT  Goal: Maintain or return mobility to safest level of function  Description: INTERVENTIONS:  - Assess patient's ability to carry out ADLs; assess patient's baseline for ADL function and identify physical deficits which impact ability to perform ADLs (bathing, care of mouth/teeth, toileting, grooming, dressing, etc.)  - Assess/evaluate cause of self-care deficits   - Assess range of motion  - Assess patient's mobility  - Assess patient's need for assistive devices and provide as appropriate  - Encourage maximum independence but intervene and supervise when necessary  - Involve family in performance of ADLs  - Assess for home care needs following discharge   - Consider OT consult to assist with ADL evaluation and planning for discharge  - Provide patient education as appropriate  Outcome: Progressing  Goal: Maintain proper alignment of affected body part  Description: INTERVENTIONS:  - Support, maintain and protect limb and body alignment  - Provide patient/ family with appropriate education  Outcome: Progressing

## 2023-07-14 NOTE — ASSESSMENT & PLAN NOTE
· Patient was laying on the floor for approximately 20 hours prior to arrival  · CPK [id number]  · Status post treatment with IV fluid  · Renal function has improved  · Continue supportive therapy  · No need for further CPK testing

## 2023-07-14 NOTE — PROGRESS NOTES
[address] [facility]  Progress Note  Name: Johny Amor  MRN: [id number]  Unit/Bed#: -01 I Date of Admission: 7/9/2023   Date of Service: 7/14/2023 I Hospital Day: 5    Assessment/Plan   * Acute renal failure superimposed on stage 3 chronic kidney disease ([address])  Assessment & Plan  · Present on admission  · Arrival creatinine 3.01  · Prerenal-secondary to dehydration, and rhabdomyolysis (see below)  · Status post treatment with IV fluid  · Repeat creatinine this morning is down to 1.48  · Appreciate nephrology input  · Status post treatment with IV fluid initially  · Status post treatment with IV Lasix also  · Patient is refusing blood draws, and also does not want an IV in place anymore  · Patient is agreeable to p.o. diuretics  · We will treat with p.o.  Bumex  · Case management is working on short-term rehab after which she will be transition to hospice while at the rehab facility  · Continue present management    Goals of care, counseling/discussion  Assessment & Plan  · Please refer to my goals of care counseling/discussion from my progress note dated 7/12/2023 and 7/13/2023  · Patient will be discharged to rehab after which he will be transitioned to hospice  · Await updated placement information from case management    Fall  Assessment & Plan  · Patient reports having had a mechanical  fall prior to coming into the hospital  · Trauma work-up was negative  · CT head-within normal limits  · CT cervical spine-no acute fractures and/or traumatic malalignment  · CT chest, abdomen, pelvis- no acute traumatic pathology  · Status post a PT and OT evaluation- they recommend postacute rehabilitation services  · Case management is working on STR placement    Left upper lobe pneumonia  Assessment & Plan  · Noted on CT imaging  · Patient is otherwise asymptomatic  · Testing for COVID-19, respiratory syncytial virus, and influenza a and B is negative  · Urine testing for Streptococcus pneumonia and Legionella antigen testing is negative  · Procalcitonin testing is up- question reliability in the setting of having an acute kidney injury  · Status post initial treatment with IV cefepime, then ceftriaxone, transition to p.o. cefdinir yesterday, needs 2 more days of cefdinir    Traumatic rhabdomyolysis [facility])  Assessment & Plan  · Patient was laying on the floor for approximately 20 hours prior to arrival  · CPK [id number]  · Status post treatment with IV fluid  · Renal function has improved  · Continue supportive therapy  · No need for further CPK testing    Parkinson's disease (HCC)  Assessment & Plan  · Continue prehospital Sinemet 25/100 mg p.o. 3 times daily and Neurontin 200 mg p.o. nightly  · Status post a PT and OT evaluation-they recommend postacute rehabilitation services  · Midodrine on hold    Adenopathy  Assessment & Plan  · Peripancreatic adenopathy noted on CT imaging concerning for possible metastatic process  · Patient has a history of renal and prostate cancer  · No further testing, treatment, and/or work-up regarding this adenopathy, the patient, and his sons understand that this could be metastatic cancer, and would like to proceed with just hospice    Anemia  Assessment & Plan  · History of iron deficiency anemia  · Dilutional drop in H&H noted  · Patient remains hemodynamically stable  · Continue ferrous sulfate    Hyperlipidemia, unspecified  Assessment & Plan  · Atorvastatin on hold in view of rhabdo    Gastroesophageal reflux disease  Assessment & Plan  · Continue Protonix    Thyroid nodule  Assessment & Plan  · Results were reviewed with the patient, and his 2 sons  · No further work-up in view of being transitioned to eventual hospice             VTE Prophylaxis:  Heparin    Patient Centered Rounds: I have performed bedside rounds with nursing staff today.     Discussions with Specialists or Other Care Team Provider: Nephrology, case management, nursing  Education and Discussions with Family / Patient: Patient's son Shade Be was brought up to [facility] at 2:25 PM, all questions answered to his satisfaction    Current Length of Stay: 5 day(s)    Current Patient Status: Inpatient   Certification Statement: The patient will continue to require additional inpatient hospital stay due to The need for placement    Discharge Plan: Patient is stable for discharge, case management is working on short-term rehab options/placement    Code Status: Level 3 - DNAR and DNI    Subjective:   Patient seen, resting in bed, denies having any pain or discomfort, wants to remain in the room with the lights off, and just feels like resting    Objective:     Vitals:   Temp (24hrs), Av.7 °F (38.2 °C), Min:99.7 °F (37.6 °C), Max:101.8 °F (38.8 °C)    Temp:  [99.7 °F (37.6 °C)-101.8 °F (38.8 °C)] 100.8 °F (38.2 °C)  HR:  [77-83] 78  Resp:  [16-18] 16  BP: ()/(59-61) 137/61  SpO2:  [97 %-99 %] 99 %  Body mass index is 31.25 kg/m². Input and Output Summary (last 24 hours): Intake/Output Summary (Last 24 hours) at 2023 1422  Last data filed at 2023 1101  Gross per 24 hour   Intake 240 ml   Output 1050 ml   Net -810 ml       Physical Exam:   Physical Exam  Vitals and nursing note reviewed. Constitutional:       General: He is not in acute distress. Appearance: Normal appearance. He is not ill-appearing. HENT:      Head: Normocephalic and atraumatic. Nose: Nose normal.   Eyes:      Extraocular Movements: Extraocular movements intact. Pupils: Pupils are equal, round, and reactive to light. Cardiovascular:      Rate and Rhythm: Normal rate and regular rhythm. Pulses: Normal pulses. Heart sounds: Normal heart sounds. No murmur heard. No friction rub. No gallop. Pulmonary:      Effort: Pulmonary effort is normal.      Breath sounds: Normal breath sounds. Abdominal:      General: There is no distension. Palpations: Abdomen is soft. There is no mass. Tenderness: There is no abdominal tenderness. There is no guarding or rebound. Musculoskeletal:         General: No swelling or tenderness. Normal range of motion. Cervical back: Normal range of motion and neck supple. No rigidity. No muscular tenderness. Right lower leg: No edema. Left lower leg: No edema. Comments: Generalized anasarca   Skin:     General: Skin is warm. Capillary Refill: Capillary refill takes less than 2 seconds. Findings: No erythema or rash. Neurological:      General: No focal deficit present. Mental Status: He is alert and oriented to person, place, and time. Mental status is at baseline. Psychiatric:         Mood and Affect: Mood normal.         Behavior: Behavior normal.         Additional Data:     Labs:    Results from last 7 days   Lab Units 07/13/23  0540   WBC Thousand/uL 7.26   HEMOGLOBIN g/dL 7.8*   HEMATOCRIT % 26.4*   PLATELETS Thousands/uL 137*   NEUTROS PCT % 78*   LYMPHS PCT % 8*   MONOS PCT % 11   EOS PCT % 2     Results from last 7 days   Lab Units 07/13/23  0540 07/10/23  0527 07/09/23  1657   SODIUM mmol/L 134*   < > 137   POTASSIUM mmol/L 3.9   < > 5.8*   CHLORIDE mmol/L 103   < > 105   CO2 mmol/L 24   < > 22   BUN mg/dL 31*   < > 41*   CREATININE mg/dL 1.48*   < > 3.01*   CALCIUM mg/dL 7.5*   < > 7.9*   ALK PHOS U/L  --   --  40   ALT U/L  --   --  19   AST U/L  --   --  92*    < > = values in this interval not displayed. Results from last 7 days   Lab Units 07/09/23  1657   INR  1.79*               * I Have Reviewed All Lab Data Listed Above. * Additional Pertinent Lab Tests Reviewed:  [facility] Admission  Reviewed    Imaging:  Imaging Reports Reviewed Today Include: None    Recent Cultures (last 7 days):     Results from last 7 days   Lab Units 07/09/23  2258   LEGIONELLA URINARY ANTIGEN  Negative       Last 24 Hours Medication List:   Current Facility-Administered Medications   Medication Dose Route Frequency Provider Last Rate   • acetaminophen  650 mg Oral Q6H PRN Willie Perry PA-C     • aspirin  81 mg Oral Daily Willie Perry PA-C     • bumetanide  2 mg Oral Once Isidra Jarrell MD     • carbidopa-levodopa  1 tablet Oral TID Willie Perry PA-C     • cefdinir  300 mg Oral Q12H [facility], MD     • dextromethorphan-guaiFENesin  10 mL Oral Q4H PRN Willie Perry PA-C     • ferrous sulfate  325 mg Oral Daily With Breakfast Willie Perry PA-C     • gabapentin  200 mg Oral HS Willie Perry PA-C     • heparin (porcine)  5,000 Units Subcutaneous [facility] Willie Perry PA-C     • lidocaine  1 patch Topical Daily Willie Perry PA-C     • nystatin   Topical BID Willie Perry PA-C     • ondansetron  4 mg Intravenous Q6H PRN Willie Perry PA-C     • pantoprazole  40 mg Oral Early Morning Willie Perry PA-C          Today, Patient Was Seen By: Isidra Jarrell MD    ** Please Note: Dictation voice to text software may have been used in the creation of this document.  **

## 2023-07-14 NOTE — ASSESSMENT & PLAN NOTE
· Noted on CT imaging  · Patient is otherwise asymptomatic  · Testing for COVID-19, respiratory syncytial virus, and influenza a and B is negative  · Urine testing for Streptococcus pneumonia and Legionella antigen testing is negative  · Procalcitonin testing is up- question reliability in the setting of having an acute kidney injury  · Status post initial treatment with IV cefepime, then ceftriaxone, transition to p.o. cefdinir yesterday, needs 2 more days of cefdinir

## 2023-07-14 NOTE — PLAN OF CARE
Problem: SKIN/TISSUE INTEGRITY - ADULT  Goal: Skin Integrity remains intact(Skin Breakdown Prevention)  Description: Assess:  -Perform Jhony assessment every shift  -Clean and moisturize skin every day  -Inspect skin when repositioning, toileting, and assisting with ADLS  -Assess under medical devices such as protective Allyven every shift  -Assess extremities for adequate circulation and sensation     Bed Management:  -Have minimal linens on bed & keep smooth, unwrinkled  -Change linens as needed when moist or perspiring  -Avoid sitting or lying in one position for more than 2 hours while in bed  -Keep HOB at 30 degrees     Toileting:  -Offer bedside commode  -Assess for incontinence every interaction  -Use incontinent care products after each incontinent episode such as wipes    Activity:  -Mobilize patient 3 times a day  -Encourage activity and walks on unit  -Encourage or provide ROM exercises   -Turn and reposition patient every 2 Hours  -Use appropriate equipment to lift or move patient in bed  -Instruct/ Assist with weight shifting every 15 [id number] when out of bed in chair  -Consider limitation of chair time 2 hour intervals    Skin Care:  -Avoid use of baby powder, tape, friction and shearing, hot water or constrictive clothing  -Relieve pressure over bony prominences using pillows/cushions  -Do not massage red bony areas    Next Steps:  -Teach patient strategies to minimize risks such as weight shifting   -Consider consults to  interdisciplinary teams such as PT and OT  Outcome: Progressing

## 2023-07-14 NOTE — CASE MANAGEMENT
Case Management Discharge Planning Note    Patient name Anne-Marie Keller  Location /-19 MRN [id number]  : 1945 Date 2023       Current Admission Date: 2023  Current Admission Diagnosis:Acute renal failure superimposed on stage 3 chronic kidney disease [facility])   Patient Active Problem List    Diagnosis Date Noted   • Goals of care, counseling/discussion 2023   • History of coronary artery bypass surgery 2023   • Acute renal failure superimposed on stage 3 chronic kidney disease ([address]) 2023   • Traumatic rhabdomyolysis ([address]) 2023   • Left upper lobe pneumonia 2023   • Adenopathy 2023   • Thyroid nodule 2023   • Fall 2023   • Renal cell cancer, left ([address]) 2023   • Peripheral edema 2023   • Frequency of micturition 2023   • Right shoulder pain 2023   • Insomnia 2023   • Ambulatory dysfunction 2023   • Orthostatic hypotension 2023   • Swelling of right upper extremity 2023   • Penile edema 06/10/2023   • Abnormal ECG 2023   • Anemia 2023   • Dizziness 2023   • Parkinson's disease ([address]) 2023   • Generalized weakness 2020   • S/P CABG x 2 2020   • Tremor of right hand 2020   • Obesity 2020   • Occlusion and stenosis of right carotid artery 2020   • Tremor, unspecified 2020   • Chronic kidney disease (CKD), stage III (moderate)    • Coronary artery disease involving native coronary artery of native heart without angina pectoris    • Carotid stenosis, right    • Stage 3 chronic kidney disease ([address]) 2020   • Coronary artery disease involving native coronary artery of native heart 2020   • Renal insufficiency 2020   • Dyslipidemia 2020   • Hyperlipidemia, unspecified 2020   • Adenocarcinoma of prostate ([address]) 04/10/2020   • Urinary incontinence 04/10/2020   • Hypercalcinuria 04/10/2019   • Hypernatriuria 04/10/2019 • Balanitis 03/21/2018   • Bilateral ureteral obstruction 02/16/2018   • Complex renal cyst 08/15/2017   • Idiopathic chronic gout, unspecified site, 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   • Presence of left artificial knee joint 06/09/2016   • First degree heart block 05/17/2016   • Abnormal prostate specific antigen 03/17/2016   • Calculus of kidney 06/18/2014   • Gastroesophageal reflux disease 06/17/2014      LOS (days): 5  Geometric Mean LOS (GMLOS) (days): 4.70  Days to GMLOS:-0.3     OBJECTIVE:  Risk of Unplanned Readmission Score: 27.08         Current admission status: Inpatient   Preferred Pharmacy:   [facility] [address] - [address] [facility]  [address] [facility]  [address] [address]  Phone: [phone] Fax: [address] - [address] [place]  Phone: [phone] Fax: [phone]    Primary Care Provider: Mj Booker DO    Primary Insurance: [facility] REP  Secondary Insurance:     DISCHARGE DETAILS:    Discharge planning discussed with[de-identified] cm placed a tena to angelito son at 14:02pm and he stated he will meet with brenda butcher to discuss a new d/c plan  Freedom of Choice: Yes  Comments - Freedom of Choice: recommendation is for rehab-  pt wa accepted to [facility]- authorization process was started- cm receieved a message that [facility] is not in network with his plan and the family needs to pick a new facility  CM contacted family/caregiver?: Yes             Contacts  Patient Contacts: Katherine Scott son [phone]   Relationship to Patient[de-identified] Family  Contact Method: Phone, In Person  Phone Number: [phone] met with son at 17:30pm  Reason/Outcome: Discharge [address] [facility]         [facility] the [facility] No    DME Referral Provided  Referral made for DME?: No    Other Referral/Resources/Interventions Provided:  Interventions: Short Term Rehab  Referral Comments: pt needs a new snf- cm called [facility] at 14:21 spoke ti Heaven Pacheco at 14:21pm - she gave cm a list of facilities in network near where the pt lives- Heaven Mclaughlin is on the list family would like them - [facility] cannot accept until Monday  new Eloisa Parris will be needed    Would you like to participate in our [facility] service program?  : No - Declined    Treatment Team Recommendation: Short Term Rehab (snf rehab  new auth needed- TBD)

## 2023-07-14 NOTE — ASSESSMENT & PLAN NOTE
· Please refer to my goals of care counseling/discussion from my progress note dated 7/12/2023 and 7/13/2023  · Patient will be discharged to rehab after which he will be transitioned to hospice  · Await updated placement information from case management

## 2023-07-15 LAB
ANION GAP SERPL CALCULATED.3IONS-SCNC: 8 MMOL/L
BASOPHILS # BLD AUTO: 0.04 THOUSANDS/ÂΜL (ref 0–0.1)
BASOPHILS NFR BLD AUTO: 1 % (ref 0–1)
BUN SERPL-MCNC: 32 MG/DL (ref 5–25)
CALCIUM SERPL-MCNC: 7.6 MG/DL (ref 8.4–10.2)
CHLORIDE SERPL-SCNC: 99 MMOL/L (ref 96–108)
CO2 SERPL-SCNC: 27 MMOL/L (ref 21–32)
CREAT SERPL-MCNC: 1.35 MG/DL (ref 0.6–1.3)
EOSINOPHIL # BLD AUTO: 0.07 THOUSAND/ÂΜL (ref 0–0.61)
EOSINOPHIL NFR BLD AUTO: 1 % (ref 0–6)
ERYTHROCYTE [DISTWIDTH] IN BLOOD BY AUTOMATED COUNT: 18.2 % (ref 11.6–15.1)
GFR SERPL CREATININE-BSD FRML MDRD: 49 ML/MIN/1.73SQ M
GLUCOSE SERPL-MCNC: 100 MG/DL (ref 65–140)
HCT VFR BLD AUTO: 25.2 % (ref 36.5–49.3)
HGB BLD-MCNC: 7.7 G/DL (ref 12–17)
IMM GRANULOCYTES # BLD AUTO: 0.03 THOUSAND/UL (ref 0–0.2)
IMM GRANULOCYTES NFR BLD AUTO: 0 % (ref 0–2)
LYMPHOCYTES # BLD AUTO: 0.63 THOUSANDS/ÂΜL (ref 0.6–4.47)
LYMPHOCYTES NFR BLD AUTO: 8 % (ref 14–44)
MAGNESIUM SERPL-MCNC: 1.3 MG/DL (ref 1.9–2.7)
MCH RBC QN AUTO: 26.8 PG (ref 26.8–34.3)
MCHC RBC AUTO-ENTMCNC: 30.6 G/DL (ref 31.4–37.4)
MCV RBC AUTO: 88 FL (ref 82–98)
MONOCYTES # BLD AUTO: 1.01 THOUSAND/ÂΜL (ref 0.17–1.22)
MONOCYTES NFR BLD AUTO: 13 % (ref 4–12)
NEUTROPHILS # BLD AUTO: 6.07 THOUSANDS/ÂΜL (ref 1.85–7.62)
NEUTS SEG NFR BLD AUTO: 77 % (ref 43–75)
NRBC BLD AUTO-RTO: 0 /100 WBCS
PLATELET # BLD AUTO: 151 THOUSANDS/UL (ref 149–390)
PMV BLD AUTO: 11.8 FL (ref 8.9–12.7)
POTASSIUM SERPL-SCNC: 3.9 MMOL/L (ref 3.5–5.3)
RBC # BLD AUTO: 2.87 MILLION/UL (ref 3.88–5.62)
SODIUM SERPL-SCNC: 134 MMOL/L (ref 135–147)
WBC # BLD AUTO: 7.85 THOUSAND/UL (ref 4.31–10.16)

## 2023-07-15 PROCEDURE — 99232 SBSQ HOSP IP/OBS MODERATE 35: CPT | Performed by: HOSPITALIST

## 2023-07-15 PROCEDURE — 83735 ASSAY OF MAGNESIUM: CPT | Performed by: HOSPITALIST

## 2023-07-15 PROCEDURE — 85025 COMPLETE CBC W/AUTO DIFF WBC: CPT | Performed by: HOSPITALIST

## 2023-07-15 PROCEDURE — 99232 SBSQ HOSP IP/OBS MODERATE 35: CPT | Performed by: INTERNAL MEDICINE

## 2023-07-15 PROCEDURE — 80048 BASIC METABOLIC PNL TOTAL CA: CPT | Performed by: HOSPITALIST

## 2023-07-15 RX ORDER — BUMETANIDE 1 MG/1
2 TABLET ORAL 2 TIMES DAILY
Status: DISCONTINUED | OUTPATIENT
Start: 2023-07-15 | End: 2023-07-17

## 2023-07-15 RX ADMIN — HEPARIN SODIUM 5000 UNITS: 5000 INJECTION INTRAVENOUS; SUBCUTANEOUS at 21:37

## 2023-07-15 RX ADMIN — CARBIDOPA AND LEVODOPA 1 TABLET: 25; 100 TABLET ORAL at 16:59

## 2023-07-15 RX ADMIN — FERROUS SULFATE TAB 325 MG (65 MG ELEMENTAL FE) 325 MG: 325 (65 FE) TAB at 09:09

## 2023-07-15 RX ADMIN — PANTOPRAZOLE SODIUM 40 MG: 20 TABLET, DELAYED RELEASE ORAL at 05:36

## 2023-07-15 RX ADMIN — NYSTATIN: 100000 POWDER TOPICAL at 17:00

## 2023-07-15 RX ADMIN — CARBIDOPA AND LEVODOPA 1 TABLET: 25; 100 TABLET ORAL at 20:23

## 2023-07-15 RX ADMIN — CEFDINIR 300 MG: 300 CAPSULE ORAL at 09:09

## 2023-07-15 RX ADMIN — GABAPENTIN 200 MG: 100 CAPSULE ORAL at 21:37

## 2023-07-15 RX ADMIN — BUMETANIDE 2 MG: 1 TABLET ORAL at 20:23

## 2023-07-15 RX ADMIN — LIDOCAINE 5% 1 PATCH: 700 PATCH TOPICAL at 09:09

## 2023-07-15 RX ADMIN — TRAMADOL HYDROCHLORIDE 50 MG: 50 TABLET, COATED ORAL at 17:09

## 2023-07-15 RX ADMIN — CARBIDOPA AND LEVODOPA 1 TABLET: 25; 100 TABLET ORAL at 09:09

## 2023-07-15 RX ADMIN — NYSTATIN: 100000 POWDER TOPICAL at 09:10

## 2023-07-15 RX ADMIN — ASPIRIN 81 MG CHEWABLE TABLET 81 MG: 81 TABLET CHEWABLE at 09:09

## 2023-07-15 RX ADMIN — ACETAMINOPHEN 650 MG: 325 TABLET ORAL at 23:17

## 2023-07-15 RX ADMIN — CEFDINIR 300 MG: 300 CAPSULE ORAL at 20:23

## 2023-07-15 RX ADMIN — HEPARIN SODIUM 5000 UNITS: 5000 INJECTION INTRAVENOUS; SUBCUTANEOUS at 14:41

## 2023-07-15 RX ADMIN — HEPARIN SODIUM 5000 UNITS: 5000 INJECTION INTRAVENOUS; SUBCUTANEOUS at 05:37

## 2023-07-15 NOTE — PROGRESS NOTES
Progress Note - Nephrology   Raegan Runjacquelin 66 y.o. male MRN: [id number]  Unit/Bed#: -01 Encounter: [id number]    A/P:  1. Acute kidney injury superimposed on chronic kidney disease   - Creatinine has trended down to 1.35 mg/dL which is at his baseline   - He still appears mildly volume overloaded. Lungs are clear but are distant. He does have edema   -  -has no IV access   - Case discussed with Dr. Arianne Montesinos. Will increase diuretic therapy and try to diurese him over the weekend. .  Bumex 2 mg twice a day p.o. will be given   - He will be going to an SNF on Monday   - Obtain morning labs and daily weight    2. Volume overload   - as above   - continue fluid restriction    3. History of mild traumatic rhabdomyolysis   - stable    4. PARKER pneumonia   - treated by primary team    5.   Peripancreatic lymphadenopathy   - Has a history of renal cell cancer   - No further treatment desired and he will be going to an SNF on hospice likely        Follow up reason for today's visit: Acute kidney injury superimposed on chronic kidney disease    Acute renal failure superimposed on stage 3 chronic kidney disease [facility])    Patient Active Problem List   Diagnosis   • Coronary artery disease involving native coronary artery of native heart   • Renal insufficiency   • Dyslipidemia   • Stage 3 chronic kidney disease (HCC)   • Chronic kidney disease (CKD), stage III (moderate)   • Coronary artery disease involving native coronary artery of native heart without angina pectoris   • Carotid stenosis, right   • S/P CABG x 2   • Tremor of right hand   • Obesity   • Generalized weakness   • Dizziness   • Parkinson's disease (HCC)   • Abnormal ECG   • Anemia   • Penile edema   • Swelling of right upper extremity   • Ambulatory dysfunction   • Orthostatic hypotension   • Right shoulder pain   • Insomnia   • Renal cell cancer, left (HCC)   • Peripheral edema   • Frequency of micturition   • Abnormal prostate specific antigen   • Balanitis   • Bilateral ureteral obstruction   • Calculus of kidney   • Adenocarcinoma of prostate ([address])   • Complex renal cyst   • First degree heart block   • Gastroesophageal reflux disease   • Hyperlipidemia, unspecified   • Idiopathic chronic gout, unspecified site, with tophus (tophi)   • Hypercalcinuria   • Occlusion and stenosis of right carotid artery   • Presence of left artificial knee joint   • Tremor, unspecified   • Urinary incontinence   • History of coronary artery bypass surgery   • Hypernatriuria   • Acute renal failure superimposed on stage 3 chronic kidney disease (HCC)   • Traumatic rhabdomyolysis ([address])   • Left upper lobe pneumonia   • Adenopathy   • Thyroid nodule   • Fall   • Goals of care, counseling/discussion         Subjective:   A 10 point ROS is neg. Says breathing is a little better    Objective:     Vitals: Blood pressure 112/53, pulse 76, temperature 100.3 °F (37.9 °C), temperature source Axillary, resp. rate 18, height 5' 7" (1.702 m), weight 82.7 kg (182 lb 5.1 oz), SpO2 97 %. ,Body mass index is 28.56 kg/m². Weight (last 2 days)     Date/Time Weight    07/15/23 0600 82.7 (182.32)    07/14/23 0600 90.5 (199.52)    07/13/23 0600 90.8 (200.18)     Weight: weighed 3x, only pillow, sheet and jose rafael on bed at 07/13/23 0600            Intake/Output Summary (Last 24 hours) at 7/15/2023 1140  Last data filed at 7/15/2023 0900  Gross per 24 hour   Intake 480 ml   Output 1770 ml   Net -1290 ml     I/O last 3 completed shifts:   In: 480 [P.O.:480]  Out: 2050 [Urine:2050]         Physical Exam: /53   Pulse 76   Temp 100.3 °F (37.9 °C) (Axillary)   Resp 18   Ht 5' 7" (1.702 m)   Wt 82.7 kg (182 lb 5.1 oz)   SpO2 97%   BMI 28.56 kg/m²     General Appearance:    Alert, ill appearing and pale cooperative, no distress, appears stated age   Head:    Normocephalic, without obvious abnormality, atraumatic   Eyes:    Conjunctiva/corneas clear   Ears:    Normal external ears   Nose:   Nares normal, septum midline, mucosa normal, no drainage    or sinus tenderness   Throat:   Lips, mucosa, and tongue normal; teeth and gums normal   Neck:   Supple, symmetrical, trachea midline, no adenopathy;        thyroid:  No enlargement/tenderness/nodules; no carotid    bruit or JVD   Back:     Symmetric, no curvature, ROM normal, no CVA tenderness   Lungs:     Clear to auscultation bilaterally, respirations unlabored   Chest wall:    No tenderness or deformity   Heart:    Regular rate and rhythm, S1 and S2 normal, no murmur, rub   or gallop   Abdomen:     Soft, non-tender, bowel sounds active   Extremities:   Extremities normal, atraumatic, no cyanosis has 2+ edema   Skin:   Skin color, texture, turgor normal, no rashes or lesions   Lymph nodes:   Cervical normal   Neurologic:   CNII-XII intact            Lab, Imaging and other studies: I have personally reviewed pertinent labs. CBC:   Lab Results   Component Value Date    WBC 7.85 07/15/2023    HGB 7.7 (L) 07/15/2023    HCT 25.2 (L) 07/15/2023    MCV 88 07/15/2023     07/15/2023    RBC 2.87 (L) 07/15/2023    MCH 26.8 07/15/2023    MCHC 30.6 (L) 07/15/2023    RDW 18.2 (H) 07/15/2023    MPV 11.8 07/15/2023    NRBC 0 07/15/2023     CMP:   Lab Results   Component Value Date    K 3.9 07/15/2023    CL 99 07/15/2023    CO2 27 07/15/2023    BUN 32 (H) 07/15/2023    CREATININE 1.35 (H) 07/15/2023    CALCIUM 7.6 (L) 07/15/2023    EGFR 49 07/15/2023       . Results from last 7 days   Lab Units 07/15/23  0529 07/13/23  0540 07/12/23  0630 07/10/23  0527 07/09/23  1657   POTASSIUM mmol/L 3.9 3.9 4.9   < > 5.8*   CHLORIDE mmol/L 99 103 104   < > 105   CO2 mmol/L 27 24 24   < > 22   BUN mg/dL 32* 31* 33*   < > 41*   CREATININE mg/dL 1.35* 1.48* 1.64*   < > 3.01*   CALCIUM mg/dL 7.6* 7.5* 7.3*   < > 7.9*   ALK PHOS U/L  --   --   --   --  40   ALT U/L  --   --   --   --  19   AST U/L  --   --   --   --  92*    < > = values in this interval not displayed. Phosphorus: No results found for: "PHOS"  Magnesium:   Lab Results   Component Value Date    MG 1.3 (L) 07/15/2023     Urinalysis: No results found for: "COLORU", "CLARITYU", "Dartha Orn", "PHUR", "LEUKOCYTESUR", "NITRITE", "PROTEINUA", "GLUCOSEU", "Guinevere Inoue", "BILIRUBINUR", "BLOODU"  Ionized Calcium: No results found for: "CAION"  Coagulation: No results found for: "PT", "INR", "APTT"  Troponin: No results found for: "TROPONINI"  ABG: No results found for: "PHART", "EXC9VJG", "PO2ART", "DLB8HRC", "I7LRLNCB", "BEART", "SOURCE"  Radiology review:     IMAGING  No results found. Current Facility-Administered Medications   Medication Dose Route Frequency   • acetaminophen (TYLENOL) tablet 650 mg  650 mg Oral Q6H PRN   • aspirin chewable tablet 81 mg  81 mg Oral Daily   • carbidopa-levodopa (SINEMET)  mg per tablet 1 tablet  1 tablet Oral TID   • cefdinir (OMNICEF) capsule 300 mg  300 mg Oral Q12H [address]   • dextromethorphan-guaiFENesin (ROBITUSSIN DM) oral syrup 10 mL  10 mL Oral Q4H PRN   • ferrous sulfate tablet 325 mg  325 mg Oral Daily With Breakfast   • gabapentin (NEURONTIN) capsule 200 mg  200 mg Oral HS   • heparin (porcine) subcutaneous injection 5,000 Units  5,000 Units Subcutaneous Q8H [address]   • lidocaine (LIDODERM) 5 % patch 1 patch  1 patch Topical Daily   • nystatin (MYCOSTATIN) powder   Topical BID   • ondansetron (ZOFRAN) injection 4 mg  4 mg Intravenous Q6H PRN   • pantoprazole (PROTONIX) EC tablet 40 mg  40 mg Oral Early Morning   • traMADol (ULTRAM) tablet 50 mg  50 mg Oral Q6H PRN     Medications Discontinued During This Encounter   Medication Reason   • midodrine (PROAMATINE) tablet 5 mg    • Menthol (Topical Analgesic) 4 % GEL 1 application.     • sodium chloride 0.9 % infusion    • furosemide (LASIX) injection 60 mg    • cefTRIAXone (ROCEPHIN) IVPB (premix in dextrose) 1,000 mg 50 mL    • bumetanide (BUMEX) injection 2 mg        Ken Chand MD      This progress note was produced in part using a dictation device which may document imprecise wording from author's original intent.

## 2023-07-15 NOTE — ASSESSMENT & PLAN NOTE
· Noted on CT imaging  · Patient is otherwise asymptomatic  · Testing for COVID-19, respiratory syncytial virus, and influenza a and B is negative  · Urine testing for Streptococcus pneumonia and Legionella antigen testing is negative  · Procalcitonin testing is up- question reliability in the setting of having an acute kidney injury  · Status post treatment with IV cefepime, and IV ceftriaxone, patient to finish up cefdinir in the next day

## 2023-07-15 NOTE — PROGRESS NOTES
[address]  Progress Note  Name: Jose E Bustos  MRN: [id number]  Unit/Bed#: -01 I Date of Admission: 7/9/2023   Date of Service: 7/15/2023 I Hospital Day: 6    Assessment/Plan   * Acute renal failure superimposed on stage 3 chronic kidney disease ([address])  Assessment & Plan  · Present on admission  · Arrival creatinine 3.01  · Prerenal-secondary to dehydration, and rhabdomyolysis (see below)  · Status post treatment with IV fluid initially  · Creatinine is down to 1.35  · Status post a nephrology evaluation, status post treatment with IV Lasix  · Patient remains in a state of generalized anasarca  · Continue Bumex 2 mg p.o. twice daily  · Discharge planning to rehab as soon as case management set set up, after which the patient will be transitioned to hospice    Goals of care, counseling/discussion  Assessment & Plan  · Please refer to my goals of care counseling/discussion from my progress note dated 7/12/2023 and 7/13/2023  · Patient will be discharged to rehab after which he will be transitioned to hospice  · Await updated placement information from case management    Fall  Assessment & Plan  · Patient reports having had a mechanical  fall prior to coming into the hospital  · Trauma work-up was negative  · CT head-within normal limits  · CT cervical spine-no acute fractures and/or traumatic malalignment  · CT chest, abdomen, pelvis- no acute traumatic pathology  · Status post a PT and OT evaluation- they recommend postacute rehabilitation services  · Case management is working on STR placement    Left upper lobe pneumonia  Assessment & Plan  · Noted on CT imaging  · Patient is otherwise asymptomatic  · Testing for COVID-19, respiratory syncytial virus, and influenza a and B is negative  · Urine testing for Streptococcus pneumonia and Legionella antigen testing is negative  · Procalcitonin testing is up- question reliability in the setting of having an acute kidney injury  · Status post treatment with IV cefepime, and IV ceftriaxone, patient to finish up cefdinir in the next day    Traumatic rhabdomyolysis [facility])  Assessment & Plan  · Patient was laying on the floor for approximately 20 hours prior to arrival  · CPK [id number]  · Status post treatment with IV fluid  · Renal function has improved  · Continue supportive therapy  · No need for further CPK testing    Parkinson's disease (HCC)  Assessment & Plan  · Continue prehospital Sinemet 25/100 mg p.o. 3 times daily and Neurontin 200 mg p.o. nightly  · Status post a PT and OT evaluation-they recommend postacute rehabilitation services  · Midodrine on hold    Adenopathy  Assessment & Plan  · Peripancreatic adenopathy noted on CT imaging concerning for possible metastatic process  · Patient has a history of renal and prostate cancer  · No further testing, treatment, and/or work-up regarding this adenopathy, the patient, and his sons understand that this could be metastatic cancer, and would like to proceed with just hospice    Anemia  Assessment & Plan  · History of iron deficiency anemia  · Dilutional drop in H&H noted  · Patient remains hemodynamically stable  · Continue ferrous sulfate    Hyperlipidemia, unspecified  Assessment & Plan  · Atorvastatin on hold in view of rhabdo    Gastroesophageal reflux disease  Assessment & Plan  · Continue Protonix    Thyroid nodule  Assessment & Plan  · Results were reviewed with the patient, and his 2 sons  · No further work-up in view of being transitioned to eventual hospice             VTE Prophylaxis:  Heparin    Patient Centered Rounds: I have performed bedside rounds with nursing staff today.     Discussions with Specialists or Other Care Team Provider: Nephrology, case management, nursing  Education and Discussions with Family / Patient: Patient was brought up to par    Current Length of Stay: 6 day(s)    Current Patient Status: Inpatient   Certification Statement: The patient will continue to require additional inpatient hospital stay due to The need for placement    Discharge Plan: Patient is medically stable for discharge to a short-term rehab facility, case management is working on placement    Code Status: Level 3 - DNAR and DNI    Subjective:   Patient seen, resting in bed, continues to feel tired, denies pain or discomfort otherwise    Objective:     Vitals:   Temp (24hrs), Av.7 °F (37.6 °C), Min:99 °F (37.2 °C), Max:100.3 °F (37.9 °C)    Temp:  [99 °F (37.2 °C)-100.3 °F (37.9 °C)] 100.3 °F (37.9 °C)  HR:  [75-93] 76  Resp:  [17-20] 18  BP: (104-155)/(53-73) 112/53  SpO2:  [96 %-98 %] 97 %  Body mass index is 28.56 kg/m². Input and Output Summary (last 24 hours): Intake/Output Summary (Last 24 hours) at 7/15/2023 1157  Last data filed at 7/15/2023 0900  Gross per 24 hour   Intake 480 ml   Output 1770 ml   Net -1290 ml       Physical Exam:   Physical Exam  Vitals and nursing note reviewed. Constitutional:       General: He is not in acute distress. Appearance: Normal appearance. He is not ill-appearing. HENT:      Head: Normocephalic and atraumatic. Nose: Nose normal.   Eyes:      Extraocular Movements: Extraocular movements intact. Pupils: Pupils are equal, round, and reactive to light. Cardiovascular:      Rate and Rhythm: Normal rate and regular rhythm. Pulses: Normal pulses. Heart sounds: Normal heart sounds. No murmur heard. No friction rub. No gallop. Pulmonary:      Effort: Pulmonary effort is normal.      Breath sounds: Normal breath sounds. Abdominal:      General: There is no distension. Palpations: Abdomen is soft. There is no mass. Tenderness: There is no abdominal tenderness. There is no guarding or rebound. Musculoskeletal:         General: No swelling or tenderness. Normal range of motion. Cervical back: Normal range of motion and neck supple. No rigidity. No muscular tenderness.       Right lower leg: No edema. Left lower leg: No edema. Comments: Generalized anasarca persist   Skin:     General: Skin is warm. Capillary Refill: Capillary refill takes less than 2 seconds. Findings: No erythema or rash. Neurological:      General: No focal deficit present. Mental Status: He is alert and oriented to person, place, and time. Mental status is at baseline. Psychiatric:         Mood and Affect: Mood normal.         Behavior: Behavior normal.         Additional Data:     Labs:    Results from last 7 days   Lab Units 07/15/23  0529   WBC Thousand/uL 7.85   HEMOGLOBIN g/dL 7.7*   HEMATOCRIT % 25.2*   PLATELETS Thousands/uL 151   NEUTROS PCT % 77*   LYMPHS PCT % 8*   MONOS PCT % 13*   EOS PCT % 1     Results from last 7 days   Lab Units 07/15/23  0529 07/10/23  0527 07/09/23  1657   SODIUM mmol/L 134*   < > 137   POTASSIUM mmol/L 3.9   < > 5.8*   CHLORIDE mmol/L 99   < > 105   CO2 mmol/L 27   < > 22   BUN mg/dL 32*   < > 41*   CREATININE mg/dL 1.35*   < > 3.01*   CALCIUM mg/dL 7.6*   < > 7.9*   ALK PHOS U/L  --   --  40   ALT U/L  --   --  19   AST U/L  --   --  92*    < > = values in this interval not displayed. Results from last 7 days   Lab Units 07/09/23  1657   INR  1.79*               * I Have Reviewed All Lab Data Listed Above. * Additional Pertinent Lab Tests Reviewed:  [address] [place] [address] Admission  Reviewed    Imaging:  Imaging Reports Reviewed Today Include:      Recent Cultures (last 7 days):     Results from last 7 days   Lab Units 07/09/23  2258   LEGIONELLA URINARY ANTIGEN  Negative       Last 24 Hours Medication List:   Current Facility-Administered Medications   Medication Dose Route Frequency Provider Last Rate   • acetaminophen  650 mg Oral Q6H PRN Maite Mark PA-C     • aspirin  81 mg Oral Daily Maite Mark PA-C     • bumetanide  2 mg Oral BID Sherly Franco MD     • carbidopa-levodopa  1 tablet Oral TID Maite Mark PA-C     • cefdinir  300 mg Oral Q12H [address], [place]     • dextromethorphan-guaiFENesin  10 mL Oral Q4H PRN Leander Mcdermott PA-C     • ferrous sulfate  325 mg Oral Daily With Breakfast Leander Mcdermott PA-C     • gabapentin  200 mg Oral HS Leander Mcdermott PA-C     • heparin (porcine)  5,000 Units Subcutaneous [facility] Leander Mcdermott PA-C     • lidocaine  1 patch Topical Daily Leander Mcdermott PA-C     • nystatin   Topical BID Leander Mcdermott PA-C     • ondansetron  4 mg Intravenous Q6H PRN Leander Mcdermott PA-C     • pantoprazole  40 mg Oral Early Morning Leander Mcdermott PA-C     • traMADol  50 mg Oral Q6H PRN Edison Hart PA-C          Today, Patient Was Seen By: Moy Moran MD    ** Please Note: Dictation voice to text software may have been used in the creation of this document.  **

## 2023-07-15 NOTE — PLAN OF CARE
Problem: MUSCULOSKELETAL - ADULT  Goal: Maintain or return mobility to safest level of function  Description: INTERVENTIONS:  - Assess patient's ability to carry out ADLs; assess patient's baseline for ADL function and identify physical deficits which impact ability to perform ADLs (bathing, care of mouth/teeth, toileting, grooming, dressing, etc.)  - Assess/evaluate cause of self-care deficits   - Assess range of motion  - Assess patient's mobility  - Assess patient's need for assistive devices and provide as appropriate  - Encourage maximum independence but intervene and supervise when necessary  - Involve family in performance of ADLs  - Assess for home care needs following discharge   - Consider OT consult to assist with ADL evaluation and planning for discharge  - Provide patient education as appropriate  Outcome: Progressing  Goal: Maintain proper alignment of affected body part  Description: INTERVENTIONS:  - Support, maintain and protect limb and body alignment  - Provide patient/ family with appropriate education  Outcome: Progressing

## 2023-07-15 NOTE — NURSING NOTE
Pt repositioned with good skin care done, dr John Cruz made aware of unable to give bumes dt bp, no other changes at this time

## 2023-07-15 NOTE — ASSESSMENT & PLAN NOTE
· Present on admission  · Arrival creatinine 3.01  · Prerenal-secondary to dehydration, and rhabdomyolysis (see below)  · Status post treatment with IV fluid initially  · Creatinine is down to 1.35  · Status post a nephrology evaluation, status post treatment with IV Lasix  · Patient remains in a state of generalized anasarca  · Continue Bumex 2 mg p.o. twice daily  · Discharge planning to rehab as soon as case management set set up, after which the patient will be transitioned to hospice

## 2023-07-15 NOTE — PLAN OF CARE
Problem: MOBILITY - ADULT  Goal: Maintain or return to baseline ADL function  Description: INTERVENTIONS:  -  Assess patient's ability to carry out ADLs; assess patient's baseline for ADL function and identify physical deficits which impact ability to perform ADLs (bathing, care of mouth/teeth, toileting, grooming, dressing, etc.)  - Assess/evaluate cause of self-care deficits   - Assess range of motion  - Assess patient's mobility; develop plan if impaired  - Assess patient's need for assistive devices and provide as appropriate  - Encourage maximum independence but intervene and supervise when necessary  - Involve family in performance of ADLs  - Assess for home care needs following discharge   - Consider OT consult to assist with ADL evaluation and planning for discharge  - Provide patient education as appropriate  Outcome: Progressing  Goal: Maintains/Returns to pre admission functional level  Description: INTERVENTIONS:  - Perform BMAT or MOVE assessment daily.   - Set and communicate daily mobility goal to care team and patient/family/caregiver. - Collaborate with rehabilitation services on mobility goals if consulted  - Perform Range of Motion 3 times a day. - Reposition patient every 2 hours.   - Dangle patient 3 times a day  - Stand patient 3 times a day  - Ambulate patient 3 times a day  - Out of bed to chair 3 times a day   - Out of bed for meals 3 times a day  - Out of bed for toileting  - Record patient progress and toleration of activity level   Outcome: Progressing     Problem: Prexisting or High Potential for Compromised Skin Integrity  Goal: Skin integrity is maintained or improved  Description: INTERVENTIONS:  - Identify patients at risk for skin breakdown  - Assess and monitor skin integrity  - Assess and monitor nutrition and hydration status  - Monitor labs   - Assess for incontinence   - Turn and reposition patient  - Assist with mobility/ambulation  - Relieve pressure over bony prominences  - Avoid friction and shearing  - Provide appropriate hygiene as needed including keeping skin clean and dry  - Evaluate need for skin moisturizer/barrier cream  - Collaborate with interdisciplinary team   - Patient/family teaching  - Consider wound care consult   Outcome: Progressing     Problem: Nutrition/Hydration-ADULT  Goal: Nutrient/Hydration intake appropriate for improving, restoring or maintaining nutritional needs  Description: Monitor and assess patient's nutrition/hydration status for malnutrition. Collaborate with interdisciplinary team and initiate plan and interventions as ordered. Monitor patient's weight and dietary intake as ordered or per policy. Utilize nutrition screening tool and intervene as necessary. Determine patient's food preferences and provide high-protein, high-caloric foods as appropriate.      INTERVENTIONS:  - Monitor oral intake, urinary output, labs, and treatment plans  - Assess nutrition and hydration status and recommend course of action  - Evaluate amount of meals eaten  - Assist patient with eating if necessary   - Allow adequate time for meals  - Recommend/ encourage appropriate diets, oral nutritional supplements, and vitamin/mineral supplements  - Order, calculate, and assess calorie counts as needed  - Recommend, monitor, and adjust tube feedings and TPN/PPN based on assessed needs  - Assess need for intravenous fluids  - Provide specific nutrition/hydration education as appropriate  - Include patient/family/caregiver in decisions related to nutrition  Outcome: Progressing     Problem: SKIN/TISSUE INTEGRITY - ADULT  Goal: Skin Integrity remains intact(Skin Breakdown Prevention)  Description: Assess:  -Perform Jhony assessment every shift  -Clean and moisturize skin every day  -Inspect skin when repositioning, toileting, and assisting with ADLS  -Assess under medical devices such as protective Allyven every shift  -Assess extremities for adequate circulation and sensation     Bed Management:  -Have minimal linens on bed & keep smooth, unwrinkled  -Change linens as needed when moist or perspiring  -Avoid sitting or lying in one position for more than 2 hours while in bed  -Keep HOB at 30 degrees     Toileting:  -Offer bedside commode  -Assess for incontinence every interaction  -Use incontinent care products after each incontinent episode such as wipes    Activity:  -Mobilize patient 3 times a day  -Encourage activity and walks on unit  -Encourage or provide ROM exercises   -Turn and reposition patient every 2 Hours  -Use appropriate equipment to lift or move patient in bed  -Instruct/ Assist with weight shifting every 15 [id number] when out of bed in chair  -Consider limitation of chair time 2 hour intervals    Skin Care:  -Avoid use of baby powder, tape, friction and shearing, hot water or constrictive clothing  -Relieve pressure over bony prominences using pillows/cushions  -Do not massage red bony areas    Next Steps:  -Teach patient strategies to minimize risks such as weight shifting   -Consider consults to  interdisciplinary teams such as PT and OT  Outcome: Progressing  Goal: Incision(s), wounds(s) or drain site(s) healing without S/S of infection  Description: INTERVENTIONS  - Assess and document dressing, incision, wound bed, drain sites and surrounding tissue  - Provide patient and family education  - Perform skin care/dressing changes as ordered  Outcome: Progressing  Goal: Pressure injury heals and does not worsen  Description: Interventions:  - Implement low air loss mattress or specialty surface (Criteria met)  - Apply silicone foam dressing  - Instruct/assist with weight shifting every 15 minutes when in chair   - Limit chair time to 2 hour intervals  - Use special pressure reducing interventions such as waffle cushion when in chair   - Apply fecal or urinary incontinence containment device   - Perform passive or active ROM  TID  - Turn and reposition patient & offload bony prominences every 2 hours   - Utilize friction reducing device or surface for transfers   - Consider consults to  interdisciplinary teams such as PT and OT   - Use incontinent care products after each incontinent episode such as wipes  - Consider nutrition services referral as needed  Outcome: Progressing     Problem: MUSCULOSKELETAL - ADULT  Goal: Maintain or return mobility to safest level of function  Description: INTERVENTIONS:  - Assess patient's ability to carry out ADLs; assess patient's baseline for ADL function and identify physical deficits which impact ability to perform ADLs (bathing, care of mouth/teeth, toileting, grooming, dressing, etc.)  - Assess/evaluate cause of self-care deficits   - Assess range of motion  - Assess patient's mobility  - Assess patient's need for assistive devices and provide as appropriate  - Encourage maximum independence but intervene and supervise when necessary  - Involve family in performance of ADLs  - Assess for home care needs following discharge   - Consider OT consult to assist with ADL evaluation and planning for discharge  - Provide patient education as appropriate  Outcome: Progressing  Goal: Maintain proper alignment of affected body part  Description: INTERVENTIONS:  - Support, maintain and protect limb and body alignment  - Provide patient/ family with appropriate education  Outcome: Progressing     Problem: METABOLIC, FLUID AND ELECTROLYTES - ADULT  Goal: Fluid balance maintained  Description: INTERVENTIONS:  - Monitor labs   - Monitor I/O and WT  - Instruct patient on fluid and nutrition as appropriate  - Assess for signs & symptoms of volume excess or deficit  Outcome: Progressing

## 2023-07-15 NOTE — ASSESSMENT & PLAN NOTE
· Patient was laying on the floor for approximately 20 hours prior to arrival  · CPK [id number]  · Status post treatment with IV fluid  · Renal function has improved  · Continue supportive therapy  · No need for further CPK testing

## 2023-07-16 PROCEDURE — 99232 SBSQ HOSP IP/OBS MODERATE 35: CPT | Performed by: INTERNAL MEDICINE

## 2023-07-16 PROCEDURE — 99232 SBSQ HOSP IP/OBS MODERATE 35: CPT | Performed by: HOSPITALIST

## 2023-07-16 RX ORDER — OXYCODONE HYDROCHLORIDE 5 MG/1
5 TABLET ORAL EVERY 6 HOURS PRN
Status: DISCONTINUED | OUTPATIENT
Start: 2023-07-16 | End: 2023-07-17 | Stop reason: HOSPADM

## 2023-07-16 RX ADMIN — PANTOPRAZOLE SODIUM 40 MG: 20 TABLET, DELAYED RELEASE ORAL at 05:38

## 2023-07-16 RX ADMIN — FERROUS SULFATE TAB 325 MG (65 MG ELEMENTAL FE) 325 MG: 325 (65 FE) TAB at 09:15

## 2023-07-16 RX ADMIN — HEPARIN SODIUM 5000 UNITS: 5000 INJECTION INTRAVENOUS; SUBCUTANEOUS at 21:22

## 2023-07-16 RX ADMIN — HEPARIN SODIUM 5000 UNITS: 5000 INJECTION INTRAVENOUS; SUBCUTANEOUS at 05:38

## 2023-07-16 RX ADMIN — CEFDINIR 300 MG: 300 CAPSULE ORAL at 09:20

## 2023-07-16 RX ADMIN — GABAPENTIN 200 MG: 100 CAPSULE ORAL at 21:22

## 2023-07-16 RX ADMIN — CARBIDOPA AND LEVODOPA 1 TABLET: 25; 100 TABLET ORAL at 17:11

## 2023-07-16 RX ADMIN — CEFDINIR 300 MG: 300 CAPSULE ORAL at 21:22

## 2023-07-16 RX ADMIN — BUMETANIDE 2 MG: 1 TABLET ORAL at 09:15

## 2023-07-16 RX ADMIN — TRAMADOL HYDROCHLORIDE 50 MG: 50 TABLET, COATED ORAL at 14:49

## 2023-07-16 RX ADMIN — OXYCODONE HYDROCHLORIDE 5 MG: 5 TABLET ORAL at 21:22

## 2023-07-16 RX ADMIN — LIDOCAINE 5% 1 PATCH: 700 PATCH TOPICAL at 09:15

## 2023-07-16 RX ADMIN — NYSTATIN: 100000 POWDER TOPICAL at 09:18

## 2023-07-16 RX ADMIN — ASPIRIN 81 MG CHEWABLE TABLET 81 MG: 81 TABLET CHEWABLE at 09:15

## 2023-07-16 RX ADMIN — NYSTATIN: 100000 POWDER TOPICAL at 17:11

## 2023-07-16 RX ADMIN — CARBIDOPA AND LEVODOPA 1 TABLET: 25; 100 TABLET ORAL at 21:22

## 2023-07-16 RX ADMIN — CARBIDOPA AND LEVODOPA 1 TABLET: 25; 100 TABLET ORAL at 09:18

## 2023-07-16 RX ADMIN — HEPARIN SODIUM 5000 UNITS: 5000 INJECTION INTRAVENOUS; SUBCUTANEOUS at 15:03

## 2023-07-16 NOTE — PROGRESS NOTES
[address]  Progress Note  Name: Nithin Leon  MRN: [id number]  Unit/Bed#: -01 I Date of Admission: 7/9/2023   Date of Service: 7/16/2023 I Hospital Day: 7    Assessment/Plan   * Acute renal failure superimposed on stage 3 chronic kidney disease ([address])  Assessment & Plan  · Present on admission  · Arrival creatinine 3.01  · Prerenal-secondary to dehydration, and rhabdomyolysis (see below)  · Status post treatment with IV fluid initially  · Creatinine is down to 1.35  · Patient refused further blood work today on 7/16/2023  · Status post a nephrology evaluation, status post treatment with IV Lasix  · Patient remains in a state of generalized anasarca  · Continue Bumex 2 mg p.o. twice daily  · Discharge planning to rehab should be set up on 7/17/2023 by case management , after which the patient will be transitioned to hospice    Goals of care, counseling/discussion  Assessment & Plan  · Please refer to my goals of care counseling/discussion from my progress note dated 7/12/2023 and 7/13/2023  · Patient will be discharged to rehab after which he will be transitioned to hospice  · Await updated placement information from case management    Fall  Assessment & Plan  · Patient reports having had a mechanical  fall prior to coming into the hospital  · Trauma work-up was negative  · CT head-within normal limits  · CT cervical spine-no acute fractures and/or traumatic malalignment  · CT chest, abdomen, pelvis- no acute traumatic pathology  · Status post a PT and OT evaluation- they recommend postacute rehabilitation services  · Case management is working on STR placement    Left upper lobe pneumonia  Assessment & Plan  · Noted on CT imaging  · Patient is otherwise asymptomatic  · Testing for COVID-19, respiratory syncytial virus, and influenza a and B is negative  · Urine testing for Streptococcus pneumonia and Legionella antigen testing is negative  · Procalcitonin testing is up- question reliability in the setting of having an acute kidney injury  · Status post a completed course of IV ceftriaxone followed up by p.o. cefdinir. No further antibiotics needed    Traumatic rhabdomyolysis [facility])  Assessment & Plan  · Patient was laying on the floor for approximately 20 hours prior to arrival  · CPK [id number]  · Status post treatment with IV fluid  · Renal function has improved  · Continue supportive therapy  · No need for further CPK testing    Parkinson's disease (HCC)  Assessment & Plan  · Continue prehospital Sinemet 25/100 mg p.o. 3 times daily and Neurontin 200 mg p.o. nightly  · Status post a PT and OT evaluation-they recommend postacute rehabilitation services  · Midodrine on hold    Adenopathy  Assessment & Plan  · Peripancreatic adenopathy noted on CT imaging concerning for possible metastatic process  · Patient has a history of renal and prostate cancer  · No further testing, treatment, and/or work-up regarding this adenopathy, the patient, and his sons understand that this could be metastatic cancer, and would like to proceed with just hospice    Anemia  Assessment & Plan  · History of iron deficiency anemia  · Dilutional drop in H&H noted  · Patient remains hemodynamically stable  · Continue ferrous sulfate    Hyperlipidemia, unspecified  Assessment & Plan  · Atorvastatin on hold in view of rhabdo    Gastroesophageal reflux disease  Assessment & Plan  · Continue Protonix    Thyroid nodule  Assessment & Plan  · Results were reviewed with the patient, and his 2 sons  · No further work-up in view of being transitioned to eventual hospice             VTE Prophylaxis:  Heparin    Patient Centered Rounds: I have performed bedside rounds with nursing staff today.     Discussions with Specialists or Other Care Team Provider: Nephrology, nursing  Education and Discussions with Family / Patient: Patient's son Chet Tavarez was brought up to par at 12:30 PM, all questions answered to his satisfaction    Current Length of Stay: 7 day(s)    Current Patient Status: Inpatient   Certification Statement: The patient, admitted on an observation basis, will now require > 2 midnight hospital stay due to The need for placement    Discharge Plan: Hopeful discharge planning for 2023 if Case management sets up postacute short-term rehab    Code Status: Level 3 - DNAR and DNI    Subjective:   Patient seen, resting in bed, no new complaints. He is actually asking if he can be directly transition to hospice upon arrival into the rehab    Objective:     Vitals:   Temp (24hrs), Av.3 °F (37.4 °C), Min:97.4 °F (36.3 °C), Max:100.9 °F (38.3 °C)    Temp:  [97.4 °F (36.3 °C)-100.9 °F (38.3 °C)] 97.4 °F (36.3 °C)  HR:  [73-83] 82  Resp:  [17-18] 18  BP: (100-147)/(47-61) 141/47  SpO2:  [94 %-98 %] 97 %  Body mass index is 28.07 kg/m². Input and Output Summary (last 24 hours): Intake/Output Summary (Last 24 hours) at 2023 1226  Last data filed at 2023 1001  Gross per 24 hour   Intake 360 ml   Output 1100 ml   Net -740 ml       Physical Exam:   Physical Exam  Vitals and nursing note reviewed. Constitutional:       General: He is not in acute distress. Appearance: Normal appearance. He is not ill-appearing. HENT:      Head: Normocephalic and atraumatic. Nose: Nose normal.   Eyes:      Extraocular Movements: Extraocular movements intact. Pupils: Pupils are equal, round, and reactive to light. Cardiovascular:      Rate and Rhythm: Normal rate and regular rhythm. Pulses: Normal pulses. Heart sounds: Normal heart sounds. No murmur heard. No friction rub. No gallop. Pulmonary:      Effort: Pulmonary effort is normal.      Breath sounds: Normal breath sounds. Abdominal:      General: There is no distension. Palpations: Abdomen is soft. There is no mass. Tenderness: There is no abdominal tenderness. There is no guarding or rebound.    Musculoskeletal: General: No swelling or tenderness. Normal range of motion. Cervical back: Normal range of motion and neck supple. No rigidity. No muscular tenderness. Right lower leg: No edema. Left lower leg: No edema. Comments: Generalized anasarca   Skin:     General: Skin is warm. Capillary Refill: Capillary refill takes less than 2 seconds. Findings: No erythema or rash. Neurological:      General: No focal deficit present. Mental Status: He is alert and oriented to person, place, and time. Mental status is at baseline. Psychiatric:         Mood and Affect: Mood normal.         Behavior: Behavior normal.         Additional Data:     Labs:    Results from last 7 days   Lab Units 07/15/23  0529   WBC Thousand/uL 7.85   HEMOGLOBIN g/dL 7.7*   HEMATOCRIT % 25.2*   PLATELETS Thousands/uL 151   NEUTROS PCT % 77*   LYMPHS PCT % 8*   MONOS PCT % 13*   EOS PCT % 1     Results from last 7 days   Lab Units 07/15/23  0529 07/10/23  0527 07/09/23  1657   SODIUM mmol/L 134*   < > 137   POTASSIUM mmol/L 3.9   < > 5.8*   CHLORIDE mmol/L 99   < > 105   CO2 mmol/L 27   < > 22   BUN mg/dL 32*   < > 41*   CREATININE mg/dL 1.35*   < > 3.01*   CALCIUM mg/dL 7.6*   < > 7.9*   ALK PHOS U/L  --   --  40   ALT U/L  --   --  19   AST U/L  --   --  92*    < > = values in this interval not displayed. Results from last 7 days   Lab Units 07/09/23  1657   INR  1.79*               * I Have Reviewed All Lab Data Listed Above. * Additional Pertinent Lab Tests Reviewed:  [facility] Admission  Reviewed    Imaging:  Imaging Reports Reviewed Today Include: None    Recent Cultures (last 7 days):     Results from last 7 days   Lab Units 07/09/23  2258   LEGIONELLA URINARY ANTIGEN  Negative       Last 24 Hours Medication List:   Current Facility-Administered Medications   Medication Dose Route Frequency Provider Last Rate   • acetaminophen  650 mg Oral Q6H PRN Shon Beverage, PA-C     • aspirin  81 mg Oral Daily Kaushik Morrissey PA-C     • bumetanide  2 mg Oral BID Virginia Moeller MD     • carbidopa-levodopa  1 tablet Oral TID Kaushik Morrissey PA-C     • cefdinir  300 mg Oral Q12H [address], MD     • dextromethorphan-guaiFENesin  10 mL Oral Q4H PRN Kaushik Morrissey PA-C     • ferrous sulfate  325 mg Oral Daily With Breakfast Kaushik Morrissey PA-C     • gabapentin  200 mg Oral HS Kaushik Morrissey PA-C     • heparin (porcine)  5,000 Units Subcutaneous [facility] Kaushik Morrissey PA-C     • lidocaine  1 patch Topical Daily Kaushik Morrissey PA-C     • nystatin   Topical BID Kaushik Morrissey PA-C     • ondansetron  4 mg Intravenous Q6H PRN Kaushik Morrissey PA-C     • pantoprazole  40 mg Oral Early Morning Kaushik Morrissey PA-C     • traMADol  50 mg Oral Q6H PRN Rossy Torres PA-C          Today, Patient Was Seen By: Ora Simon MD    ** Please Note: Dictation voice to text software may have been used in the creation of this document.  **

## 2023-07-16 NOTE — PROGRESS NOTES
Progress Note - Nephrology   Angela Toro 66 y.o. male MRN: [id number]  Unit/Bed#: -01 Encounter: [id number]    A/P:  1. Acute kidney injury superimposed on chronic kidney disease stage IIIb   - Creatinine is stable at 1.35 mg/dL today   - Nonoliguric: 600/1020 (-3722)   - He still has lower extremity edema and right arm edema. .  Has no IV access   - Had a response to Bumex 2 mg twice a day and will continue current therapy   - Obtain morning labs    2. Chronic kidney disease stage III   - Baseline creatinine 1.2 to 1.3 mg/dL    3. Volume overload   - As above. Continue fluid restriction and diuretics    4. Mild traumatic rhabdomyolysis   - Stable    5. Left upper lobe pneumonia   - Change to Omnicef 300 p.o. every 12 hours    6. Peripancreatic lymphadenopathy   - He does have a history of renal cell cancer.   He has refused further treatment and will be going to a SNF on hospice likely      Follow up reason for today's visit: Acute kidney injury superimposed on chronic kidney disease/volume overload    Acute renal failure superimposed on stage 3 chronic kidney disease [facility])    Patient Active Problem List   Diagnosis   • Coronary artery disease involving native coronary artery of native heart   • Renal insufficiency   • Dyslipidemia   • Stage 3 chronic kidney disease (HCC)   • Chronic kidney disease (CKD), stage III (moderate)   • Coronary artery disease involving native coronary artery of native heart without angina pectoris   • Carotid stenosis, right   • S/P CABG x 2   • Tremor of right hand   • Obesity   • Generalized weakness   • Dizziness   • Parkinson's disease (HCC)   • Abnormal ECG   • Anemia   • Penile edema   • Swelling of right upper extremity   • Ambulatory dysfunction   • Orthostatic hypotension   • Right shoulder pain   • Insomnia   • Renal cell cancer, left (HCC)   • Peripheral edema   • Frequency of micturition   • Abnormal prostate specific antigen   • Balanitis   • Bilateral ureteral obstruction   • Calculus of kidney   • Adenocarcinoma of prostate ([address])   • Complex renal cyst   • First degree heart block   • Gastroesophageal reflux disease   • Hyperlipidemia, unspecified   • Idiopathic chronic gout, unspecified site, with tophus (tophi)   • Hypercalcinuria   • Occlusion and stenosis of right carotid artery   • Presence of left artificial knee joint   • Tremor, unspecified   • Urinary incontinence   • History of coronary artery bypass surgery   • Hypernatriuria   • Acute renal failure superimposed on stage 3 chronic kidney disease (HCC)   • Traumatic rhabdomyolysis ([address])   • Left upper lobe pneumonia   • Adenopathy   • Thyroid nodule   • Fall   • Goals of care, counseling/discussion         Subjective:   A 10 point ROS is negative. He appears depressed and irritable today    Objective:     Vitals: Blood pressure (!) 141/47, pulse 82, temperature (!) 97.4 °F (36.3 °C), temperature source Temporal, resp. rate 18, height 5' 7" (1.702 m), weight 81.3 kg (179 lb 3.7 oz), SpO2 97 %. ,Body mass index is 28.07 kg/m². Weight (last 2 days)     Date/Time Weight    07/16/23 0600 81.3 (179.23)    07/15/23 0600 82.7 (182.32)    07/14/23 0600 90.5 (199.52)            Intake/Output Summary (Last 24 hours) at 7/16/2023 1137  Last data filed at 7/16/2023 1001  Gross per 24 hour   Intake 360 ml   Output 1100 ml   Net -740 ml     I/O last 3 completed shifts:   In: 600 [P.O.:600]  Out: 1570 [Urine:1570]         Physical Exam: BP (!) 141/47   Pulse 82   Temp (!) 97.4 °F (36.3 °C) (Temporal)   Resp 18   Ht 5' 7" (1.702 m)   Wt 81.3 kg (179 lb 3.7 oz)   SpO2 97%   BMI 28.07 kg/m²     General Appearance:    Alert, cooperative, no distress, appears stated age   Head:    Normocephalic, without obvious abnormality, atraumatic   Eyes:    Conjunctiva/corneas clear   Ears:    Normal external ears   Nose:   Nares normal, septum midline, mucosa normal, no drainage    or sinus tenderness   Throat:   Lips, mucosa, and tongue normal; teeth and gums normal   Neck:   Supple, symmetrical, trachea midline, no adenopathy;        thyroid:  No enlargement/tenderness/nodules; no carotid    bruit or JVD   Back:     Symmetric, no curvature, ROM normal, no CVA tenderness   Lungs:     Clear to auscultation bilaterally, respirations unlabored   Chest wall:    No tenderness or deformity   Heart:    Regular rate and rhythm, S1 and S2 normal, no murmur, rub   or gallop   Abdomen:     Soft, non-tender, bowel sounds active   Extremities:   Extremities normal, atraumatic, no cyanosis 2+  edema of legs and swollen right arm   Skin:   Skin color, texture, turgor normal, no rashes or lesions   Lymph nodes:   Cervical normal   Neurologic:   CNII-XII intact            Lab, Imaging and other studies: I have personally reviewed pertinent labs. CBC: No results found for: "WBC", "HGB", "HCT", "MCV", "PLT", "ADJUSTEDWBC", "RBC", "MCH", "MCHC", "RDW", "MPV", "NRBC"  CMP: No results found for: "NA", "K", "CL", "CO2", "ANIONGAP", "BUN", "CREATININE", "GLUCOSE", "CALCIUM", "AST", "ALT", "ALKPHOS", "PROT", "BILITOT", "EGFR"    . Results from last 7 days   Lab Units 07/15/23  0529 07/13/23  0540 07/12/23  0630 07/10/23  0527 07/09/23  1657   POTASSIUM mmol/L 3.9 3.9 4.9   < > 5.8*   CHLORIDE mmol/L 99 103 104   < > 105   CO2 mmol/L 27 24 24   < > 22   BUN mg/dL 32* 31* 33*   < > 41*   CREATININE mg/dL 1.35* 1.48* 1.64*   < > 3.01*   CALCIUM mg/dL 7.6* 7.5* 7.3*   < > 7.9*   ALK PHOS U/L  --   --   --   --  40   ALT U/L  --   --   --   --  19   AST U/L  --   --   --   --  92*    < > = values in this interval not displayed.          Phosphorus: No results found for: "PHOS"  Magnesium: No results found for: "MG"  Urinalysis: No results found for: "COLORU", "CLARITYU", "Martinez Moises", "PHUR", "LEUKOCYTESUR", "NITRITE", "Randee Sensor", "GLUCOSEU", "Jigna Eddie", "Jay Malloy", "BLOODU"  Ionized Calcium: No results found for: "CAION"  Coagulation: No results found for: "PT", "INR", "APTT"  Troponin: No results found for: "TROPONINI"  ABG: No results found for: "PHART", "POL6AGY", "PO2ART", "ZLM9JRK", "L8RBVQVP", "BEART", "SOURCE"  Radiology review:     IMAGING  No results found. Current Facility-Administered Medications   Medication Dose Route Frequency   • acetaminophen (TYLENOL) tablet 650 mg  650 mg Oral Q6H PRN   • aspirin chewable tablet 81 mg  81 mg Oral Daily   • bumetanide (BUMEX) tablet 2 mg  2 mg Oral BID   • carbidopa-levodopa (SINEMET)  mg per tablet 1 tablet  1 tablet Oral TID   • cefdinir (OMNICEF) capsule 300 mg  300 mg Oral Q12H [address]   • dextromethorphan-guaiFENesin (ROBITUSSIN DM) oral syrup 10 mL  10 mL Oral Q4H PRN   • ferrous sulfate tablet 325 mg  325 mg Oral Daily With Breakfast   • gabapentin (NEURONTIN) capsule 200 mg  200 mg Oral HS   • heparin (porcine) subcutaneous injection 5,000 Units  5,000 Units Subcutaneous Q8H [address]   • lidocaine (LIDODERM) 5 % patch 1 patch  1 patch Topical Daily   • nystatin (MYCOSTATIN) powder   Topical BID   • ondansetron (ZOFRAN) injection 4 mg  4 mg Intravenous Q6H PRN   • pantoprazole (PROTONIX) EC tablet 40 mg  40 mg Oral Early Morning   • traMADol (ULTRAM) tablet 50 mg  50 mg Oral Q6H PRN     Medications Discontinued During This Encounter   Medication Reason   • midodrine (PROAMATINE) tablet 5 mg    • Menthol (Topical Analgesic) 4 % GEL 1 application. • sodium chloride 0.9 % infusion    • furosemide (LASIX) injection 60 mg    • cefTRIAXone (ROCEPHIN) IVPB (premix in dextrose) 1,000 mg 50 mL    • bumetanide (BUMEX) injection 2 mg        Arturo Baltazar MD      This progress note was produced in part using a dictation device which may document imprecise wording from author's original intent.

## 2023-07-16 NOTE — PLAN OF CARE
Problem: MOBILITY - ADULT  Goal: Maintain or return to baseline ADL function  Description: INTERVENTIONS:  -  Assess patient's ability to carry out ADLs; assess patient's baseline for ADL function and identify physical deficits which impact ability to perform ADLs (bathing, care of mouth/teeth, toileting, grooming, dressing, etc.)  - Assess/evaluate cause of self-care deficits   - Assess range of motion  - Assess patient's mobility; develop plan if impaired  - Assess patient's need for assistive devices and provide as appropriate  - Encourage maximum independence but intervene and supervise when necessary  - Involve family in performance of ADLs  - Assess for home care needs following discharge   - Consider OT consult to assist with ADL evaluation and planning for discharge  - Provide patient education as appropriate  Outcome: Progressing  Goal: Maintains/Returns to pre admission functional level  Description: INTERVENTIONS:  - Perform BMAT or MOVE assessment daily.   - Set and communicate daily mobility goal to care team and patient/family/caregiver. - Collaborate with rehabilitation services on mobility goals if consulted  - Perform Range of Motion 3 times a day. - Reposition patient every 2 hours.   - Dangle patient 3 times a day  - Stand patient 3 times a day  - Ambulate patient 3 times a day  - Out of bed to chair 3 times a day   - Out of bed for meals 3 times a day  - Out of bed for toileting  - Record patient progress and toleration of activity level   Outcome: Progressing     Problem: Prexisting or High Potential for Compromised Skin Integrity  Goal: Skin integrity is maintained or improved  Description: INTERVENTIONS:  - Identify patients at risk for skin breakdown  - Assess and monitor skin integrity  - Assess and monitor nutrition and hydration status  - Monitor labs   - Assess for incontinence   - Turn and reposition patient  - Assist with mobility/ambulation  - Relieve pressure over bony prominences  - Avoid friction and shearing  - Provide appropriate hygiene as needed including keeping skin clean and dry  - Evaluate need for skin moisturizer/barrier cream  - Collaborate with interdisciplinary team   - Patient/family teaching  - Consider wound care consult   Outcome: Progressing     Problem: Nutrition/Hydration-ADULT  Goal: Nutrient/Hydration intake appropriate for improving, restoring or maintaining nutritional needs  Description: Monitor and assess patient's nutrition/hydration status for malnutrition. Collaborate with interdisciplinary team and initiate plan and interventions as ordered. Monitor patient's weight and dietary intake as ordered or per policy. Utilize nutrition screening tool and intervene as necessary. Determine patient's food preferences and provide high-protein, high-caloric foods as appropriate.      INTERVENTIONS:  - Monitor oral intake, urinary output, labs, and treatment plans  - Assess nutrition and hydration status and recommend course of action  - Evaluate amount of meals eaten  - Assist patient with eating if necessary   - Allow adequate time for meals  - Recommend/ encourage appropriate diets, oral nutritional supplements, and vitamin/mineral supplements  - Order, calculate, and assess calorie counts as needed  - Recommend, monitor, and adjust tube feedings and TPN/PPN based on assessed needs  - Assess need for intravenous fluids  - Provide specific nutrition/hydration education as appropriate  - Include patient/family/caregiver in decisions related to nutrition  Outcome: Progressing     Problem: SKIN/TISSUE INTEGRITY - ADULT  Goal: Skin Integrity remains intact(Skin Breakdown Prevention)  Description: Assess:  -Perform Jhony assessment every shift  -Clean and moisturize skin every day  -Inspect skin when repositioning, toileting, and assisting with ADLS  -Assess under medical devices such as protective Allyven every shift  -Assess extremities for adequate circulation and sensation     Bed Management:  -Have minimal linens on bed & keep smooth, unwrinkled  -Change linens as needed when moist or perspiring  -Avoid sitting or lying in one position for more than 2 hours while in bed  -Keep HOB at 30 degrees     Toileting:  -Offer bedside commode  -Assess for incontinence every interaction  -Use incontinent care products after each incontinent episode such as wipes    Activity:  -Mobilize patient 3 times a day  -Encourage activity and walks on unit  -Encourage or provide ROM exercises   -Turn and reposition patient every 2 Hours  -Use appropriate equipment to lift or move patient in bed  -Instruct/ Assist with weight shifting every 15 [id number] when out of bed in chair  -Consider limitation of chair time 2 hour intervals    Skin Care:  -Avoid use of baby powder, tape, friction and shearing, hot water or constrictive clothing  -Relieve pressure over bony prominences using pillows/cushions  -Do not massage red bony areas    Next Steps:  -Teach patient strategies to minimize risks such as weight shifting   -Consider consults to  interdisciplinary teams such as PT and OT  Outcome: Progressing  Goal: Incision(s), wounds(s) or drain site(s) healing without S/S of infection  Description: INTERVENTIONS  - Assess and document dressing, incision, wound bed, drain sites and surrounding tissue  - Provide patient and family education  - Perform skin care/dressing changes as ordered  Outcome: Progressing  Goal: Pressure injury heals and does not worsen  Description: Interventions:  - Implement low air loss mattress or specialty surface (Criteria met)  - Apply silicone foam dressing  - Instruct/assist with weight shifting every 15 minutes when in chair   - Limit chair time to 2 hour intervals  - Use special pressure reducing interventions such as waffle cushion when in chair   - Apply fecal or urinary incontinence containment device   - Perform passive or active ROM  TID  - Turn and reposition patient & offload bony prominences every 2 hours   - Utilize friction reducing device or surface for transfers   - Consider consults to  interdisciplinary teams such as PT and OT   - Use incontinent care products after each incontinent episode such as wipes  - Consider nutrition services referral as needed  Outcome: Progressing

## 2023-07-16 NOTE — ASSESSMENT & PLAN NOTE
· Patient was laying on the floor for approximately 20 hours prior to arrival  · CPK [id number]  · Status post treatment with IV fluid  · Renal function has improved  · Continue supportive therapy  · No need for further CPK testing

## 2023-07-16 NOTE — ASSESSMENT & PLAN NOTE
· Present on admission  · Arrival creatinine 3.01  · Prerenal-secondary to dehydration, and rhabdomyolysis (see below)  · Status post treatment with IV fluid initially  · Creatinine is down to 1.35  · Patient refused further blood work today on 7/16/2023  · Status post a nephrology evaluation, status post treatment with IV Lasix  · Patient remains in a state of generalized anasarca  · Continue Bumex 2 mg p.o. twice daily  · Discharge planning to rehab should be set up on 7/17/2023 by case management , after which the patient will be transitioned to hospice

## 2023-07-16 NOTE — ASSESSMENT & PLAN NOTE
· Noted on CT imaging  · Patient is otherwise asymptomatic  · Testing for COVID-19, respiratory syncytial virus, and influenza a and B is negative  · Urine testing for Streptococcus pneumonia and Legionella antigen testing is negative  · Procalcitonin testing is up- question reliability in the setting of having an acute kidney injury  · Status post a completed course of IV ceftriaxone followed up by p.o. cefdinir.   No further antibiotics needed

## 2023-07-17 VITALS
RESPIRATION RATE: 20 BRPM | OXYGEN SATURATION: 99 % | SYSTOLIC BLOOD PRESSURE: 98 MMHG | TEMPERATURE: 98 F | WEIGHT: 179.68 LBS | HEIGHT: 67 IN | HEART RATE: 80 BPM | BODY MASS INDEX: 28.2 KG/M2 | DIASTOLIC BLOOD PRESSURE: 45 MMHG

## 2023-07-17 PROCEDURE — 99232 SBSQ HOSP IP/OBS MODERATE 35: CPT | Performed by: INTERNAL MEDICINE

## 2023-07-17 PROCEDURE — 99239 HOSP IP/OBS DSCHRG MGMT >30: CPT | Performed by: INTERNAL MEDICINE

## 2023-07-17 RX ORDER — BUMETANIDE 1 MG/1
3 TABLET ORAL 2 TIMES DAILY
Status: DISCONTINUED | OUTPATIENT
Start: 2023-07-17 | End: 2023-07-17 | Stop reason: HOSPADM

## 2023-07-17 RX ADMIN — CARBIDOPA AND LEVODOPA 1 TABLET: 25; 100 TABLET ORAL at 16:52

## 2023-07-17 RX ADMIN — PANTOPRAZOLE SODIUM 40 MG: 20 TABLET, DELAYED RELEASE ORAL at 05:44

## 2023-07-17 RX ADMIN — NYSTATIN: 100000 POWDER TOPICAL at 08:25

## 2023-07-17 RX ADMIN — BUMETANIDE 2 MG: 1 TABLET ORAL at 08:23

## 2023-07-17 RX ADMIN — LIDOCAINE 5% 1 PATCH: 700 PATCH TOPICAL at 08:23

## 2023-07-17 RX ADMIN — CEFDINIR 300 MG: 300 CAPSULE ORAL at 11:00

## 2023-07-17 RX ADMIN — CARBIDOPA AND LEVODOPA 1 TABLET: 25; 100 TABLET ORAL at 08:23

## 2023-07-17 RX ADMIN — ASPIRIN 81 MG CHEWABLE TABLET 81 MG: 81 TABLET CHEWABLE at 08:23

## 2023-07-17 RX ADMIN — FERROUS SULFATE TAB 325 MG (65 MG ELEMENTAL FE) 325 MG: 325 (65 FE) TAB at 08:23

## 2023-07-17 RX ADMIN — HEPARIN SODIUM 5000 UNITS: 5000 INJECTION INTRAVENOUS; SUBCUTANEOUS at 05:44

## 2023-07-17 NOTE — ASSESSMENT & PLAN NOTE
· Present on admission  · Arrival creatinine 3.01  · Prerenal-secondary to dehydration, and rhabdomyolysis (see below)  · Status post treatment with IV fluid initially  · Creatinine is down to 1.35  · Patient refused further blood work  · Status post a nephrology evaluation, status post treatment with IV Lasix  · Patient remains in a state of generalized anasarca  · Discharge planning to rehab should be set up by case management , after which the patient will be transitioned to hospice

## 2023-07-17 NOTE — DISCHARGE SUMMARY
[address]  Discharge- Viktoriya Tamayo 1945, 66 y.o. male MRN: [id number]  Unit/Bed#: -01 Encounter: [id number]  Primary Care Provider: Nichol Dawkins DO   Date and time admitted to hospital: 7/9/2023  4:28 PM    Goals of care, counseling/discussion  Assessment & Plan  · Please refer to goals of care counseling/discussion from progress note dated 7/12/2023 and 7/13/2023  · Patient will be discharged to rehab after which he will be transitioned to hospice  · Case management has arranged for patient to be discharged to [facility] with eventual transition to hospice    Fall  Assessment & Plan  · Patient reports having had a mechanical  fall prior to coming into the hospital  · Trauma work-up was negative  · CT head-within normal limits  · CT cervical spine-no acute fractures and/or traumatic malalignment  · CT chest, abdomen, pelvis- no acute traumatic pathology  · Status post a PT and OT evaluation- they recommend postacute rehabilitation services  · Patient will be discharged to rehab with plan to transition to hospice    Thyroid nodule  Assessment & Plan  · Results were reviewed with the patient, and his 2 sons  · No further work-up in view of being transitioned to eventual hospice    Adenopathy  Assessment & Plan  · Peripancreatic adenopathy noted on CT imaging concerning for possible metastatic process  · Patient has a history of renal and prostate cancer  · No further testing, treatment, and/or work-up regarding this adenopathy, the patient, and his sons understand that this could be metastatic cancer, and would like to proceed with just hospice    Left upper lobe pneumonia  Assessment & Plan  · Noted on CT imaging  · Patient is otherwise asymptomatic  · Testing for COVID-19, respiratory syncytial virus, and influenza a and B is negative  · Urine testing for Streptococcus pneumonia and Legionella antigen testing is negative  · Procalcitonin testing is up- question reliability in the setting of having an acute kidney injury  · Status post a completed course of IV ceftriaxone followed up by p.o. cefdinir.   No further antibiotics needed    Traumatic rhabdomyolysis [facility])  Assessment & Plan  · Patient was laying on the floor for approximately 20 hours prior to arrival  · CPK [id number]  · Status post treatment with IV fluid  · Renal function has improved  · Continue supportive therapy  · No need for further CPK testing    Hyperlipidemia, unspecified  Assessment & Plan  · Atorvastatin on hold in view of rhabdo    Gastroesophageal reflux disease  Assessment & Plan  · Continue Protonix    Anemia  Assessment & Plan  · History of iron deficiency anemia  · Dilutional drop in H&H noted  · Patient remains hemodynamically stable  · Continue ferrous sulfate    Parkinson's disease (HCC)  Assessment & Plan  · Continue prehospital Sinemet 25/100 mg p.o. 3 times daily and Neurontin 200 mg p.o. nightly  · Status post a PT and OT evaluation-they recommend postacute rehabilitation services  · Midodrine on hold    * Acute renal failure superimposed on stage 3 chronic kidney disease ([address])  Assessment & Plan  · Present on admission  · Arrival creatinine 3.01  · Prerenal-secondary to dehydration, and rhabdomyolysis (see below)  · Status post treatment with IV fluid initially  · Creatinine is down to 1.35  · Patient refused further blood work  · Status post a nephrology evaluation, status post treatment with IV Lasix  · Patient remains in a state of generalized anasarca  · Discharge planning to rehab should be set up by case management , after which the patient will be transitioned to hospice      Discharging Physician / Practitioner: Coco Thompson MD  PCP: Fawad Cline DO  Admission Date:   Admission Orders (From admission, onward)     Ordered        07/09/23 1914  INPATIENT ADMISSION  Once                      Discharge Date: 07/17/23    Medical Problems     Resolved Problems  Date Reviewed: 7/9/2023   None         Consultations During Hospital Stay:  · Nephrology    Procedures Performed:   · None    Significant Findings / Test Results:   · Acute kidney injury, rhabdomyolysis    Incidental Findings:   · Peripancreatic adenopathy concerning for possible metastatic disease. Thyroid nodule    Test Results Pending at Discharge (will require follow up): · None     Outpatient Tests Requested:  · Routine labs with PCP as needed    Complications:    • None    Reason for Admission: Rhabdomyolysis, pneumonia, acute kidney injury    Hospital Course:     Robert Alonso is a 66 y.o. male patient who originally presented to the hospital on 7/9/2023 due to fall. Found to have acute renal failure likely secondary to rhabdomyolysis. Nephrology was consulted and patient was treated with IV fluids. Patient also noted to have left upper lobe pneumonia which was treated with IV antibiotics. Goals of care discussion was had between previous provider Dr. Bharti Kelley and patient's family. Patient had expressed to staff that he was exhausted and did not want any more medical treatment. Please see previous notes for details regarding goals of care discussion and advance care planning. Ultimate decision was to transition patient to hospice. Over the last few days patient has refused blood work. Patient has been maintained on diuretics for anasarca. Case management has arranged for discharge to SNF with plan to transition to hospice    Please see above list of diagnoses and related plan for additional information. Condition at Discharge: stable     Discharge Day Visit / Exam:     Subjective: No complaints at this time.   Patient does not want to be examined or disturbed at this time    Vitals: Blood Pressure: (!) 98/45 (07/17/23 1318)  Pulse: 80 (07/17/23 1318)  Temperature: 98 °F (36.7 °C) (07/17/23 1318)  Temp Source: Temporal (07/16/23 0700)  Respirations: 20 (07/17/23 1318)  Height: 5' 7" (170.2 cm) (07/12/23 1526)  Weight - Scale: 81.5 kg (179 lb 10.8 oz) (07/17/23 0600)  SpO2: 99 % (07/17/23 1318)     Exam:   Physical Exam   Patient refused exam    Discussion with Family: Spoke with patient's son over the phone regarding discharge plan     Discharge instructions/Information to patient and family:   See after visit summary for information provided to patient and family. Provisions for Follow-Up Care:  See after visit summary for information related to follow-up care and any pertinent home health orders. Disposition:     Other [address] at [facility]      Planned Readmission:   • no     Discharge Statement:  I spent 35 minutes discharging the patient. This time was spent on the day of discharge. I had direct contact with the patient on the day of discharge. Greater than 50% of the total time was spent examining patient, answering all patient questions, arranging and discussing plan of care with patient as well as directly providing post-discharge instructions. Additional time then spent on discharge activities. Discharge Medications:  See after visit summary for reconciled discharge medications provided to patient and family.       ** Please Note: This note has been constructed using a voice recognition system **

## 2023-07-17 NOTE — OCCUPATIONAL THERAPY NOTE
07/17/23 1150   OT Last Visit   OT Visit Date 07/17/23   Note Type   Note Type Cancelled Session   Cancel Reasons Refusal  (patient declined participation though I mentioned that I thought he was interested (most recently) in rehab. He then said "my son's making those decisions". )     *he then told me to "please get out and shut the door".    DOUGIE Pandya/CARLOS

## 2023-07-17 NOTE — ASSESSMENT & PLAN NOTE
· Patient was laying on the floor for approximately 20 hours prior to arrival  · CPK [id number]  · Status post treatment with IV fluid  · Renal function has improved  · Continue supportive therapy  · No need for further CPK testing

## 2023-07-17 NOTE — PROGRESS NOTES
Progress Note - Nephrology   Tl Elizondo 66 y.o. male MRN: [id number]  Unit/Bed#: -01 Encounter: [id number]    A/P:  1. Acute kidney injury on chronic kidney disease stage IIIb, resolved. Remains hypervolemic. He is refusing labs. His last labs were obtained on 7/15 and Cr stable at 1.35. Currently on p.o. Bumex 2 mg twice daily. He does not have IV access and has refused IV/lab monitoring. Urine output nonoliguric. Nephrology service will sign off. Patient is refusing to have his renal function monitored. There are plans to transition to hospice after period of rehab. If patient agreeable to have blood work obtained, nephrology can reevaluate patient. Otherwise limited in evaluation management. Can consider increasing Bumex to 3 mg twice daily as he remains edematous. His weights are unfortunately unchanged on bed scale. If we are actively diuresing and proceeding with aggressive care, labs should be part of care for safety and monitoring     2. CKD 3, baseline creatinine 1.2 to 1.3 mg/dL. Patient is back to baseline. 3.  Mild traumatic rhabdomyolysis. No need to follow-up from renal perspective. 4.  Volume overload, continue p.o. Bumex and fluid restriction. Ideally following renal function. 5.  Left upper lobe pneumonia antibiotics per primary. Follow up reason for today's visit:     Acute renal failure superimposed on stage 3 chronic kidney disease (HCC)      Subjective:   Patient seen and examined today. He is agitated on my exam.  He reports no improvement with anything. He refused labs when I asked about obtaining. A complete 10 point review of systems was performed and is otherwise negative. Objective:     Vitals: Blood pressure 104/52, pulse 81, temperature 98.1 °F (36.7 °C), resp. rate 20, height 5' 7" (1.702 m), weight 81.5 kg (179 lb 10.8 oz), SpO2 98 %. ,Body mass index is 28.14 kg/m².     Weight (last 2 days)     Date/Time Weight    07/17/23 0600 81.5 (179.68)    07/16/23 0600 81.3 (179.23)    07/15/23 0600 82.7 (182.32)            Intake/Output Summary (Last 24 hours) at 7/17/2023 0844  Last data filed at 7/17/2023 0813  Gross per 24 hour   Intake 120 ml   Output 625 ml   Net -505 ml     I/O last 3 completed shifts:  In: -   Out: 1050 [Urine:1050]         Physical Exam: /52   Pulse 81   Temp 98.1 °F (36.7 °C)   Resp 20   Ht 5' 7" (1.702 m)   Wt 81.5 kg (179 lb 10.8 oz)   SpO2 98%   BMI 28.14 kg/m²     General Appearance:    Alert, cooperative, no distress, appears stated age   Head:    Normocephalic, without obvious abnormality, atraumatic   Eyes:    Conjunctiva/corneas clear   Ears:    Normal external ears   Nose:   Nares normal, septum midline, mucosa normal, no drainage    or sinus tenderness   Throat:   Lips, mucosa, and tongue normal; teeth and gums normal   Neck:    Back:      Lungs:     Clear to auscultation bilaterally, respirations unlabored   Chest wall:    No tenderness or deformity   Heart:    Regular rate and rhythm, S1 and S2 normal, no murmur, rub   or gallop   Abdomen:     Soft, non-tender, bowel sounds active   Extremities:  severe BL LE edema, edema RUE   Skin:   Skin color, texture, turgor normal, no rashes or lesions   Lymph nodes:   Cervical normal   Neurologic:   CNII-XII intact            Lab, Imaging and other studies: I have personally reviewed pertinent labs. CBC: No results found for: "WBC", "HGB", "HCT", "MCV", "PLT", "ADJUSTEDWBC", "RBC", "MCH", "MCHC", "RDW", "MPV", "NRBC"  CMP: No results found for: "NA", "K", "CL", "CO2", "ANIONGAP", "BUN", "CREATININE", "GLUCOSE", "CALCIUM", "AST", "ALT", "ALKPHOS", "PROT", "BILITOT", "EGFR"    .   Results from last 7 days   Lab Units 07/15/23  0529 07/13/23  0540 07/12/23  0630   POTASSIUM mmol/L 3.9 3.9 4.9   CHLORIDE mmol/L 99 103 104   CO2 mmol/L 27 24 24   BUN mg/dL 32* 31* 33*   CREATININE mg/dL 1.35* 1.48* 1.64*   CALCIUM mg/dL 7.6* 7.5* 7.3*         Phosphorus: No results found for: "PHOS"  Magnesium: No results found for: "MG"  Urinalysis: No results found for: "COLORU", "CLARITYU", "Celeste Shield", "PHUR", "LEUKOCYTESUR", "NITRITE", "Maryln Eddie", "GLUCOSEU", "Sherol Captain", "Ludmila Maria D", "BLOODU"  Ionized Calcium: No results found for: "CAION"  Coagulation: No results found for: "PT", "INR", "APTT"  Troponin: No results found for: "TROPONINI"  ABG: No results found for: "PHART", "ZSV4WSV", "PO2ART", "KTG2IPL", "L9AYGOPR", "BEART", "SOURCE"  Radiology review:     IMAGING  No results found. Current Facility-Administered Medications   Medication Dose Route Frequency   • acetaminophen (TYLENOL) tablet 650 mg  650 mg Oral Q6H PRN   • aspirin chewable tablet 81 mg  81 mg Oral Daily   • bumetanide (BUMEX) tablet 2 mg  2 mg Oral BID   • carbidopa-levodopa (SINEMET)  mg per tablet 1 tablet  1 tablet Oral TID   • cefdinir (OMNICEF) capsule 300 mg  300 mg Oral Q12H [address]   • dextromethorphan-guaiFENesin (ROBITUSSIN DM) oral syrup 10 mL  10 mL Oral Q4H PRN   • ferrous sulfate tablet 325 mg  325 mg Oral Daily With Breakfast   • gabapentin (NEURONTIN) capsule 200 mg  200 mg Oral HS   • heparin (porcine) subcutaneous injection 5,000 Units  5,000 Units Subcutaneous Q8H [address]   • lidocaine (LIDODERM) 5 % patch 1 patch  1 patch Topical Daily   • nystatin (MYCOSTATIN) powder   Topical BID   • ondansetron (ZOFRAN) injection 4 mg  4 mg Intravenous Q6H PRN   • oxyCODONE (ROXICODONE) IR tablet 5 mg  5 mg Oral Q6H PRN   • pantoprazole (PROTONIX) EC tablet 40 mg  40 mg Oral Early Morning     Medications Discontinued During This Encounter   Medication Reason   • midodrine (PROAMATINE) tablet 5 mg    • Menthol (Topical Analgesic) 4 % GEL 1 application.     • sodium chloride 0.9 % infusion    • furosemide (LASIX) injection 60 mg    • cefTRIAXone (ROCEPHIN) IVPB (premix in dextrose) 1,000 mg 50 mL    • bumetanide (BUMEX) injection 2 mg    • traMADol (ULTRAM) tablet 50 mg        Heide [place] and Michelle, DO      This progress note was produced in part using a dictation device which may document imprecise wording from author's original intent.

## 2023-07-17 NOTE — PROGRESS NOTES
-- Patient: Diane Whitman  -- MRN: [id number]  -- Aidin Request ID: [id number]  -- Level of care reserved: [address]  -- Partner Reserved: [facility], [address] [place] [address] [phone]  -- Clinical needs requested:  -- Geography searched: 30 miles around [phone]  -- Start of Service:  -- Request sent: 9:34am EDT on 7/11/2023 by Christo Perez  -- Partner reserved: 3:28pm EDT on 7/17/2023 by Christo Perez  -- Choice list shared:

## 2023-07-17 NOTE — CASE MANAGEMENT
Case Management Discharge Planning Note    Patient name Skip Knife  Location /-28 MRN [id number]  : 1945 Date 2023       Current Admission Date: 2023  Current Admission Diagnosis:Acute renal failure superimposed on stage 3 chronic kidney disease [facility])   Patient Active Problem List    Diagnosis Date Noted   • Goals of care, counseling/discussion 2023   • History of coronary artery bypass surgery 2023   • Acute renal failure superimposed on stage 3 chronic kidney disease ([address]) 2023   • Traumatic rhabdomyolysis ([address]) 2023   • Left upper lobe pneumonia 2023   • Adenopathy 2023   • Thyroid nodule 2023   • Fall 2023   • Renal cell cancer, left ([address]) 2023   • Peripheral edema 2023   • Frequency of micturition 2023   • Right shoulder pain 2023   • Insomnia 2023   • Ambulatory dysfunction 2023   • Orthostatic hypotension 2023   • Swelling of right upper extremity 2023   • Penile edema 06/10/2023   • Abnormal ECG 2023   • Anemia 2023   • Dizziness 2023   • Parkinson's disease ([address]) 2023   • Generalized weakness 2020   • S/P CABG x 2 2020   • Tremor of right hand 2020   • Obesity 2020   • Occlusion and stenosis of right carotid artery 2020   • Tremor, unspecified 2020   • Chronic kidney disease (CKD), stage III (moderate)    • Coronary artery disease involving native coronary artery of native heart without angina pectoris    • Carotid stenosis, right    • Stage 3 chronic kidney disease ([address]) 2020   • Coronary artery disease involving native coronary artery of native heart 2020   • Renal insufficiency 2020   • Dyslipidemia 2020   • Hyperlipidemia, unspecified 2020   • Adenocarcinoma of prostate ([address]) 04/10/2020   • Urinary incontinence 04/10/2020   • Hypercalcinuria 04/10/2019   • Hypernatriuria 04/10/2019 • Balanitis 03/21/2018   • Bilateral ureteral obstruction 02/16/2018   • Complex renal cyst 08/15/2017   • Idiopathic chronic gout, unspecified site, 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   • Presence of left artificial knee joint 06/09/2016   • First degree heart block 05/17/2016   • Abnormal prostate specific antigen 03/17/2016   • Calculus of kidney 06/18/2014   • Gastroesophageal reflux disease 06/17/2014      LOS (days): 8  Geometric Mean LOS (GMLOS) (days): 4.70  Days to GMLOS:-3.2     OBJECTIVE:  Risk of Unplanned Readmission Score: 28.38         Current admission status: Inpatient   Preferred Pharmacy:   [facility] [address] [facility] [address]  [address]  [address] [facility]  [address] [address]  Phone: [phone] Fax: [place] [address] [address] [place]  Phone: [phone] Fax: [phone]    Primary Care Provider: Michelle Rivas DO    Primary Insurance: [facility]  Secondary Insurance:     DISCHARGE DETAILS:    Discharge planning discussed with[de-identified] patient and son was called at 9:32 am & 14:48pm  & 15:09 pm & 16:22 pm  Freedom of Choice: Yes  Comments - Freedom of Choice: recommendation is for rehab- pt was accepted to [facility] they are in network   auth# [id number]  -  CM contacted family/caregiver?: Yes  Were Treatment Team discharge recommendations reviewed with patient/caregiver?: Yes  Did patient/caregiver verbalize understanding of patient care needs?: Yes  Were patient/caregiver advised of the risks associated with not following Treatment Team discharge recommendations?: Yes    Contacts  Patient Contacts: Ana Fontaine son [phone]   Relationship to Patient[de-identified] Family  Contact Method: Phone  Phone Number: [phone]  Reason/Outcome: Discharge [address] [facility]         Is the patient interested in [facility] at discharge?: No    DME Referral Provided  Referral made for DME?: No    Other Referral/Resources/Interventions Provided:  Interventions: Short Term Rehab  Referral Comments: pt accepted to [facility] for rehab and tehn possible hospice care- pt not agreeable to hospice care    Would you like to participate in our [facility] program?  : No - Declined    Treatment Team Recommendation: Short Term Rehab ([facility] auth received- bls no auth needed)             IMM reviewed with son, son agrees with discharge determination.   Pt and son are in agreement with the d/c and d/c plan                       IMM Given (Date):: 07/17/23  IMM Given to[de-identified] Family (son was called and IMM was reviewd - he stated he understood & IMM was mailed)     Additional Comments: cm called Ginny Danielson at [place] at 15:22pm #[phone]  she gave the auth # [id number]  soc 7/17/2023  nrd- 7/19/2023  review can be uploaded in the portal or faxed to # [phone]  [facility] was given this information - no covid test needed- no auth needed for bls as per cm support team  17:30pm bls-  [facility] was called they are aware of transport time- pt& son are aware of transport time-  rn is aware of transport time and report &fax# was given    Accepting Facility Name, [address] [place] [address] : [facility]  Receiving Facility/Agency Phone Number: [phone]  Facility/Agency Fax Number: [phone]

## 2023-07-17 NOTE — PLAN OF CARE
Problem: SKIN/TISSUE INTEGRITY - ADULT  Goal: Skin Integrity remains intact(Skin Breakdown Prevention)  Description: Assess:  -Perform Jhony assessment every shift  -Clean and moisturize skin every day  -Inspect skin when repositioning, toileting, and assisting with ADLS  -Assess under medical devices such as protective Allyven every shift  -Assess extremities for adequate circulation and sensation     Bed Management:  -Have minimal linens on bed & keep smooth, unwrinkled  -Change linens as needed when moist or perspiring  -Avoid sitting or lying in one position for more than 2 hours while in bed  -Keep HOB at 30 degrees     Toileting:  -Offer bedside commode  -Assess for incontinence every interaction  -Use incontinent care products after each incontinent episode such as wipes    Activity:  -Mobilize patient 3 times a day  -Encourage activity and walks on unit  -Encourage or provide ROM exercises   -Turn and reposition patient every 2 Hours  -Use appropriate equipment to lift or move patient in bed  -Instruct/ Assist with weight shifting every 15 [id number] when out of bed in chair  -Consider limitation of chair time 2 hour intervals    Skin Care:  -Avoid use of baby powder, tape, friction and shearing, hot water or constrictive clothing  -Relieve pressure over bony prominences using pillows/cushions  -Do not massage red bony areas    Next Steps:  -Teach patient strategies to minimize risks such as weight shifting   -Consider consults to  interdisciplinary teams such as PT and OT  Outcome: Progressing     Problem: MOBILITY - ADULT  Goal: Maintain or return to baseline ADL function  Description: INTERVENTIONS:  -  Assess patient's ability to carry out ADLs; assess patient's baseline for ADL function and identify physical deficits which impact ability to perform ADLs (bathing, care of mouth/teeth, toileting, grooming, dressing, etc.)  - Assess/evaluate cause of self-care deficits   - Assess range of motion  - Assess patient's mobility; develop plan if impaired  - Assess patient's need for assistive devices and provide as appropriate  - Encourage maximum independence but intervene and supervise when necessary  - Involve family in performance of ADLs  - Assess for home care needs following discharge   - Consider OT consult to assist with ADL evaluation and planning for discharge  - Provide patient education as appropriate  Outcome: Progressing     Problem: MUSCULOSKELETAL - ADULT  Goal: Maintain or return mobility to safest level of function  Description: INTERVENTIONS:  - Assess patient's ability to carry out ADLs; assess patient's baseline for ADL function and identify physical deficits which impact ability to perform ADLs (bathing, care of mouth/teeth, toileting, grooming, dressing, etc.)  - Assess/evaluate cause of self-care deficits   - Assess range of motion  - Assess patient's mobility  - Assess patient's need for assistive devices and provide as appropriate  - Encourage maximum independence but intervene and supervise when necessary  - Involve family in performance of ADLs  - Assess for home care needs following discharge   - Consider OT consult to assist with ADL evaluation and planning for discharge  - Provide patient education as appropriate  Outcome: Progressing

## 2023-07-17 NOTE — ASSESSMENT & PLAN NOTE
· Patient reports having had a mechanical  fall prior to coming into the hospital  · Trauma work-up was negative  · CT head-within normal limits  · CT cervical spine-no acute fractures and/or traumatic malalignment  · CT chest, abdomen, pelvis- no acute traumatic pathology  · Status post a PT and OT evaluation- they recommend postacute rehabilitation services  · Patient will be discharged to rehab with plan to transition to hospice

## 2023-07-17 NOTE — ASSESSMENT & PLAN NOTE
· Please refer to goals of care counseling/discussion from progress note dated 7/12/2023 and 7/13/2023  · Patient will be discharged to rehab after which he will be transitioned to hospice  · Case management has arranged for patient to be discharged to [facility] with eventual transition to hospice

## 2023-07-18 NOTE — UTILIZATION REVIEW
NOTIFICATION OF ADMISSION DISCHARGE   This is a Notification of Discharge from [address] [place] [address]. Please be advised that this patient has been discharge from our facility. Below you will find the admission and discharge date and time including the patient’s disposition. UTILIZATION REVIEW CONTACT:  Shannan Mendieta  Utilization   Network Utilization Review Department  Phone: [phone] carefully listen to the prompts. All voicemails are confidential.  Email: [email or web address]     ADMISSION INFORMATION  PRESENTATION DATE: 7/9/2023  4:28 PM  OBERVATION ADMISSION DATE:  INPATIENT ADMISSION DATE: 7/9/23  7:14 PM   DISCHARGE DATE: 7/17/2023  6:00 PM   DISPOSITION:Non [facility] SNF/TCU/SNU    IMPORTANT INFORMATION:  Send all requests for admission clinical reviews, approved or denied determinations and any other requests to dedicated fax number below belonging to the campus where the patient is receiving treatment.  List of dedicated fax numbers:  [place] DENIALS (Administrative/Medical Necessity) [phone] 230[address] [facility] (Maternity/NICU/Pediatrics) [phone]   [facility] [phone]   [facility] [phone]   [address] [facility] [phone]   [address] [place] [address] [phone]   [facility] [phone]   [address] [place] [address] [address] [place] [address] [phone]   [address] [place] [address] [phone] 3[address] [facility] [phone] 27[address] [facility] [address] [phone]

## 2023-07-18 NOTE — WOUND OSTOMY CARE
Progress Note - Wound   Aj Mabry 66 y.o. male MRN: [id number]  Unit/Bed#: -01 Encounter: [id number]    Assessment: Patient seen prior to discharge  Wound care weekly follow up for patient admitted after fall at home and down for approx 20 hours. Patient in bed for assessment. He was irritable that wounds were being assessed and treated today. Patient stated there is always someone coming in the room, he states he is tired and wants to go to sleep permanently. He is not incontinent of urine or stool. He is able to feed himself. He is dependent for hygiene care. He is able to turn onto his side with assistance. He is on a P-500 low air loss therapy surface. Findings  1. POA-left elbow wound, dry and brown, mild swelling to the periwound, no drainage, abrasion vs pressure  2. 2. POA-abrasions to the face, above the right eyebrow is now pale brown and dry. The bridge of the nose dry beefy red partial thickness wound bed, no current drainage. Nose is swollen  3. POA-right and left mid chest abrasions, left chest wound bed pale red and blanchable. Right chest resolved  4. POA-left wrist wound, pressure vs abrasion. Wound bed is dry, yellow with beefy red edge, no drainage  5. POA-right wrist mix of pressure and abrasion, dry brown eschar proximally with light purple laterally and distally, scant serous drainage noted. Right arm and hand edematous  6. POA-left elbow wound mix of pressure and abrasion. Dry beefy red abrasion proximally and purple non-blanchable area distally  7. POA-deep tissue injury to the right outer buttock, non blanchable purple tissue, distal wound bed with yellow slough. Left buttock and sacrum blanchable and hyperpigmented. Beefy red blanchable wound to the left mid buttock. Evolving deep tissue injury may evolve to a stage 3, stage 4 or unstageable wound  8. POA-evolving deep tissue injury to the penis. Per patient the penis is chronically edematous.  Black eschar noted on the lateral sides of the penis with beefy red wound bed where eschar has loosened  9. POA-right and left knee wounds, black eschar, brown and beefy red areas, no drainage or open areas, bilateral knee edema, pressure vs abrasions. No open areas to the knees  10. POA-right great toe partial thickness wound, brown and yellow dry wound bed, no drainage  11. POA-left great toe deep tissue injury, non-blanchable, pale light purple, 2nd toe with pale light purple non-blanchable area on the joint. Skin and Wound Care Plan:   1. Apply skin nourishing cream to the skin daily  2. Elevate heels off of bed with pillows to offload  3. Cleanse wounds with Remedy foaming cleanser and water, pat dry. Apply hydraguard to left anterior great toe, penis, forehead and chest wounds BID and PRN. 4. Turn and reposition patient Q2 hours  5. Cleanse wounds with gently with Remedy foaming cleanser and water, pat dry. Apply 3M No Sting to bilateral knee wounds, right and left wrists, bilateral elbows and nose daily  6. Allevyn life silicone bordered foam dressing to the sacrum, dea with T, date, peel back daily for wound and skin assessment, reapply and change every 3 days and PRN  7. Allevyn life heel foams to bilateral heels, dea with P, date, and peel back daily for skin assessment, change every 3 days or with soilage or dislodgement. 8. Cleanse right great to wound with Remedy foaming cleanser, pat dry.  Place Dermagran on toe wound, cover with 2X2 and wrap with 1" gauze, change every other day and PRN  9. P-500 low air loss therapy surface    Wound:  Wound 07/10/23 (Active)   Wound Image   07/17/23 1126   Wound Description Dry;Beefy red;Brown 07/17/23 1124   Joyce-wound Assessment Intact 07/17/23 1124   Wound Length (cm) 0.6 cm 07/17/23 1124   Wound Width (cm) 0.7 cm 07/17/23 1124   Wound Depth (cm) 0 cm 07/17/23 1124   Wound Surface Area (cm^2) 0.42 cm^2 07/17/23 1124   Wound Volume (cm^3) 0 cm^3 07/17/23 1124   Calculated Wound Volume (cm^3) 0 cm^3 07/17/23 1124   Treatments Cleansed 07/17/23 1124   Dressing Protective barrier 07/17/23 1124   Patient Tolerance Tolerated well 07/17/23 1124       Wound 07/10/23 Toe (Comment  which one) Anterior;Right (Active)   Wound Image   07/17/23 1140   Wound Description Beefy red;Brown;Yellow;Dry 07/17/23 1140   Joyce-wound Assessment Dry; Intact 07/17/23 1140   Wound Length (cm) 1 cm 07/17/23 1140   Wound Width (cm) 0.9 cm 07/17/23 1140   Wound Depth (cm) 0.1 cm 07/10/23 1051   Wound Surface Area (cm^2) 0.9 cm^2 07/17/23 1140   Wound Volume (cm^3) 0.06 cm^3 07/10/23 1051   Calculated Wound Volume (cm^3) 0.06 cm^3 07/10/23 1051   Drainage Amount None 07/17/23 1140   Non-staged Wound Description Partial thickness 07/10/23 1051   Treatments Cleansed 07/17/23 1140   Dressing Xeroform;Dry dressing;Gauze 07/17/23 1140   Dressing Changed New 07/17/23 1140   Patient Tolerance Tolerated well 07/17/23 1140   Dressing Status Clean;Dry; Intact 07/15/23 1709       Wound 07/10/23 Toe (Comment  which one) Anterior; Left (Active)   Wound Image   07/17/23 1140   Wound Description Pale;Light purple 07/17/23 1140   Pressure Injury Stage DTPI 07/17/23 1140   Joyce-wound Assessment Intact 07/17/23 1140   Wound Length (cm) 0.3 cm 07/17/23 1140   Wound Width (cm) 0.6 cm 07/17/23 1140   Wound Depth (cm) 0 cm 07/17/23 1140   Wound Surface Area (cm^2) 0.18 cm^2 07/17/23 1140   Wound Volume (cm^3) 0 cm^3 07/17/23 1140   Calculated Wound Volume (cm^3) 0 cm^3 07/17/23 1140   Drainage Amount None 07/17/23 1140   Treatments Cleansed;Site care 07/16/23 0916   Dressing Protective barrier 07/17/23 1140   Patient Tolerance Tolerated well 07/17/23 1140       Wound 07/10/23 Knee Anterior; Left (Active)   Wound Image    07/17/23 1137   Wound Description Dry;Brown;Black;Eschar 07/17/23 1137   Joyce-wound Assessment [place] 07/17/23 1137   Wound Length (cm) 2 cm 07/17/23 1137   Wound Width (cm) 4 cm 07/17/23 1137   Wound Depth (cm) 0 cm 07/17/23 1137 Wound Surface Area (cm^2) 8 cm^2 07/17/23 1137   Wound Volume (cm^3) 0 cm^3 07/17/23 1137   Calculated Wound Volume (cm^3) 0 cm^3 07/17/23 1137   Drainage Amount None 07/17/23 1137   Treatments Cleansed 07/17/23 1137   Dressing Protective barrier 07/17/23 1137   Patient Tolerance Tolerated well 07/17/23 1137   Dressing Status Clean;Dry; Intact 07/13/23 1933       Wound 07/10/23 Knee Anterior;Right (Active)   Wound Image    07/17/23 1138   Wound Description Beefy red;Brown;Black;Eschar 07/17/23 1138   Joyce-wound Assessment Intact 07/17/23 1138   Wound Length (cm) 6.5 cm 07/17/23 1138   Wound Width (cm) 4.8 cm 07/17/23 1138   Wound Depth (cm) 0 cm 07/17/23 1138   Wound Surface Area (cm^2) 31.2 cm^2 07/17/23 1138   Wound Volume (cm^3) 0 cm^3 07/17/23 1138   Calculated Wound Volume (cm^3) 0 cm^3 07/17/23 1138   Drainage Amount None 07/17/23 1138   Treatments Cleansed 07/17/23 1138   Dressing Protective barrier 07/17/23 1138   Patient Tolerance Tolerated well 07/17/23 1138   Dressing Status Clean;Dry; Intact 07/15/23 2145       Wound 07/10/23 Penis (Active)   Wound Image    07/17/23 1134   Wound Description Beefy red;Black; Brown;Eschar 07/17/23 1138   Pressure Injury Stage DTPI 07/17/23 1138   Joyce-wound Assessment Edema 07/17/23 1138   Wound Length (cm) 6.5 cm 07/17/23 1138   Wound Width (cm) 6 cm 07/17/23 1138   Wound Depth (cm) 0.1 cm 07/17/23 1138   Wound Surface Area (cm^2) 39 cm^2 07/17/23 1138   Wound Volume (cm^3) 3.9 cm^3 07/17/23 1138   Calculated Wound Volume (cm^3) 3.9 cm^3 07/17/23 1138   Change in Wound Size % 0 07/17/23 1138   Drainage Amount Scant 07/17/23 1138   Drainage Description Bloody 07/17/23 1138   Treatments Cleansed 07/17/23 1138   Dressing Protective barrier 07/17/23 1138   Patient Tolerance Tolerated well 07/17/23 1138       Wound 07/10/23 Buttocks (Active)   Wound Image   07/17/23 1150   Wound Description Beefy red;Fragile;Light purple;Dry 07/17/23 1150   Pressure Injury Stage DTPI 07/17/23 1150   Joyce-wound Assessment Dry;Hyperpigmented 07/17/23 1150   Wound Length (cm) 5 cm 07/17/23 1150   Wound Width (cm) 5 cm 07/17/23 1150   Wound Depth (cm) 0.1 cm 07/17/23 1150   Wound Surface Area (cm^2) 25 cm^2 07/17/23 1150   Wound Volume (cm^3) 2.5 cm^3 07/17/23 1150   Calculated Wound Volume (cm^3) 2.5 cm^3 07/17/23 1150   Drainage Amount None 07/17/23 1150   Treatments Cleansed 07/17/23 1150   Dressing Protective barrier 07/17/23 1150   Dressing Changed Changed 07/15/23 0900   Patient Tolerance Tolerated well 07/17/23 1150   Dressing Status Clean;Dry; Intact 07/16/23 0916       Wound 07/10/23 Elbow Posterior;Right (Active)   Wound Image   07/17/23 1149   Wound Description Beefy red;Brown;Edema 07/17/23 1134   Joyce-wound Assessment Edema 07/17/23 1134   Wound Length (cm) 3 cm 07/17/23 1149   Wound Width (cm) 2.5 cm 07/17/23 1149   Wound Depth (cm) 0 cm 07/17/23 1149   Wound Surface Area (cm^2) 7.5 cm^2 07/17/23 1149   Wound Volume (cm^3) 0 cm^3 07/17/23 1149   Calculated Wound Volume (cm^3) 0 cm^3 07/17/23 1149   Change in Wound Size % 100 07/17/23 1149   Drainage Amount None 07/17/23 1149   Drainage Description Serosanguineous 07/13/23 1933   Non-staged Wound Description Partial thickness 07/10/23 1101   Treatments Cleansed 07/17/23 1149   Dressing Protective barrier 07/17/23 1149   Patient Tolerance Tolerated well 07/17/23 1149   Dressing Status Intact 07/15/23 2145       Wound 07/10/23 Wrist Posterior;Right (Active)   Wound Image   07/17/23 1147   Wound Description Brown;Light purple 07/17/23 1147   Pressure Injury Stage DTPI 07/17/23 1147   Joyce-wound Assessment Edema 07/17/23 1147   Wound Length (cm) 2 cm 07/17/23 1147   Wound Width (cm) 1 cm 07/17/23 1147   Wound Depth (cm) 0.1 cm 07/17/23 1147   Wound Surface Area (cm^2) 2 cm^2 07/17/23 1147   Wound Volume (cm^3) 0.2 cm^3 07/17/23 1147   Calculated Wound Volume (cm^3) 0.2 cm^3 07/17/23 1147   Change in Wound Size % 84.5 07/17/23 1147   Drainage Amount Scant 07/17/23 1147   Drainage Description Serous 07/17/23 1147   Treatments Cleansed 07/17/23 1147   Dressing Foam, Silicon (eg. Allevyn, etc); Xeroform 07/17/23 1147   Dressing Changed New 07/17/23 1147   Patient Tolerance Tolerated well 07/17/23 1147       Wound 07/10/23 Arm Distal;Left;Posterior; Lower (Active)   Wound Image   07/17/23 1146   Wound Description Dry;Beefy red;Slough; Yellow 07/17/23 1146   Pressure Injury Stage DTPI 07/17/23 1146   Joyce-wound Assessment Dry;Pink 07/17/23 1146   Wound Length (cm) 1 cm 07/17/23 1146   Wound Width (cm) 1 cm 07/17/23 1146   Wound Depth (cm) 0 cm 07/17/23 1146   Wound Surface Area (cm^2) 1 cm^2 07/17/23 1146   Wound Volume (cm^3) 0 cm^3 07/17/23 1146   Calculated Wound Volume (cm^3) 0 cm^3 07/17/23 1146   Change in Wound Size % 100 07/17/23 1146   Drainage Amount None 07/17/23 1146   Treatments Cleansed 07/17/23 1146   Dressing Foam, Silicon (eg. Allevyn, etc); Xeroform 07/17/23 1146   Dressing Changed New 07/17/23 1146   Patient Tolerance Tolerated well 07/17/23 1146       Wound 07/10/23 Chest Left (Active)   Wound Image   07/17/23 1131   Wound Description Dry;Beefy red;Pale 07/17/23 1146   Joyce-wound Assessment Intact 07/17/23 1131   Wound Length (cm) 2.5 cm 07/17/23 1131   Wound Width (cm) 8 cm 07/17/23 1131   Wound Depth (cm) 0 cm 07/17/23 1131   Wound Surface Area (cm^2) 20 cm^2 07/17/23 1131   Wound Volume (cm^3) 0 cm^3 07/17/23 1131   Calculated Wound Volume (cm^3) 0 cm^3 07/17/23 1131   Change in Wound Size % 100 07/17/23 1131   Drainage Amount None 07/17/23 1131   Non-staged Wound Description Partial thickness 07/10/23 1106   Dressing Moisture barrier 07/17/23 1131   Patient Tolerance Tolerated well 07/17/23 1131       Wound 07/10/23 Chest Right;Upper (Active)   Wound Image   07/17/23 1130   Wound Description Intact 07/17/23 1130   Joyce-wound Assessment Fragile 07/16/23 0916   Wound Length (cm) 0 cm 07/17/23 1130   Wound Width (cm) 0 cm 07/17/23 1130   Wound Depth (cm) 0 cm 07/17/23 1130   Wound Surface Area (cm^2) 0 cm^2 07/17/23 1130   Wound Volume (cm^3) 0 cm^3 07/17/23 1130   Calculated Wound Volume (cm^3) 0 cm^3 07/17/23 1130   Change in Wound Size % 100 07/17/23 1130   Drainage Amount None 07/17/23 1130   Dressing Moisture barrier 07/16/23 0916   Patient Tolerance Tolerated well 07/17/23 1130       Wound 07/10/23 Elbow Left;Posterior (Active)   Wound Image   07/17/23 1145   Wound Description Beefy red 07/17/23 1145   Joyce-wound Assessment Dry; Intact 07/17/23 1145   Wound Length (cm) 8.5 cm 07/17/23 1145   Wound Width (cm) 2.5 cm 07/17/23 1145   Wound Depth (cm) 0 cm 07/17/23 1145   Wound Surface Area (cm^2) 21.25 cm^2 07/17/23 1145   Wound Volume (cm^3) 0 cm^3 07/17/23 1145   Calculated Wound Volume (cm^3) 0 cm^3 07/17/23 1145   Drainage Amount None 07/17/23 1145   Treatments Cleansed 07/17/23 1145   Dressing Protective barrier 07/17/23 1145   Patient Tolerance Tolerated well 07/17/23 1145     Reviewed plan of care with primary RN Reyes  Recommendations written as orders  Wound care team to follow weekly while admitted  Questions or concerns [phone] [facility] BSN, RN, [employer]

## 2023-07-20 ENCOUNTER — HOME CARE VISIT (OUTPATIENT)
Dept: HOME HOSPICE | Facility: HOSPICE | Age: 78
End: 2023-07-20
Payer: MEDICARE

## 2023-07-20 ENCOUNTER — HOME CARE VISIT (OUTPATIENT)
Dept: HOME HEALTH SERVICES | Facility: HOME HEALTHCARE | Age: 78
End: 2023-07-20
Payer: MEDICARE

## 2023-07-20 PROCEDURE — G0155 HHCP-SVS OF CSW,EA 15 MIN: HCPCS

## 2023-07-20 PROCEDURE — G0299 HHS/HOSPICE OF RN EA 15 MIN: HCPCS

## 2023-07-21 ENCOUNTER — HOME CARE VISIT (OUTPATIENT)
Dept: HOME HEALTH SERVICES | Facility: HOME HEALTHCARE | Age: 78
End: 2023-07-21
Payer: MEDICARE

## 2023-07-21 VITALS
DIASTOLIC BLOOD PRESSURE: 51 MMHG | SYSTOLIC BLOOD PRESSURE: 89 MMHG | RESPIRATION RATE: 17 BRPM | BODY MASS INDEX: 31.32 KG/M2 | HEART RATE: 75 BPM | WEIGHT: 200 LBS

## 2023-07-21 VITALS — RESPIRATION RATE: 18 BRPM

## 2023-07-21 PROCEDURE — 10330057 MEDICATION, GENERAL

## 2023-07-21 PROCEDURE — G0299 HHS/HOSPICE OF RN EA 15 MIN: HCPCS

## 2023-07-24 ENCOUNTER — HOME CARE VISIT (OUTPATIENT)
Dept: HOME HOSPICE | Facility: HOSPICE | Age: 78
End: 2023-07-24
Payer: MEDICARE

## 2023-07-24 ENCOUNTER — HOME CARE VISIT (OUTPATIENT)
Dept: HOME HEALTH SERVICES | Facility: HOME HEALTHCARE | Age: 78
End: 2023-07-24
Payer: MEDICARE

## 2023-07-24 VITALS — RESPIRATION RATE: 18 BRPM | HEART RATE: 64 BPM | TEMPERATURE: 98.7 F

## 2023-07-24 PROCEDURE — G0156 HHCP-SVS OF AIDE,EA 15 MIN: HCPCS

## 2023-07-24 PROCEDURE — G0299 HHS/HOSPICE OF RN EA 15 MIN: HCPCS

## 2023-07-25 ENCOUNTER — HOME CARE VISIT (OUTPATIENT)
Dept: HOME HEALTH SERVICES | Facility: HOME HEALTHCARE | Age: 78
End: 2023-07-25
Payer: MEDICARE

## 2023-07-25 ENCOUNTER — HOME CARE VISIT (OUTPATIENT)
Dept: HOME HOSPICE | Facility: HOSPICE | Age: 78
End: 2023-07-25
Payer: MEDICARE

## 2023-07-25 PROCEDURE — G0156 HHCP-SVS OF AIDE,EA 15 MIN: HCPCS

## 2023-07-26 ENCOUNTER — HOME CARE VISIT (OUTPATIENT)
Dept: HOME HEALTH SERVICES | Facility: HOME HEALTHCARE | Age: 78
End: 2023-07-26
Payer: MEDICARE

## 2023-07-26 VITALS
HEART RATE: 80 BPM | SYSTOLIC BLOOD PRESSURE: 103 MMHG | RESPIRATION RATE: 18 BRPM | DIASTOLIC BLOOD PRESSURE: 67 MMHG | TEMPERATURE: 97.7 F

## 2023-07-26 PROCEDURE — 10330057 MEDICATION, GENERAL

## 2023-07-26 PROCEDURE — G0300 HHS/HOSPICE OF LPN EA 15 MIN: HCPCS

## 2023-07-26 PROCEDURE — G0299 HHS/HOSPICE OF RN EA 15 MIN: HCPCS

## 2023-07-27 ENCOUNTER — HOME CARE VISIT (OUTPATIENT)
Dept: HOME HEALTH SERVICES | Facility: HOME HEALTHCARE | Age: 78
End: 2023-07-27
Payer: MEDICARE

## 2023-07-27 PROCEDURE — G0156 HHCP-SVS OF AIDE,EA 15 MIN: HCPCS

## 2023-07-28 ENCOUNTER — HOME CARE VISIT (OUTPATIENT)
Dept: HOME HOSPICE | Facility: HOSPICE | Age: 78
End: 2023-07-28
Payer: MEDICARE

## 2023-07-28 ENCOUNTER — HOME CARE VISIT (OUTPATIENT)
Dept: HOME HEALTH SERVICES | Facility: HOME HEALTHCARE | Age: 78
End: 2023-07-28
Payer: MEDICARE

## 2023-07-28 VITALS — RESPIRATION RATE: 18 BRPM

## 2023-07-28 PROCEDURE — G0299 HHS/HOSPICE OF RN EA 15 MIN: HCPCS

## 2023-07-28 PROCEDURE — G0156 HHCP-SVS OF AIDE,EA 15 MIN: HCPCS

## 2023-07-29 ENCOUNTER — HOME CARE VISIT (OUTPATIENT)
Dept: HOME HEALTH SERVICES | Facility: HOME HEALTHCARE | Age: 78
End: 2023-07-29
Payer: MEDICARE

## 2023-07-29 PROCEDURE — 10330057 MEDICATION, GENERAL

## 2023-07-29 PROCEDURE — G0156 HHCP-SVS OF AIDE,EA 15 MIN: HCPCS

## 2023-07-30 ENCOUNTER — HOME CARE VISIT (OUTPATIENT)
Dept: HOME HEALTH SERVICES | Facility: HOME HEALTHCARE | Age: 78
End: 2023-07-30
Payer: MEDICARE

## 2023-07-30 PROCEDURE — G0156 HHCP-SVS OF AIDE,EA 15 MIN: HCPCS

## 2023-07-31 ENCOUNTER — HOME CARE VISIT (OUTPATIENT)
Dept: HOME HEALTH SERVICES | Facility: HOME HEALTHCARE | Age: 78
End: 2023-07-31
Payer: MEDICARE

## 2023-07-31 VITALS
HEART RATE: 72 BPM | TEMPERATURE: 97.3 F | RESPIRATION RATE: 18 BRPM | SYSTOLIC BLOOD PRESSURE: 112 MMHG | DIASTOLIC BLOOD PRESSURE: 66 MMHG

## 2023-07-31 PROCEDURE — G0156 HHCP-SVS OF AIDE,EA 15 MIN: HCPCS

## 2023-07-31 PROCEDURE — G0299 HHS/HOSPICE OF RN EA 15 MIN: HCPCS

## 2023-08-01 ENCOUNTER — HOME CARE VISIT (OUTPATIENT)
Dept: HOME HOSPICE | Facility: HOSPICE | Age: 78
End: 2023-08-01
Payer: MEDICARE

## 2023-08-01 ENCOUNTER — HOME CARE VISIT (OUTPATIENT)
Dept: HOME HEALTH SERVICES | Facility: HOME HEALTHCARE | Age: 78
End: 2023-08-01
Payer: MEDICARE

## 2023-08-01 PROCEDURE — G0155 HHCP-SVS OF CSW,EA 15 MIN: HCPCS

## 2023-08-01 PROCEDURE — G0156 HHCP-SVS OF AIDE,EA 15 MIN: HCPCS

## 2023-08-02 ENCOUNTER — HOME CARE VISIT (OUTPATIENT)
Dept: HOME HEALTH SERVICES | Facility: HOME HEALTHCARE | Age: 78
End: 2023-08-02
Payer: MEDICARE

## 2023-08-02 PROCEDURE — G0156 HHCP-SVS OF AIDE,EA 15 MIN: HCPCS

## 2023-08-03 ENCOUNTER — HOME CARE VISIT (OUTPATIENT)
Dept: HOME HEALTH SERVICES | Facility: HOME HEALTHCARE | Age: 78
End: 2023-08-03
Payer: MEDICARE

## 2023-08-03 PROCEDURE — G0299 HHS/HOSPICE OF RN EA 15 MIN: HCPCS

## 2023-08-03 PROCEDURE — G0156 HHCP-SVS OF AIDE,EA 15 MIN: HCPCS

## 2023-08-04 ENCOUNTER — HOME CARE VISIT (OUTPATIENT)
Dept: HOME HEALTH SERVICES | Facility: HOME HEALTHCARE | Age: 78
End: 2023-08-04
Payer: MEDICARE

## 2023-08-04 PROCEDURE — G0156 HHCP-SVS OF AIDE,EA 15 MIN: HCPCS

## 2023-08-05 ENCOUNTER — HOME CARE VISIT (OUTPATIENT)
Dept: HOME HEALTH SERVICES | Facility: HOME HEALTHCARE | Age: 78
End: 2023-08-05
Payer: MEDICARE

## 2023-08-05 PROCEDURE — G0156 HHCP-SVS OF AIDE,EA 15 MIN: HCPCS

## 2023-08-07 ENCOUNTER — HOME CARE VISIT (OUTPATIENT)
Dept: HOME HEALTH SERVICES | Facility: HOME HEALTHCARE | Age: 78
End: 2023-08-07
Payer: MEDICARE

## 2023-08-07 VITALS — RESPIRATION RATE: 20 BRPM | SYSTOLIC BLOOD PRESSURE: 124 MMHG | HEART RATE: 87 BPM | DIASTOLIC BLOOD PRESSURE: 67 MMHG

## 2023-08-07 PROCEDURE — G0156 HHCP-SVS OF AIDE,EA 15 MIN: HCPCS

## 2023-08-08 ENCOUNTER — HOME CARE VISIT (OUTPATIENT)
Dept: HOME HEALTH SERVICES | Facility: HOME HEALTHCARE | Age: 78
End: 2023-08-08
Payer: MEDICARE

## 2023-08-08 VITALS — RESPIRATION RATE: 17 BRPM

## 2023-08-08 PROCEDURE — G0299 HHS/HOSPICE OF RN EA 15 MIN: HCPCS

## 2023-08-08 PROCEDURE — G0156 HHCP-SVS OF AIDE,EA 15 MIN: HCPCS

## 2023-08-09 ENCOUNTER — HOME CARE VISIT (OUTPATIENT)
Dept: HOME HEALTH SERVICES | Facility: HOME HEALTHCARE | Age: 78
End: 2023-08-09
Payer: MEDICARE

## 2023-08-09 PROCEDURE — G0156 HHCP-SVS OF AIDE,EA 15 MIN: HCPCS

## 2023-08-10 ENCOUNTER — HOME CARE VISIT (OUTPATIENT)
Dept: HOME HEALTH SERVICES | Facility: HOME HEALTHCARE | Age: 78
End: 2023-08-10
Payer: MEDICARE

## 2023-08-10 PROCEDURE — G0156 HHCP-SVS OF AIDE,EA 15 MIN: HCPCS

## 2023-08-11 ENCOUNTER — HOME CARE VISIT (OUTPATIENT)
Dept: HOME HEALTH SERVICES | Facility: HOME HEALTHCARE | Age: 78
End: 2023-08-11
Payer: MEDICARE

## 2023-08-11 VITALS — RESPIRATION RATE: 17 BRPM

## 2023-08-11 PROCEDURE — G0299 HHS/HOSPICE OF RN EA 15 MIN: HCPCS

## 2023-08-11 PROCEDURE — G0156 HHCP-SVS OF AIDE,EA 15 MIN: HCPCS

## 2023-08-15 ENCOUNTER — HOME CARE VISIT (OUTPATIENT)
Dept: HOME HEALTH SERVICES | Facility: HOME HEALTHCARE | Age: 78
End: 2023-08-15
Payer: MEDICARE

## 2023-08-15 ENCOUNTER — HOME CARE VISIT (OUTPATIENT)
Dept: HOME HOSPICE | Facility: HOSPICE | Age: 78
End: 2023-08-15
Payer: MEDICARE

## 2023-08-15 VITALS — RESPIRATION RATE: 17 BRPM | HEART RATE: 67 BPM | TEMPERATURE: 98.7 F

## 2023-08-15 PROCEDURE — G0156 HHCP-SVS OF AIDE,EA 15 MIN: HCPCS

## 2023-08-15 PROCEDURE — G0299 HHS/HOSPICE OF RN EA 15 MIN: HCPCS

## 2023-08-15 PROCEDURE — G0155 HHCP-SVS OF CSW,EA 15 MIN: HCPCS

## 2023-08-15 NOTE — ASSESSMENT & PLAN NOTE
Nutrition Assessment   Reason for Consult/Assessment: Initial, Enteral nutrition      Diagnosis and Hx: Reviewed    Pertinent Nutrition History: Pt admitted with hypokalemia and possible pneumonia. Hx includes CKD, CHF and g-tube which pt rarely uses    Pertinent Nutrition Information: TF consult recieved. TFs initiated per MD order. Current order of Jevity 1.5 = 360ml TID provides 1080ml, 1620kcal, 69gm protein and 820ml free water and provides ~75% of needs. VFSS completed today with diet upgraded per SLP recs. Pt was taking pureed diet at home with occasional cheating (sandwiches). Spoke with Pt and he prefers me to speak with wife. She reports g-tube placed in mid-June 2023 after surgery and subsequent failed swallow evaluations. Wife reports pt takes 1-1.5 cartons TID of Jevity 1.5 and ~500ml total water flush for the day. Pt weighed 186# 1 month ago and had started to gain weight at home. Pt's weight is up ~15# although likely fluid contributing as pt is fluid overloaded at this time (LLE, abdomen). Pt remains appropriate for TFs to meet at least 75% of needs. Current TF regimen and oral intake will likely meet 100% of needs. Will send ONS and monitor oral/EN intake/tolerance.    STAAR Protocol: No                            Diet Order: Enteral nutrition/tube feeding                  Diet tolerance: Other (comment)previously tolerating pureed diet  Food Allergies: None known    Demographic/Anthropometrics Information  Gender: male  Patient Age: 72 year old  Height:   Ht Readings from Last 1 Encounters:   08/14/23 6' 1\" (1.854 m)      Weight: 210# 8/14/23; Weight on 7/14/23 = 186# per wt at home  Wt Readings from Last 1 Encounters:   08/14/23 95.5 kg      BMI:   BMI Readings from Last 1 Encounters:   08/14/23 27.78 kg/m²       Usual Weight: 103 kg (226#)  % Weight Change: -16# (7%) in 4 months  Weight change significant: No  Reason for weight change: Decreased intake     Estimated Needs based on  · Continue Protonix 96kg:  Calculated Energy Needs: 6207-8201  kcal (20-25kcal/kg)               Calculated protein needs:   g (1-1.2gm/kg)    Calculated Fluid Needs: 1ml/kcal              NFPE     Body Fat  Overall Body Fat: Mild/Moderate  Orbital Region: Mild/Moderate  Cheek Region (Buccal Fat Pads): Mild/Moderate  Muscle Mass  Overall Muscle Mass: Mild/Moderate  Temporal Region (Temporalis Muscle): Mild/Moderate  Collarbone Region (Clavicle Bone, Pectoralis Major, Trapezius Muscle): Mild/Moderate  Skin Assessment/Wounds  Edema: Mild to moderate pitting, slight swelling of the extremity, indentation subsides quickly (0-30 sec)             TREATMENT PLAN: Monitoring & Interventions   Intervention: Coordination of nutrition care by a nutrition professional, Enteral nutrition, Meals and snacks, Nutrition supplement therapy         Meals & snacks: IDDSI 4  Enteral Nutrition Formula: Jevity 1.5 Calorie   Rate: 360ml bolus feedings TID  Access Site: G-tube  Calories Provided by Tube Feedinkcal  Protein Provided by Tube Feedingm  Free Water Provided by Tube Feedinml               Flushes: 100ml q4hr per nephrology                        Nutrition supplement therapy: ensure plus q/d       Goal: Tolerate enteral feeding goal, Meet >/equal 75% estimated needs, Tolerate oral diet, Transition to oral intake   Intervention goal status: Initiated  Time frame to achieve goal: 3-5 days     Dietitian will monitor: Anthropometric Measurements, Food, beverage, and nutrient intake, Enteral nutrition intake            Nutrition Diagnosis / PES  Nutrition Diagnosis: Malnutrition  Malnutrition in the context of chronic illness: Non-severe (moderate)  Related to: Difficulty swallowing   As evidenced by: Loss of muscle mass, Loss of fat mass, Weight loss over time   Malnutrition chronic; non-severe: Mild depletion of body fat, Mild depletion of muscle mass  Primary Nutrition Diagnosis status: New nutrition diagnosis

## 2023-08-16 ENCOUNTER — HOME CARE VISIT (OUTPATIENT)
Dept: HOME HEALTH SERVICES | Facility: HOME HEALTHCARE | Age: 78
End: 2023-08-16
Payer: MEDICARE

## 2023-08-16 PROCEDURE — G0156 HHCP-SVS OF AIDE,EA 15 MIN: HCPCS

## 2023-08-18 ENCOUNTER — HOME CARE VISIT (OUTPATIENT)
Dept: HOME HEALTH SERVICES | Facility: HOME HEALTHCARE | Age: 78
End: 2023-08-18
Payer: MEDICARE

## 2023-08-18 VITALS
DIASTOLIC BLOOD PRESSURE: 87 MMHG | RESPIRATION RATE: 18 BRPM | TEMPERATURE: 98.4 F | HEART RATE: 76 BPM | SYSTOLIC BLOOD PRESSURE: 134 MMHG

## 2023-08-18 PROCEDURE — G0156 HHCP-SVS OF AIDE,EA 15 MIN: HCPCS

## 2023-08-18 PROCEDURE — G0299 HHS/HOSPICE OF RN EA 15 MIN: HCPCS

## 2023-08-21 ENCOUNTER — HOME CARE VISIT (OUTPATIENT)
Dept: HOME HEALTH SERVICES | Facility: HOME HEALTHCARE | Age: 78
End: 2023-08-21
Payer: MEDICARE

## 2023-08-21 VITALS
HEART RATE: 80 BPM | TEMPERATURE: 97.8 F | DIASTOLIC BLOOD PRESSURE: 73 MMHG | SYSTOLIC BLOOD PRESSURE: 107 MMHG | RESPIRATION RATE: 18 BRPM

## 2023-08-21 PROCEDURE — G0299 HHS/HOSPICE OF RN EA 15 MIN: HCPCS

## 2023-08-21 PROCEDURE — G0156 HHCP-SVS OF AIDE,EA 15 MIN: HCPCS

## 2023-08-23 ENCOUNTER — HOME CARE VISIT (OUTPATIENT)
Dept: HOME HEALTH SERVICES | Facility: HOME HEALTHCARE | Age: 78
End: 2023-08-23
Payer: MEDICARE

## 2023-08-23 VITALS — TEMPERATURE: 97.3 F | RESPIRATION RATE: 17 BRPM

## 2023-08-23 PROCEDURE — G0299 HHS/HOSPICE OF RN EA 15 MIN: HCPCS

## 2023-08-24 ENCOUNTER — TELEPHONE (OUTPATIENT)
Dept: ADMINISTRATIVE | Facility: OTHER | Age: 78
End: 2023-08-24

## 2023-08-24 ENCOUNTER — HOME CARE VISIT (OUTPATIENT)
Dept: HOME HEALTH SERVICES | Facility: HOME HEALTHCARE | Age: 78
End: 2023-08-24
Payer: MEDICARE

## 2023-08-24 PROCEDURE — G0156 HHCP-SVS OF AIDE,EA 15 MIN: HCPCS

## 2023-08-24 NOTE — TELEPHONE ENCOUNTER
----- Message from Dario Reddy MA sent at 8/23/2023  1:16 PM EDT -----  Regarding: AWV  08/23/23 1:16 PM    Hello, our patient Richard Gonzalez has had Medicare AWV completed/performed. Please assist in updating the patient chart by pulling the document from ENCOUNTER Tab(SWITCHBACK) within Chart Review. The date of service is 04/03/2023.   DOCUMENTATION IS LOCATED ON THE SECOND PAGE    Thank you,    [facility]

## 2023-08-25 ENCOUNTER — HOME CARE VISIT (OUTPATIENT)
Dept: HOME HEALTH SERVICES | Facility: HOME HEALTHCARE | Age: 78
End: 2023-08-25
Payer: MEDICARE

## 2023-08-25 PROCEDURE — G0156 HHCP-SVS OF AIDE,EA 15 MIN: HCPCS

## 2023-08-26 ENCOUNTER — HOME CARE VISIT (OUTPATIENT)
Dept: HOME HOSPICE | Facility: HOSPICE | Age: 78
End: 2023-08-26
Payer: MEDICARE

## 2023-09-05 ENCOUNTER — HOME CARE VISIT (OUTPATIENT)
Dept: HOME HOSPICE | Facility: HOSPICE | Age: 78
End: 2023-09-05
Payer: MEDICARE

## 2023-09-13 ENCOUNTER — HOME CARE VISIT (OUTPATIENT)
Dept: HOME HOSPICE | Facility: HOSPICE | Age: 78
End: 2023-09-13
Payer: MEDICARE

## 2024-10-01 NOTE — ACP (ADVANCE CARE PLANNING)
"Advanced Care Planning Progress Note    Serious Illness Conversation    1  What is your understanding now of where you are with your illness? Prognostic Understanding: no understanding of prognosis  Patient has been dealing with Parkinsons for 5 years  Has not seen a neurologist so does not know the prognosis of his disease process     2  How much information about what is likely to be ahead with your illness would you like to have? 3  What did you (clinician) communicate to the patient? Prognostic Communication: Uncertain - It can be difficult to predict what will happen with your illness  I hope you will continue to live well for a long time but I’m worried that you could get sick quickly, and I think it is important to prepare for that possibility  4  If your health situation worsens, what are your most important goals? Goals: be physically comfortable, not be a burden     5  What are the biggest fears and worries about the future and your health? Fears/Worries: pain, being dependent     6  What abilities are so critical to your life that you cannot imagine living without them? Unacceptable Function: being chronically confused or not being myself, being in pain or very uncomfortable     7  What gives you strength as you think about the future with your illness? \"Right now not much\"  Patient lives alone and does not have much help  Does not want to burden his son  8  If you become sicker, how much are you willing to go through for the possibility of gaining more time? Be in the hospital: No    Be in the ICU: No    Be on a ventilator: No    Be on dialysis: No    Patient states he dose not want to be resuscitated   9  How much does your proxy and family know about your priorities and wishes? Discussion Discussion: extensive discussion with family about goals and wishes        How does this plan sound to you? I will do everything I can to help you through this    Patient verbalized understanding " of the plan     I have spent 35 minutes speaking with my patient on advanced care planning today or during this visit     Advanced directives  Five Wishes: Patient does not have Five Wishes- would not like information       Patient states that he has been dealing with his Parkinson's disease for the last 5 to 6 years  Treatment has been guided by his PCP  However patient states that over the last 6 to 8 months he has had worsening symptoms specifically in his right upper extremity with tremor and pain  Patient lives alone and is concerned that this may not be possible anymore as it is difficult for him to be on his own as he is having difficulty with ADLs  Children nearby help him here and there however he does not want to burden them  Patient states that he does not want to be resuscitated  Patient changed to DNR in the epic system  Notified RN to confirm this with patient      Donal Calero MD [Negative] : Heme/Lymph

## (undated) DEVICE — SUT ETHIBOND 2-0 SH-1/SH-1 30 IN X763H

## (undated) DEVICE — 32 FR RIGHT ANGLE – SOFT PVC CATHETER: Brand: PVC THORACIC CATHETERS

## (undated) DEVICE — STERNAL WIRE

## (undated) DEVICE — BLANKET HYPOTHERMIA ADULT GAYMAR

## (undated) DEVICE — SUT PROLENE 7-0 BV175-8/BV175-8 24 IN EPM8747

## (undated) DEVICE — SUT PROLENE 4-0 BB 36 IN 8581H

## (undated) DEVICE — SUT SILK 0 CT-1 30 IN 424H

## (undated) DEVICE — BLADE BEAVER MINI SZ 69

## (undated) DEVICE — SUT PROLENE 6-0 C-1/C-1 30 IN 8307H

## (undated) DEVICE — PLUMEPEN PRO 10FT

## (undated) DEVICE — SUT SILK 3-0 SH CR/8 18 IN C013D

## (undated) DEVICE — ELECTRODE BLADE E-Z CLEAN 4IN -0014A

## (undated) DEVICE — TRAY FOLEY 16FR SURESTEP TEMP SENS URIMETER STAT LOK

## (undated) DEVICE — SUT PROLENE 4-0 RB-1/RB-1 36 IN 8557H

## (undated) DEVICE — VASOVIEW HEMOPRO 2: Brand: VASOVIEW HEMOPRO 2

## (undated) DEVICE — SUCTION CATH 18 FR

## (undated) DEVICE — GLOVE INDICATOR PI UNDERGLOVE SZ 8 BLUE

## (undated) DEVICE — ALCON OPHTHALMIC KNIFE 15 °: Brand: ALCON

## (undated) DEVICE — PENCIL ELECTROSURG E-Z CLEAN -0035H

## (undated) DEVICE — CATH STRAIGHT RED RUBBER 20 FR

## (undated) DEVICE — INTENDED FOR TISSUE SEPARATION, AND OTHER PROCEDURES THAT REQUIRE A SHARP SURGICAL BLADE TO PUNCTURE OR CUT.: Brand: BARD-PARKER ® CARBON RIB-BACK BLADES

## (undated) DEVICE — PACK CABG PBDS

## (undated) DEVICE — GLOVE SRG BIOGEL ECLIPSE 8

## (undated) DEVICE — TUBING INSUFFLATION SET ISO CONNECTOR

## (undated) DEVICE — AORTIC PUNCH 5.2 MM DISP

## (undated) DEVICE — SILVER-COATED ANTIBACTERIAL BARRIER DRESSING: Brand: ACTICOAT SURGIC 10X12CM 5PK US

## (undated) DEVICE — BONE WAX WHITE: Brand: BONE WAX WHITE

## (undated) DEVICE — SUT PDS PLUS 1 CTB 36 IN PDPB359T

## (undated) DEVICE — SUT MONOCRYL PLUS 4-0 PS-2 18 IN MCP496G

## (undated) DEVICE — SUT PROLENE 4-0 SH 36 IN 8521H

## (undated) DEVICE — EVERGRIP INSERT SET 86MM: Brand: FOGARTY EVERGRIP

## (undated) DEVICE — ANTIBACTERIAL UNDYED BRAIDED (POLYGLACTIN 910), SYNTHETIC ABSORBABLE SUTURE: Brand: COATED VICRYL

## (undated) DEVICE — SUT MONOCRYL PLUS 3-0 PS-2 27 IN MCP427H

## (undated) DEVICE — SYRINGE 50ML LL

## (undated) DEVICE — FILTER SMOKE EVAC VIROSAFE

## (undated) DEVICE — PLEDGET CARDIO PTFE 9.5 X 4.8 SOFT LF (6EA/PK)

## (undated) DEVICE — SUT PROLENE 5-0 C-1/C-1 36 IN 8321H

## (undated) DEVICE — INTENDED FOR TISSUE SEPARATION, AND OTHER PROCEDURES THAT REQUIRE A SHARP SURGICAL BLADE TO PUNCTURE OR CUT.: Brand: BARD-PARKER SAFETY BLADES SIZE 15, STERILE

## (undated) DEVICE — OASIS DRAIN, DUAL, IN-LINE, ATS COMPATIBLE: Brand: OASIS

## (undated) DEVICE — GAUZE SPONGES,16 PLY: Brand: CURITY

## (undated) DEVICE — 3000CC GUARDIAN II: Brand: GUARDIAN

## (undated) DEVICE — SUT SILK 2 60 IN SA8H

## (undated) DEVICE — THERMOFLECT BLANKET, L, 25EA                               TS THERMOFLECT BLANKET, 48" X 84", SILVER, 5/BG, 5 BG/CS NW: Brand: THERMOFLECT

## (undated) DEVICE — 40601 PROLONGED POSITIONING SYSTEM: Brand: 40601 PROLONGED POSITIONING SYSTEM

## (undated) DEVICE — RECIP.STERNUM SAW BLADE 34/7.5/0.7MM: Brand: AESCULAP

## (undated) DEVICE — LIGHT HANDLE COVER SLEEVE DISP BLUE STELLAR

## (undated) DEVICE — ACE WRAP 6 IN UNSTERILE

## (undated) DEVICE — DRESSING ALLEVYN LIFE HEEL 25 X 25.2CM

## (undated) DEVICE — 32 FR STRAIGHT – SOFT PVC CATHETER: Brand: PVC THORACIC CATHETERS

## (undated) DEVICE — LIGACLIP MCA MULTIPLE CLIP APPLIERS, 20 SMALL CLIPS: Brand: LIGACLIP

## (undated) DEVICE — ADHESIVE SKIN HIGH VISCOSITY EXOFIN 1ML

## (undated) DEVICE — VASOVIEW EVH ACCSESSORY PACK: Brand: VASOVIEW EVH ACCSESSORY PACK

## (undated) DEVICE — SILVER-COATED ANTIBACTERIAL BARRIER DRESSING: Brand: ACTICOAT SURGIC 10X25CM 5PK US